# Patient Record
Sex: MALE | Race: WHITE | NOT HISPANIC OR LATINO | Employment: OTHER | URBAN - METROPOLITAN AREA
[De-identification: names, ages, dates, MRNs, and addresses within clinical notes are randomized per-mention and may not be internally consistent; named-entity substitution may affect disease eponyms.]

---

## 2017-03-17 ENCOUNTER — HOSPITAL ENCOUNTER (OUTPATIENT)
Dept: INFUSION THERAPY | Facility: HOSPITAL | Age: 47
Discharge: HOME | End: 2017-03-17
Attending: FAMILY MEDICINE | Admitting: FAMILY MEDICINE

## 2017-04-21 ENCOUNTER — ON CAMPUS - OUTPATIENT (AMBULATORY)
Dept: URBAN - METROPOLITAN AREA HOSPITAL 2 | Facility: HOSPITAL | Age: 47
End: 2017-04-21

## 2017-04-21 VITALS
HEART RATE: 69 BPM | OXYGEN SATURATION: 99 % | HEART RATE: 61 BPM | HEIGHT: 65 IN | OXYGEN SATURATION: 97 % | HEART RATE: 72 BPM | HEART RATE: 64 BPM | HEART RATE: 63 BPM | SYSTOLIC BLOOD PRESSURE: 117 MMHG | OXYGEN SATURATION: 93 % | DIASTOLIC BLOOD PRESSURE: 80 MMHG | WEIGHT: 167.8 LBS | SYSTOLIC BLOOD PRESSURE: 131 MMHG | SYSTOLIC BLOOD PRESSURE: 104 MMHG | OXYGEN SATURATION: 92 % | SYSTOLIC BLOOD PRESSURE: 121 MMHG | OXYGEN SATURATION: 95 % | DIASTOLIC BLOOD PRESSURE: 73 MMHG | SYSTOLIC BLOOD PRESSURE: 108 MMHG | RESPIRATION RATE: 18 BRPM | DIASTOLIC BLOOD PRESSURE: 62 MMHG | SYSTOLIC BLOOD PRESSURE: 96 MMHG | HEART RATE: 78 BPM | RESPIRATION RATE: 16 BRPM | DIASTOLIC BLOOD PRESSURE: 70 MMHG | DIASTOLIC BLOOD PRESSURE: 85 MMHG | TEMPERATURE: 97.3 F

## 2017-04-21 DIAGNOSIS — R13.10 DYSPHAGIA, UNSPECIFIED: ICD-10-CM

## 2017-04-21 PROCEDURE — 43450 DILATE ESOPHAGUS 1/MULT PASS: CPT | Performed by: INTERNAL MEDICINE

## 2017-04-21 PROCEDURE — 43235 EGD DIAGNOSTIC BRUSH WASH: CPT | Performed by: INTERNAL MEDICINE

## 2017-04-21 RX ORDER — RANITIDINE 300 MG/1
TABLET ORAL
Qty: 180 | Refills: 4 | Status: COMPLETED
End: 2019-10-17

## 2017-04-21 RX ADMIN — PROPOFOL: 10 INJECTION, EMULSION INTRAVENOUS at 08:53

## 2017-05-18 ENCOUNTER — OFFICE (AMBULATORY)
Dept: URBAN - METROPOLITAN AREA CLINIC 64 | Facility: CLINIC | Age: 47
End: 2017-05-18

## 2017-05-18 VITALS
HEART RATE: 85 BPM | DIASTOLIC BLOOD PRESSURE: 79 MMHG | WEIGHT: 170 LBS | HEIGHT: 65 IN | SYSTOLIC BLOOD PRESSURE: 132 MMHG

## 2017-05-18 DIAGNOSIS — R10.84 GENERALIZED ABDOMINAL PAIN: ICD-10-CM

## 2017-05-18 DIAGNOSIS — Q43.1 HIRSCHSPRUNG'S DISEASE: ICD-10-CM

## 2017-05-18 DIAGNOSIS — K59.1 FUNCTIONAL DIARRHEA: ICD-10-CM

## 2017-05-18 DIAGNOSIS — R14.0 ABDOMINAL DISTENSION (GASEOUS): ICD-10-CM

## 2017-05-18 PROCEDURE — 99214 OFFICE O/P EST MOD 30 MIN: CPT | Performed by: NURSE PRACTITIONER

## 2017-05-19 LAB
C DIFFICILE TOXINS A+B, EIA: NEGATIVE
OVA + PARASITE EXAM: (no result)
RESULT: RESULT 1: (no result)
STOOL CULTURE: CAMPYLOBACTER CULTURE: (no result)
STOOL CULTURE: E COLI SHIGA TOXIN EIA: NEGATIVE
STOOL CULTURE: SALMONELLA/SHIGELLA SCREEN: (no result)
WHITE BLOOD CELLS (WBC), STOOL: (no result)

## 2017-06-15 ENCOUNTER — HOSPITAL ENCOUNTER (OUTPATIENT)
Dept: FAMILY MEDICINE CLINIC | Facility: CLINIC | Age: 47
Setting detail: SPECIMEN
Discharge: HOME | End: 2017-06-15
Attending: FAMILY MEDICINE | Admitting: FAMILY MEDICINE

## 2017-06-15 LAB
CHOLEST SERPL-MCNC: 166 MG/DL
CHOLEST/HDLC SERPL: 4.1 {RATIO}
CONV LDL CHOLESTEROL DIRECT: 112 MG/DL (ref 0–100)
HDLC SERPL-MCNC: 40 MG/DL
LDLC/HDLC SERPL: 2.8 {RATIO}
LIPID INTERPRETATION: ABNORMAL
PSA SERPL-MCNC: 1.1 NG/ML (ref 0–4)
TRIGL SERPL-MCNC: 87 MG/DL
TSH SERPL-ACNC: 6.45 UIU/ML (ref 0.34–5.6)
VALPROATE SERPL-MCNC: 74.5 UG/ML (ref 50–100)
VLDLC SERPL CALC-MCNC: 13.7 MG/DL

## 2017-06-21 ENCOUNTER — ON CAMPUS - OUTPATIENT (AMBULATORY)
Dept: URBAN - METROPOLITAN AREA HOSPITAL 2 | Facility: HOSPITAL | Age: 47
End: 2017-06-21

## 2017-06-21 ENCOUNTER — HOSPITAL ENCOUNTER (OUTPATIENT)
Dept: OTHER | Facility: HOSPITAL | Age: 47
Setting detail: SPECIMEN
Discharge: HOME | End: 2017-06-21
Attending: INTERNAL MEDICINE | Admitting: INTERNAL MEDICINE

## 2017-06-21 ENCOUNTER — OFFICE (AMBULATORY)
Dept: URBAN - METROPOLITAN AREA CLINIC 64 | Facility: CLINIC | Age: 47
End: 2017-06-21

## 2017-06-21 VITALS
TEMPERATURE: 97.6 F | DIASTOLIC BLOOD PRESSURE: 77 MMHG | OXYGEN SATURATION: 97 % | DIASTOLIC BLOOD PRESSURE: 47 MMHG | SYSTOLIC BLOOD PRESSURE: 100 MMHG | SYSTOLIC BLOOD PRESSURE: 92 MMHG | DIASTOLIC BLOOD PRESSURE: 57 MMHG | DIASTOLIC BLOOD PRESSURE: 52 MMHG | DIASTOLIC BLOOD PRESSURE: 40 MMHG | OXYGEN SATURATION: 95 % | HEART RATE: 59 BPM | HEART RATE: 60 BPM | WEIGHT: 169 LBS | SYSTOLIC BLOOD PRESSURE: 112 MMHG | HEART RATE: 61 BPM | HEART RATE: 58 BPM | OXYGEN SATURATION: 98 % | DIASTOLIC BLOOD PRESSURE: 51 MMHG | DIASTOLIC BLOOD PRESSURE: 58 MMHG | HEART RATE: 56 BPM | SYSTOLIC BLOOD PRESSURE: 79 MMHG | DIASTOLIC BLOOD PRESSURE: 50 MMHG | RESPIRATION RATE: 17 BRPM | OXYGEN SATURATION: 96 % | HEIGHT: 65 IN | HEART RATE: 57 BPM | DIASTOLIC BLOOD PRESSURE: 53 MMHG | RESPIRATION RATE: 16 BRPM | SYSTOLIC BLOOD PRESSURE: 83 MMHG | HEART RATE: 55 BPM | RESPIRATION RATE: 18 BRPM | RESPIRATION RATE: 20 BRPM | SYSTOLIC BLOOD PRESSURE: 124 MMHG | HEART RATE: 53 BPM | SYSTOLIC BLOOD PRESSURE: 93 MMHG | HEART RATE: 52 BPM | DIASTOLIC BLOOD PRESSURE: 73 MMHG | SYSTOLIC BLOOD PRESSURE: 118 MMHG | SYSTOLIC BLOOD PRESSURE: 72 MMHG | DIASTOLIC BLOOD PRESSURE: 66 MMHG | SYSTOLIC BLOOD PRESSURE: 80 MMHG | HEART RATE: 72 BPM | DIASTOLIC BLOOD PRESSURE: 86 MMHG

## 2017-06-21 DIAGNOSIS — Q43.1 HIRSCHSPRUNG'S DISEASE: ICD-10-CM

## 2017-06-21 DIAGNOSIS — K62.1 RECTAL POLYP: ICD-10-CM

## 2017-06-21 DIAGNOSIS — R19.4 CHANGE IN BOWEL HABIT: ICD-10-CM

## 2017-06-21 DIAGNOSIS — K59.1 FUNCTIONAL DIARRHEA: ICD-10-CM

## 2017-06-21 DIAGNOSIS — R10.84 GENERALIZED ABDOMINAL PAIN: ICD-10-CM

## 2017-06-21 LAB
GI HISTOLOGY: A. UNSPECIFIED: (no result)
GI HISTOLOGY: PDF REPORT: (no result)

## 2017-06-21 PROCEDURE — 88305 TISSUE EXAM BY PATHOLOGIST: CPT | Mod: 26 | Performed by: INTERNAL MEDICINE

## 2017-06-21 PROCEDURE — 45380 COLONOSCOPY AND BIOPSY: CPT | Performed by: INTERNAL MEDICINE

## 2017-06-21 RX ORDER — LUBIPROSTONE 24 UG/1
CAPSULE, GELATIN COATED ORAL
Qty: 60 | Refills: 1 | Status: COMPLETED
End: 2019-11-04

## 2017-06-21 RX ADMIN — PROPOFOL: 10 INJECTION, EMULSION INTRAVENOUS at 08:35

## 2017-06-21 NOTE — SERVICEHPINOTES
Patient is a 46-year-old male with history of Hirschsprung's disease, magacolon s/p left colectomy, and behavior disorder who presents today with complaints of diarrhea and stomach swelling. The patient was last seen on EGD in 4/2017 by Dr. Tomas which was normal with empiric dilation to 56 U[place]. The patient's most recent colonoscopy was in 8/2013 performed by Dr. Callejas showing an anastomotic ulcer, fecal impaction with focal colitis, poor prep, internal and external hemorrhoids with megarectum. At the time of his colonoscopy, the patient was started on Linzess 290 mg daily and Miralax BID. He presents today with stomach swelling and diarrhea. BRThe patient is accompanied to this visit by his caretaker. According to caretaker, the patient has been having diarrhea for the past couple months. His primary care provider stopped Linzess approximately 2 months ago as the patient had been having diarrhea multiple times daily with occasional episodes of incontinence at night. The patient had been on a bowel regimen of MiraLAX twice daily, magnesium citrate once weekly, and Linzess once daily. Over the weekend, the patient's caretaker reports that his abdomen became markedly more distended and the patient began to complain of some abdominal pain. The patient's primary care provider was notified and he was instructed to drink magnesium citrate every other day. The patient's abdominal distention has now lessened and he is no longer been complaining of abdominal pain. The patient's caretaker reports that he typically has multiple loose stools daily with occasional small formed pieces. Caretakers have not seen any bright red blood per rectum or melena. Patient currently denies any abdominal pain. No nausea/vomiting, heartburn, or dysphagia. No NSAID use. Caretaker denies any recent fever or weight loss. 4/21/2017 EGD (Dr. Tomas) - normal, empiric dilation to 56 FrBR1/2015 EGD (Dr. Espinosa) - normal, empiric dilation to 56 FrBR8/2013 Colonoscopy (Dr. Callejas) - anastomotic ulcer, fecal impaction with focal colitis, poor prep, internal and external hemorrhoids with megarectum

## 2017-07-06 ENCOUNTER — OFFICE (AMBULATORY)
Dept: URBAN - METROPOLITAN AREA CLINIC 64 | Facility: CLINIC | Age: 47
End: 2017-07-06

## 2017-07-06 VITALS
WEIGHT: 165 LBS | HEIGHT: 65 IN | SYSTOLIC BLOOD PRESSURE: 105 MMHG | HEART RATE: 74 BPM | DIASTOLIC BLOOD PRESSURE: 76 MMHG

## 2017-07-06 DIAGNOSIS — K59.1 FUNCTIONAL DIARRHEA: ICD-10-CM

## 2017-07-06 DIAGNOSIS — R14.0 ABDOMINAL DISTENSION (GASEOUS): ICD-10-CM

## 2017-07-06 PROCEDURE — 99213 OFFICE O/P EST LOW 20 MIN: CPT | Performed by: NURSE PRACTITIONER

## 2017-09-18 ENCOUNTER — HOSPITAL ENCOUNTER (OUTPATIENT)
Dept: FAMILY MEDICINE CLINIC | Facility: CLINIC | Age: 47
Setting detail: SPECIMEN
Discharge: HOME | End: 2017-09-18
Attending: FAMILY MEDICINE | Admitting: FAMILY MEDICINE

## 2017-09-18 LAB — TSH SERPL-ACNC: 10.51 UIU/ML (ref 0.34–5.6)

## 2017-09-19 LAB
T3 SERPL-MCNC: 0.86 NG/ML (ref 0.87–1.78)
T4 FREE SERPL-MCNC: 0.62 NG/DL (ref 0.58–1.64)

## 2017-12-18 ENCOUNTER — HOSPITAL ENCOUNTER (OUTPATIENT)
Dept: FAMILY MEDICINE CLINIC | Facility: CLINIC | Age: 47
Setting detail: SPECIMEN
Discharge: HOME | End: 2017-12-18
Attending: FAMILY MEDICINE | Admitting: FAMILY MEDICINE

## 2018-03-26 ENCOUNTER — HOSPITAL ENCOUNTER (OUTPATIENT)
Dept: FAMILY MEDICINE CLINIC | Facility: CLINIC | Age: 48
Setting detail: SPECIMEN
Discharge: HOME | End: 2018-03-26
Attending: FAMILY MEDICINE | Admitting: FAMILY MEDICINE

## 2018-03-26 LAB
ALBUMIN SERPL-MCNC: 3.6 G/DL (ref 3.5–4.8)
ALBUMIN/GLOB SERPL: 1 {RATIO} (ref 1–1.7)
ALP SERPL-CCNC: 41 IU/L (ref 32–91)
ALT SERPL-CCNC: 27 IU/L (ref 17–63)
ANION GAP SERPL CALC-SCNC: 12.1 MMOL/L (ref 10–20)
AST SERPL-CCNC: 44 IU/L (ref 15–41)
BASOPHILS # BLD AUTO: 0 10*3/UL (ref 0–0.2)
BASOPHILS NFR BLD AUTO: 0 % (ref 0–2)
BILIRUB SERPL-MCNC: 0.6 MG/DL (ref 0.3–1.2)
BILIRUB UR QL STRIP: NEGATIVE MG/DL
BUN SERPL-MCNC: 14 MG/DL (ref 8–20)
BUN/CREAT SERPL: 15.6 (ref 6.2–20.3)
CALCIUM SERPL-MCNC: 9.1 MG/DL (ref 8.9–10.3)
CASTS URNS QL MICRO: ABNORMAL /[LPF]
CHLORIDE SERPL-SCNC: 103 MMOL/L (ref 101–111)
COLOR UR: ABNORMAL
CONV BACTERIA IN URINE MICRO: NEGATIVE
CONV CLARITY OF URINE: CLEAR
CONV CO2: 28 MMOL/L (ref 22–32)
CONV HYALINE CASTS IN URINE MICRO: 5 /[LPF] (ref 0–5)
CONV PROTEIN IN URINE BY AUTOMATED TEST STRIP: NEGATIVE MG/DL
CONV SMALL ROUND CELLS: ABNORMAL /[HPF]
CONV TOTAL PROTEIN: 7.1 G/DL (ref 6.1–7.9)
CONV UROBILINOGEN IN URINE BY AUTOMATED TEST STRIP: 0.2 MG/DL
CREAT UR-MCNC: 0.9 MG/DL (ref 0.7–1.2)
CULTURE INDICATED?: ABNORMAL
DIFFERENTIAL METHOD BLD: (no result)
EOSINOPHIL # BLD AUTO: 0.1 10*3/UL (ref 0–0.3)
EOSINOPHIL # BLD AUTO: 2 % (ref 0–3)
ERYTHROCYTE [DISTWIDTH] IN BLOOD BY AUTOMATED COUNT: 14.6 % (ref 11.5–14.5)
GLOBULIN UR ELPH-MCNC: 3.5 G/DL (ref 2.5–3.8)
GLUCOSE SERPL-MCNC: 80 MG/DL (ref 65–99)
GLUCOSE UR QL: NEGATIVE MG/DL
HCT VFR BLD AUTO: 43.4 % (ref 40–54)
HGB BLD-MCNC: 14.5 G/DL (ref 14–18)
HGB UR QL STRIP: NEGATIVE
KETONES UR QL STRIP: ABNORMAL MG/DL
LEUKOCYTE ESTERASE UR QL STRIP: ABNORMAL
LYMPHOCYTES # BLD AUTO: 2.5 10*3/UL (ref 0.8–4.8)
LYMPHOCYTES NFR BLD AUTO: 38 % (ref 18–42)
MCH RBC QN AUTO: 32.6 PG (ref 26–32)
MCHC RBC AUTO-ENTMCNC: 33.4 G/DL (ref 32–36)
MCV RBC AUTO: 97.7 FL (ref 80–94)
MONOCYTES # BLD AUTO: 0.7 10*3/UL (ref 0.1–1.3)
MONOCYTES NFR BLD AUTO: 11 % (ref 2–11)
NEUTROPHILS # BLD AUTO: 3.3 10*3/UL (ref 2.3–8.6)
NEUTROPHILS NFR BLD AUTO: 49 % (ref 50–75)
NITRITE UR QL STRIP: NEGATIVE
NRBC BLD AUTO-RTO: 0 /100{WBCS}
NRBC/RBC NFR BLD MANUAL: 0 10*3/UL
PH UR STRIP.AUTO: 6 [PH] (ref 4.5–8)
PLATELET # BLD AUTO: 151 10*3/UL (ref 150–450)
PMV BLD AUTO: 9.9 FL (ref 7.4–10.4)
POTASSIUM SERPL-SCNC: 4.1 MMOL/L (ref 3.6–5.1)
RBC # BLD AUTO: 4.45 10*6/UL (ref 4.6–6)
RBC #/AREA URNS HPF: 1 /[HPF] (ref 0–3)
SODIUM SERPL-SCNC: 139 MMOL/L (ref 136–144)
SP GR UR: 1.03 (ref 1–1.03)
SPERM URNS QL MICRO: ABNORMAL /[HPF]
SQUAMOUS SPT QL MICRO: 2 /[HPF] (ref 0–5)
UNIDENT CRYS URNS QL MICRO: ABNORMAL /[HPF]
WBC # BLD AUTO: 6.7 10*3/UL (ref 4.5–11.5)
WBC #/AREA URNS HPF: 2 /[HPF] (ref 0–5)
YEAST SPEC QL WET PREP: ABNORMAL /[HPF]

## 2018-10-23 ENCOUNTER — HOSPITAL ENCOUNTER (OUTPATIENT)
Dept: FAMILY MEDICINE CLINIC | Facility: CLINIC | Age: 48
Setting detail: SPECIMEN
Discharge: HOME | End: 2018-10-23
Attending: FAMILY MEDICINE | Admitting: FAMILY MEDICINE

## 2018-10-23 LAB
ALBUMIN SERPL-MCNC: 3.6 G/DL (ref 3.5–4.8)
ALBUMIN/GLOB SERPL: 1.1 {RATIO} (ref 1–1.7)
ALP SERPL-CCNC: 58 IU/L (ref 32–91)
ALT SERPL-CCNC: 19 IU/L (ref 17–63)
ANION GAP SERPL CALC-SCNC: 12.2 MMOL/L (ref 10–20)
AST SERPL-CCNC: 32 IU/L (ref 15–41)
BASOPHILS # BLD AUTO: 0 10*3/UL (ref 0–0.2)
BASOPHILS NFR BLD AUTO: 1 % (ref 0–2)
BILIRUB SERPL-MCNC: 0.4 MG/DL (ref 0.3–1.2)
BUN SERPL-MCNC: 12 MG/DL (ref 8–20)
BUN/CREAT SERPL: 13.3 (ref 6.2–20.3)
CALCIUM SERPL-MCNC: 9.1 MG/DL (ref 8.9–10.3)
CHLORIDE SERPL-SCNC: 101 MMOL/L (ref 101–111)
CONV CO2: 25 MMOL/L (ref 22–32)
CONV TOTAL PROTEIN: 7 G/DL (ref 6.1–7.9)
CREAT UR-MCNC: 0.9 MG/DL (ref 0.7–1.2)
DIFFERENTIAL METHOD BLD: (no result)
EOSINOPHIL # BLD AUTO: 0.1 10*3/UL (ref 0–0.3)
EOSINOPHIL # BLD AUTO: 2 % (ref 0–3)
ERYTHROCYTE [DISTWIDTH] IN BLOOD BY AUTOMATED COUNT: 14.9 % (ref 11.5–14.5)
GLOBULIN UR ELPH-MCNC: 3.4 G/DL (ref 2.5–3.8)
GLUCOSE SERPL-MCNC: 83 MG/DL (ref 65–99)
HCT VFR BLD AUTO: 44.6 % (ref 40–54)
HGB BLD-MCNC: 15 G/DL (ref 14–18)
LYMPHOCYTES # BLD AUTO: 2.4 10*3/UL (ref 0.8–4.8)
LYMPHOCYTES NFR BLD AUTO: 39 % (ref 18–42)
MCH RBC QN AUTO: 32.9 PG (ref 26–32)
MCHC RBC AUTO-ENTMCNC: 33.7 G/DL (ref 32–36)
MCV RBC AUTO: 97.7 FL (ref 80–94)
MONOCYTES # BLD AUTO: 0.7 10*3/UL (ref 0.1–1.3)
MONOCYTES NFR BLD AUTO: 11 % (ref 2–11)
NEUTROPHILS # BLD AUTO: 2.8 10*3/UL (ref 2.3–8.6)
NEUTROPHILS NFR BLD AUTO: 47 % (ref 50–75)
NRBC BLD AUTO-RTO: 0 /100{WBCS}
NRBC/RBC NFR BLD MANUAL: 0 10*3/UL
PLATELET # BLD AUTO: 169 10*3/UL (ref 150–450)
PMV BLD AUTO: 9.3 FL (ref 7.4–10.4)
POTASSIUM SERPL-SCNC: 4.2 MMOL/L (ref 3.6–5.1)
PSA SERPL-MCNC: 1.5 NG/ML (ref 0–4)
RBC # BLD AUTO: 4.56 10*6/UL (ref 4.6–6)
SODIUM SERPL-SCNC: 134 MMOL/L (ref 136–144)
T3 SERPL-MCNC: 0.68 NG/ML (ref 0.87–1.78)
T4 FREE SERPL-MCNC: 0.7 NG/DL (ref 0.58–1.64)
TSH SERPL-ACNC: 0.93 UIU/ML (ref 0.34–5.6)
VALPROATE SERPL-MCNC: 93.8 UG/ML (ref 50–100)
WBC # BLD AUTO: 6.1 10*3/UL (ref 4.5–11.5)

## 2018-11-14 ENCOUNTER — OFFICE (AMBULATORY)
Dept: URBAN - METROPOLITAN AREA CLINIC 64 | Facility: CLINIC | Age: 48
End: 2018-11-14

## 2018-11-14 VITALS
SYSTOLIC BLOOD PRESSURE: 119 MMHG | HEIGHT: 65 IN | HEART RATE: 87 BPM | DIASTOLIC BLOOD PRESSURE: 95 MMHG | WEIGHT: 188 LBS

## 2018-11-14 DIAGNOSIS — R14.0 ABDOMINAL DISTENSION (GASEOUS): ICD-10-CM

## 2018-11-14 DIAGNOSIS — R19.5 OTHER FECAL ABNORMALITIES: ICD-10-CM

## 2018-11-14 DIAGNOSIS — R93.3 ABNORMAL FINDINGS ON DIAGNOSTIC IMAGING OF OTHER PARTS OF DI: ICD-10-CM

## 2018-11-14 DIAGNOSIS — Q43.1 HIRSCHSPRUNG'S DISEASE: ICD-10-CM

## 2018-11-14 PROCEDURE — 99214 OFFICE O/P EST MOD 30 MIN: CPT | Performed by: NURSE PRACTITIONER

## 2018-11-14 RX ORDER — BISACODYL 10 MG
SUPPOSITORY, RECTAL RECTAL
Qty: 15 | Refills: 1 | Status: COMPLETED
Start: 2018-11-14 | End: 2019-09-04

## 2019-01-03 ENCOUNTER — OFFICE (AMBULATORY)
Dept: URBAN - METROPOLITAN AREA CLINIC 64 | Facility: CLINIC | Age: 49
End: 2019-01-03
Payer: MEDICAID

## 2019-01-03 VITALS
DIASTOLIC BLOOD PRESSURE: 85 MMHG | SYSTOLIC BLOOD PRESSURE: 118 MMHG | WEIGHT: 195 LBS | HEART RATE: 86 BPM | HEIGHT: 65 IN

## 2019-01-03 DIAGNOSIS — R14.0 ABDOMINAL DISTENSION (GASEOUS): ICD-10-CM

## 2019-01-03 DIAGNOSIS — K59.00 CONSTIPATION, UNSPECIFIED: ICD-10-CM

## 2019-01-03 DIAGNOSIS — R10.84 GENERALIZED ABDOMINAL PAIN: ICD-10-CM

## 2019-01-03 DIAGNOSIS — Q43.1 HIRSCHSPRUNG'S DISEASE: ICD-10-CM

## 2019-01-03 PROCEDURE — 99214 OFFICE O/P EST MOD 30 MIN: CPT | Performed by: INTERNAL MEDICINE

## 2019-01-03 RX ORDER — PLECANATIDE 3 MG/1
3 TABLET ORAL
Qty: 90 | Refills: 3 | Status: COMPLETED
Start: 2019-01-03 | End: 2019-10-17

## 2019-01-04 ENCOUNTER — HOSPITAL ENCOUNTER (OUTPATIENT)
Dept: GENERAL RADIOLOGY | Facility: HOSPITAL | Age: 49
Discharge: HOME | End: 2019-01-04
Attending: INTERNAL MEDICINE | Admitting: INTERNAL MEDICINE

## 2019-01-05 ENCOUNTER — HOSPITAL ENCOUNTER (OUTPATIENT)
Dept: GENERAL RADIOLOGY | Facility: HOSPITAL | Age: 49
Discharge: HOME | End: 2019-01-05
Attending: INTERNAL MEDICINE | Admitting: INTERNAL MEDICINE

## 2019-01-09 ENCOUNTER — HOSPITAL ENCOUNTER (OUTPATIENT)
Dept: GENERAL RADIOLOGY | Facility: HOSPITAL | Age: 49
Discharge: HOME | End: 2019-01-09
Attending: INTERNAL MEDICINE | Admitting: INTERNAL MEDICINE

## 2019-01-31 ENCOUNTER — OFFICE (AMBULATORY)
Dept: URBAN - METROPOLITAN AREA CLINIC 51 | Facility: CLINIC | Age: 49
End: 2019-01-31

## 2019-01-31 DIAGNOSIS — K62.89 OTHER SPECIFIED DISEASES OF ANUS AND RECTUM: ICD-10-CM

## 2019-01-31 DIAGNOSIS — K59.00 CONSTIPATION, UNSPECIFIED: ICD-10-CM

## 2019-01-31 DIAGNOSIS — R10.9 UNSPECIFIED ABDOMINAL PAIN: ICD-10-CM

## 2019-01-31 DIAGNOSIS — Q43.1 HIRSCHSPRUNG'S DISEASE: ICD-10-CM

## 2019-01-31 PROCEDURE — 91120: CPT | Mod: 59 | Performed by: INTERNAL MEDICINE

## 2019-01-31 PROCEDURE — 91122: CPT | Performed by: INTERNAL MEDICINE

## 2019-02-06 ENCOUNTER — HOSPITAL ENCOUNTER (OUTPATIENT)
Dept: LAB | Facility: HOSPITAL | Age: 49
Discharge: HOME | End: 2019-02-06
Attending: PSYCHIATRY & NEUROLOGY | Admitting: PSYCHIATRY & NEUROLOGY

## 2019-02-06 LAB — VALPROATE SERPL-MCNC: 61 UG/ML (ref 50–100)

## 2019-02-13 ENCOUNTER — OFFICE (AMBULATORY)
Dept: URBAN - METROPOLITAN AREA CLINIC 64 | Facility: CLINIC | Age: 49
End: 2019-02-13

## 2019-02-13 VITALS
HEART RATE: 87 BPM | DIASTOLIC BLOOD PRESSURE: 84 MMHG | SYSTOLIC BLOOD PRESSURE: 119 MMHG | WEIGHT: 189 LBS | HEIGHT: 65 IN

## 2019-02-13 DIAGNOSIS — K59.00 CONSTIPATION, UNSPECIFIED: ICD-10-CM

## 2019-02-13 DIAGNOSIS — R14.0 ABDOMINAL DISTENSION (GASEOUS): ICD-10-CM

## 2019-02-13 PROCEDURE — 99213 OFFICE O/P EST LOW 20 MIN: CPT | Performed by: INTERNAL MEDICINE

## 2019-04-15 ENCOUNTER — OFFICE (AMBULATORY)
Dept: URBAN - METROPOLITAN AREA CLINIC 64 | Facility: CLINIC | Age: 49
End: 2019-04-15

## 2019-04-15 VITALS
HEART RATE: 86 BPM | SYSTOLIC BLOOD PRESSURE: 114 MMHG | WEIGHT: 188 LBS | DIASTOLIC BLOOD PRESSURE: 80 MMHG | HEIGHT: 65 IN

## 2019-04-15 DIAGNOSIS — R14.0 ABDOMINAL DISTENSION (GASEOUS): ICD-10-CM

## 2019-04-15 DIAGNOSIS — K59.00 CONSTIPATION, UNSPECIFIED: ICD-10-CM

## 2019-04-15 PROCEDURE — 99213 OFFICE O/P EST LOW 20 MIN: CPT | Performed by: INTERNAL MEDICINE

## 2019-04-15 RX ORDER — POLYETHYLENE GLYCOL 3350 17 G/17G
34 POWDER, FOR SOLUTION ORAL
Qty: 510 | Refills: 11 | Status: COMPLETED
Start: 2019-04-15 | End: 2019-10-17

## 2019-04-18 ENCOUNTER — HOSPITAL ENCOUNTER (OUTPATIENT)
Dept: CARDIOLOGY | Facility: HOSPITAL | Age: 49
Discharge: HOME | End: 2019-04-18
Attending: NURSE PRACTITIONER | Admitting: NURSE PRACTITIONER

## 2019-04-24 ENCOUNTER — HOSPITAL ENCOUNTER (OUTPATIENT)
Dept: FAMILY MEDICINE CLINIC | Facility: CLINIC | Age: 49
Setting detail: SPECIMEN
Discharge: HOME | End: 2019-04-24
Attending: FAMILY MEDICINE | Admitting: FAMILY MEDICINE

## 2019-04-24 LAB
ALBUMIN SERPL-MCNC: 4.1 G/DL (ref 3.5–4.8)
ALBUMIN/GLOB SERPL: 1.1 {RATIO} (ref 1–1.7)
ALP SERPL-CCNC: 69 IU/L (ref 32–91)
ALT SERPL-CCNC: 36 IU/L (ref 17–63)
ANION GAP SERPL CALC-SCNC: 16 MMOL/L (ref 10–20)
AST SERPL-CCNC: 56 IU/L (ref 15–41)
BILIRUB SERPL-MCNC: 0.5 MG/DL (ref 0.3–1.2)
BUN SERPL-MCNC: 15 MG/DL (ref 8–20)
BUN/CREAT SERPL: 12.5 (ref 6.2–20.3)
CALCIUM SERPL-MCNC: 9.9 MG/DL (ref 8.9–10.3)
CHLORIDE SERPL-SCNC: 102 MMOL/L (ref 101–111)
CONV CO2: 22 MMOL/L (ref 22–32)
CONV TOTAL PROTEIN: 7.8 G/DL (ref 6.1–7.9)
CREAT UR-MCNC: 1.2 MG/DL (ref 0.7–1.2)
GLOBULIN UR ELPH-MCNC: 3.7 G/DL (ref 2.5–3.8)
GLUCOSE SERPL-MCNC: 87 MG/DL (ref 65–99)
POTASSIUM SERPL-SCNC: 4 MMOL/L (ref 3.6–5.1)
SODIUM SERPL-SCNC: 136 MMOL/L (ref 136–144)
VALPROATE SERPL-MCNC: ABNORMAL UG/ML
VALPROATE SERPL-MCNC: ABNORMAL UG/ML (ref 50–100)

## 2019-06-05 ENCOUNTER — HOSPITAL ENCOUNTER (OUTPATIENT)
Dept: LAB | Facility: HOSPITAL | Age: 49
Discharge: HOME | End: 2019-06-05
Attending: PSYCHIATRY & NEUROLOGY | Admitting: PSYCHIATRY & NEUROLOGY

## 2019-06-05 LAB — VALPROATE SERPL-MCNC: 67.2 UG/ML (ref 50–100)

## 2019-06-27 RX ORDER — LUBIPROSTONE 24 UG/1
CAPSULE, GELATIN COATED ORAL
Qty: 60 CAPSULE | Refills: 11 | Status: SHIPPED | OUTPATIENT
Start: 2019-06-27 | End: 2020-06-04

## 2019-07-29 ENCOUNTER — OFFICE VISIT (OUTPATIENT)
Dept: FAMILY MEDICINE CLINIC | Facility: CLINIC | Age: 49
End: 2019-07-29

## 2019-07-29 VITALS
WEIGHT: 188.4 LBS | HEART RATE: 91 BPM | RESPIRATION RATE: 12 BRPM | BODY MASS INDEX: 33.37 KG/M2 | SYSTOLIC BLOOD PRESSURE: 115 MMHG | DIASTOLIC BLOOD PRESSURE: 80 MMHG

## 2019-07-29 DIAGNOSIS — R91.8 LUNG MASS: ICD-10-CM

## 2019-07-29 DIAGNOSIS — Q43.1 HIRSCHSPRUNG'S DISEASE: ICD-10-CM

## 2019-07-29 DIAGNOSIS — K59.09 CONSTIPATION, CHRONIC: ICD-10-CM

## 2019-07-29 DIAGNOSIS — F71 MODERATE INTELLECTUAL DISABILITY: ICD-10-CM

## 2019-07-29 DIAGNOSIS — K22.2 ESOPHAGEAL STRICTURE: Primary | ICD-10-CM

## 2019-07-29 DIAGNOSIS — K21.9 GASTROESOPHAGEAL REFLUX DISEASE WITHOUT ESOPHAGITIS: ICD-10-CM

## 2019-07-29 DIAGNOSIS — R91.1 NODULE OF LOWER LOBE OF LEFT LUNG: Primary | ICD-10-CM

## 2019-07-29 PROBLEM — J30.1 HAY FEVER: Status: ACTIVE | Noted: 2018-06-25

## 2019-07-29 PROBLEM — IMO0002 DISORDER OF FOOT: Status: ACTIVE | Noted: 2018-08-24

## 2019-07-29 PROBLEM — R27.0 ATAXIA: Status: ACTIVE | Noted: 2018-08-24

## 2019-07-29 PROBLEM — Z99.89 DEPENDENCE ON CONTINUOUS POSITIVE AIRWAY PRESSURE VENTILATION: Status: ACTIVE | Noted: 2018-04-25

## 2019-07-29 PROBLEM — E78.5 HYPERLIPIDEMIA: Status: ACTIVE | Noted: 2017-06-15

## 2019-07-29 PROCEDURE — 99213 OFFICE O/P EST LOW 20 MIN: CPT | Performed by: FAMILY MEDICINE

## 2019-07-29 RX ORDER — LEVOTHYROXINE SODIUM 0.1 MG/1
TABLET ORAL
COMMUNITY
Start: 2018-10-18 | End: 2019-10-18 | Stop reason: SDUPTHER

## 2019-07-29 RX ORDER — DIVALPROEX SODIUM 250 MG/1
250 TABLET, DELAYED RELEASE ORAL 2 TIMES DAILY
Qty: 60 TABLET | Refills: 11 | COMMUNITY
Start: 2019-07-29 | End: 2019-10-29

## 2019-07-29 RX ORDER — PLECANATIDE 3 MG/1
TABLET ORAL
Refills: 3 | COMMUNITY
Start: 2019-07-09 | End: 2020-06-13

## 2019-07-29 RX ORDER — TAMSULOSIN HYDROCHLORIDE 0.4 MG/1
1 CAPSULE ORAL NIGHTLY
COMMUNITY

## 2019-07-29 RX ORDER — BUSPIRONE HYDROCHLORIDE 30 MG/1
30 TABLET ORAL 2 TIMES DAILY
Qty: 60 TABLET | Refills: 11 | COMMUNITY
Start: 2019-07-29

## 2019-07-29 RX ORDER — QUETIAPINE FUMARATE 300 MG/1
TABLET, FILM COATED ORAL EVERY 12 HOURS
COMMUNITY
Start: 2016-11-09 | End: 2019-10-29

## 2019-07-29 RX ORDER — MONTELUKAST SODIUM 10 MG/1
TABLET ORAL
COMMUNITY
Start: 2019-05-03 | End: 2020-05-01

## 2019-07-29 RX ORDER — QUETIAPINE FUMARATE 400 MG/1
400 TABLET, FILM COATED ORAL 2 TIMES DAILY
Refills: 3 | COMMUNITY
Start: 2019-06-10

## 2019-07-29 RX ORDER — DIVALPROEX SODIUM 250 MG/1
TABLET, DELAYED RELEASE ORAL
COMMUNITY
Start: 2016-05-03 | End: 2019-07-29

## 2019-07-29 RX ORDER — BUSPIRONE HYDROCHLORIDE 30 MG/1
TABLET ORAL
COMMUNITY
Start: 2012-03-19 | End: 2019-07-29

## 2019-07-29 RX ORDER — FLUVOXAMINE MALEATE 150 MG/1
CAPSULE, EXTENDED RELEASE ORAL
COMMUNITY
Start: 2018-03-06 | End: 2019-10-29

## 2019-07-29 RX ORDER — RANITIDINE 300 MG/1
CAPSULE ORAL
COMMUNITY
Start: 2015-03-11 | End: 2019-10-29

## 2019-07-29 NOTE — PROGRESS NOTES
Rooming Tab(CC,VS,Pt Hx,Fall Screen)  Chief Complaint   Patient presents with   • Hypothyroidism       Subjective 48-year-old here for follow-up 3-month.  Patient has had more coughing with his meals, he has a history of esophageal stricture and has had to have dilation in the past.  His last was approximately 2 years ago.  He has a history of Hirschsprung's disease and chronic constipation and follows this with GI as well.  He has been doing relatively well.  He is in a group home and doing relatively well behaviorally.  There are no other new issues.  We need to review his medications.    I have reviewed and updated his medications, medical history and problem list during today's office visit.     Patient Care Team:  Charlie Wing MD as PCP - General  Charlie Wing MD as PCP - Claims Attributed  Charlie Wing MD as PCP - Family Medicine    Problem List Tab  Medications Tab  Synopsis Tab  Chart Review Tab  Care Everywhere Tab  Immunizations Tab  Patient History Tab    Social History     Tobacco Use   • Smoking status: Never Smoker   • Smokeless tobacco: Never Used   Substance Use Topics   • Alcohol use: No     Frequency: Never       Review of Systems   Unable to perform ROS: Mental status change       Objective     Rooming Tab(CC,VS,Pt Hx,Fall Screen)  /80 (BP Location: Right arm, Patient Position: Sitting, Cuff Size: Large Adult)   Pulse 91   Resp 12   Wt 85.5 kg (188 lb 6.4 oz)   BMI 33.37 kg/m²     Body mass index is 33.37 kg/m².    Physical Exam   Constitutional: He appears well-developed. No distress.   Mentally handicapped, pleasant and cooperative   HENT:   Nose: Nose normal.   Mouth/Throat: Oropharynx is clear and moist.   Eyes: Conjunctivae, EOM and lids are normal. Pupils are equal, round, and reactive to light.   Neck: Trachea normal and normal range of motion. Neck supple. No JVD present. Carotid bruit is not present. No thyroid mass and no thyromegaly present.   Cardiovascular:  Normal rate, regular rhythm, normal heart sounds and intact distal pulses.   Pulmonary/Chest: Effort normal and breath sounds normal.   Abdominal:   Somewhat distended, bowel sounds present, nontender   Musculoskeletal:   Trace of edema, has a brace on his left ankle and lower leg.   Lymphadenopathy:     He has no cervical adenopathy.   Neurological:   Mentally handicapped, he has an ataxic gait with poor balance   Skin: Skin is warm and dry.   Psychiatric: His speech is normal. He is attentive.   Nursing note and vitals reviewed.       Statin Choice Calculator  Data Reviewed:                   Assessment/Plan   Order Review Tab  Health Maintenance Tab  Patient Plan/Order Tab  Diagnoses and all orders for this visit:    1. Esophageal stricture (Primary)  Assessment & Plan:  Recurrent  Will need EGD with dilation    Orders:  -     Ambulatory referral for Screening EGD    2. Gastroesophageal reflux disease without esophagitis    3. Constipation, chronic  Assessment & Plan:  stable      4. Moderate intellectual disability  Assessment & Plan:  Psychological condition is unchanged.  Continue current treatment regimen.  Psychological condition  will be reassessed at the next regular appointment.      5. Lung mass  Assessment & Plan:  He is overdue for a CT scan of the chest, we will order this      6. Hirschsprung's disease  Assessment & Plan:  Continue current medication      Other orders  -     divalproex (DEPAKOTE) 250 MG DR tablet; Take 1 tablet by mouth 2 (Two) Times a Day.  Dispense: 60 tablet; Refill: 11  -     busPIRone (BUSPAR) 30 MG tablet; Take 1 tablet by mouth 2 (Two) Times a Day.  Dispense: 60 tablet; Refill: 11      Wrapup Tab  Return in about 3 months (around 10/29/2019).

## 2019-08-09 ENCOUNTER — HOSPITAL ENCOUNTER (OUTPATIENT)
Dept: CT IMAGING | Facility: HOSPITAL | Age: 49
Discharge: HOME | End: 2019-08-09
Admitting: FAMILY MEDICINE

## 2019-08-09 DIAGNOSIS — R91.1 NODULE OF LOWER LOBE OF LEFT LUNG: ICD-10-CM

## 2019-08-09 PROCEDURE — 71250 CT THORAX DX C-: CPT

## 2019-10-10 ENCOUNTER — LAB (OUTPATIENT)
Dept: LAB | Facility: HOSPITAL | Age: 49
End: 2019-10-10

## 2019-10-10 ENCOUNTER — TRANSCRIBE ORDERS (OUTPATIENT)
Dept: ADMINISTRATIVE | Facility: HOSPITAL | Age: 49
End: 2019-10-10

## 2019-10-10 DIAGNOSIS — F39 MILD MOOD DISORDER (HCC): ICD-10-CM

## 2019-10-10 DIAGNOSIS — F39 MILD MOOD DISORDER (HCC): Primary | ICD-10-CM

## 2019-10-10 LAB — VALPROATE SERPL-MCNC: 69.8 MCG/ML (ref 50–100)

## 2019-10-10 PROCEDURE — 80164 ASSAY DIPROPYLACETIC ACD TOT: CPT

## 2019-10-10 PROCEDURE — 36415 COLL VENOUS BLD VENIPUNCTURE: CPT

## 2019-10-17 ENCOUNTER — OFFICE (AMBULATORY)
Dept: URBAN - METROPOLITAN AREA PATHOLOGY 4 | Facility: PATHOLOGY | Age: 49
End: 2019-10-17

## 2019-10-17 ENCOUNTER — ON CAMPUS - OUTPATIENT (AMBULATORY)
Dept: URBAN - METROPOLITAN AREA HOSPITAL 2 | Facility: HOSPITAL | Age: 49
End: 2019-10-17

## 2019-10-17 VITALS
DIASTOLIC BLOOD PRESSURE: 82 MMHG | DIASTOLIC BLOOD PRESSURE: 62 MMHG | TEMPERATURE: 96.8 F | OXYGEN SATURATION: 99 % | RESPIRATION RATE: 18 BRPM | HEART RATE: 78 BPM | SYSTOLIC BLOOD PRESSURE: 101 MMHG | SYSTOLIC BLOOD PRESSURE: 116 MMHG | HEIGHT: 65 IN | DIASTOLIC BLOOD PRESSURE: 49 MMHG | SYSTOLIC BLOOD PRESSURE: 100 MMHG | OXYGEN SATURATION: 98 % | DIASTOLIC BLOOD PRESSURE: 53 MMHG | OXYGEN SATURATION: 100 % | WEIGHT: 199 LBS | HEART RATE: 74 BPM | HEART RATE: 71 BPM | HEART RATE: 81 BPM | OXYGEN SATURATION: 97 % | HEART RATE: 85 BPM | SYSTOLIC BLOOD PRESSURE: 120 MMHG | DIASTOLIC BLOOD PRESSURE: 78 MMHG | HEART RATE: 75 BPM | SYSTOLIC BLOOD PRESSURE: 106 MMHG | RESPIRATION RATE: 16 BRPM | SYSTOLIC BLOOD PRESSURE: 132 MMHG | RESPIRATION RATE: 19 BRPM | DIASTOLIC BLOOD PRESSURE: 58 MMHG

## 2019-10-17 DIAGNOSIS — K31.89 OTHER DISEASES OF STOMACH AND DUODENUM: ICD-10-CM

## 2019-10-17 DIAGNOSIS — B96.81 HELICOBACTER PYLORI [H. PYLORI] AS THE CAUSE OF DISEASES CLA: ICD-10-CM

## 2019-10-17 DIAGNOSIS — K29.50 UNSPECIFIED CHRONIC GASTRITIS WITHOUT BLEEDING: ICD-10-CM

## 2019-10-17 DIAGNOSIS — R13.10 DYSPHAGIA, UNSPECIFIED: ICD-10-CM

## 2019-10-17 LAB
GI HISTOLOGY: A. SELECT: (no result)
GI HISTOLOGY: PDF REPORT: (no result)

## 2019-10-17 PROCEDURE — 88305 TISSUE EXAM BY PATHOLOGIST: CPT | Mod: 26 | Performed by: INTERNAL MEDICINE

## 2019-10-17 PROCEDURE — 43239 EGD BIOPSY SINGLE/MULTIPLE: CPT | Performed by: INTERNAL MEDICINE

## 2019-10-17 PROCEDURE — 43450 DILATE ESOPHAGUS 1/MULT PASS: CPT | Performed by: INTERNAL MEDICINE

## 2019-10-17 PROCEDURE — 88305 TISSUE EXAM BY PATHOLOGIST: CPT | Performed by: INTERNAL MEDICINE

## 2019-10-17 RX ORDER — PANTOPRAZOLE SODIUM 40 MG/1
TABLET, DELAYED RELEASE ORAL
Qty: 90 | Refills: 2 | Status: ACTIVE

## 2019-10-17 RX ORDER — RANITIDINE 300 MG/1
TABLET ORAL
Refills: 0 | Status: COMPLETED
End: 2019-10-17

## 2019-10-18 ENCOUNTER — LAB REQUISITION (OUTPATIENT)
Dept: LAB | Facility: HOSPITAL | Age: 49
End: 2019-10-18

## 2019-10-18 DIAGNOSIS — R13.10 DYSPHAGIA, UNSPECIFIED: ICD-10-CM

## 2019-10-21 LAB
LAB AP CASE REPORT: NORMAL
PATH REPORT.FINAL DX SPEC: NORMAL
PATH REPORT.GROSS SPEC: NORMAL

## 2019-10-21 RX ORDER — LEVOTHYROXINE SODIUM 0.1 MG/1
TABLET ORAL
Qty: 30 TABLET | Refills: 11 | Status: SHIPPED | OUTPATIENT
Start: 2019-10-21 | End: 2020-06-13

## 2019-10-29 ENCOUNTER — OFFICE VISIT (OUTPATIENT)
Dept: FAMILY MEDICINE CLINIC | Facility: CLINIC | Age: 49
End: 2019-10-29

## 2019-10-29 VITALS
BODY MASS INDEX: 34.79 KG/M2 | RESPIRATION RATE: 12 BRPM | SYSTOLIC BLOOD PRESSURE: 115 MMHG | HEART RATE: 82 BPM | WEIGHT: 196.4 LBS | DIASTOLIC BLOOD PRESSURE: 83 MMHG

## 2019-10-29 DIAGNOSIS — R97.20 ELEVATED PSA: ICD-10-CM

## 2019-10-29 DIAGNOSIS — R60.0 LEG EDEMA: ICD-10-CM

## 2019-10-29 DIAGNOSIS — E78.2 MIXED HYPERLIPIDEMIA: ICD-10-CM

## 2019-10-29 DIAGNOSIS — E03.9 ACQUIRED HYPOTHYROIDISM: ICD-10-CM

## 2019-10-29 DIAGNOSIS — G40.909 SEIZURE DISORDER (HCC): ICD-10-CM

## 2019-10-29 DIAGNOSIS — I89.0 LYMPHEDEMA: Primary | ICD-10-CM

## 2019-10-29 LAB
ALBUMIN SERPL-MCNC: 4 G/DL (ref 3.5–5.2)
ALBUMIN/GLOB SERPL: 1.1 G/DL
ALP SERPL-CCNC: 63 U/L (ref 39–117)
ALT SERPL W P-5'-P-CCNC: 26 U/L (ref 1–41)
ANION GAP SERPL CALCULATED.3IONS-SCNC: 5.5 MMOL/L (ref 5–15)
AST SERPL-CCNC: 39 U/L (ref 1–40)
BASOPHILS # BLD AUTO: 0.03 10*3/MM3 (ref 0–0.2)
BASOPHILS NFR BLD AUTO: 0.6 % (ref 0–1.5)
BILIRUB SERPL-MCNC: 0.3 MG/DL (ref 0.2–1.2)
BUN BLD-MCNC: 13 MG/DL (ref 6–20)
BUN/CREAT SERPL: 14 (ref 7–25)
CALCIUM SPEC-SCNC: 9.6 MG/DL (ref 8.6–10.5)
CHLORIDE SERPL-SCNC: 96 MMOL/L (ref 98–107)
CO2 SERPL-SCNC: 32.5 MMOL/L (ref 22–29)
CREAT BLD-MCNC: 0.93 MG/DL (ref 0.76–1.27)
DEPRECATED RDW RBC AUTO: 45.9 FL (ref 37–54)
EOSINOPHIL # BLD AUTO: 0.08 10*3/MM3 (ref 0–0.4)
EOSINOPHIL NFR BLD AUTO: 1.6 % (ref 0.3–6.2)
ERYTHROCYTE [DISTWIDTH] IN BLOOD BY AUTOMATED COUNT: 13.2 % (ref 12.3–15.4)
GFR SERPL CREATININE-BSD FRML MDRD: 86 ML/MIN/1.73
GLOBULIN UR ELPH-MCNC: 3.8 GM/DL
GLUCOSE BLD-MCNC: 76 MG/DL (ref 65–99)
HCT VFR BLD AUTO: 44.7 % (ref 37.5–51)
HGB BLD-MCNC: 15.4 G/DL (ref 13–17.7)
IMM GRANULOCYTES # BLD AUTO: 0.03 10*3/MM3 (ref 0–0.05)
IMM GRANULOCYTES NFR BLD AUTO: 0.6 % (ref 0–0.5)
LYMPHOCYTES # BLD AUTO: 1.91 10*3/MM3 (ref 0.7–3.1)
LYMPHOCYTES NFR BLD AUTO: 37.7 % (ref 19.6–45.3)
MCH RBC QN AUTO: 33 PG (ref 26.6–33)
MCHC RBC AUTO-ENTMCNC: 34.5 G/DL (ref 31.5–35.7)
MCV RBC AUTO: 95.7 FL (ref 79–97)
MONOCYTES # BLD AUTO: 0.67 10*3/MM3 (ref 0.1–0.9)
MONOCYTES NFR BLD AUTO: 13.2 % (ref 5–12)
NEUTROPHILS # BLD AUTO: 2.35 10*3/MM3 (ref 1.7–7)
NEUTROPHILS NFR BLD AUTO: 46.3 % (ref 42.7–76)
NRBC BLD AUTO-RTO: 0 /100 WBC (ref 0–0.2)
PLATELET # BLD AUTO: 165 10*3/MM3 (ref 140–450)
PMV BLD AUTO: 12 FL (ref 6–12)
POTASSIUM BLD-SCNC: 4.6 MMOL/L (ref 3.5–5.2)
PROT SERPL-MCNC: 7.8 G/DL (ref 6–8.5)
PSA SERPL-MCNC: 1.05 NG/ML (ref 0–4)
RBC # BLD AUTO: 4.67 10*6/MM3 (ref 4.14–5.8)
SODIUM BLD-SCNC: 134 MMOL/L (ref 136–145)
T3 SERPL-MCNC: 66.6 NG/DL (ref 80–200)
T4 FREE SERPL-MCNC: 0.96 NG/DL (ref 0.93–1.7)
TSH SERPL DL<=0.05 MIU/L-ACNC: 2.74 UIU/ML (ref 0.27–4.2)
WBC NRBC COR # BLD: 5.07 10*3/MM3 (ref 3.4–10.8)

## 2019-10-29 PROCEDURE — 84480 ASSAY TRIIODOTHYRONINE (T3): CPT | Performed by: FAMILY MEDICINE

## 2019-10-29 PROCEDURE — 84443 ASSAY THYROID STIM HORMONE: CPT | Performed by: FAMILY MEDICINE

## 2019-10-29 PROCEDURE — 84153 ASSAY OF PSA TOTAL: CPT | Performed by: FAMILY MEDICINE

## 2019-10-29 PROCEDURE — 80053 COMPREHEN METABOLIC PANEL: CPT | Performed by: FAMILY MEDICINE

## 2019-10-29 PROCEDURE — 84439 ASSAY OF FREE THYROXINE: CPT | Performed by: FAMILY MEDICINE

## 2019-10-29 PROCEDURE — 85025 COMPLETE CBC W/AUTO DIFF WBC: CPT | Performed by: FAMILY MEDICINE

## 2019-10-29 PROCEDURE — 36415 COLL VENOUS BLD VENIPUNCTURE: CPT | Performed by: FAMILY MEDICINE

## 2019-10-29 PROCEDURE — 99213 OFFICE O/P EST LOW 20 MIN: CPT | Performed by: FAMILY MEDICINE

## 2019-10-29 RX ORDER — PANTOPRAZOLE SODIUM 40 MG/1
40 TABLET, DELAYED RELEASE ORAL DAILY
Refills: 3 | COMMUNITY
Start: 2019-10-17

## 2019-10-29 RX ORDER — DIVALPROEX SODIUM 500 MG/1
500 TABLET, DELAYED RELEASE ORAL 2 TIMES DAILY
Qty: 60 TABLET | Refills: 11 | COMMUNITY
Start: 2019-10-29 | End: 2020-03-05 | Stop reason: SDUPTHER

## 2019-10-29 RX ORDER — POLYETHYLENE GLYCOL 3350 17 G/17G
POWDER, FOR SOLUTION ORAL
Refills: 11 | COMMUNITY
Start: 2019-10-19 | End: 2020-06-04

## 2019-10-29 RX ORDER — FLUVOXAMINE MALEATE 100 MG
100 TABLET ORAL 2 TIMES DAILY
Qty: 60 TABLET | Refills: 11 | COMMUNITY
Start: 2019-10-29 | End: 2020-06-04

## 2019-10-29 RX ORDER — FUROSEMIDE 40 MG/1
40 TABLET ORAL DAILY
Qty: 30 TABLET | Refills: 0 | Status: SHIPPED | OUTPATIENT
Start: 2019-10-29 | End: 2019-11-15 | Stop reason: SDUPTHER

## 2019-10-29 RX ORDER — DIVALPROEX SODIUM 500 MG/1
1000 TABLET, DELAYED RELEASE ORAL 2 TIMES DAILY
Refills: 2 | COMMUNITY
Start: 2019-10-03 | End: 2019-10-29

## 2019-10-29 RX ORDER — MAGNESIUM OXIDE 500 MG
TABLET ORAL
Refills: 11 | COMMUNITY
Start: 2019-10-19 | End: 2020-06-04

## 2019-10-29 NOTE — PROGRESS NOTES
Rooming Tab(CC,VS,Pt Hx,Fall Screen)  Chief Complaint   Patient presents with   • multiple medical       Subjective 48-year-old here for follow-up of his intellectual disability.  Patient lives in a group home.  He has 24-hour 7-day a week care but is able to do some things on his own including bathing and dressing.  He has had persistent swelling in his legs, he sleeps in a lounge chair and will not elevate his legs and they become quite severe least swollen.  He also has chronic constipation and is currently on amities and Lantus together.  He has no other complaints today.  He has no shortness of breath dizziness or syncope    I have reviewed and updated his medications, medical history and problem list during today's office visit.     Patient Care Team:  Charlie Wing MD as PCP - General  To Barber DPM as PCP - Claims Attributed  Charlie Wing MD as PCP - Family Medicine    Problem List Tab  Medications Tab  Synopsis Tab  Chart Review Tab  Care Everywhere Tab  Immunizations Tab  Patient History Tab    Social History     Tobacco Use   • Smoking status: Never Smoker   • Smokeless tobacco: Never Used   Substance Use Topics   • Alcohol use: No     Frequency: Never       Review of Systems   Constitutional: Negative for chills, fatigue and fever.   HENT: Negative for nosebleeds.    Eyes: Negative for double vision.   Respiratory: Negative for chest tightness and shortness of breath.    Cardiovascular: Positive for leg swelling. Negative for chest pain and palpitations.   Gastrointestinal: Positive for constipation. Negative for abdominal pain and blood in stool.   Genitourinary: Negative for dysuria and hematuria.   Neurological: Negative for dizziness and syncope.   Psychiatric/Behavioral: Negative for depressed mood.       Objective     Rooming Tab(CC,VS,Pt Hx,Fall Screen)  /83 (BP Location: Right arm, Patient Position: Sitting, Cuff Size: Large Adult)   Pulse 82   Resp 12   Wt 89.1 kg  (196 lb 6.4 oz)   BMI 34.79 kg/m²     Body mass index is 34.79 kg/m².    Physical Exam   Constitutional: No distress.   Pleasant, follows simple directions, is in no acute distress   HENT:   Head: Normocephalic and atraumatic.   Nose: Nose normal.   Mouth/Throat: Oropharynx is clear and moist.   Eyes: Conjunctivae, EOM and lids are normal. Pupils are equal, round, and reactive to light.   Neck: Trachea normal and normal range of motion. Neck supple. No JVD present. Carotid bruit is not present. No thyroid mass and no thyromegaly present.   No carotid bruits   Cardiovascular: Normal rate, regular rhythm, normal heart sounds and intact distal pulses.   Pulmonary/Chest: Effort normal and breath sounds normal.   Abdominal:   Old incisions noted, protuberant, bowel sounds present, nondistended nontender.   Musculoskeletal:   He has 3+ edema in his lower extremities.  There is no redness or warmth or tenderness.   Neurological: He is alert. No cranial nerve deficit.   He is ataxic in his gait   Skin: Skin is warm and dry.   Psychiatric: His speech is normal.   He has intellectual disability but he is pleasant cooperative and follows simple directions. He is attentive.   Nursing note and vitals reviewed.       Statin Choice Calculator  Data Reviewed:               Lab Results   Component Value Date    BUN 13 10/29/2019    CREATININE 0.93 10/29/2019    EGFRIFNONA 86 10/29/2019     (L) 10/29/2019    K 4.6 10/29/2019    CL 96 (L) 10/29/2019    CALCIUM 9.6 10/29/2019    ALBUMIN 4.00 10/29/2019    BILITOT 0.3 10/29/2019    ALKPHOS 63 10/29/2019    AST 39 10/29/2019    ALT 26 10/29/2019    WBC 5.07 10/29/2019    RBC 4.67 10/29/2019    HCT 44.7 10/29/2019    MCV 95.7 10/29/2019    MCH 33.0 10/29/2019    TSH 2.740 10/29/2019    FREET4 0.96 10/29/2019    PSA 1.050 10/29/2019      Assessment/Plan   Order Review Tab  Health Maintenance Tab  Patient Plan/Order Tab  Diagnoses and all orders for this visit:    1. Lymphedema  (Primary)  -     Ambulatory Referral to Lymphedema Clinic    2. Mixed hyperlipidemia  Assessment & Plan:  Lipid abnormalities are improving with treatment.  Pharmacotherapy as ordered.  Lipids will be reassessed in 3 months.      3. Acquired hypothyroidism  Assessment & Plan:  Check TFTs    Orders:  -     TSH  -     T4, Free  -     T3    4. Seizure disorder (CMS/HCC)  Assessment & Plan:  Stable, no seizures    Orders:  -     CBC & Differential  -     Comprehensive Metabolic Panel  -     CBC Auto Differential    5. Elevated PSA  Assessment & Plan:  Recheck    Orders:  -     PSA DIAGNOSTIC    6. Leg edema  Assessment & Plan:  Lasix 40 mg daily   Referral to lymphedema clinic  Try to elevate legs      Other orders  -     fluvoxaMINE (LUVOX) 100 MG tablet; Take 1 tablet by mouth 2 (Two) Times a Day.  Dispense: 60 tablet; Refill: 11  -     divalproex (DEPAKOTE) 500 MG DR tablet; Take 1 tablet by mouth 2 (Two) Times a Day.  Dispense: 60 tablet; Refill: 11  -     furosemide (LASIX) 40 MG tablet; Take 1 tablet by mouth Daily.  Dispense: 30 tablet; Refill: 0      Wrapup Tab  Return in about 1 week (around 11/5/2019) for Recheck.

## 2019-10-30 PROBLEM — R97.20 ELEVATED PSA: Status: ACTIVE | Noted: 2019-10-30

## 2019-10-30 PROBLEM — R60.0 LEG EDEMA: Status: ACTIVE | Noted: 2019-10-30

## 2019-10-30 RX ORDER — LIOTHYRONINE SODIUM 5 UG/1
5 TABLET ORAL DAILY
Qty: 30 TABLET | Refills: 5 | Status: SHIPPED | OUTPATIENT
Start: 2019-10-30 | End: 2020-04-02

## 2019-11-01 ENCOUNTER — TREATMENT (OUTPATIENT)
Dept: PHYSICAL THERAPY | Facility: CLINIC | Age: 49
End: 2019-11-01

## 2019-11-01 DIAGNOSIS — I89.0 LYMPHEDEMA: Primary | ICD-10-CM

## 2019-11-01 PROCEDURE — 97110 THERAPEUTIC EXERCISES: CPT | Performed by: OCCUPATIONAL THERAPIST

## 2019-11-01 PROCEDURE — 97165 OT EVAL LOW COMPLEX 30 MIN: CPT | Performed by: OCCUPATIONAL THERAPIST

## 2019-11-01 NOTE — PROGRESS NOTES
Occupational Therapy Initial Evaluation    Patient: Charlie Wells   : 1970  Diagnosis/ICD-10 Code:  Lymphedema [I89.0]  Referring practitioner: Charlie Wing MD  Date of Initial Visit: 2019  Today's Date: 2019  Patient seen for 1 sessions               Subjective Questionnaire: LEFS:       Subjective the patient is a 48 yr old male   He has lived in a group home for about 30 yrs.  He has development /cognitive issues     He is here today for BLE lymphedema    The pt also has sleep apnea but refuses to wear the Cpac machine.    He, therefore ,has a recliner, which has helped the snoring issue but he refuses to elevate his feet; resulting in  issues with lymphedema.   He does have compression socks which he was wearing   He does not report pain   The caregiver reports that he does walk 20 min x2 a day on a treadmill or he walks outside.            Objective      Right LE              Left LE   26 cm      Ankle        25cm  34cm   Above ankle   28cm   44cm  Calf                 45cm  43 cm    Above calf    44cm       Assessment/Plan     Lymphedema treatment requires;   BLE layered lymphedema wraps   The wraps remain in place for 2days  Return to clinic for re-application of the wraps and BLE exercises   Once the lymphedema is reduced  Compression garments are obtained and to be worn daily be the patient     There is incident where a pneumatic pump can be obtained and used daily for one hr     Goals:  To submit documentation to determine if the patient qualifies for a pump   If the pt qualifies a trial run and demonstration to be done to see if the patient will wear /tolerate the pump   Contact to be made with the manager of the group home      History # of Personal Factors and/or Comorbidities: LOW (0)  Examination of Body System(s): # of elements: LOW (1-2)  Clinical Presentation: STABLE   Clinical Decision Making: LOW       Timed Codes        Manual Therapy:           mins   78211;    Therapeutic Exercise:  15         mins  37701;     Neuromuscular April:     mins  27488;    Therapeutic Activity:          mins  56724;      Ultrasound:          mins  77351;    Community/ work         mins  98732  Self Care/ Vandana         mins  32355  Sensory Re-Int.                  mins   23010  Cognitve Re-train         mins  71443     Un-timed Codes  Electrical Stimulation:         mins 9 15932 ( );  OT low eval     30       mins  81552  OT mod eval.                 mins   72281  OT high eval         mins 57415        Timed Treatment:   15   mins   Total Treatment:     45  mins    OT SIGNATURE: Rachael Trujillo OT   DATE TREATMENT INITIATED: 11/1/2019    Initial Certification  Certification Period: 1/30/2020  I certify that the therapy services are furnished while this patient is under my care.  The services outlined above are required by this patient, and will be reviewed every 90 days.     PHYSICIAN: Charlie Wing MD      DATE:     Please sign and return via fax to [phone].. Thank you, [facility].

## 2019-11-04 ENCOUNTER — OFFICE (AMBULATORY)
Dept: URBAN - METROPOLITAN AREA CLINIC 64 | Facility: CLINIC | Age: 49
End: 2019-11-04

## 2019-11-04 VITALS
SYSTOLIC BLOOD PRESSURE: 115 MMHG | DIASTOLIC BLOOD PRESSURE: 79 MMHG | HEART RATE: 79 BPM | HEIGHT: 65 IN | WEIGHT: 199 LBS

## 2019-11-04 DIAGNOSIS — K59.39 OTHER MEGACOLON: ICD-10-CM

## 2019-11-04 DIAGNOSIS — B96.81 HELICOBACTER PYLORI [H. PYLORI] AS THE CAUSE OF DISEASES CLA: ICD-10-CM

## 2019-11-04 DIAGNOSIS — F41.9 ANXIETY DISORDER, UNSPECIFIED: ICD-10-CM

## 2019-11-04 DIAGNOSIS — A09 INFECTIOUS GASTROENTERITIS AND COLITIS, UNSPECIFIED: ICD-10-CM

## 2019-11-04 DIAGNOSIS — R14.0 ABDOMINAL DISTENSION (GASEOUS): ICD-10-CM

## 2019-11-04 DIAGNOSIS — K59.00 CONSTIPATION, UNSPECIFIED: ICD-10-CM

## 2019-11-04 PROCEDURE — 99213 OFFICE O/P EST LOW 20 MIN: CPT | Performed by: INTERNAL MEDICINE

## 2019-11-04 RX ORDER — PRUCALOPRIDE 2 MG/1
TABLET, FILM COATED ORAL
Qty: 30 | Refills: 1 | Status: ACTIVE

## 2019-11-15 RX ORDER — FUROSEMIDE 40 MG/1
TABLET ORAL
Qty: 30 TABLET | Refills: 0 | Status: SHIPPED | OUTPATIENT
Start: 2019-11-15 | End: 2019-12-13 | Stop reason: SDUPTHER

## 2019-12-09 ENCOUNTER — HOSPITAL ENCOUNTER (OUTPATIENT)
Dept: RESPIRATORY THERAPY | Facility: HOSPITAL | Age: 49
Discharge: HOME | End: 2019-12-09
Admitting: INTERNAL MEDICINE

## 2019-12-09 ENCOUNTER — TRANSCRIBE ORDERS (OUTPATIENT)
Dept: ADMINISTRATIVE | Facility: HOSPITAL | Age: 49
End: 2019-12-09

## 2019-12-09 DIAGNOSIS — R09.02 HYPOXEMIA: Primary | ICD-10-CM

## 2019-12-09 DIAGNOSIS — R09.02 HYPOXEMIA: ICD-10-CM

## 2019-12-09 DIAGNOSIS — J44.9 CHRONIC OBSTRUCTIVE PULMONARY DISEASE, UNSPECIFIED COPD TYPE (HCC): ICD-10-CM

## 2019-12-09 PROCEDURE — 94618 PULMONARY STRESS TESTING: CPT

## 2019-12-09 NOTE — NURSING NOTE
Exercise Oximetry    Patient Name:Charlie Wells   MRN: [id number]   Date: 12/09/19             ROOM AIR BASELINE   SpO2% 94   Heart Rate 88   Blood Pressure      EXERCISE ON ROOM AIR SpO2% EXERCISE ON O2 @  LPM SpO2%   1 MINUTE 93 1 MINUTE    2 MINUTES 92 2 MINUTES    3 MINUTES 92 3 MINUTES    4 MINUTES 93 4 MINUTES    5 MINUTES 93 5 MINUTES    6 MINUTES 92 6 MINUTES               Distance Walked   Distance Walked   Dyspnea (Kathrin Scale)   Dyspnea (Kathrin Scale)   Fatigue (Kathrin Scale)   Fatigue (Kathrin Scale)   SpO2% Post Exercise  94 SpO2% Post Exercise   HR Post Exercise  92 HR Post Exercise   Time to Recovery  1 MINUTE Time to Recovery     Comments: PATIENT WALKED ON ROOM AIR, O2 SAT REMAINED 92% OR BETTER THRU-OUT THE WALK. HR INCREASED  BPM DURING WALK

## 2019-12-16 RX ORDER — FUROSEMIDE 40 MG/1
TABLET ORAL
Qty: 30 TABLET | Refills: 0 | Status: SHIPPED | OUTPATIENT
Start: 2019-12-16 | End: 2020-01-10

## 2020-01-10 RX ORDER — FUROSEMIDE 40 MG/1
TABLET ORAL
Qty: 30 TABLET | Refills: 0 | Status: SHIPPED | OUTPATIENT
Start: 2020-01-10 | End: 2020-02-10

## 2020-01-23 RX ORDER — SKIN CLEANSER COMB NO.42
CLEANSER (ML) TOPICAL
Refills: 0 | COMMUNITY
Start: 2019-11-01 | End: 2020-01-23 | Stop reason: SDUPTHER

## 2020-01-23 RX ORDER — SKIN CLEANSER COMB NO.42
1 CLEANSER (ML) TOPICAL DAILY
Qty: 237 ML | Refills: 3 | Status: SHIPPED | OUTPATIENT
Start: 2020-01-23 | End: 2020-11-25

## 2020-02-04 ENCOUNTER — OFFICE VISIT (OUTPATIENT)
Dept: FAMILY MEDICINE CLINIC | Facility: CLINIC | Age: 50
End: 2020-02-04

## 2020-02-04 VITALS
BODY MASS INDEX: 35.89 KG/M2 | WEIGHT: 202.6 LBS | DIASTOLIC BLOOD PRESSURE: 70 MMHG | HEART RATE: 106 BPM | SYSTOLIC BLOOD PRESSURE: 105 MMHG | RESPIRATION RATE: 16 BRPM

## 2020-02-04 DIAGNOSIS — R60.0 LEG EDEMA: ICD-10-CM

## 2020-02-04 DIAGNOSIS — I27.20 PULMONARY HYPERTENSION (HCC): ICD-10-CM

## 2020-02-04 DIAGNOSIS — Q43.1 HIRSCHSPRUNG'S DISEASE: ICD-10-CM

## 2020-02-04 DIAGNOSIS — E03.9 ACQUIRED HYPOTHYROIDISM: ICD-10-CM

## 2020-02-04 DIAGNOSIS — G47.33 OBSTRUCTIVE SLEEP APNEA: Primary | ICD-10-CM

## 2020-02-04 LAB
ALBUMIN SERPL-MCNC: 4 G/DL (ref 3.5–5.2)
ALBUMIN/GLOB SERPL: 1.3 G/DL
ALP SERPL-CCNC: 60 U/L (ref 39–117)
ALT SERPL W P-5'-P-CCNC: 20 U/L (ref 1–41)
ANION GAP SERPL CALCULATED.3IONS-SCNC: 12.6 MMOL/L (ref 5–15)
AST SERPL-CCNC: 35 U/L (ref 1–40)
BILIRUB SERPL-MCNC: 0.3 MG/DL (ref 0.2–1.2)
BUN BLD-MCNC: 13 MG/DL (ref 6–20)
BUN/CREAT SERPL: 13.7 (ref 7–25)
CALCIUM SPEC-SCNC: 9.1 MG/DL (ref 8.6–10.5)
CHLORIDE SERPL-SCNC: 101 MMOL/L (ref 98–107)
CO2 SERPL-SCNC: 26.4 MMOL/L (ref 22–29)
CREAT BLD-MCNC: 0.95 MG/DL (ref 0.76–1.27)
GFR SERPL CREATININE-BSD FRML MDRD: 84 ML/MIN/1.73
GLOBULIN UR ELPH-MCNC: 3 GM/DL
GLUCOSE BLD-MCNC: 77 MG/DL (ref 65–99)
POTASSIUM BLD-SCNC: 4.2 MMOL/L (ref 3.5–5.2)
PROT SERPL-MCNC: 7 G/DL (ref 6–8.5)
SODIUM BLD-SCNC: 140 MMOL/L (ref 136–145)
T3 SERPL-MCNC: 80.1 NG/DL (ref 80–200)
T4 FREE SERPL-MCNC: 0.9 NG/DL (ref 0.93–1.7)
TSH SERPL DL<=0.05 MIU/L-ACNC: 0.69 UIU/ML (ref 0.27–4.2)

## 2020-02-04 PROCEDURE — 84439 ASSAY OF FREE THYROXINE: CPT | Performed by: FAMILY MEDICINE

## 2020-02-04 PROCEDURE — 84480 ASSAY TRIIODOTHYRONINE (T3): CPT | Performed by: FAMILY MEDICINE

## 2020-02-04 PROCEDURE — 74018 RADEX ABDOMEN 1 VIEW: CPT | Performed by: FAMILY MEDICINE

## 2020-02-04 PROCEDURE — 80053 COMPREHEN METABOLIC PANEL: CPT | Performed by: FAMILY MEDICINE

## 2020-02-04 PROCEDURE — 99214 OFFICE O/P EST MOD 30 MIN: CPT | Performed by: FAMILY MEDICINE

## 2020-02-04 PROCEDURE — 84443 ASSAY THYROID STIM HORMONE: CPT | Performed by: FAMILY MEDICINE

## 2020-02-04 RX ORDER — MAGNESIUM HYDROXIDE 1200 MG/15ML
SUSPENSION ORAL
Qty: 355 ML | Refills: 10 | Status: SHIPPED | OUTPATIENT
Start: 2020-02-04 | End: 2020-06-13

## 2020-02-04 NOTE — PROGRESS NOTES
Rooming Tab(CC,VS,Pt Hx,Fall Screen)  Chief Complaint   Patient presents with   • Hyperlipidemia       Subjective 49-year-old here for follow-up of his intellectual disability and other medical problems.  Patient is following [facility] for his Hirschsprung's disease.  He has had good bowel movements but his abdomen is still distended.  He gets short of breath with walking minimal distances.  He is getting more swelling in his ankles and calves.  He will not sleep in his bed but sleeps in a recliner and his feet hanging down.  They swell.  He wears compression socks and it does not seem to help anymore.  He is gained 6 pounds since his last visit.  The patient has obstructive sleep apnea and he does not treat it, he has pulmonary hypertension as well.  He has not had an echo in some time.   The patient's has hypothyroidism and we added Cytomel last visit and he needs to have his TFTs checked.    I have reviewed and updated his medications, medical history and problem list during today's office visit.     Patient Care Team:  Charlie Wing MD as PCP - General  Charlie Wing MD as PCP - Family Medicine  To Barber DPM as PCP - Claims Attributed    Problem List Tab  Medications Tab  Synopsis Tab  Chart Review Tab  Care Everywhere Tab  Immunizations Tab  Patient History Tab    Social History     Tobacco Use   • Smoking status: Never Smoker   • Smokeless tobacco: Never Used   Substance Use Topics   • Alcohol use: No     Frequency: Never       Review of Systems   Unable to perform ROS: Mental status change       Objective     Rooming Tab(CC,VS,Pt Hx,Fall Screen)  /70 (BP Location: Right arm, Patient Position: Sitting, Cuff Size: Large Adult)   Pulse 106   Resp 16   Wt 91.9 kg (202 lb 9.6 oz)   BMI 35.89 kg/m²     Body mass index is 35.89 kg/m².    Physical Exam   Constitutional: He is oriented to person, place, and time. No distress.   Obese pleasant, intellectually disabled but no distress   HENT:    Head: Normocephalic and atraumatic.   Nose: Nose normal.   Mouth/Throat: Oropharynx is clear and moist.   Eyes: Pupils are equal, round, and reactive to light. Conjunctivae, EOM and lids are normal.   Neck: Trachea normal and normal range of motion. Neck supple. No JVD present. Carotid bruit is not present. No thyroid mass and no thyromegaly present.   No carotid bruits   Cardiovascular: Normal rate, regular rhythm, normal heart sounds and intact distal pulses.   Pulmonary/Chest: Effort normal and breath sounds normal.   Abdominal:   Distended abdomen, bowel sounds present, nontender.   Musculoskeletal:   He has edema in his lower legs and feet   Neurological: He is alert and oriented to person, place, and time. No cranial nerve deficit.   Skin: Skin is warm and dry.   Psychiatric: His speech is normal.   He does not talk very much but does answer simple questions He is attentive.   Nursing note and vitals reviewed.       Statin Choice Calculator  Data Reviewed:               Lab Results   Component Value Date    BUN 13 02/04/2020    CREATININE 0.95 02/04/2020    EGFRIFNONA 84 02/04/2020     02/04/2020    K 4.2 02/04/2020     02/04/2020    CALCIUM 9.1 02/04/2020    ALBUMIN 4.00 02/04/2020    BILITOT 0.3 02/04/2020    ALKPHOS 60 02/04/2020    AST 35 02/04/2020    ALT 20 02/04/2020    TSH 0.692 02/04/2020    FREET4 0.90 (L) 02/04/2020      Assessment/Plan   Order Review Tab  Health Maintenance Tab  Patient Plan/Order Tab  Diagnoses and all orders for this visit:    1. Obstructive sleep apnea (Primary)  Assessment & Plan:  This is worsening because he does not treat it, he will not to treat because of his intellectual disability.    Orders:  -     Adult Transthoracic Echo Complete W/ Cont if Necessary Per Protocol; Future    2. Hirschsprung's disease  Assessment & Plan:  Recommend a follow-up with GI for this.  We  check a KUB.    Orders:  -     XR Abdomen KUB    3. Acquired hypothyroidism  Assessment &  Plan:  Improved  Check TFTs with the addition of Cytomel    Orders:  -     TSH  -     T4, Free  -     T3    4. Leg edema  Assessment & Plan:  Worsening.  Could be venous insufficiency from his legs hanging down or could be from his pulmonary hypertension from untreated sleep apnea.  Check an echocardiogram to assess his pulmonary artery pressures and right ventricle.    Orders:  -     Comprehensive Metabolic Panel    5. Pulmonary hypertension (CMS/HCC)  -     Adult Transthoracic Echo Complete W/ Cont if Necessary Per Protocol; Future      Wrapup Tab  Return in about 4 weeks (around 3/3/2020) for Recheck.

## 2020-02-07 NOTE — ASSESSMENT & PLAN NOTE
This is worsening because he does not treat it, he will not to treat because of his intellectual disability.

## 2020-02-07 NOTE — ASSESSMENT & PLAN NOTE
Worsening.  Could be venous insufficiency from his legs hanging down or could be from his pulmonary hypertension from untreated sleep apnea.  Check an echocardiogram to assess his pulmonary artery pressures and right ventricle.

## 2020-02-10 ENCOUNTER — APPOINTMENT (OUTPATIENT)
Dept: CARDIOLOGY | Facility: HOSPITAL | Age: 50
End: 2020-02-10

## 2020-02-10 ENCOUNTER — OFFICE (AMBULATORY)
Dept: URBAN - METROPOLITAN AREA CLINIC 64 | Facility: CLINIC | Age: 50
End: 2020-02-10

## 2020-02-10 VITALS
HEART RATE: 91 BPM | WEIGHT: 202 LBS | HEIGHT: 65 IN | SYSTOLIC BLOOD PRESSURE: 117 MMHG | DIASTOLIC BLOOD PRESSURE: 85 MMHG

## 2020-02-10 DIAGNOSIS — B96.81 HELICOBACTER PYLORI [H. PYLORI] AS THE CAUSE OF DISEASES CLA: ICD-10-CM

## 2020-02-10 DIAGNOSIS — R14.0 ABDOMINAL DISTENSION (GASEOUS): ICD-10-CM

## 2020-02-10 DIAGNOSIS — K59.00 CONSTIPATION, UNSPECIFIED: ICD-10-CM

## 2020-02-10 DIAGNOSIS — Q43.1 HIRSCHSPRUNG'S DISEASE: ICD-10-CM

## 2020-02-10 PROCEDURE — 99214 OFFICE O/P EST MOD 30 MIN: CPT | Performed by: NURSE PRACTITIONER

## 2020-02-10 RX ORDER — FUROSEMIDE 40 MG/1
TABLET ORAL
Qty: 30 TABLET | Refills: 0 | Status: SHIPPED | OUTPATIENT
Start: 2020-02-10 | End: 2020-03-09

## 2020-02-13 ENCOUNTER — HOSPITAL ENCOUNTER (OUTPATIENT)
Dept: CARDIOLOGY | Facility: HOSPITAL | Age: 50
Discharge: HOME | End: 2020-02-13
Admitting: FAMILY MEDICINE

## 2020-02-13 VITALS
HEIGHT: 63 IN | DIASTOLIC BLOOD PRESSURE: 77 MMHG | BODY MASS INDEX: 35.79 KG/M2 | RESPIRATION RATE: 20 BRPM | WEIGHT: 202 LBS | HEART RATE: 82 BPM | SYSTOLIC BLOOD PRESSURE: 120 MMHG

## 2020-02-13 DIAGNOSIS — I27.20 PULMONARY HYPERTENSION (HCC): ICD-10-CM

## 2020-02-13 DIAGNOSIS — G47.33 OBSTRUCTIVE SLEEP APNEA: ICD-10-CM

## 2020-02-13 PROCEDURE — 93306 TTE W/DOPPLER COMPLETE: CPT

## 2020-02-17 LAB
BH CV ECHO MEAS - ACS: 1.6 CM
BH CV ECHO MEAS - AO MAX PG (FULL): -1.7 MMHG
BH CV ECHO MEAS - AO MAX PG: 6.6 MMHG
BH CV ECHO MEAS - AO MEAN PG (FULL): 0.39 MMHG
BH CV ECHO MEAS - AO MEAN PG: 4.3 MMHG
BH CV ECHO MEAS - AO ROOT AREA (BSA CORRECTED): 1.5
BH CV ECHO MEAS - AO ROOT AREA: 6.9 CM^2
BH CV ECHO MEAS - AO ROOT DIAM: 3 CM
BH CV ECHO MEAS - AO V2 MAX: 128.4 CM/SEC
BH CV ECHO MEAS - AO V2 MEAN: 101.1 CM/SEC
BH CV ECHO MEAS - AO V2 VTI: 27 CM
BH CV ECHO MEAS - ASC AORTA: 3.2 CM
BH CV ECHO MEAS - AVA(I,A): 3.5 CM^2
BH CV ECHO MEAS - AVA(I,D): 3.5 CM^2
BH CV ECHO MEAS - AVA(V,A): 3.6 CM^2
BH CV ECHO MEAS - AVA(V,D): 3.6 CM^2
BH CV ECHO MEAS - BSA(HAYCOCK): 2.1 M^2
BH CV ECHO MEAS - BSA: 1.9 M^2
BH CV ECHO MEAS - BZI_BMI: 35.8 KILOGRAMS/M^2
BH CV ECHO MEAS - BZI_METRIC_HEIGHT: 160 CM
BH CV ECHO MEAS - BZI_METRIC_WEIGHT: 91.6 KG
BH CV ECHO MEAS - EDV(CUBED): 54.9 ML
BH CV ECHO MEAS - EDV(MOD-SP2): 58.2 ML
BH CV ECHO MEAS - EDV(MOD-SP4): 117.9 ML
BH CV ECHO MEAS - EDV(TEICH): 62 ML
BH CV ECHO MEAS - EF(CUBED): 61.5 %
BH CV ECHO MEAS - EF(MOD-BP): 69 %
BH CV ECHO MEAS - EF(MOD-SP2): 62.4 %
BH CV ECHO MEAS - EF(MOD-SP4): 64.6 %
BH CV ECHO MEAS - EF(TEICH): 53.8 %
BH CV ECHO MEAS - ESV(CUBED): 21.1 ML
BH CV ECHO MEAS - ESV(MOD-SP2): 21.9 ML
BH CV ECHO MEAS - ESV(MOD-SP4): 41.7 ML
BH CV ECHO MEAS - ESV(TEICH): 28.7 ML
BH CV ECHO MEAS - FS: 27.3 %
BH CV ECHO MEAS - IVS/LVPW: 1.4
BH CV ECHO MEAS - IVSD: 1.2 CM
BH CV ECHO MEAS - LA DIMENSION(2D): 3.7 CM
BH CV ECHO MEAS - LV DIASTOLIC VOL/BSA (35-75): 60.7 ML/M^2
BH CV ECHO MEAS - LV MASS(C)D: 128.2 GRAMS
BH CV ECHO MEAS - LV MASS(C)DI: 66 GRAMS/M^2
BH CV ECHO MEAS - LV MAX PG: 8.3 MMHG
BH CV ECHO MEAS - LV MEAN PG: 3.9 MMHG
BH CV ECHO MEAS - LV SYSTOLIC VOL/BSA (12-30): 21.5 ML/M^2
BH CV ECHO MEAS - LV V1 MAX: 144.2 CM/SEC
BH CV ECHO MEAS - LV V1 MEAN: 87.8 CM/SEC
BH CV ECHO MEAS - LV V1 VTI: 29.6 CM
BH CV ECHO MEAS - LVIDD: 3.8 CM
BH CV ECHO MEAS - LVIDS: 2.8 CM
BH CV ECHO MEAS - LVOT AREA: 3.2 CM^2
BH CV ECHO MEAS - LVOT DIAM: 2 CM
BH CV ECHO MEAS - LVPWD: 0.88 CM
BH CV ECHO MEAS - MV A MAX VEL: 67.1 CM/SEC
BH CV ECHO MEAS - MV DEC SLOPE: 329.2 CM/SEC^2
BH CV ECHO MEAS - MV DEC TIME: 0.25 SEC
BH CV ECHO MEAS - MV E MAX VEL: 82.6 CM/SEC
BH CV ECHO MEAS - MV E/A: 1.2
BH CV ECHO MEAS - MV MAX PG: 2.2 MMHG
BH CV ECHO MEAS - MV MEAN PG: 1.1 MMHG
BH CV ECHO MEAS - MV V2 MAX: 74.4 CM/SEC
BH CV ECHO MEAS - MV V2 MEAN: 49.1 CM/SEC
BH CV ECHO MEAS - MV V2 VTI: 18.4 CM
BH CV ECHO MEAS - MVA(VTI): 5.2 CM^2
BH CV ECHO MEAS - PA ACC TIME: 0.13 SEC
BH CV ECHO MEAS - PA MAX PG (FULL): 2.4 MMHG
BH CV ECHO MEAS - PA MAX PG: 4 MMHG
BH CV ECHO MEAS - PA MEAN PG (FULL): 1.4 MMHG
BH CV ECHO MEAS - PA MEAN PG: 2.2 MMHG
BH CV ECHO MEAS - PA PR(ACCEL): 18.8 MMHG
BH CV ECHO MEAS - PA V2 MAX: 99.9 CM/SEC
BH CV ECHO MEAS - PA V2 MEAN: 70.2 CM/SEC
BH CV ECHO MEAS - PA V2 VTI: 18.8 CM
BH CV ECHO MEAS - PULM DIAS VEL: 43.3 CM/SEC
BH CV ECHO MEAS - PULM S/D: 1.1
BH CV ECHO MEAS - PULM SYS VEL: 47.4 CM/SEC
BH CV ECHO MEAS - PVA(I,A): 3.1 CM^2
BH CV ECHO MEAS - PVA(I,D): 3.1 CM^2
BH CV ECHO MEAS - PVA(V,A): 3.4 CM^2
BH CV ECHO MEAS - PVA(V,D): 3.4 CM^2
BH CV ECHO MEAS - QP/QS: 0.6
BH CV ECHO MEAS - RAP SYSTOLE: 8 MMHG
BH CV ECHO MEAS - RV MAX PG: 1.6 MMHG
BH CV ECHO MEAS - RV MEAN PG: 0.87 MMHG
BH CV ECHO MEAS - RV V1 MAX: 62.4 CM/SEC
BH CV ECHO MEAS - RV V1 MEAN: 43.7 CM/SEC
BH CV ECHO MEAS - RV V1 VTI: 10.5 CM
BH CV ECHO MEAS - RVDD: 2.4 CM
BH CV ECHO MEAS - RVOT AREA: 5.5 CM^2
BH CV ECHO MEAS - RVOT DIAM: 2.6 CM
BH CV ECHO MEAS - RVSP: 24 MMHG
BH CV ECHO MEAS - SI(AO): 95.4 ML/M^2
BH CV ECHO MEAS - SI(CUBED): 17.4 ML/M^2
BH CV ECHO MEAS - SI(LVOT): 49.4 ML/M^2
BH CV ECHO MEAS - SI(MOD-SP2): 18.7 ML/M^2
BH CV ECHO MEAS - SI(MOD-SP4): 39.2 ML/M^2
BH CV ECHO MEAS - SI(TEICH): 17.2 ML/M^2
BH CV ECHO MEAS - SV(AO): 185.2 ML
BH CV ECHO MEAS - SV(CUBED): 33.8 ML
BH CV ECHO MEAS - SV(LVOT): 95.9 ML
BH CV ECHO MEAS - SV(MOD-SP2): 36.3 ML
BH CV ECHO MEAS - SV(MOD-SP4): 76.2 ML
BH CV ECHO MEAS - SV(RVOT): 57.4 ML
BH CV ECHO MEAS - SV(TEICH): 33.4 ML
BH CV ECHO MEAS - TR MAX VEL: 200 CM/SEC

## 2020-02-17 PROCEDURE — 93306 TTE W/DOPPLER COMPLETE: CPT | Performed by: INTERNAL MEDICINE

## 2020-03-05 ENCOUNTER — OFFICE VISIT (OUTPATIENT)
Dept: NEUROLOGY | Facility: CLINIC | Age: 50
End: 2020-03-05

## 2020-03-05 VITALS
BODY MASS INDEX: 35.44 KG/M2 | HEART RATE: 98 BPM | SYSTOLIC BLOOD PRESSURE: 115 MMHG | WEIGHT: 200 LBS | DIASTOLIC BLOOD PRESSURE: 77 MMHG | HEIGHT: 63 IN

## 2020-03-05 DIAGNOSIS — G47.33 OBSTRUCTIVE SLEEP APNEA: ICD-10-CM

## 2020-03-05 DIAGNOSIS — G40.909 SEIZURE DISORDER (HCC): Primary | ICD-10-CM

## 2020-03-05 PROCEDURE — 99213 OFFICE O/P EST LOW 20 MIN: CPT | Performed by: PSYCHIATRY & NEUROLOGY

## 2020-03-05 RX ORDER — PAROXETINE HYDROCHLORIDE 40 MG/1
50 TABLET, FILM COATED ORAL
COMMUNITY
Start: 2020-02-17 | End: 2021-09-13

## 2020-03-05 RX ORDER — ESTRADIOL 1 MG/1
1 TABLET ORAL DAILY
COMMUNITY
Start: 2020-02-10

## 2020-03-05 RX ORDER — PRUCALOPRIDE 2 MG/1
2 TABLET, FILM COATED ORAL DAILY
COMMUNITY
Start: 2020-02-12

## 2020-03-05 RX ORDER — DIVALPROEX SODIUM 500 MG/1
500 TABLET, DELAYED RELEASE ORAL 2 TIMES DAILY
Qty: 60 TABLET | Refills: 11 | COMMUNITY
Start: 2020-03-05 | End: 2021-05-13 | Stop reason: SDUPTHER

## 2020-03-05 RX ORDER — CHLORPHENIRAMINE MALEATE 4 MG/1
4 TABLET ORAL EVERY 6 HOURS PRN
COMMUNITY
Start: 2020-02-01 | End: 2020-11-25

## 2020-03-05 RX ORDER — ALUMINUM HYDROXIDE, MAGNESIUM HYDROXIDE, DIMETHICONE 200; 200; 20 MG/5ML; MG/5ML; MG/5ML
SUSPENSION ORAL
COMMUNITY
Start: 2020-02-01 | End: 2020-06-04

## 2020-03-05 NOTE — PROGRESS NOTES
Subjective: seizure disorder    Patient ID: Charlie Wells is a 49 y.o. male.    Chief complaint seizure disorder    Patient is here for follow up on seizures, no seizures patient is doing well with current medication. Depakote 500mg bid  plt ct and lft in chart reviewed both ok no sign of depakote toxicity  SHARON, dc pap as patient unable to be compliant.    SHARON,  ahi 58 on npsg july 2015, on bipap auto 12-20, PS 4-6; not using BIPAP as of  (11/23/15)  No new complaints or issues since last visit.  Pt sleeps in a recliner    Pt is obese bmi 35.4      The following portions of the patient's history were reviewed and updated as appropriate: allergies, current medications, past family history, past medical history, past social history, past surgical history and problem list.    History reviewed. No pertinent family history.    Past Medical History:   Diagnosis Date   • Allergic rhinitis    • Chronic constipation    • Depression    • Esophageal stricture    • GI bleed    • Hirschsprung's disease    • Hypothyroidism    • Iron deficiency anemia    • Leg length discrepancy    • Megacolon    • Mildly mentally retarded    • Positive PPD    • Pulmonary hypertension (CMS/HCC)    • Scoliosis    • Sleep apnea     Not very compliant with CPAP   • Urinary tract infection        Social History     Socioeconomic History   • Marital status: Single     Spouse name: Not on file   • Number of children: Not on file   • Years of education: Not on file   • Highest education level: Not on file   Tobacco Use   • Smoking status: Never Smoker   • Smokeless tobacco: Never Used   Substance and Sexual Activity   • Alcohol use: No     Frequency: Never   • Drug use: No   • Sexual activity: Never   Social History Narrative    Patient lives in a group home         Current Outpatient Medications:   •  busPIRone (BUSPAR) 30 MG tablet, Take 1 tablet by mouth 2 (Two) Times a Day., Disp: 60 tablet, Rfl: 11  •  divalproex (DEPAKOTE) 500 MG DR tablet,  Take 1 tablet by mouth 2 (Two) Times a Day., Disp: 60 tablet, Rfl: 11  •  estradiol (ESTRACE) 1 MG tablet, , Disp: , Rfl:   •  furosemide (LASIX) 40 MG tablet, 1T PO QD, Disp: 30 tablet, Rfl: 0  •  GAVILAX powder, DISSOLVE 2 SCOOPS IN 8OZ WATER EVERY MORNING, Disp: , Rfl: 11  •  GNP ALLERGY 4 MG tablet, , Disp: , Rfl:   •  hydrocortisone 2.5 % cream, , Disp: , Rfl:   •  levothyroxine (SYNTHROID, LEVOTHROID) 100 MCG tablet, TAKE ONE TABLET BY MOUTH EVERY DAY ( BUBBLE ), Disp: 30 tablet, Rfl: 11  •  liothyronine (CYTOMEL) 5 MCG tablet, Take 1 tablet by mouth Daily., Disp: 30 tablet, Rfl: 5  •  magnesium hydroxide (JESUS MILK OF MAGNESIA) 311 MG chewable tablet chewable tablet, JESUS MILK OF MAGNESIA 311 MG CHEW, Disp: , Rfl:   •  MI-ACID 200-200-20 MG/5ML suspension, , Disp: , Rfl:   •  MILK OF MAGNESIA 7.75 % suspension, TK 30 ML PO AT BEDTIME AS NEEDED FOR CONSTIPATION. NOTIFY NURSE IF NO BM FOR 6 CONSECUTIVE SHIFTS., Disp: 355 mL, Rfl: 10  •  montelukast (SINGULAIR) 10 MG tablet, SINGULAIR 10 MG TABS, Disp: , Rfl:   •  MOTEGRITY 2 MG tablet, TAKE 1 TABLET BY MOUTH EVERY DAY ( BUBBLE NOT ON CYCLE ), Disp: , Rfl:   •  pantoprazole (PROTONIX) 40 MG EC tablet, Take 40 mg by mouth Daily., Disp: , Rfl: 3  •  PARoxetine (PAXIL) 40 MG tablet, Take 40 mg by mouth every night at bedtime., Disp: , Rfl:   •  JESUS 500 MG tablet, TAKE 1 CAPSULE BY MOUTH TWICE DAILY ( REPLACES CHEW TABS)BOXES OF 24 IN BACK, Disp: , Rfl: 11  •  Polyethylene Glycol 3350 (MIRALAX PO), MIRALAX PACK, Disp: , Rfl:   •  QUEtiapine (SEROquel) 400 MG tablet, Take 400 mg by mouth 2 (Two) Times a Day., Disp: , Rfl: 3  •  Soap & Cleansers (CETAPHIL GENTLE CLEANSER) liquid, Apply 1 application topically to the appropriate area as directed Daily., Disp: 237 mL, Rfl: 3  •  tamsulosin (FLOMAX) 0.4 MG capsule 24 hr capsule, FLOMAX 0.4 MG CAPS, Disp: , Rfl:   •  TRULANCE 3 MG tablet, TAKE 1 TABLET BY MOUTH EVERY DAY ( SEND IN PACKAGE ), Disp: , Rfl: 3  •   AMITIZA 24 MCG capsule, TAKE 1 CAPSULE BY MOUTH TWICE DAILY, Disp: 60 capsule, Rfl: 11  •  fluvoxaMINE (LUVOX) 100 MG tablet, Take 1 tablet by mouth 2 (Two) Times a Day., Disp: 60 tablet, Rfl: 11    Review of Systems   Constitutional: Negative for fatigue and fever.   HENT: Negative for sinus pressure and sinus pain.    Eyes: Negative for discharge and itching.   Respiratory: Negative for cough and shortness of breath.    Cardiovascular: Negative for chest pain.   Gastrointestinal: Negative for abdominal pain and nausea.   Endocrine: Negative for cold intolerance and heat intolerance.   Genitourinary: Negative for urgency.   Musculoskeletal: Negative for neck pain and neck stiffness.   Neurological: Negative for dizziness and headaches.   Psychiatric/Behavioral: Positive for agitation and behavioral problems. Negative for confusion.          I have reviewed ROS completed by medical assistant.     Objective:    Neurologic Exam     Mental Status   Oriented to person, place, and time.   Level of consciousness: alert    Motor Exam     Strength   Strength 5/5 throughout.       Physical Exam   Constitutional: He is oriented to person, place, and time.   Neurological: He is oriented to person, place, and time. He has normal strength.       Assessment/Plan:    Charlie was seen today for seizures.    Diagnoses and all orders for this visit:    Seizure disorder (CMS/HCC)    Obstructive sleep apnea    Other orders  -     divalproex (DEPAKOTE) 500 MG DR tablet; Take 1 tablet by mouth 2 (Two) Times a Day.      Continue Depakote at 1000 mg per day  Pt unable to do CPAP, continue sleeping in chair      This document has been electronically signed by Joseph Seipel, MD on March 5, 2020 1:14 PM

## 2020-03-09 ENCOUNTER — OFFICE VISIT (OUTPATIENT)
Dept: FAMILY MEDICINE CLINIC | Facility: CLINIC | Age: 50
End: 2020-03-09

## 2020-03-09 ENCOUNTER — LAB (OUTPATIENT)
Dept: FAMILY MEDICINE CLINIC | Facility: CLINIC | Age: 50
End: 2020-03-09

## 2020-03-09 VITALS
HEART RATE: 92 BPM | DIASTOLIC BLOOD PRESSURE: 65 MMHG | BODY MASS INDEX: 35.92 KG/M2 | WEIGHT: 202.8 LBS | SYSTOLIC BLOOD PRESSURE: 91 MMHG | RESPIRATION RATE: 12 BRPM

## 2020-03-09 DIAGNOSIS — R60.0 LEG EDEMA: Primary | ICD-10-CM

## 2020-03-09 DIAGNOSIS — Z11.59 ENCOUNTER FOR SCREENING FOR OTHER VIRAL DISEASES: ICD-10-CM

## 2020-03-09 DIAGNOSIS — Z01.84 IMMUNITY STATUS TESTING: ICD-10-CM

## 2020-03-09 DIAGNOSIS — R60.0 LEG EDEMA: ICD-10-CM

## 2020-03-09 DIAGNOSIS — Q43.1 HIRSCHSPRUNG'S DISEASE: ICD-10-CM

## 2020-03-09 DIAGNOSIS — L71.9 ROSACEA: ICD-10-CM

## 2020-03-09 LAB — HBV SURFACE AB SER RIA-ACNC: NORMAL

## 2020-03-09 PROCEDURE — 99213 OFFICE O/P EST LOW 20 MIN: CPT | Performed by: FAMILY MEDICINE

## 2020-03-09 PROCEDURE — 86706 HEP B SURFACE ANTIBODY: CPT | Performed by: FAMILY MEDICINE

## 2020-03-09 RX ORDER — FUROSEMIDE 40 MG/1
TABLET ORAL
Qty: 45 TABLET | Refills: 11 | Status: SHIPPED | OUTPATIENT
Start: 2020-03-09 | End: 2020-06-13

## 2020-03-09 RX ORDER — METRONIDAZOLE 10 MG/G
GEL TOPICAL DAILY
Qty: 60 G | Refills: 5 | Status: SHIPPED | OUTPATIENT
Start: 2020-03-09 | End: 2020-06-04

## 2020-03-09 NOTE — PROGRESS NOTES
Rooming Tab(CC,VS,Pt Hx,Fall Screen)  Chief Complaint   Patient presents with   • Leg Swelling     The patient is here for his 4 wk f/u       Subjective Patient is here for follow-up of his edema.  He continues with some edema although it is somewhat better.  He continues with his abdominal protuberance which GI is working on.  He continues with dyspnea on exertion.  His KUB shows quite a bit of bowel distention.  He apparently had a sits markers done which passed within 5 days.  Patient's leg edema has not seemed to improve with Lasix.  He lays in a recliner to sleep and apparently his feet hanging down despite them trying to elevate them he will not do it.  He had an echocardiogram that was unremarkable and had a normal ejection fraction.  He has intellectual disability and is not always able to follow directions.  He also has quite a bit of a nasty rash on his face with dry skin despite Cetaphil lotion twice a day.    I have reviewed and updated his medications, medical history and problem list during today's office visit.     Patient Care Team:  Charlie Wing MD as PCP - General  Charlie Wing MD as PCP - Family Medicine  oT Barber DPM as PCP - Claims Attributed    Problem List Tab  Medications Tab  Synopsis Tab  Chart Review Tab  Care Everywhere Tab  Immunizations Tab  Patient History Tab    Social History     Tobacco Use   • Smoking status: Never Smoker   • Smokeless tobacco: Never Used   Substance Use Topics   • Alcohol use: No     Frequency: Never       Review of Systems   Constitutional: Negative for chills, fatigue and fever.   HENT: Negative for nosebleeds.    Eyes: Negative for double vision.   Respiratory: Positive for shortness of breath. Negative for chest tightness.    Cardiovascular: Positive for leg swelling. Negative for chest pain and palpitations.   Gastrointestinal: Negative for blood in stool.   Genitourinary: Negative for dysuria and hematuria.   Skin: Positive for rash.    Neurological: Negative for dizziness and syncope.   Psychiatric/Behavioral: Negative for depressed mood.       Objective     Rooming Tab(CC,VS,Pt Hx,Fall Screen)  BP 91/65 (BP Location: Left arm, Patient Position: Sitting, Cuff Size: Large Adult)   Pulse 92   Resp 12   Wt 92 kg (202 lb 12.8 oz)   BMI 35.92 kg/m²     Body mass index is 35.92 kg/m².    Physical Exam   Constitutional: He is oriented to person, place, and time. No distress.   Intellectually disabled, pleasant and answers questions.  He is in no distress   HENT:   Head: Normocephalic and atraumatic.   Nose: Nose normal.   Mouth/Throat: Oropharynx is clear and moist.   Eyes: Pupils are equal, round, and reactive to light. Conjunctivae, EOM and lids are normal.   Neck: Trachea normal and normal range of motion. Neck supple. No JVD present. Carotid bruit is not present. No thyroid mass and no thyromegaly present.   No carotid bruits   Cardiovascular: Normal rate, regular rhythm, normal heart sounds and intact distal pulses.   Pulmonary/Chest: Effort normal and breath sounds normal.   Abdominal:   Distended, bowel sounds present, nontender   Musculoskeletal:   1+ edema in his lower legs   Neurological: He is alert and oriented to person, place, and time. No cranial nerve deficit.   He has an ataxic gait   Skin: Skin is warm and dry.   Psychiatric: He has a normal mood and affect. His speech is normal and behavior is normal. He is attentive.   Nursing note and vitals reviewed.       Statin Choice Calculator  Data Reviewed:               Lab Results   Component Value Date    BUN 13 02/04/2020    CREATININE 0.95 02/04/2020    EGFRIFNONA 84 02/04/2020     02/04/2020    K 4.2 02/04/2020     02/04/2020    CALCIUM 9.1 02/04/2020    ALBUMIN 4.00 02/04/2020    BILITOT 0.3 02/04/2020    ALKPHOS 60 02/04/2020    AST 35 02/04/2020    ALT 20 02/04/2020    TSH 0.692 02/04/2020    FREET4 0.90 (L) 02/04/2020      Assessment/Plan   Order Review Tab  Health  Maintenance Tab  Patient Plan/Order Tab  Diagnoses and all orders for this visit:    1. Leg edema (Primary)  Assessment & Plan:  No better, increase Lasix to 60 mg daily and repeat a BMP in 2 weeks.  Try to elevate his legs best as possible.  Compression socks would be helpful.    Orders:  -     Basic Metabolic Panel; Future    2. Immunity status testing  -     Hepatitis B Surface Antibody    3. Encounter for screening for other viral diseases   -     Hepatitis B Surface Antibody    4. Hirschsprung's disease  Assessment & Plan:  GI is going to handle this      5. Rosacea  Assessment & Plan:  Discontinue Cetaphil  MetroGel twice daily  Dermatology if persistent        Other orders  -     furosemide (LASIX) 40 MG tablet; 1.5 po q d  Dispense: 45 tablet; Refill: 11  -     metroNIDAZOLE (METROGEL) 1 % gel; Apply  topically to the appropriate area as directed Daily.  Dispense: 60 g; Refill: 5      Wrapup Tab  Return in about 7 weeks (around 4/28/2020) for Medicare Wellness.

## 2020-03-10 ENCOUNTER — TELEPHONE (OUTPATIENT)
Dept: FAMILY MEDICINE CLINIC | Facility: CLINIC | Age: 50
End: 2020-03-10

## 2020-03-10 PROBLEM — L71.9 ROSACEA: Status: ACTIVE | Noted: 2020-03-10

## 2020-03-10 NOTE — TELEPHONE ENCOUNTER
----- Message from Charlie Wing MD sent at 3/10/2020  8:48 AM EDT -----  Call oconnell angy and give order for him to get Hepatitis B immunization

## 2020-03-10 NOTE — ASSESSMENT & PLAN NOTE
No better, increase Lasix to 60 mg daily and repeat a BMP in 2 weeks.  Try to elevate his legs best as possible.  Compression socks would be helpful.

## 2020-04-02 RX ORDER — LIOTHYRONINE SODIUM 5 UG/1
5 TABLET ORAL DAILY
Qty: 30 TABLET | Refills: 4 | Status: SHIPPED | OUTPATIENT
Start: 2020-04-02 | End: 2020-08-21

## 2020-05-01 RX ORDER — MONTELUKAST SODIUM 10 MG/1
TABLET ORAL
Qty: 30 TABLET | Refills: 10 | Status: SHIPPED | OUTPATIENT
Start: 2020-05-01 | End: 2021-03-08

## 2020-06-04 ENCOUNTER — TELEMEDICINE (OUTPATIENT)
Dept: FAMILY MEDICINE CLINIC | Facility: CLINIC | Age: 50
End: 2020-06-04

## 2020-06-04 ENCOUNTER — OFFICE (AMBULATORY)
Dept: URBAN - METROPOLITAN AREA CLINIC 64 | Facility: CLINIC | Age: 50
End: 2020-06-04

## 2020-06-04 VITALS
RESPIRATION RATE: 14 BRPM | DIASTOLIC BLOOD PRESSURE: 80 MMHG | SYSTOLIC BLOOD PRESSURE: 115 MMHG | HEART RATE: 83 BPM | BODY MASS INDEX: 35.96 KG/M2 | TEMPERATURE: 97.8 F | WEIGHT: 203 LBS

## 2020-06-04 VITALS — HEIGHT: 65 IN

## 2020-06-04 DIAGNOSIS — K59.39 OTHER MEGACOLON: ICD-10-CM

## 2020-06-04 DIAGNOSIS — Z00.00 MEDICARE ANNUAL WELLNESS VISIT, SUBSEQUENT: Primary | ICD-10-CM

## 2020-06-04 DIAGNOSIS — K59.00 CONSTIPATION, UNSPECIFIED: ICD-10-CM

## 2020-06-04 DIAGNOSIS — A04.9 BACTERIAL INTESTINAL INFECTION, UNSPECIFIED: ICD-10-CM

## 2020-06-04 DIAGNOSIS — Q43.1 HIRSCHSPRUNG'S DISEASE: ICD-10-CM

## 2020-06-04 PROCEDURE — G0439 PPPS, SUBSEQ VISIT: HCPCS | Performed by: FAMILY MEDICINE

## 2020-06-04 PROCEDURE — 99214 OFFICE O/P EST MOD 30 MIN: CPT | Mod: 95 | Performed by: INTERNAL MEDICINE

## 2020-06-04 RX ORDER — METRONIDAZOLE 500 MG/1
1500 TABLET, FILM COATED ORAL
Qty: 42 | Refills: 0 | Status: COMPLETED
Start: 2020-06-04 | End: 2020-07-10

## 2020-06-04 RX ORDER — DIAPER,BRIEF,INFANT-TODD,DISP
EACH MISCELLANEOUS
Qty: 30 G | Refills: 3 | Status: SHIPPED | OUTPATIENT
Start: 2020-06-04 | End: 2020-06-13

## 2020-06-04 RX ORDER — POLYETHYLENE GLYCOL 3350 17 G/17G
POWDER, FOR SOLUTION ORAL
Qty: 100 PACKET | Refills: 11 | COMMUNITY
Start: 2020-06-04 | End: 2020-06-13

## 2020-06-04 RX ORDER — CEFUROXIME AXETIL 500 MG/1
1000 TABLET, FILM COATED ORAL
Qty: 28 | Refills: 0 | Status: COMPLETED
Start: 2020-06-04 | End: 2022-03-07

## 2020-06-04 NOTE — ASSESSMENT & PLAN NOTE
Recommend increase his exercise activity.  Also recommend a nutritious diet high in fiber and vegetable, low in carbohydrates, eating leaner cuts of meat and higher amounts of fish.  Immunizations were reviewed with caretaker.

## 2020-06-04 NOTE — PROGRESS NOTES
The ABCs of the Annual Wellness Visit  Subsequent Medicare Wellness Visit    Chief Complaint   Patient presents with   • Medicare Wellness-subsequent       Subjective   History of Present Illness:  Charlie Wells is a 49 y.o. male who presents for a Subsequent Medicare Wellness Visit.  We are doing a video visit for his Medicare wellness exam due to COVID-19.  The patient has intellectual disability and most of the discussion is with his caretaker and myself.  Patient is doing well, he has good hearing.  He has no complaints of chest pain or syncope or dizziness.  He has quite a bit of difficulty with his bowels because of his Hirschsprung's disease and follows GI for this and is on multiple medications for this.  They are working with them for the treatment of this.  He has no urinary issues.  He is active although he does not exercise much he does walk some but could do better.  They try to feed him a nutritious diet.  He requires assistance with his ADLs and transportation.    HEALTH RISK ASSESSMENT    Recent Hospitalizations:  No hospitalization(s) within the last year.    Current Medical Providers:  Patient Care Team:  Charlie Wing MD as PCP - General  Charlie Wing MD as PCP - Family Medicine  To Barber DPM as PCP - Claims Attributed    Smoking Status:  Social History     Tobacco Use   Smoking Status Never Smoker   Smokeless Tobacco Never Used       Alcohol Consumption:  Social History     Substance and Sexual Activity   Alcohol Use No   • Frequency: Never       Depression Screen:   PHQ-2/PHQ-9 Depression Screening 2/4/2020   Little interest or pleasure in doing things 0   Feeling down, depressed, or hopeless 0   Total Score 0       Fall Risk Screen:  STEADI Fall Risk Assessment has not been completed.    Health Habits and Functional and Cognitive Screening:  No flowsheet data found.      Does the patient have evidence of cognitive impairment? No    Asprin use counseling:Does not need  ASA (and currently is not on it)    Age-appropriate Screening Schedule:  Refer to the list below for future screening recommendations based on patient's age, sex and/or medical conditions. Orders for these recommended tests are listed in the plan section. The patient has been provided with a written plan.    Health Maintenance   Topic Date Due   • PNEUMOCOCCAL VACCINE (19-64 MEDIUM RISK) (1 of 1 - PPSV23) 12/22/1988   • LIPID PANEL  07/29/2019   • ZOSTER VACCINE (2 of 2) 04/16/2020   • INFLUENZA VACCINE  08/01/2020   • COLONOSCOPY  06/21/2027   • TDAP/TD VACCINES (2 - Td) 04/30/2029          The following portions of the patient's history were reviewed and updated as appropriate:   He  has a past medical history of Allergic rhinitis, Chronic constipation, Depression, Esophageal stricture, GI bleed, Hirschsprung's disease, Hypothyroidism, Iron deficiency anemia, Leg length discrepancy, Megacolon, Mildly mentally retarded, Positive PPD, Pulmonary hypertension (CMS/HCC), Scoliosis, Sleep apnea, and Urinary tract infection.  He does not have any pertinent problems on file.  He  has a past surgical history that includes Esophageal dilation; Hemicolectomy (Left); Colonoscopy; and Myotomy.  His family history is not on file.  He  reports that he has never smoked. He has never used smokeless tobacco. He reports that he does not drink alcohol or use drugs.  Current Outpatient Medications   Medication Sig Dispense Refill   • busPIRone (BUSPAR) 30 MG tablet Take 1 tablet by mouth 2 (Two) Times a Day. 60 tablet 11   • divalproex (DEPAKOTE) 500 MG DR tablet Take 1 tablet by mouth 2 (Two) Times a Day. 60 tablet 11   • estradiol (ESTRACE) 1 MG tablet      • furosemide (LASIX) 40 MG tablet 1.5 po q d 45 tablet 11   • GNP ALLERGY 4 MG tablet      • hydrocortisone 1 % cream Apply face bid prn rash 30 g 3   • hydrocortisone 2.5 % cream      • levothyroxine (SYNTHROID, LEVOTHROID) 100 MCG tablet TAKE ONE TABLET BY MOUTH EVERY DAY (  BUBBLE ) 30 tablet 11   • liothyronine (CYTOMEL) 5 MCG tablet Take 1 tablet by mouth Daily. 30 tablet 4   • magnesium hydroxide (JESUS MILK OF MAGNESIA) 311 MG chewable tablet chewable tablet JESUS MILK OF MAGNESIA 311 MG CHEW     • MILK OF MAGNESIA 7.75 % suspension TK 30 ML PO AT BEDTIME AS NEEDED FOR CONSTIPATION. NOTIFY NURSE IF NO BM FOR 6 CONSECUTIVE SHIFTS. 355 mL 10   • montelukast (SINGULAIR) 10 MG tablet TAKE ONE TABLET BY MOUTH EVERY NIGHT AT BEDTIME 30 tablet 10   • MOTEGRITY 2 MG tablet TAKE 1 TABLET BY MOUTH EVERY DAY ( BUBBLE NOT ON CYCLE )     • pantoprazole (PROTONIX) 40 MG EC tablet Take 40 mg by mouth Daily.  3   • PARoxetine (PAXIL) 40 MG tablet Take 40 mg by mouth every night at bedtime.     • polyethylene glycol (MiraLax) 17 g packet 2 scoops q d 100 packet 11   • QUEtiapine (SEROquel) 400 MG tablet Take 400 mg by mouth 2 (Two) Times a Day.  3   • Soap & Cleansers (CETAPHIL GENTLE CLEANSER) liquid Apply 1 application topically to the appropriate area as directed Daily. 237 mL 3   • tamsulosin (FLOMAX) 0.4 MG capsule 24 hr capsule FLOMAX 0.4 MG CAPS     • TRULANCE 3 MG tablet TAKE 1 TABLET BY MOUTH EVERY DAY ( SEND IN PACKAGE )  3     No current facility-administered medications for this visit.      Current Outpatient Medications on File Prior to Visit   Medication Sig   • busPIRone (BUSPAR) 30 MG tablet Take 1 tablet by mouth 2 (Two) Times a Day.   • divalproex (DEPAKOTE) 500 MG DR tablet Take 1 tablet by mouth 2 (Two) Times a Day.   • estradiol (ESTRACE) 1 MG tablet    • furosemide (LASIX) 40 MG tablet 1.5 po q d   • GNP ALLERGY 4 MG tablet    • hydrocortisone 2.5 % cream    • levothyroxine (SYNTHROID, LEVOTHROID) 100 MCG tablet TAKE ONE TABLET BY MOUTH EVERY DAY ( BUBBLE )   • liothyronine (CYTOMEL) 5 MCG tablet Take 1 tablet by mouth Daily.   • magnesium hydroxide (JESUS MILK OF MAGNESIA) 311 MG chewable tablet chewable tablet JESUS MILK OF MAGNESIA 311 MG CHEW   • MILK OF  MAGNESIA 7.75 % suspension TK 30 ML PO AT BEDTIME AS NEEDED FOR CONSTIPATION. NOTIFY NURSE IF NO BM FOR 6 CONSECUTIVE SHIFTS.   • montelukast (SINGULAIR) 10 MG tablet TAKE ONE TABLET BY MOUTH EVERY NIGHT AT BEDTIME   • MOTEGRITY 2 MG tablet TAKE 1 TABLET BY MOUTH EVERY DAY ( BUBBLE NOT ON CYCLE )   • pantoprazole (PROTONIX) 40 MG EC tablet Take 40 mg by mouth Daily.   • PARoxetine (PAXIL) 40 MG tablet Take 40 mg by mouth every night at bedtime.   • QUEtiapine (SEROquel) 400 MG tablet Take 400 mg by mouth 2 (Two) Times a Day.   • Soap & Cleansers (CETAPHIL GENTLE CLEANSER) liquid Apply 1 application topically to the appropriate area as directed Daily.   • tamsulosin (FLOMAX) 0.4 MG capsule 24 hr capsule FLOMAX 0.4 MG CAPS   • TRULANCE 3 MG tablet TAKE 1 TABLET BY MOUTH EVERY DAY ( SEND IN PACKAGE )   • [DISCONTINUED] AMITIZA 24 MCG capsule TAKE 1 CAPSULE BY MOUTH TWICE DAILY   • [DISCONTINUED] fluvoxaMINE (LUVOX) 100 MG tablet Take 1 tablet by mouth 2 (Two) Times a Day.   • [DISCONTINUED] GAVILAX powder DISSOLVE 2 SCOOPS IN 8OZ WATER EVERY MORNING   • [DISCONTINUED] metroNIDAZOLE (METROGEL) 1 % gel Apply  topically to the appropriate area as directed Daily.   • [DISCONTINUED] MI-ACID 200-200-20 MG/5ML suspension    • [DISCONTINUED] JESUS 500 MG tablet TAKE 1 CAPSULE BY MOUTH TWICE DAILY ( REPLACES CHEW TABS)BOXES OF 24 IN BACK   • [DISCONTINUED] Polyethylene Glycol 3350 (MIRALAX PO) MIRALAX PACK     No current facility-administered medications on file prior to visit.      He is allergic to metoclopramide..    Outpatient Medications Prior to Visit   Medication Sig Dispense Refill   • busPIRone (BUSPAR) 30 MG tablet Take 1 tablet by mouth 2 (Two) Times a Day. 60 tablet 11   • divalproex (DEPAKOTE) 500 MG DR tablet Take 1 tablet by mouth 2 (Two) Times a Day. 60 tablet 11   • estradiol (ESTRACE) 1 MG tablet      • furosemide (LASIX) 40 MG tablet 1.5 po q d 45 tablet 11   • GNP ALLERGY 4 MG tablet      • hydrocortisone  2.5 % cream      • levothyroxine (SYNTHROID, LEVOTHROID) 100 MCG tablet TAKE ONE TABLET BY MOUTH EVERY DAY ( BUBBLE ) 30 tablet 11   • liothyronine (CYTOMEL) 5 MCG tablet Take 1 tablet by mouth Daily. 30 tablet 4   • magnesium hydroxide (JESUS MILK OF MAGNESIA) 311 MG chewable tablet chewable tablet JESUS MILK OF MAGNESIA 311 MG CHEW     • MILK OF MAGNESIA 7.75 % suspension TK 30 ML PO AT BEDTIME AS NEEDED FOR CONSTIPATION. NOTIFY NURSE IF NO BM FOR 6 CONSECUTIVE SHIFTS. 355 mL 10   • montelukast (SINGULAIR) 10 MG tablet TAKE ONE TABLET BY MOUTH EVERY NIGHT AT BEDTIME 30 tablet 10   • MOTEGRITY 2 MG tablet TAKE 1 TABLET BY MOUTH EVERY DAY ( BUBBLE NOT ON CYCLE )     • pantoprazole (PROTONIX) 40 MG EC tablet Take 40 mg by mouth Daily.  3   • PARoxetine (PAXIL) 40 MG tablet Take 40 mg by mouth every night at bedtime.     • QUEtiapine (SEROquel) 400 MG tablet Take 400 mg by mouth 2 (Two) Times a Day.  3   • Soap & Cleansers (CETAPHIL GENTLE CLEANSER) liquid Apply 1 application topically to the appropriate area as directed Daily. 237 mL 3   • tamsulosin (FLOMAX) 0.4 MG capsule 24 hr capsule FLOMAX 0.4 MG CAPS     • TRULANCE 3 MG tablet TAKE 1 TABLET BY MOUTH EVERY DAY ( SEND IN PACKAGE )  3   • AMITIZA 24 MCG capsule TAKE 1 CAPSULE BY MOUTH TWICE DAILY 60 capsule 11   • fluvoxaMINE (LUVOX) 100 MG tablet Take 1 tablet by mouth 2 (Two) Times a Day. 60 tablet 11   • GAVILAX powder DISSOLVE 2 SCOOPS IN 8OZ WATER EVERY MORNING  11   • metroNIDAZOLE (METROGEL) 1 % gel Apply  topically to the appropriate area as directed Daily. 60 g 5   • MI-ACID 200-200-20 MG/5ML suspension      • JESUS 500 MG tablet TAKE 1 CAPSULE BY MOUTH TWICE DAILY ( REPLACES CHEW TABS)BOXES OF 24 IN BACK  11   • Polyethylene Glycol 3350 (MIRALAX PO) MIRALAX PACK       No facility-administered medications prior to visit.        Patient Active Problem List   Diagnosis   • Anemia in other chronic diseases classified elsewhere   • Ataxia   •  Constipation, chronic   • Dependence on continuous positive airway pressure ventilation   • Disorder of foot   • Encounter for general adult medical examination without abnormal findings   • Gastroesophageal reflux disease   • Esophageal stricture   • Hay fever   • Hirschsprung's disease   • Hyperlipidemia   • Hypothyroidism   • Lung mass   • Mediastinal lymphadenopathy   • Moderate intellectual disability   • Obstructive sleep apnea   • Proteinuria   • Seizure disorder (CMS/HCC)   • Unequal leg length   • Full incontinence of feces   • Urinary incontinence   • Leg edema   • Elevated PSA   • Rosacea   • Medicare annual wellness visit, subsequent       Advanced Care Planning:  ACP discussion was declined by the patient. Patient does not have an advance directive, declines further assistance.    Review of Systems   Constitutional: Negative for chills and fever.   HENT: Negative for nosebleeds.    Respiratory: Negative for cough, chest tightness and shortness of breath.    Cardiovascular: Negative for chest pain and palpitations.   Gastrointestinal: Positive for abdominal distention and constipation. Negative for nausea and vomiting.   Genitourinary: Negative for dysuria and hematuria.   Neurological: Negative for syncope.   Psychiatric/Behavioral: Negative for behavioral problems, confusion and self-injury.       Compared to one year ago, the patient feels his physical health is the same.  Compared to one year ago, the patient feels his mental health is the same.    Reviewed chart for potential of high risk medication in the elderly: yes  Reviewed chart for potential of harmful drug interactions in the elderly:yes    Objective         Vitals:    06/04/20 1719   BP: 115/80   Pulse: 83   Resp: 14   Temp: 97.8 °F (36.6 °C)   Weight: 92.1 kg (203 lb)       Body mass index is 35.96 kg/m².  Discussed the patient's BMI with him. The BMI is above average; BMI management plan is completed.    Physical Exam   Constitutional: He  appears well-developed and well-nourished.   Poor communication due to his intellectual disability   HENT:   Head: Normocephalic and atraumatic.   Eyes: Pupils are equal, round, and reactive to light. EOM are normal.   Pulmonary/Chest: Effort normal.   Neurological: He is alert.   Psychiatric:   He is pleasant and answers questions that are simple   Nursing note and vitals reviewed.            Assessment/Plan   Medicare Risks and Personalized Health Plan  CMS Preventative Services Quick Reference  Advance Directive Discussion  Colon Cancer Screening  Dementia/Memory   Fall Risk  Glaucoma Risk  Hearing Problem  Immunizations Discussed/Encouraged (specific immunizations; adacel Tdap, Influenza, Pneumococcal 23, Prevnar and Shingrix )  Inadequate Social Support, Isolation, Loneliness, Lack of Transportation, Financial Difficulties, or Caregiver Stress   Inactivity/Sedentary  Obesity/Overweight   Prostate Cancer Screening     The above risks/problems have been discussed with the patient.  Pertinent information has been shared with the patient in the After Visit Summary.  Follow up plans and orders are seen below in the Assessment/Plan Section.    Diagnoses and all orders for this visit:    1. Medicare annual wellness visit, subsequent (Primary)  Assessment & Plan:  Recommend increase his exercise activity.  Also recommend a nutritious diet high in fiber and vegetable, low in carbohydrates, eating leaner cuts of meat and higher amounts of fish.  Immunizations were reviewed with caretaker.      Other orders  -     polyethylene glycol (MiraLax) 17 g packet; 2 scoops q d  Dispense: 100 packet; Refill: 11  -     hydrocortisone 1 % cream; Apply face bid prn rash  Dispense: 30 g; Refill: 3    Follow Up:  Return in about 3 months (around 9/4/2020) for Recheck.     An After Visit Summary and PPPS were given to the patient.     At next visit patient will need hep C antibody, TFTs, CBC, CMP and valproic acid lipid panel

## 2020-06-13 ENCOUNTER — HOSPITAL ENCOUNTER (INPATIENT)
Facility: HOSPITAL | Age: 50
LOS: 11 days | Discharge: NURSING, REHAB OR LONG-TERM CARE | End: 2020-06-24
Attending: INTERNAL MEDICINE | Admitting: INTERNAL MEDICINE

## 2020-06-13 ENCOUNTER — APPOINTMENT (OUTPATIENT)
Dept: GENERAL RADIOLOGY | Facility: HOSPITAL | Age: 50
End: 2020-06-13

## 2020-06-13 ENCOUNTER — APPOINTMENT (OUTPATIENT)
Dept: CT IMAGING | Facility: HOSPITAL | Age: 50
End: 2020-06-13

## 2020-06-13 ENCOUNTER — INPATIENT HOSPITAL (AMBULATORY)
Dept: URBAN - METROPOLITAN AREA HOSPITAL 84 | Facility: HOSPITAL | Age: 50
End: 2020-06-13

## 2020-06-13 ENCOUNTER — APPOINTMENT (OUTPATIENT)
Dept: ULTRASOUND IMAGING | Facility: HOSPITAL | Age: 50
End: 2020-06-13

## 2020-06-13 DIAGNOSIS — K85.90 ACUTE PANCREATITIS WITHOUT NECROSIS OR INFECTION, UNSPECIFIE: ICD-10-CM

## 2020-06-13 DIAGNOSIS — J18.9 PNEUMONIA OF RIGHT LUNG DUE TO INFECTIOUS ORGANISM, UNSPECIFIED PART OF LUNG: ICD-10-CM

## 2020-06-13 DIAGNOSIS — K21.9 GASTRO-ESOPHAGEAL REFLUX DISEASE WITHOUT ESOPHAGITIS: ICD-10-CM

## 2020-06-13 DIAGNOSIS — K85.90 ACUTE PANCREATITIS, UNSPECIFIED COMPLICATION STATUS, UNSPECIFIED PANCREATITIS TYPE: ICD-10-CM

## 2020-06-13 DIAGNOSIS — J18.9 PNEUMONIA DUE TO INFECTIOUS ORGANISM, UNSPECIFIED LATERALITY, UNSPECIFIED PART OF LUNG: Primary | ICD-10-CM

## 2020-06-13 DIAGNOSIS — K59.39 MEGACOLON: ICD-10-CM

## 2020-06-13 DIAGNOSIS — R14.0 ABDOMINAL DISTENSION (GASEOUS): ICD-10-CM

## 2020-06-13 PROBLEM — E87.6 HYPOKALEMIA: Status: ACTIVE | Noted: 2020-06-13

## 2020-06-13 PROBLEM — R10.9 ABDOMINAL PAIN: Status: ACTIVE | Noted: 2020-06-13

## 2020-06-13 LAB
ALBUMIN SERPL-MCNC: 3.5 G/DL (ref 3.5–5.2)
ALBUMIN/GLOB SERPL: 1 G/DL
ALP SERPL-CCNC: 51 U/L (ref 39–117)
ALT SERPL W P-5'-P-CCNC: 9 U/L (ref 1–41)
ANION GAP SERPL CALCULATED.3IONS-SCNC: 12 MMOL/L (ref 5–15)
ANISOCYTOSIS BLD QL: ABNORMAL
AST SERPL-CCNC: 24 U/L (ref 1–40)
B PERT DNA SPEC QL NAA+PROBE: NOT DETECTED
BILIRUB SERPL-MCNC: 0.4 MG/DL (ref 0.2–1.2)
BILIRUB UR QL STRIP: NEGATIVE
BUN BLD-MCNC: 12 MG/DL (ref 6–20)
BUN BLD-MCNC: ABNORMAL MG/DL
BUN/CREAT SERPL: ABNORMAL
C PNEUM DNA NPH QL NAA+NON-PROBE: NOT DETECTED
CALCIUM SPEC-SCNC: 9 MG/DL (ref 8.6–10.5)
CHLORIDE SERPL-SCNC: 100 MMOL/L (ref 98–107)
CLARITY UR: CLEAR
CO2 SERPL-SCNC: 25 MMOL/L (ref 22–29)
COLOR UR: YELLOW
CREAT BLD-MCNC: 0.85 MG/DL (ref 0.76–1.27)
D-LACTATE SERPL-SCNC: 1.5 MMOL/L (ref 0.5–2)
DEPRECATED RDW RBC AUTO: 50.3 FL (ref 37–54)
ERYTHROCYTE [DISTWIDTH] IN BLOOD BY AUTOMATED COUNT: 15.5 % (ref 12.3–15.4)
FLUAV H1 2009 PAND RNA NPH QL NAA+PROBE: NOT DETECTED
FLUAV H1 HA GENE NPH QL NAA+PROBE: NOT DETECTED
FLUAV H3 RNA NPH QL NAA+PROBE: NOT DETECTED
FLUAV SUBTYP SPEC NAA+PROBE: NOT DETECTED
FLUBV RNA ISLT QL NAA+PROBE: NOT DETECTED
GFR SERPL CREATININE-BSD FRML MDRD: 95 ML/MIN/1.73
GLOBULIN UR ELPH-MCNC: 3.4 GM/DL
GLUCOSE BLD-MCNC: 123 MG/DL (ref 65–99)
GLUCOSE UR STRIP-MCNC: NEGATIVE MG/DL
HADV DNA SPEC NAA+PROBE: NOT DETECTED
HCOV 229E RNA SPEC QL NAA+PROBE: NOT DETECTED
HCOV HKU1 RNA SPEC QL NAA+PROBE: NOT DETECTED
HCOV NL63 RNA SPEC QL NAA+PROBE: NOT DETECTED
HCOV OC43 RNA SPEC QL NAA+PROBE: NOT DETECTED
HCT VFR BLD AUTO: 40 % (ref 37.5–51)
HGB BLD-MCNC: 13.8 G/DL (ref 13–17.7)
HGB UR QL STRIP.AUTO: NEGATIVE
HMPV RNA NPH QL NAA+NON-PROBE: NOT DETECTED
HOLD SPECIMEN: NORMAL
HPIV1 RNA SPEC QL NAA+PROBE: NOT DETECTED
HPIV2 RNA SPEC QL NAA+PROBE: NOT DETECTED
HPIV3 RNA NPH QL NAA+PROBE: NOT DETECTED
HPIV4 P GENE NPH QL NAA+PROBE: NOT DETECTED
KETONES UR QL STRIP: NEGATIVE
L PNEUMO1 AG UR QL IA: NEGATIVE
LEUKOCYTE ESTERASE UR QL STRIP.AUTO: NEGATIVE
LIPASE SERPL-CCNC: 1395 U/L (ref 13–60)
LYMPHOCYTES # BLD MANUAL: 1.8 10*3/MM3 (ref 0.7–3.1)
LYMPHOCYTES NFR BLD MANUAL: 15 % (ref 5–12)
LYMPHOCYTES NFR BLD MANUAL: 25 % (ref 19.6–45.3)
M PNEUMO IGG SER IA-ACNC: NOT DETECTED
MCH RBC QN AUTO: 32.3 PG (ref 26.6–33)
MCHC RBC AUTO-ENTMCNC: 34.4 G/DL (ref 31.5–35.7)
MCV RBC AUTO: 94 FL (ref 79–97)
MONOCYTES # BLD AUTO: 1.08 10*3/MM3 (ref 0.1–0.9)
MYELOCYTES NFR BLD MANUAL: 9 % (ref 0–0)
NEUTROPHILS # BLD AUTO: 3.67 10*3/MM3 (ref 1.7–7)
NEUTROPHILS NFR BLD MANUAL: 32 % (ref 42.7–76)
NEUTS BAND NFR BLD MANUAL: 19 % (ref 0–5)
NEUTS VAC BLD QL SMEAR: ABNORMAL
NITRITE UR QL STRIP: NEGATIVE
NT-PROBNP SERPL-MCNC: 288 PG/ML (ref 5–900)
NT-PROBNP SERPL-MCNC: 288.8 PG/ML (ref 5–900)
PH UR STRIP.AUTO: 6.5 [PH] (ref 5–8)
PLAT MORPH BLD: NORMAL
PLATELET # BLD AUTO: 181 10*3/MM3 (ref 140–450)
PMV BLD AUTO: 8.2 FL (ref 6–12)
POTASSIUM BLD-SCNC: 3.3 MMOL/L (ref 3.5–5.2)
POTASSIUM BLD-SCNC: 3.5 MMOL/L (ref 3.5–5.2)
PROCALCITONIN SERPL-MCNC: 0.14 NG/ML (ref 0.1–0.25)
PROT SERPL-MCNC: 6.9 G/DL (ref 6–8.5)
PROT UR QL STRIP: NEGATIVE
RBC # BLD AUTO: 4.26 10*6/MM3 (ref 4.14–5.8)
RHINOVIRUS RNA SPEC NAA+PROBE: NOT DETECTED
RSV RNA NPH QL NAA+NON-PROBE: NOT DETECTED
S PNEUM AG SPEC QL LA: NEGATIVE
SARS-COV-2 RNA PNL SPEC NAA+PROBE: NOT DETECTED
SCAN SLIDE: NORMAL
SODIUM BLD-SCNC: 137 MMOL/L (ref 136–145)
SP GR UR STRIP: 1.01 (ref 1–1.03)
TOXIC GRANULATION: ABNORMAL
TROPONIN T SERPL-MCNC: <0.01 NG/ML (ref 0–0.03)
UROBILINOGEN UR QL STRIP: NORMAL
WBC NRBC COR # BLD: 7.2 10*3/MM3 (ref 3.4–10.8)
WHOLE BLOOD HOLD SPECIMEN: NORMAL

## 2020-06-13 PROCEDURE — 81003 URINALYSIS AUTO W/O SCOPE: CPT | Performed by: NURSE PRACTITIONER

## 2020-06-13 PROCEDURE — 84484 ASSAY OF TROPONIN QUANT: CPT | Performed by: NURSE PRACTITIONER

## 2020-06-13 PROCEDURE — 76705 ECHO EXAM OF ABDOMEN: CPT

## 2020-06-13 PROCEDURE — 99284 EMERGENCY DEPT VISIT MOD MDM: CPT

## 2020-06-13 PROCEDURE — 74177 CT ABD & PELVIS W/CONTRAST: CPT

## 2020-06-13 PROCEDURE — 83880 ASSAY OF NATRIURETIC PEPTIDE: CPT | Performed by: NURSE PRACTITIONER

## 2020-06-13 PROCEDURE — 87899 AGENT NOS ASSAY W/OPTIC: CPT | Performed by: PHYSICIAN ASSISTANT

## 2020-06-13 PROCEDURE — 87635 SARS-COV-2 COVID-19 AMP PRB: CPT | Performed by: NURSE PRACTITIONER

## 2020-06-13 PROCEDURE — 85025 COMPLETE CBC W/AUTO DIFF WBC: CPT | Performed by: NURSE PRACTITIONER

## 2020-06-13 PROCEDURE — 83880 ASSAY OF NATRIURETIC PEPTIDE: CPT | Performed by: PHYSICIAN ASSISTANT

## 2020-06-13 PROCEDURE — 25010000002 CEFTRIAXONE PER 250 MG: Performed by: PHYSICIAN ASSISTANT

## 2020-06-13 PROCEDURE — 93005 ELECTROCARDIOGRAM TRACING: CPT

## 2020-06-13 PROCEDURE — 0099U HC BIOFIRE FILMARRAY RESP PANEL 1: CPT | Performed by: PHYSICIAN ASSISTANT

## 2020-06-13 PROCEDURE — 25010000002 CEFEPIME PER 500 MG: Performed by: NURSE PRACTITIONER

## 2020-06-13 PROCEDURE — 83605 ASSAY OF LACTIC ACID: CPT

## 2020-06-13 PROCEDURE — 93005 ELECTROCARDIOGRAM TRACING: CPT | Performed by: INTERNAL MEDICINE

## 2020-06-13 PROCEDURE — 83690 ASSAY OF LIPASE: CPT | Performed by: NURSE PRACTITIONER

## 2020-06-13 PROCEDURE — 74018 RADEX ABDOMEN 1 VIEW: CPT

## 2020-06-13 PROCEDURE — 25010000002 AZITHROMYCIN PER 500 MG: Performed by: NURSE PRACTITIONER

## 2020-06-13 PROCEDURE — 0 IOPAMIDOL PER 1 ML: Performed by: NURSE PRACTITIONER

## 2020-06-13 PROCEDURE — 80053 COMPREHEN METABOLIC PANEL: CPT | Performed by: NURSE PRACTITIONER

## 2020-06-13 PROCEDURE — 84132 ASSAY OF SERUM POTASSIUM: CPT | Performed by: PHYSICIAN ASSISTANT

## 2020-06-13 PROCEDURE — 87040 BLOOD CULTURE FOR BACTERIA: CPT | Performed by: NURSE PRACTITIONER

## 2020-06-13 PROCEDURE — 85007 BL SMEAR W/DIFF WBC COUNT: CPT | Performed by: NURSE PRACTITIONER

## 2020-06-13 PROCEDURE — 84484 ASSAY OF TROPONIN QUANT: CPT | Performed by: PHYSICIAN ASSISTANT

## 2020-06-13 PROCEDURE — 99223 1ST HOSP IP/OBS HIGH 75: CPT | Performed by: NURSE PRACTITIONER

## 2020-06-13 PROCEDURE — 36415 COLL VENOUS BLD VENIPUNCTURE: CPT

## 2020-06-13 PROCEDURE — 99223 1ST HOSP IP/OBS HIGH 75: CPT | Performed by: INTERNAL MEDICINE

## 2020-06-13 PROCEDURE — 71045 X-RAY EXAM CHEST 1 VIEW: CPT

## 2020-06-13 PROCEDURE — 84145 PROCALCITONIN (PCT): CPT | Performed by: PHYSICIAN ASSISTANT

## 2020-06-13 PROCEDURE — 25010000002 KETOROLAC TROMETHAMINE PER 15 MG: Performed by: PHYSICIAN ASSISTANT

## 2020-06-13 RX ORDER — LEVOTHYROXINE SODIUM 0.1 MG/1
100 TABLET ORAL DAILY
COMMUNITY
End: 2020-08-21 | Stop reason: SDUPTHER

## 2020-06-13 RX ORDER — POTASSIUM CHLORIDE 20 MEQ/1
40 TABLET, EXTENDED RELEASE ORAL AS NEEDED
Status: DISCONTINUED | OUTPATIENT
Start: 2020-06-13 | End: 2020-06-24 | Stop reason: HOSPADM

## 2020-06-13 RX ORDER — LEVOTHYROXINE SODIUM 0.1 MG/1
100 TABLET ORAL DAILY
Status: DISCONTINUED | OUTPATIENT
Start: 2020-06-14 | End: 2020-06-15

## 2020-06-13 RX ORDER — CALCIUM GLUCONATE 20 MG/ML
2 INJECTION, SOLUTION INTRAVENOUS AS NEEDED
Status: DISCONTINUED | OUTPATIENT
Start: 2020-06-13 | End: 2020-06-24 | Stop reason: HOSPADM

## 2020-06-13 RX ORDER — ONDANSETRON 2 MG/ML
4 INJECTION INTRAMUSCULAR; INTRAVENOUS EVERY 6 HOURS PRN
Status: DISCONTINUED | OUTPATIENT
Start: 2020-06-13 | End: 2020-06-24 | Stop reason: HOSPADM

## 2020-06-13 RX ORDER — CHLORPHENIRAMINE MALEATE 4 MG/1
4 TABLET ORAL EVERY 6 HOURS PRN
Status: DISCONTINUED | OUTPATIENT
Start: 2020-06-13 | End: 2020-06-24 | Stop reason: HOSPADM

## 2020-06-13 RX ORDER — ONDANSETRON 4 MG/1
4 TABLET, FILM COATED ORAL EVERY 6 HOURS PRN
Status: DISCONTINUED | OUTPATIENT
Start: 2020-06-13 | End: 2020-06-24 | Stop reason: HOSPADM

## 2020-06-13 RX ORDER — MAGNESIUM SULFATE HEPTAHYDRATE 40 MG/ML
2 INJECTION, SOLUTION INTRAVENOUS AS NEEDED
Status: DISCONTINUED | OUTPATIENT
Start: 2020-06-13 | End: 2020-06-24 | Stop reason: HOSPADM

## 2020-06-13 RX ORDER — CEFUROXIME AXETIL 500 MG/1
500 TABLET ORAL 2 TIMES DAILY
COMMUNITY
Start: 2020-06-04 | End: 2020-06-24 | Stop reason: HOSPADM

## 2020-06-13 RX ORDER — POLYETHYLENE GLYCOL 3350 17 G/17G
34 POWDER, FOR SOLUTION ORAL DAILY
Status: DISCONTINUED | OUTPATIENT
Start: 2020-06-14 | End: 2020-06-24 | Stop reason: HOSPADM

## 2020-06-13 RX ORDER — BUSPIRONE HYDROCHLORIDE 15 MG/1
30 TABLET ORAL 2 TIMES DAILY
Status: DISCONTINUED | OUTPATIENT
Start: 2020-06-13 | End: 2020-06-15

## 2020-06-13 RX ORDER — LORAZEPAM 2 MG/ML
2 INJECTION INTRAMUSCULAR ONCE AS NEEDED
Status: COMPLETED | OUTPATIENT
Start: 2020-06-13 | End: 2020-06-16

## 2020-06-13 RX ORDER — DOCUSATE SODIUM 100 MG/1
100 CAPSULE, LIQUID FILLED ORAL 2 TIMES DAILY PRN
Status: DISCONTINUED | OUTPATIENT
Start: 2020-06-13 | End: 2020-06-24 | Stop reason: HOSPADM

## 2020-06-13 RX ORDER — ESTRADIOL 1 MG/1
1 TABLET ORAL DAILY
Status: DISCONTINUED | OUTPATIENT
Start: 2020-06-14 | End: 2020-06-15

## 2020-06-13 RX ORDER — POTASSIUM CHLORIDE 1.5 G/1.77G
40 POWDER, FOR SOLUTION ORAL AS NEEDED
Status: DISCONTINUED | OUTPATIENT
Start: 2020-06-13 | End: 2020-06-24 | Stop reason: HOSPADM

## 2020-06-13 RX ORDER — SODIUM CHLORIDE 0.9 % (FLUSH) 0.9 %
10 SYRINGE (ML) INJECTION AS NEEDED
Status: DISCONTINUED | OUTPATIENT
Start: 2020-06-13 | End: 2020-06-24 | Stop reason: HOSPADM

## 2020-06-13 RX ORDER — TAMSULOSIN HYDROCHLORIDE 0.4 MG/1
0.4 CAPSULE ORAL NIGHTLY
Status: DISCONTINUED | OUTPATIENT
Start: 2020-06-13 | End: 2020-06-15

## 2020-06-13 RX ORDER — LIOTHYRONINE SODIUM 5 UG/1
5 TABLET ORAL DAILY
Status: DISCONTINUED | OUTPATIENT
Start: 2020-06-14 | End: 2020-06-24 | Stop reason: HOSPADM

## 2020-06-13 RX ORDER — DIVALPROEX SODIUM 125 MG/1
500 TABLET, DELAYED RELEASE ORAL 2 TIMES DAILY
Status: DISCONTINUED | OUTPATIENT
Start: 2020-06-13 | End: 2020-06-15

## 2020-06-13 RX ORDER — CALCIUM GLUCONATE 20 MG/ML
1 INJECTION, SOLUTION INTRAVENOUS AS NEEDED
Status: DISCONTINUED | OUTPATIENT
Start: 2020-06-13 | End: 2020-06-24 | Stop reason: HOSPADM

## 2020-06-13 RX ORDER — POLYETHYLENE GLYCOL 3350 17 G/17G
34 POWDER, FOR SOLUTION ORAL DAILY
COMMUNITY
End: 2020-11-17

## 2020-06-13 RX ORDER — MAGNESIUM SULFATE HEPTAHYDRATE 40 MG/ML
4 INJECTION, SOLUTION INTRAVENOUS AS NEEDED
Status: DISCONTINUED | OUTPATIENT
Start: 2020-06-13 | End: 2020-06-24 | Stop reason: HOSPADM

## 2020-06-13 RX ORDER — POTASSIUM CHLORIDE 20 MEQ/1
20 TABLET, EXTENDED RELEASE ORAL DAILY
Status: DISCONTINUED | OUTPATIENT
Start: 2020-06-13 | End: 2020-06-15

## 2020-06-13 RX ORDER — PANTOPRAZOLE SODIUM 40 MG/10ML
40 INJECTION, POWDER, LYOPHILIZED, FOR SOLUTION INTRAVENOUS
Status: DISCONTINUED | OUTPATIENT
Start: 2020-06-14 | End: 2020-06-24 | Stop reason: HOSPADM

## 2020-06-13 RX ORDER — CHOLECALCIFEROL (VITAMIN D3) 125 MCG
5 CAPSULE ORAL NIGHTLY PRN
Status: DISCONTINUED | OUTPATIENT
Start: 2020-06-13 | End: 2020-06-24 | Stop reason: HOSPADM

## 2020-06-13 RX ORDER — FUROSEMIDE 40 MG/1
60 TABLET ORAL DAILY
COMMUNITY
End: 2021-02-05

## 2020-06-13 RX ORDER — ALBUTEROL SULFATE 2.5 MG/3ML
2.5 SOLUTION RESPIRATORY (INHALATION) EVERY 6 HOURS PRN
Status: DISCONTINUED | OUTPATIENT
Start: 2020-06-13 | End: 2020-06-24 | Stop reason: HOSPADM

## 2020-06-13 RX ORDER — PANTOPRAZOLE SODIUM 40 MG/1
40 TABLET, DELAYED RELEASE ORAL DAILY
Status: DISCONTINUED | OUTPATIENT
Start: 2020-06-14 | End: 2020-06-13

## 2020-06-13 RX ORDER — ACETAMINOPHEN 650 MG/1
650 SUPPOSITORY RECTAL EVERY 4 HOURS PRN
Status: DISCONTINUED | OUTPATIENT
Start: 2020-06-13 | End: 2020-06-24 | Stop reason: HOSPADM

## 2020-06-13 RX ORDER — METRONIDAZOLE 500 MG/1
500 TABLET ORAL 3 TIMES DAILY
COMMUNITY
Start: 2020-06-04 | End: 2020-06-24 | Stop reason: HOSPADM

## 2020-06-13 RX ORDER — KETOROLAC TROMETHAMINE 15 MG/ML
15 INJECTION, SOLUTION INTRAMUSCULAR; INTRAVENOUS EVERY 6 HOURS PRN
Status: DISCONTINUED | OUTPATIENT
Start: 2020-06-13 | End: 2020-06-14

## 2020-06-13 RX ORDER — ACETAMINOPHEN 325 MG/1
650 TABLET ORAL EVERY 4 HOURS PRN
Status: DISCONTINUED | OUTPATIENT
Start: 2020-06-13 | End: 2020-06-24 | Stop reason: HOSPADM

## 2020-06-13 RX ORDER — METRONIDAZOLE 500 MG/1
500 TABLET ORAL EVERY 8 HOURS SCHEDULED
Status: DISCONTINUED | OUTPATIENT
Start: 2020-06-13 | End: 2020-06-14

## 2020-06-13 RX ORDER — SODIUM CHLORIDE 0.9 % (FLUSH) 0.9 %
10 SYRINGE (ML) INJECTION EVERY 12 HOURS SCHEDULED
Status: DISCONTINUED | OUTPATIENT
Start: 2020-06-13 | End: 2020-06-24 | Stop reason: HOSPADM

## 2020-06-13 RX ORDER — QUETIAPINE FUMARATE 100 MG/1
400 TABLET, FILM COATED ORAL 2 TIMES DAILY
Status: DISCONTINUED | OUTPATIENT
Start: 2020-06-13 | End: 2020-06-15

## 2020-06-13 RX ORDER — MONTELUKAST SODIUM 10 MG/1
10 TABLET ORAL NIGHTLY
Status: DISCONTINUED | OUTPATIENT
Start: 2020-06-13 | End: 2020-06-15

## 2020-06-13 RX ORDER — NITROGLYCERIN 0.4 MG/1
0.4 TABLET SUBLINGUAL
Status: DISCONTINUED | OUTPATIENT
Start: 2020-06-13 | End: 2020-06-24 | Stop reason: HOSPADM

## 2020-06-13 RX ORDER — PAROXETINE HYDROCHLORIDE 20 MG/1
40 TABLET, FILM COATED ORAL NIGHTLY
Status: DISCONTINUED | OUTPATIENT
Start: 2020-06-13 | End: 2020-06-15

## 2020-06-13 RX ORDER — SODIUM CHLORIDE, SODIUM LACTATE, POTASSIUM CHLORIDE, CALCIUM CHLORIDE 600; 310; 30; 20 MG/100ML; MG/100ML; MG/100ML; MG/100ML
125 INJECTION, SOLUTION INTRAVENOUS CONTINUOUS
Status: DISCONTINUED | OUTPATIENT
Start: 2020-06-13 | End: 2020-06-14

## 2020-06-13 RX ORDER — ACETAMINOPHEN 160 MG/5ML
650 SOLUTION ORAL EVERY 4 HOURS PRN
Status: DISCONTINUED | OUTPATIENT
Start: 2020-06-13 | End: 2020-06-24 | Stop reason: HOSPADM

## 2020-06-13 RX ADMIN — DIVALPROEX SODIUM 500 MG: 125 TABLET, DELAYED RELEASE ORAL at 20:38

## 2020-06-13 RX ADMIN — METRONIDAZOLE 500 MG: 500 TABLET, FILM COATED ORAL at 17:28

## 2020-06-13 RX ADMIN — CEFTRIAXONE SODIUM 1 G: 1 INJECTION, POWDER, FOR SOLUTION INTRAMUSCULAR; INTRAVENOUS at 20:39

## 2020-06-13 RX ADMIN — SODIUM CHLORIDE, SODIUM LACTATE, POTASSIUM CHLORIDE, AND CALCIUM CHLORIDE 125 ML/HR: 600; 310; 30; 20 INJECTION, SOLUTION INTRAVENOUS at 17:22

## 2020-06-13 RX ADMIN — IOPAMIDOL 100 ML: 755 INJECTION, SOLUTION INTRAVENOUS at 11:58

## 2020-06-13 RX ADMIN — METRONIDAZOLE 500 MG: 500 TABLET, FILM COATED ORAL at 20:38

## 2020-06-13 RX ADMIN — AZITHROMYCIN 500 MG: 500 INJECTION, POWDER, LYOPHILIZED, FOR SOLUTION INTRAVENOUS at 12:50

## 2020-06-13 RX ADMIN — TAMSULOSIN HYDROCHLORIDE 0.4 MG: 0.4 CAPSULE ORAL at 20:39

## 2020-06-13 RX ADMIN — Medication 10 ML: at 20:45

## 2020-06-13 RX ADMIN — POTASSIUM CHLORIDE 20 MEQ: 1500 TABLET, EXTENDED RELEASE ORAL at 11:22

## 2020-06-13 RX ADMIN — MONTELUKAST SODIUM 10 MG: 10 TABLET, COATED ORAL at 20:38

## 2020-06-13 RX ADMIN — PAROXETINE HYDROCHLORIDE 40 MG: 20 TABLET, FILM COATED ORAL at 20:39

## 2020-06-13 RX ADMIN — BUSPIRONE HYDROCHLORIDE 30 MG: 15 TABLET ORAL at 20:39

## 2020-06-13 RX ADMIN — CEFEPIME HYDROCHLORIDE 2 G: 2 INJECTION, POWDER, FOR SOLUTION INTRAVENOUS at 12:50

## 2020-06-13 RX ADMIN — QUETIAPINE 400 MG: 100 TABLET, FILM COATED ORAL at 20:37

## 2020-06-13 RX ADMIN — KETOROLAC TROMETHAMINE 15 MG: 15 INJECTION, SOLUTION INTRAMUSCULAR; INTRAVENOUS at 20:40

## 2020-06-14 ENCOUNTER — APPOINTMENT (OUTPATIENT)
Dept: GENERAL RADIOLOGY | Facility: HOSPITAL | Age: 50
End: 2020-06-14

## 2020-06-14 LAB
ALBUMIN SERPL-MCNC: 2.5 G/DL (ref 3.5–5.2)
ALBUMIN/GLOB SERPL: 0.8 G/DL
ALP SERPL-CCNC: 38 U/L (ref 39–117)
ALT SERPL W P-5'-P-CCNC: 7 U/L (ref 1–41)
ANION GAP SERPL CALCULATED.3IONS-SCNC: 9 MMOL/L (ref 5–15)
ANISOCYTOSIS BLD QL: ABNORMAL
ARTERIAL PATENCY WRIST A: POSITIVE
AST SERPL-CCNC: 18 U/L (ref 1–40)
ATMOSPHERIC PRESS: ABNORMAL MM[HG]
BASE EXCESS BLDA CALC-SCNC: 3.9 MMOL/L (ref 0–3)
BDY SITE: ABNORMAL
BILIRUB SERPL-MCNC: 0.3 MG/DL (ref 0.2–1.2)
BUN BLD-MCNC: 12 MG/DL (ref 6–20)
BUN BLD-MCNC: ABNORMAL MG/DL
BUN/CREAT SERPL: ABNORMAL
CALCIUM SPEC-SCNC: 8.1 MG/DL (ref 8.6–10.5)
CHLORIDE SERPL-SCNC: 103 MMOL/L (ref 98–107)
CHOLEST SERPL-MCNC: 78 MG/DL (ref 0–200)
CO2 BLDA-SCNC: 29.4 MMOL/L (ref 22–29)
CO2 SERPL-SCNC: 28 MMOL/L (ref 22–29)
CREAT BLD-MCNC: 0.71 MG/DL (ref 0.76–1.27)
DEPRECATED RDW RBC AUTO: 49.9 FL (ref 37–54)
EOSINOPHIL # BLD MANUAL: 0.26 10*3/MM3 (ref 0–0.4)
EOSINOPHIL NFR BLD MANUAL: 4 % (ref 0.3–6.2)
ERYTHROCYTE [DISTWIDTH] IN BLOOD BY AUTOMATED COUNT: 15.2 % (ref 12.3–15.4)
GFR SERPL CREATININE-BSD FRML MDRD: 117 ML/MIN/1.73
GLOBULIN UR ELPH-MCNC: 3 GM/DL
GLUCOSE BLD-MCNC: 71 MG/DL (ref 65–99)
HCO3 BLDA-SCNC: 28.1 MMOL/L (ref 21–28)
HCT VFR BLD AUTO: 36.4 % (ref 37.5–51)
HDLC SERPL-MCNC: 23 MG/DL (ref 40–60)
HEMODILUTION: NO
HGB BLD-MCNC: 12.7 G/DL (ref 13–17.7)
HOROWITZ INDEX BLD+IHG-RTO: 62 %
LDLC SERPL CALC-MCNC: 42 MG/DL (ref 0–100)
LDLC/HDLC SERPL: 1.83 {RATIO}
LIPASE SERPL-CCNC: 645 U/L (ref 13–60)
LYMPHOCYTES # BLD MANUAL: 1.6 10*3/MM3 (ref 0.7–3.1)
LYMPHOCYTES NFR BLD MANUAL: 25 % (ref 19.6–45.3)
LYMPHOCYTES NFR BLD MANUAL: 32 % (ref 5–12)
MAGNESIUM SERPL-MCNC: 1.9 MG/DL (ref 1.6–2.6)
MCH RBC QN AUTO: 33.2 PG (ref 26.6–33)
MCHC RBC AUTO-ENTMCNC: 35 G/DL (ref 31.5–35.7)
MCV RBC AUTO: 95 FL (ref 79–97)
METAMYELOCYTES NFR BLD MANUAL: 1 % (ref 0–0)
MODALITY: ABNORMAL
MONOCYTES # BLD AUTO: 2.05 10*3/MM3 (ref 0.1–0.9)
MYELOCYTES NFR BLD MANUAL: 4 % (ref 0–0)
NEUTROPHILS # BLD AUTO: 2.18 10*3/MM3 (ref 1.7–7)
NEUTROPHILS NFR BLD MANUAL: 21 % (ref 42.7–76)
NEUTS BAND NFR BLD MANUAL: 13 % (ref 0–5)
NT-PROBNP SERPL-MCNC: 632.9 PG/ML (ref 5–900)
PCO2 BLDA: 39.9 MM HG (ref 35–48)
PH BLDA: 7.46 PH UNITS (ref 7.35–7.45)
PHOSPHATE SERPL-MCNC: 3.4 MG/DL (ref 2.5–4.5)
PLAT MORPH BLD: NORMAL
PLATELET # BLD AUTO: 165 10*3/MM3 (ref 140–450)
PMV BLD AUTO: 8.6 FL (ref 6–12)
PO2 BLDA: 73.7 MM HG (ref 83–108)
POTASSIUM BLD-SCNC: 3.1 MMOL/L (ref 3.5–5.2)
PROT SERPL-MCNC: 5.5 G/DL (ref 6–8.5)
RBC # BLD AUTO: 3.83 10*6/MM3 (ref 4.14–5.8)
SAO2 % BLDCOA: 95.4 % (ref 94–98)
SCAN SLIDE: NORMAL
SODIUM BLD-SCNC: 140 MMOL/L (ref 136–145)
TOXIC GRANULATION: ABNORMAL
TRIGL SERPL-MCNC: 65 MG/DL (ref 0–150)
TROPONIN T SERPL-MCNC: <0.01 NG/ML (ref 0–0.03)
TSH SERPL DL<=0.05 MIU/L-ACNC: 3.06 UIU/ML (ref 0.27–4.2)
VLDLC SERPL-MCNC: 13 MG/DL
WBC NRBC COR # BLD: 6.4 10*3/MM3 (ref 3.4–10.8)

## 2020-06-14 PROCEDURE — 94799 UNLISTED PULMONARY SVC/PX: CPT

## 2020-06-14 PROCEDURE — 74018 RADEX ABDOMEN 1 VIEW: CPT

## 2020-06-14 PROCEDURE — 99233 SBSQ HOSP IP/OBS HIGH 50: CPT | Performed by: INTERNAL MEDICINE

## 2020-06-14 PROCEDURE — 71045 X-RAY EXAM CHEST 1 VIEW: CPT

## 2020-06-14 PROCEDURE — 84484 ASSAY OF TROPONIN QUANT: CPT | Performed by: PHYSICIAN ASSISTANT

## 2020-06-14 PROCEDURE — 85007 BL SMEAR W/DIFF WBC COUNT: CPT | Performed by: PHYSICIAN ASSISTANT

## 2020-06-14 PROCEDURE — 25010000002 AZITHROMYCIN PER 500 MG: Performed by: PHYSICIAN ASSISTANT

## 2020-06-14 PROCEDURE — 25010000002 ENOXAPARIN PER 10 MG: Performed by: INTERNAL MEDICINE

## 2020-06-14 PROCEDURE — 80053 COMPREHEN METABOLIC PANEL: CPT | Performed by: PHYSICIAN ASSISTANT

## 2020-06-14 PROCEDURE — 36600 WITHDRAWAL OF ARTERIAL BLOOD: CPT

## 2020-06-14 PROCEDURE — 84443 ASSAY THYROID STIM HORMONE: CPT | Performed by: PHYSICIAN ASSISTANT

## 2020-06-14 PROCEDURE — 85025 COMPLETE CBC W/AUTO DIFF WBC: CPT | Performed by: PHYSICIAN ASSISTANT

## 2020-06-14 PROCEDURE — 82803 BLOOD GASES ANY COMBINATION: CPT

## 2020-06-14 PROCEDURE — 80061 LIPID PANEL: CPT | Performed by: PHYSICIAN ASSISTANT

## 2020-06-14 PROCEDURE — 99232 SBSQ HOSP IP/OBS MODERATE 35: CPT | Performed by: NURSE PRACTITIONER

## 2020-06-14 PROCEDURE — 83880 ASSAY OF NATRIURETIC PEPTIDE: CPT | Performed by: PHYSICIAN ASSISTANT

## 2020-06-14 PROCEDURE — 25010000002 KETOROLAC TROMETHAMINE PER 15 MG: Performed by: PHYSICIAN ASSISTANT

## 2020-06-14 PROCEDURE — 84100 ASSAY OF PHOSPHORUS: CPT | Performed by: PHYSICIAN ASSISTANT

## 2020-06-14 PROCEDURE — 83735 ASSAY OF MAGNESIUM: CPT | Performed by: PHYSICIAN ASSISTANT

## 2020-06-14 PROCEDURE — 25010000002 PIPERACILLIN SOD-TAZOBACTAM PER 1 G: Performed by: INTERNAL MEDICINE

## 2020-06-14 PROCEDURE — 83690 ASSAY OF LIPASE: CPT | Performed by: NURSE PRACTITIONER

## 2020-06-14 RX ORDER — SODIUM CHLORIDE 9 MG/ML
100 INJECTION, SOLUTION INTRAVENOUS CONTINUOUS
Status: DISCONTINUED | OUTPATIENT
Start: 2020-06-14 | End: 2020-06-20

## 2020-06-14 RX ORDER — MORPHINE SULFATE 4 MG/ML
2 INJECTION, SOLUTION INTRAMUSCULAR; INTRAVENOUS EVERY 4 HOURS PRN
Status: DISCONTINUED | OUTPATIENT
Start: 2020-06-14 | End: 2020-06-21

## 2020-06-14 RX ADMIN — PIPERACILLIN AND TAZOBACTAM 3.38 G: 3; .375 INJECTION, POWDER, FOR SOLUTION INTRAVENOUS at 21:08

## 2020-06-14 RX ADMIN — DIVALPROEX SODIUM 500 MG: 125 TABLET, DELAYED RELEASE ORAL at 08:37

## 2020-06-14 RX ADMIN — PANTOPRAZOLE SODIUM 40 MG: 40 INJECTION, POWDER, FOR SOLUTION INTRAVENOUS at 05:57

## 2020-06-14 RX ADMIN — Medication 10 ML: at 21:08

## 2020-06-14 RX ADMIN — KETOROLAC TROMETHAMINE 15 MG: 15 INJECTION, SOLUTION INTRAMUSCULAR; INTRAVENOUS at 03:26

## 2020-06-14 RX ADMIN — SODIUM CHLORIDE 250 MG: 9 INJECTION, SOLUTION INTRAVENOUS at 11:18

## 2020-06-14 RX ADMIN — METRONIDAZOLE 500 MG: 500 TABLET, FILM COATED ORAL at 05:57

## 2020-06-14 RX ADMIN — QUETIAPINE 400 MG: 100 TABLET, FILM COATED ORAL at 08:37

## 2020-06-14 RX ADMIN — SODIUM CHLORIDE 50 ML/HR: 900 INJECTION, SOLUTION INTRAVENOUS at 16:15

## 2020-06-14 RX ADMIN — QUETIAPINE 400 MG: 100 TABLET, FILM COATED ORAL at 21:07

## 2020-06-14 RX ADMIN — BUSPIRONE HYDROCHLORIDE 30 MG: 15 TABLET ORAL at 08:37

## 2020-06-14 RX ADMIN — POTASSIUM CHLORIDE 20 MEQ: 1500 TABLET, EXTENDED RELEASE ORAL at 08:37

## 2020-06-14 RX ADMIN — POTASSIUM CHLORIDE 40 MEQ: 1500 TABLET, EXTENDED RELEASE ORAL at 05:57

## 2020-06-14 RX ADMIN — FUROSEMIDE 60 MG: 40 TABLET ORAL at 08:37

## 2020-06-14 RX ADMIN — LIOTHYRONINE SODIUM 5 MCG: 5 TABLET ORAL at 08:37

## 2020-06-14 RX ADMIN — ESTRADIOL 1 MG: 1 TABLET ORAL at 08:37

## 2020-06-14 RX ADMIN — PIPERACILLIN AND TAZOBACTAM 3.38 G: 3; .375 INJECTION, POWDER, FOR SOLUTION INTRAVENOUS at 16:15

## 2020-06-14 RX ADMIN — DIVALPROEX SODIUM 500 MG: 125 TABLET, DELAYED RELEASE ORAL at 21:07

## 2020-06-14 RX ADMIN — MONTELUKAST SODIUM 10 MG: 10 TABLET, COATED ORAL at 21:07

## 2020-06-14 RX ADMIN — LEVOTHYROXINE SODIUM 100 MCG: 100 TABLET ORAL at 06:02

## 2020-06-14 RX ADMIN — PAROXETINE HYDROCHLORIDE 40 MG: 20 TABLET, FILM COATED ORAL at 21:07

## 2020-06-14 RX ADMIN — METRONIDAZOLE 500 MG: 500 TABLET, FILM COATED ORAL at 14:35

## 2020-06-14 RX ADMIN — ENOXAPARIN SODIUM 40 MG: 40 INJECTION SUBCUTANEOUS at 16:15

## 2020-06-14 RX ADMIN — BUSPIRONE HYDROCHLORIDE 30 MG: 15 TABLET ORAL at 21:08

## 2020-06-15 ENCOUNTER — APPOINTMENT (OUTPATIENT)
Dept: GENERAL RADIOLOGY | Facility: HOSPITAL | Age: 50
End: 2020-06-15

## 2020-06-15 ENCOUNTER — INPATIENT HOSPITAL (AMBULATORY)
Dept: URBAN - METROPOLITAN AREA HOSPITAL 84 | Facility: HOSPITAL | Age: 50
End: 2020-06-15

## 2020-06-15 DIAGNOSIS — Q43.1 HIRSCHSPRUNG'S DISEASE: ICD-10-CM

## 2020-06-15 DIAGNOSIS — K59.00 CONSTIPATION, UNSPECIFIED: ICD-10-CM

## 2020-06-15 DIAGNOSIS — K59.8 OTHER SPECIFIED FUNCTIONAL INTESTINAL DISORDERS: ICD-10-CM

## 2020-06-15 DIAGNOSIS — R14.0 ABDOMINAL DISTENSION (GASEOUS): ICD-10-CM

## 2020-06-15 LAB
ALBUMIN SERPL-MCNC: 2.7 G/DL (ref 3.5–5.2)
ALBUMIN/GLOB SERPL: 0.8 G/DL
ALP SERPL-CCNC: 45 U/L (ref 39–117)
ALT SERPL W P-5'-P-CCNC: 9 U/L (ref 1–41)
ANION GAP SERPL CALCULATED.3IONS-SCNC: 12 MMOL/L (ref 5–15)
ANISOCYTOSIS BLD QL: ABNORMAL
AST SERPL-CCNC: 27 U/L (ref 1–40)
BILIRUB SERPL-MCNC: 0.3 MG/DL (ref 0.2–1.2)
BUN BLD-MCNC: 13 MG/DL (ref 6–20)
BUN BLD-MCNC: ABNORMAL MG/DL
BUN/CREAT SERPL: ABNORMAL
CALCIUM SPEC-SCNC: 8.2 MG/DL (ref 8.6–10.5)
CHLORIDE SERPL-SCNC: 103 MMOL/L (ref 98–107)
CO2 SERPL-SCNC: 25 MMOL/L (ref 22–29)
CREAT BLD-MCNC: 0.71 MG/DL (ref 0.76–1.27)
CRP SERPL-MCNC: 17.85 MG/DL (ref 0–0.5)
DEPRECATED RDW RBC AUTO: 49.4 FL (ref 37–54)
ERYTHROCYTE [DISTWIDTH] IN BLOOD BY AUTOMATED COUNT: 15.2 % (ref 12.3–15.4)
GFR SERPL CREATININE-BSD FRML MDRD: 117 ML/MIN/1.73
GLOBULIN UR ELPH-MCNC: 3.3 GM/DL
GLUCOSE BLD-MCNC: 96 MG/DL (ref 65–99)
HCT VFR BLD AUTO: 37.4 % (ref 37.5–51)
HGB BLD-MCNC: 13 G/DL (ref 13–17.7)
LIPASE SERPL-CCNC: 138 U/L (ref 13–60)
LYMPHOCYTES # BLD MANUAL: 2 10*3/MM3 (ref 0.7–3.1)
LYMPHOCYTES NFR BLD MANUAL: 18 % (ref 5–12)
LYMPHOCYTES NFR BLD MANUAL: 20 % (ref 19.6–45.3)
MAGNESIUM SERPL-MCNC: 2 MG/DL (ref 1.6–2.6)
MCH RBC QN AUTO: 32.5 PG (ref 26.6–33)
MCHC RBC AUTO-ENTMCNC: 34.6 G/DL (ref 31.5–35.7)
MCV RBC AUTO: 94 FL (ref 79–97)
METAMYELOCYTES NFR BLD MANUAL: 7 % (ref 0–0)
MONOCYTES # BLD AUTO: 1.8 10*3/MM3 (ref 0.1–0.9)
MYELOCYTES NFR BLD MANUAL: 2 % (ref 0–0)
NEUTROPHILS # BLD AUTO: 5.1 10*3/MM3 (ref 1.7–7)
NEUTROPHILS NFR BLD MANUAL: 6 % (ref 42.7–76)
NEUTS BAND NFR BLD MANUAL: 45 % (ref 0–5)
PHOSPHATE SERPL-MCNC: 2.6 MG/DL (ref 2.5–4.5)
PLATELET # BLD AUTO: 211 10*3/MM3 (ref 140–450)
PMV BLD AUTO: 8.4 FL (ref 6–12)
POTASSIUM BLD-SCNC: 3.5 MMOL/L (ref 3.5–5.2)
PROT SERPL-MCNC: 6 G/DL (ref 6–8.5)
RBC # BLD AUTO: 3.98 10*6/MM3 (ref 4.14–5.8)
SCAN SLIDE: NORMAL
SMALL PLATELETS BLD QL SMEAR: ADEQUATE
SODIUM BLD-SCNC: 140 MMOL/L (ref 136–145)
TOXIC GRANULATION: ABNORMAL
VARIANT LYMPHS NFR BLD MANUAL: 2 % (ref 0–5)
WBC NRBC COR # BLD: 10 10*3/MM3 (ref 3.4–10.8)

## 2020-06-15 PROCEDURE — 94799 UNLISTED PULMONARY SVC/PX: CPT

## 2020-06-15 PROCEDURE — 85025 COMPLETE CBC W/AUTO DIFF WBC: CPT | Performed by: PHYSICIAN ASSISTANT

## 2020-06-15 PROCEDURE — 85007 BL SMEAR W/DIFF WBC COUNT: CPT | Performed by: PHYSICIAN ASSISTANT

## 2020-06-15 PROCEDURE — 74018 RADEX ABDOMEN 1 VIEW: CPT

## 2020-06-15 PROCEDURE — 71046 X-RAY EXAM CHEST 2 VIEWS: CPT

## 2020-06-15 PROCEDURE — 25010000002 ENOXAPARIN PER 10 MG: Performed by: INTERNAL MEDICINE

## 2020-06-15 PROCEDURE — 25010000002 ONDANSETRON PER 1 MG: Performed by: PHYSICIAN ASSISTANT

## 2020-06-15 PROCEDURE — 94660 CPAP INITIATION&MGMT: CPT

## 2020-06-15 PROCEDURE — 25010000002 METHYLNALTREXONE 12 MG/0.6ML SOLUTION: Performed by: NURSE PRACTITIONER

## 2020-06-15 PROCEDURE — 99232 SBSQ HOSP IP/OBS MODERATE 35: CPT | Performed by: NURSE PRACTITIONER

## 2020-06-15 PROCEDURE — 25010000002 PIPERACILLIN SOD-TAZOBACTAM PER 1 G: Performed by: INTERNAL MEDICINE

## 2020-06-15 PROCEDURE — 86140 C-REACTIVE PROTEIN: CPT | Performed by: NURSE PRACTITIONER

## 2020-06-15 PROCEDURE — 25010000002 METHYLPREDNISOLONE PER 40 MG: Performed by: INTERNAL MEDICINE

## 2020-06-15 PROCEDURE — 84100 ASSAY OF PHOSPHORUS: CPT | Performed by: PHYSICIAN ASSISTANT

## 2020-06-15 PROCEDURE — 25010000002 FUROSEMIDE PER 20 MG: Performed by: INTERNAL MEDICINE

## 2020-06-15 PROCEDURE — 80053 COMPREHEN METABOLIC PANEL: CPT | Performed by: PHYSICIAN ASSISTANT

## 2020-06-15 PROCEDURE — 99233 SBSQ HOSP IP/OBS HIGH 50: CPT | Performed by: HOSPITALIST

## 2020-06-15 PROCEDURE — 83735 ASSAY OF MAGNESIUM: CPT | Performed by: PHYSICIAN ASSISTANT

## 2020-06-15 PROCEDURE — 94640 AIRWAY INHALATION TREATMENT: CPT

## 2020-06-15 PROCEDURE — 83690 ASSAY OF LIPASE: CPT | Performed by: NURSE PRACTITIONER

## 2020-06-15 RX ORDER — BUSPIRONE HYDROCHLORIDE 15 MG/1
30 TABLET ORAL 2 TIMES DAILY
Status: DISCONTINUED | OUTPATIENT
Start: 2020-06-15 | End: 2020-06-24 | Stop reason: HOSPADM

## 2020-06-15 RX ORDER — FUROSEMIDE 10 MG/ML
40 INJECTION INTRAMUSCULAR; INTRAVENOUS ONCE
Status: COMPLETED | OUTPATIENT
Start: 2020-06-15 | End: 2020-06-15

## 2020-06-15 RX ORDER — MONTELUKAST SODIUM 10 MG/1
10 TABLET ORAL NIGHTLY
Status: DISCONTINUED | OUTPATIENT
Start: 2020-06-15 | End: 2020-06-24 | Stop reason: HOSPADM

## 2020-06-15 RX ORDER — METHYLPREDNISOLONE SODIUM SUCCINATE 40 MG/ML
20 INJECTION, POWDER, LYOPHILIZED, FOR SOLUTION INTRAMUSCULAR; INTRAVENOUS ONCE
Status: COMPLETED | OUTPATIENT
Start: 2020-06-15 | End: 2020-06-15

## 2020-06-15 RX ORDER — ESTRADIOL 1 MG/1
1 TABLET ORAL DAILY
Status: DISCONTINUED | OUTPATIENT
Start: 2020-06-16 | End: 2020-06-24 | Stop reason: HOSPADM

## 2020-06-15 RX ORDER — QUETIAPINE FUMARATE 100 MG/1
400 TABLET, FILM COATED ORAL 2 TIMES DAILY
Status: DISCONTINUED | OUTPATIENT
Start: 2020-06-15 | End: 2020-06-15

## 2020-06-15 RX ORDER — PAROXETINE HYDROCHLORIDE 20 MG/1
40 TABLET, FILM COATED ORAL NIGHTLY
Status: DISCONTINUED | OUTPATIENT
Start: 2020-06-15 | End: 2020-06-24 | Stop reason: HOSPADM

## 2020-06-15 RX ORDER — LEVOTHYROXINE SODIUM 0.1 MG/1
100 TABLET ORAL DAILY
Status: DISCONTINUED | OUTPATIENT
Start: 2020-06-16 | End: 2020-06-24 | Stop reason: HOSPADM

## 2020-06-15 RX ORDER — FUROSEMIDE 10 MG/ML
20 INJECTION INTRAMUSCULAR; INTRAVENOUS ONCE
Status: COMPLETED | OUTPATIENT
Start: 2020-06-15 | End: 2020-06-15

## 2020-06-15 RX ORDER — POTASSIUM CHLORIDE 1.5 G/1.77G
20 POWDER, FOR SOLUTION ORAL DAILY
Status: DISCONTINUED | OUTPATIENT
Start: 2020-06-15 | End: 2020-06-24 | Stop reason: HOSPADM

## 2020-06-15 RX ADMIN — ONDANSETRON 4 MG: 2 INJECTION INTRAMUSCULAR; INTRAVENOUS at 22:35

## 2020-06-15 RX ADMIN — PIPERACILLIN AND TAZOBACTAM 3.38 G: 3; .375 INJECTION, POWDER, FOR SOLUTION INTRAVENOUS at 15:10

## 2020-06-15 RX ADMIN — Medication 10 ML: at 21:05

## 2020-06-15 RX ADMIN — ALBUTEROL SULFATE 2.5 MG: 2.5 SOLUTION RESPIRATORY (INHALATION) at 22:40

## 2020-06-15 RX ADMIN — DEXTROSE MONOHYDRATE 250 MG: 50 INJECTION, SOLUTION INTRAVENOUS at 17:16

## 2020-06-15 RX ADMIN — FUROSEMIDE 20 MG: 10 INJECTION INTRAMUSCULAR; INTRAVENOUS at 16:15

## 2020-06-15 RX ADMIN — LEVOTHYROXINE SODIUM 100 MCG: 100 TABLET ORAL at 05:41

## 2020-06-15 RX ADMIN — ALBUTEROL SULFATE 2.5 MG: 2.5 SOLUTION RESPIRATORY (INHALATION) at 15:24

## 2020-06-15 RX ADMIN — ENOXAPARIN SODIUM 40 MG: 40 INJECTION SUBCUTANEOUS at 15:10

## 2020-06-15 RX ADMIN — PIPERACILLIN AND TAZOBACTAM 3.38 G: 3; .375 INJECTION, POWDER, FOR SOLUTION INTRAVENOUS at 05:40

## 2020-06-15 RX ADMIN — Medication 10 ML: at 09:00

## 2020-06-15 RX ADMIN — PANTOPRAZOLE SODIUM 40 MG: 40 INJECTION, POWDER, FOR SOLUTION INTRAVENOUS at 05:41

## 2020-06-15 RX ADMIN — PIPERACILLIN AND TAZOBACTAM 3.38 G: 3; .375 INJECTION, POWDER, FOR SOLUTION INTRAVENOUS at 22:36

## 2020-06-15 RX ADMIN — POTASSIUM CHLORIDE 20 MEQ: 1.5 POWDER, FOR SOLUTION ORAL at 12:34

## 2020-06-15 RX ADMIN — METHYLPREDNISOLONE SODIUM SUCCINATE 20 MG: 40 INJECTION, POWDER, FOR SOLUTION INTRAMUSCULAR; INTRAVENOUS at 16:15

## 2020-06-15 RX ADMIN — FUROSEMIDE 40 MG: 10 INJECTION, SOLUTION INTRAMUSCULAR; INTRAVENOUS at 08:39

## 2020-06-15 RX ADMIN — METHYLNALTREXONE BROMIDE 12 MG: 12 INJECTION, SOLUTION SUBCUTANEOUS at 09:55

## 2020-06-15 RX ADMIN — ALBUTEROL SULFATE 2.5 MG: 2.5 SOLUTION RESPIRATORY (INHALATION) at 07:22

## 2020-06-16 ENCOUNTER — INPATIENT HOSPITAL (AMBULATORY)
Dept: URBAN - METROPOLITAN AREA HOSPITAL 84 | Facility: HOSPITAL | Age: 50
End: 2020-06-16

## 2020-06-16 ENCOUNTER — APPOINTMENT (OUTPATIENT)
Dept: GENERAL RADIOLOGY | Facility: HOSPITAL | Age: 50
End: 2020-06-16

## 2020-06-16 DIAGNOSIS — R14.0 ABDOMINAL DISTENSION (GASEOUS): ICD-10-CM

## 2020-06-16 DIAGNOSIS — K59.00 CONSTIPATION, UNSPECIFIED: ICD-10-CM

## 2020-06-16 DIAGNOSIS — Q43.1 HIRSCHSPRUNG'S DISEASE: ICD-10-CM

## 2020-06-16 DIAGNOSIS — K59.8 OTHER SPECIFIED FUNCTIONAL INTESTINAL DISORDERS: ICD-10-CM

## 2020-06-16 LAB
ALBUMIN SERPL-MCNC: 2.8 G/DL (ref 3.5–5.2)
ALBUMIN/GLOB SERPL: 0.8 G/DL
ALP SERPL-CCNC: 75 U/L (ref 39–117)
ALT SERPL W P-5'-P-CCNC: 8 U/L (ref 1–41)
AMYLASE SERPL-CCNC: 111 U/L (ref 28–100)
ANION GAP SERPL CALCULATED.3IONS-SCNC: 12 MMOL/L (ref 5–15)
ARTERIAL PATENCY WRIST A: NEGATIVE
ARTERIAL PATENCY WRIST A: POSITIVE
AST SERPL-CCNC: 27 U/L (ref 1–40)
ATMOSPHERIC PRESS: ABNORMAL MM[HG]
ATMOSPHERIC PRESS: ABNORMAL MM[HG]
BASE EXCESS BLDA CALC-SCNC: 6.6 MMOL/L (ref 0–3)
BASE EXCESS BLDA CALC-SCNC: 8.2 MMOL/L (ref 0–3)
BDY SITE: ABNORMAL
BDY SITE: ABNORMAL
BILIRUB SERPL-MCNC: 0.3 MG/DL (ref 0.2–1.2)
BUN BLD-MCNC: 17 MG/DL (ref 6–20)
BUN BLD-MCNC: ABNORMAL MG/DL
BUN/CREAT SERPL: ABNORMAL
CA-I BLDA-SCNC: 1.11 MMOL/L (ref 1.15–1.33)
CALCIUM SPEC-SCNC: 8.2 MG/DL (ref 8.6–10.5)
CHLORIDE SERPL-SCNC: 101 MMOL/L (ref 98–107)
CO2 BLDA-SCNC: 32.7 MMOL/L (ref 22–29)
CO2 BLDA-SCNC: 34.8 MMOL/L (ref 22–29)
CO2 SERPL-SCNC: 26 MMOL/L (ref 22–29)
CREAT BLD-MCNC: 0.6 MG/DL (ref 0.76–1.27)
D-LACTATE SERPL-SCNC: 0.7 MMOL/L (ref 0.5–2)
DEPRECATED RDW RBC AUTO: 52.1 FL (ref 37–54)
ERYTHROCYTE [DISTWIDTH] IN BLOOD BY AUTOMATED COUNT: 15.8 % (ref 12.3–15.4)
GFR SERPL CREATININE-BSD FRML MDRD: 143 ML/MIN/1.73
GLOBULIN UR ELPH-MCNC: 3.7 GM/DL
GLUCOSE BLD-MCNC: 126 MG/DL (ref 65–99)
GLUCOSE BLDC GLUCOMTR-MCNC: 121 MG/DL (ref 70–105)
GLUCOSE BLDC GLUCOMTR-MCNC: 121 MG/DL (ref 70–105)
GLUCOSE BLDC GLUCOMTR-MCNC: 133 MG/DL (ref 70–105)
GLUCOSE BLDC GLUCOMTR-MCNC: 136 MG/DL (ref 74–100)
HCO3 BLDA-SCNC: 31.3 MMOL/L (ref 21–28)
HCO3 BLDA-SCNC: 33.4 MMOL/L (ref 21–28)
HCT VFR BLD AUTO: 37.9 % (ref 37.5–51)
HCT VFR BLDA CALC: 45 % (ref 38–51)
HEMODILUTION: NO
HEMODILUTION: NO
HGB BLD-MCNC: 13.2 G/DL (ref 13–17.7)
HGB BLDA-MCNC: 15.5 G/DL (ref 12–17)
HOROWITZ INDEX BLD+IHG-RTO: 100 %
HOROWITZ INDEX BLD+IHG-RTO: 100 %
LIPASE SERPL-CCNC: 102 U/L (ref 13–60)
LYMPHOCYTES # BLD MANUAL: 0.82 10*3/MM3 (ref 0.7–3.1)
LYMPHOCYTES NFR BLD MANUAL: 1 % (ref 5–12)
LYMPHOCYTES NFR BLD MANUAL: 5 % (ref 19.6–45.3)
MAGNESIUM SERPL-MCNC: 2.4 MG/DL (ref 1.6–2.6)
MCH RBC QN AUTO: 33.3 PG (ref 26.6–33)
MCHC RBC AUTO-ENTMCNC: 34.8 G/DL (ref 31.5–35.7)
MCV RBC AUTO: 95.5 FL (ref 79–97)
METAMYELOCYTES NFR BLD MANUAL: 2 % (ref 0–0)
MODALITY: ABNORMAL
MODALITY: ABNORMAL
MONOCYTES # BLD AUTO: 0.16 10*3/MM3 (ref 0.1–0.9)
NEUTROPHILS # BLD AUTO: 15 10*3/MM3 (ref 1.7–7)
NEUTROPHILS NFR BLD MANUAL: 45 % (ref 42.7–76)
NEUTS BAND NFR BLD MANUAL: 47 % (ref 0–5)
PCO2 BLDA: 43.9 MM HG (ref 35–48)
PCO2 BLDA: 46.7 MM HG (ref 35–48)
PEEP RESPIRATORY: 12 CM[H2O]
PEEP RESPIRATORY: 5 CM[H2O]
PH BLDA: 7.46 PH UNITS (ref 7.35–7.45)
PH BLDA: 7.46 PH UNITS (ref 7.35–7.45)
PHOSPHATE SERPL-MCNC: 2.6 MG/DL (ref 2.5–4.5)
PLAT MORPH BLD: NORMAL
PLATELET # BLD AUTO: 242 10*3/MM3 (ref 140–450)
PMV BLD AUTO: 8.2 FL (ref 6–12)
PO2 BLDA: 58.1 MM HG (ref 83–108)
PO2 BLDA: 89.7 MM HG (ref 83–108)
POLYCHROMASIA BLD QL SMEAR: ABNORMAL
POTASSIUM BLD-SCNC: 3.7 MMOL/L (ref 3.5–5.2)
POTASSIUM BLDA-SCNC: 3.8 MMOL/L (ref 3.5–4.5)
PROT SERPL-MCNC: 6.5 G/DL (ref 6–8.5)
RBC # BLD AUTO: 3.97 10*6/MM3 (ref 4.14–5.8)
RESPIRATORY RATE: 20
RESPIRATORY RATE: 24
SAO2 % BLDCOA: 90.9 % (ref 94–98)
SAO2 % BLDCOA: 97.3 % (ref 94–98)
SCAN SLIDE: NORMAL
SODIUM BLD-SCNC: 139 MMOL/L (ref 136–145)
SODIUM BLDA-SCNC: 145 MMOL/L (ref 138–146)
TOXIC GRANULATION: ABNORMAL
VENTILATOR MODE: ABNORMAL
VENTILATOR MODE: ABNORMAL
VT ON VENT VENT: 450 ML
VT ON VENT VENT: 500 ML
WBC NRBC COR # BLD: 16.3 10*3/MM3 (ref 3.4–10.8)

## 2020-06-16 PROCEDURE — 31500 INSERT EMERGENCY AIRWAY: CPT

## 2020-06-16 PROCEDURE — 25010000002 METHYLPREDNISOLONE PER 40 MG: Performed by: NURSE PRACTITIONER

## 2020-06-16 PROCEDURE — 99233 SBSQ HOSP IP/OBS HIGH 50: CPT | Performed by: HOSPITALIST

## 2020-06-16 PROCEDURE — 83605 ASSAY OF LACTIC ACID: CPT

## 2020-06-16 PROCEDURE — 82150 ASSAY OF AMYLASE: CPT | Performed by: NURSE PRACTITIONER

## 2020-06-16 PROCEDURE — 94799 UNLISTED PULMONARY SVC/PX: CPT

## 2020-06-16 PROCEDURE — 85025 COMPLETE CBC W/AUTO DIFF WBC: CPT | Performed by: PHYSICIAN ASSISTANT

## 2020-06-16 PROCEDURE — 84100 ASSAY OF PHOSPHORUS: CPT | Performed by: PHYSICIAN ASSISTANT

## 2020-06-16 PROCEDURE — 5A1945Z RESPIRATORY VENTILATION, 24-96 CONSECUTIVE HOURS: ICD-10-PCS | Performed by: INTERNAL MEDICINE

## 2020-06-16 PROCEDURE — 82803 BLOOD GASES ANY COMBINATION: CPT

## 2020-06-16 PROCEDURE — 83735 ASSAY OF MAGNESIUM: CPT | Performed by: PHYSICIAN ASSISTANT

## 2020-06-16 PROCEDURE — 71045 X-RAY EXAM CHEST 1 VIEW: CPT

## 2020-06-16 PROCEDURE — 85018 HEMOGLOBIN: CPT

## 2020-06-16 PROCEDURE — 25010000002 PROPOFOL 10 MG/ML EMULSION

## 2020-06-16 PROCEDURE — 85007 BL SMEAR W/DIFF WBC COUNT: CPT | Performed by: PHYSICIAN ASSISTANT

## 2020-06-16 PROCEDURE — 87205 SMEAR GRAM STAIN: CPT | Performed by: INTERNAL MEDICINE

## 2020-06-16 PROCEDURE — 82330 ASSAY OF CALCIUM: CPT

## 2020-06-16 PROCEDURE — 87070 CULTURE OTHR SPECIMN AEROBIC: CPT | Performed by: INTERNAL MEDICINE

## 2020-06-16 PROCEDURE — 83690 ASSAY OF LIPASE: CPT | Performed by: NURSE PRACTITIONER

## 2020-06-16 PROCEDURE — 25010000002 PIPERACILLIN SOD-TAZOBACTAM PER 1 G: Performed by: INTERNAL MEDICINE

## 2020-06-16 PROCEDURE — 82962 GLUCOSE BLOOD TEST: CPT

## 2020-06-16 PROCEDURE — 25010000002 LORAZEPAM PER 2 MG: Performed by: PHYSICIAN ASSISTANT

## 2020-06-16 PROCEDURE — 25010000002 MIDAZOLAM PER 1 MG

## 2020-06-16 PROCEDURE — 80053 COMPREHEN METABOLIC PANEL: CPT | Performed by: PHYSICIAN ASSISTANT

## 2020-06-16 PROCEDURE — 25010000002 METHYLNALTREXONE 12 MG/0.6ML SOLUTION: Performed by: NURSE PRACTITIONER

## 2020-06-16 PROCEDURE — 36600 WITHDRAWAL OF ARTERIAL BLOOD: CPT

## 2020-06-16 PROCEDURE — 99233 SBSQ HOSP IP/OBS HIGH 50: CPT | Performed by: NURSE PRACTITIONER

## 2020-06-16 PROCEDURE — 74018 RADEX ABDOMEN 1 VIEW: CPT

## 2020-06-16 PROCEDURE — 94660 CPAP INITIATION&MGMT: CPT

## 2020-06-16 PROCEDURE — 25010000002 ENOXAPARIN PER 10 MG: Performed by: INTERNAL MEDICINE

## 2020-06-16 PROCEDURE — 94002 VENT MGMT INPAT INIT DAY: CPT

## 2020-06-16 PROCEDURE — 0BH17EZ INSERTION OF ENDOTRACHEAL AIRWAY INTO TRACHEA, VIA NATURAL OR ARTIFICIAL OPENING: ICD-10-PCS | Performed by: NURSE PRACTITIONER

## 2020-06-16 PROCEDURE — 25010000002 FENTANYL CITRATE (PF) 100 MCG/2ML SOLUTION

## 2020-06-16 PROCEDURE — 0B9F8ZX DRAINAGE OF RIGHT LOWER LUNG LOBE, VIA NATURAL OR ARTIFICIAL OPENING ENDOSCOPIC, DIAGNOSTIC: ICD-10-PCS | Performed by: INTERNAL MEDICINE

## 2020-06-16 PROCEDURE — 31500 INSERT EMERGENCY AIRWAY: CPT | Performed by: NURSE PRACTITIONER

## 2020-06-16 PROCEDURE — 80051 ELECTROLYTE PANEL: CPT

## 2020-06-16 PROCEDURE — 25010000003 LIDOCAINE 1 % SOLUTION

## 2020-06-16 RX ORDER — ETOMIDATE 2 MG/ML
20 INJECTION INTRAVENOUS ONCE
Status: COMPLETED | OUTPATIENT
Start: 2020-06-16 | End: 2020-06-16

## 2020-06-16 RX ORDER — MIDAZOLAM HYDROCHLORIDE 1 MG/ML
INJECTION INTRAMUSCULAR; INTRAVENOUS
Status: DISCONTINUED
Start: 2020-06-16 | End: 2020-06-16 | Stop reason: WASHOUT

## 2020-06-16 RX ORDER — LIDOCAINE HYDROCHLORIDE 10 MG/ML
INJECTION, SOLUTION INFILTRATION; PERINEURAL
Status: COMPLETED
Start: 2020-06-16 | End: 2020-06-16

## 2020-06-16 RX ORDER — ETOMIDATE 2 MG/ML
INJECTION INTRAVENOUS
Status: COMPLETED
Start: 2020-06-16 | End: 2020-06-16

## 2020-06-16 RX ORDER — MIDAZOLAM HYDROCHLORIDE 1 MG/ML
INJECTION INTRAMUSCULAR; INTRAVENOUS
Status: COMPLETED
Start: 2020-06-16 | End: 2020-06-16

## 2020-06-16 RX ORDER — CHLORHEXIDINE GLUCONATE 0.12 MG/ML
15 RINSE ORAL EVERY 12 HOURS SCHEDULED
Status: DISCONTINUED | OUTPATIENT
Start: 2020-06-16 | End: 2020-06-20

## 2020-06-16 RX ORDER — VECURONIUM BROMIDE 1 MG/ML
10 INJECTION, POWDER, LYOPHILIZED, FOR SOLUTION INTRAVENOUS ONCE
Status: COMPLETED | OUTPATIENT
Start: 2020-06-16 | End: 2020-06-16

## 2020-06-16 RX ORDER — FENTANYL CITRATE 50 UG/ML
INJECTION, SOLUTION INTRAMUSCULAR; INTRAVENOUS
Status: DISPENSED
Start: 2020-06-16 | End: 2020-06-16

## 2020-06-16 RX ORDER — MIDAZOLAM HYDROCHLORIDE 1 MG/ML
4 INJECTION INTRAMUSCULAR; INTRAVENOUS ONCE
Status: COMPLETED | OUTPATIENT
Start: 2020-06-16 | End: 2020-06-16

## 2020-06-16 RX ORDER — FENTANYL CITRATE 50 UG/ML
100 INJECTION, SOLUTION INTRAMUSCULAR; INTRAVENOUS ONCE
Status: COMPLETED | OUTPATIENT
Start: 2020-06-16 | End: 2020-06-16

## 2020-06-16 RX ORDER — PROPOFOL 10 MG/ML
VIAL (ML) INTRAVENOUS
Status: COMPLETED
Start: 2020-06-16 | End: 2020-06-16

## 2020-06-16 RX ORDER — METHYLPREDNISOLONE SODIUM SUCCINATE 40 MG/ML
20 INJECTION, POWDER, LYOPHILIZED, FOR SOLUTION INTRAMUSCULAR; INTRAVENOUS EVERY 12 HOURS
Status: DISCONTINUED | OUTPATIENT
Start: 2020-06-16 | End: 2020-06-21

## 2020-06-16 RX ORDER — FENTANYL CITRATE 50 UG/ML
INJECTION, SOLUTION INTRAMUSCULAR; INTRAVENOUS
Status: DISCONTINUED
Start: 2020-06-16 | End: 2020-06-16 | Stop reason: WASHOUT

## 2020-06-16 RX ORDER — VECURONIUM BROMIDE 1 MG/ML
INJECTION, POWDER, LYOPHILIZED, FOR SOLUTION INTRAVENOUS
Status: COMPLETED
Start: 2020-06-16 | End: 2020-06-16

## 2020-06-16 RX ORDER — ALBUTEROL SULFATE 2.5 MG/3ML
2.5 SOLUTION RESPIRATORY (INHALATION) ONCE
Status: DISCONTINUED | OUTPATIENT
Start: 2020-06-16 | End: 2020-06-18

## 2020-06-16 RX ORDER — VECURONIUM BROMIDE 1 MG/ML
INJECTION, POWDER, LYOPHILIZED, FOR SOLUTION INTRAVENOUS
Status: DISCONTINUED
Start: 2020-06-16 | End: 2020-06-16 | Stop reason: WASHOUT

## 2020-06-16 RX ORDER — MIDAZOLAM HYDROCHLORIDE 1 MG/ML
INJECTION INTRAMUSCULAR; INTRAVENOUS
Status: DISPENSED
Start: 2020-06-16 | End: 2020-06-16

## 2020-06-16 RX ORDER — PROPOFOL 10 MG/ML
VIAL (ML) INTRAVENOUS
Status: DISCONTINUED
Start: 2020-06-16 | End: 2020-06-16 | Stop reason: WASHOUT

## 2020-06-16 RX ORDER — FENTANYL CITRATE 50 UG/ML
INJECTION, SOLUTION INTRAMUSCULAR; INTRAVENOUS
Status: COMPLETED
Start: 2020-06-16 | End: 2020-06-16

## 2020-06-16 RX ADMIN — MIDAZOLAM HYDROCHLORIDE 4 MG: 1 INJECTION INTRAMUSCULAR; INTRAVENOUS at 04:28

## 2020-06-16 RX ADMIN — LIDOCAINE HYDROCHLORIDE: 10 INJECTION, SOLUTION INFILTRATION; PERINEURAL at 09:57

## 2020-06-16 RX ADMIN — BUSPIRONE HYDROCHLORIDE 30 MG: 15 TABLET ORAL at 08:14

## 2020-06-16 RX ADMIN — ETOMIDATE 20 MG: 2 INJECTION INTRAVENOUS at 06:36

## 2020-06-16 RX ADMIN — CHLORHEXIDINE GLUCONATE 0.12% ORAL RINSE 15 ML: 1.2 LIQUID ORAL at 21:45

## 2020-06-16 RX ADMIN — MIDAZOLAM 4 MG: 1 INJECTION INTRAMUSCULAR; INTRAVENOUS at 04:28

## 2020-06-16 RX ADMIN — FENTANYL CITRATE 100 MCG: 50 INJECTION INTRAMUSCULAR; INTRAVENOUS at 04:28

## 2020-06-16 RX ADMIN — FENTANYL CITRATE 100 MCG: 50 INJECTION, SOLUTION INTRAMUSCULAR; INTRAVENOUS at 04:28

## 2020-06-16 RX ADMIN — ETOMIDATE 20 MG: 40 INJECTION, SOLUTION INTRAVENOUS at 04:27

## 2020-06-16 RX ADMIN — DEXTROSE MONOHYDRATE 250 MG: 50 INJECTION, SOLUTION INTRAVENOUS at 15:32

## 2020-06-16 RX ADMIN — VECURONIUM BROMIDE 10 MG: 1 INJECTION, POWDER, LYOPHILIZED, FOR SOLUTION INTRAVENOUS at 06:35

## 2020-06-16 RX ADMIN — POTASSIUM CHLORIDE 20 MEQ: 1.5 POWDER, FOR SOLUTION ORAL at 08:14

## 2020-06-16 RX ADMIN — PIPERACILLIN AND TAZOBACTAM 3.38 G: 3; .375 INJECTION, POWDER, FOR SOLUTION INTRAVENOUS at 21:45

## 2020-06-16 RX ADMIN — BUSPIRONE HYDROCHLORIDE 30 MG: 15 TABLET ORAL at 21:46

## 2020-06-16 RX ADMIN — LORAZEPAM 1 MG: 2 INJECTION INTRAMUSCULAR; INTRAVENOUS at 10:22

## 2020-06-16 RX ADMIN — ETOMIDATE 20 MG: 2 INJECTION INTRAVENOUS at 04:27

## 2020-06-16 RX ADMIN — PIPERACILLIN AND TAZOBACTAM 3.38 G: 3; .375 INJECTION, POWDER, FOR SOLUTION INTRAVENOUS at 06:37

## 2020-06-16 RX ADMIN — CHLORHEXIDINE GLUCONATE 0.12% ORAL RINSE 15 ML: 1.2 LIQUID ORAL at 08:15

## 2020-06-16 RX ADMIN — LEVOTHYROXINE SODIUM 100 MCG: 100 TABLET ORAL at 07:57

## 2020-06-16 RX ADMIN — ALBUTEROL SULFATE 2.5 MG: 2.5 SOLUTION RESPIRATORY (INHALATION) at 00:50

## 2020-06-16 RX ADMIN — MONTELUKAST SODIUM 10 MG: 10 TABLET, COATED ORAL at 21:45

## 2020-06-16 RX ADMIN — DEXTROSE MONOHYDRATE 250 MG: 50 INJECTION, SOLUTION INTRAVENOUS at 00:54

## 2020-06-16 RX ADMIN — POLYETHYLENE GLYCOL 3350 34 G: 17 POWDER, FOR SOLUTION ORAL at 08:14

## 2020-06-16 RX ADMIN — ETOMIDATE 20 MG: 40 INJECTION, SOLUTION INTRAVENOUS at 06:36

## 2020-06-16 RX ADMIN — DEXTROSE MONOHYDRATE 250 MG: 50 INJECTION, SOLUTION INTRAVENOUS at 04:29

## 2020-06-16 RX ADMIN — SODIUM CHLORIDE 100 ML/HR: 900 INJECTION, SOLUTION INTRAVENOUS at 10:12

## 2020-06-16 RX ADMIN — DEXTROSE MONOHYDRATE 250 MG: 50 INJECTION, SOLUTION INTRAVENOUS at 10:13

## 2020-06-16 RX ADMIN — Medication 10 ML: at 21:44

## 2020-06-16 RX ADMIN — Medication 10 ML: at 08:14

## 2020-06-16 RX ADMIN — PIPERACILLIN AND TAZOBACTAM 3.38 G: 3; .375 INJECTION, POWDER, FOR SOLUTION INTRAVENOUS at 15:02

## 2020-06-16 RX ADMIN — METHYLNALTREXONE BROMIDE 12 MG: 12 INJECTION, SOLUTION SUBCUTANEOUS at 08:14

## 2020-06-16 RX ADMIN — LIOTHYRONINE SODIUM 5 MCG: 5 TABLET ORAL at 08:14

## 2020-06-16 RX ADMIN — PROPOFOL 1000 MG: 10 INJECTION, EMULSION INTRAVENOUS at 04:29

## 2020-06-16 RX ADMIN — PANTOPRAZOLE SODIUM 40 MG: 40 INJECTION, POWDER, FOR SOLUTION INTRAVENOUS at 06:37

## 2020-06-16 RX ADMIN — METHYLPREDNISOLONE SODIUM SUCCINATE 20 MG: 40 INJECTION, POWDER, FOR SOLUTION INTRAMUSCULAR; INTRAVENOUS at 21:46

## 2020-06-16 RX ADMIN — METHYLPREDNISOLONE SODIUM SUCCINATE 20 MG: 40 INJECTION, POWDER, FOR SOLUTION INTRAMUSCULAR; INTRAVENOUS at 10:12

## 2020-06-16 RX ADMIN — PAROXETINE HYDROCHLORIDE 40 MG: 20 TABLET, FILM COATED ORAL at 21:45

## 2020-06-16 RX ADMIN — PROPOFOL 1000 MG: 10 INJECTION, EMULSION INTRAVENOUS at 06:37

## 2020-06-16 RX ADMIN — ENOXAPARIN SODIUM 40 MG: 40 INJECTION SUBCUTANEOUS at 15:03

## 2020-06-16 RX ADMIN — DEXTROSE MONOHYDRATE 250 MG: 50 INJECTION, SOLUTION INTRAVENOUS at 21:45

## 2020-06-17 ENCOUNTER — APPOINTMENT (OUTPATIENT)
Dept: GENERAL RADIOLOGY | Facility: HOSPITAL | Age: 50
End: 2020-06-17

## 2020-06-17 LAB
ALBUMIN SERPL-MCNC: 2.7 G/DL (ref 3.5–5.2)
ALBUMIN/GLOB SERPL: 0.7 G/DL
ALP SERPL-CCNC: 125 U/L (ref 39–117)
ALT SERPL W P-5'-P-CCNC: 8 U/L (ref 1–41)
ANION GAP SERPL CALCULATED.3IONS-SCNC: 9 MMOL/L (ref 5–15)
ANISOCYTOSIS BLD QL: ABNORMAL
ARTERIAL PATENCY WRIST A: POSITIVE
AST SERPL-CCNC: 24 U/L (ref 1–40)
ATMOSPHERIC PRESS: ABNORMAL MM[HG]
BASE EXCESS BLDA CALC-SCNC: 2.9 MMOL/L (ref 0–3)
BDY SITE: ABNORMAL
BILIRUB SERPL-MCNC: 0.2 MG/DL (ref 0.2–1.2)
BUN BLD-MCNC: 17 MG/DL (ref 6–20)
BUN BLD-MCNC: ABNORMAL MG/DL
BUN/CREAT SERPL: ABNORMAL
CALCIUM SPEC-SCNC: 8.4 MG/DL (ref 8.6–10.5)
CHLORIDE SERPL-SCNC: 107 MMOL/L (ref 98–107)
CO2 BLDA-SCNC: 28.4 MMOL/L (ref 22–29)
CO2 SERPL-SCNC: 24 MMOL/L (ref 22–29)
CREAT BLD-MCNC: 0.56 MG/DL (ref 0.76–1.27)
DEPRECATED RDW RBC AUTO: 53.4 FL (ref 37–54)
ERYTHROCYTE [DISTWIDTH] IN BLOOD BY AUTOMATED COUNT: 15.9 % (ref 12.3–15.4)
GFR SERPL CREATININE-BSD FRML MDRD: >150 ML/MIN/1.73
GLOBULIN UR ELPH-MCNC: 3.8 GM/DL
GLUCOSE BLD-MCNC: 111 MG/DL (ref 65–99)
GLUCOSE BLDC GLUCOMTR-MCNC: 102 MG/DL (ref 70–105)
GLUCOSE BLDC GLUCOMTR-MCNC: 122 MG/DL (ref 70–105)
GLUCOSE BLDC GLUCOMTR-MCNC: 97 MG/DL (ref 70–105)
HCO3 BLDA-SCNC: 27.2 MMOL/L (ref 21–28)
HCT VFR BLD AUTO: 39.1 % (ref 37.5–51)
HEMODILUTION: NO
HGB BLD-MCNC: 13.4 G/DL (ref 13–17.7)
HOROWITZ INDEX BLD+IHG-RTO: 70 %
LYMPHOCYTES # BLD MANUAL: 1.39 10*3/MM3 (ref 0.7–3.1)
LYMPHOCYTES NFR BLD MANUAL: 4 % (ref 5–12)
LYMPHOCYTES NFR BLD MANUAL: 8 % (ref 19.6–45.3)
MAGNESIUM SERPL-MCNC: 2.3 MG/DL (ref 1.6–2.6)
MCH RBC QN AUTO: 32.5 PG (ref 26.6–33)
MCHC RBC AUTO-ENTMCNC: 34.3 G/DL (ref 31.5–35.7)
MCV RBC AUTO: 95 FL (ref 79–97)
MODALITY: ABNORMAL
MONOCYTES # BLD AUTO: 0.7 10*3/MM3 (ref 0.1–0.9)
NEUTROPHILS # BLD AUTO: 15.31 10*3/MM3 (ref 1.7–7)
NEUTROPHILS NFR BLD MANUAL: 83 % (ref 42.7–76)
NEUTS BAND NFR BLD MANUAL: 5 % (ref 0–5)
PCO2 BLDA: 39.7 MM HG (ref 35–48)
PEEP RESPIRATORY: 5 CM[H2O]
PH BLDA: 7.44 PH UNITS (ref 7.35–7.45)
PHOSPHATE SERPL-MCNC: 3.1 MG/DL (ref 2.5–4.5)
PLAT MORPH BLD: NORMAL
PLATELET # BLD AUTO: 240 10*3/MM3 (ref 140–450)
PMV BLD AUTO: 8.1 FL (ref 6–12)
PO2 BLDA: 114.5 MM HG (ref 83–108)
POTASSIUM BLD-SCNC: 4.6 MMOL/L (ref 3.5–5.2)
PROT SERPL-MCNC: 6.5 G/DL (ref 6–8.5)
RBC # BLD AUTO: 4.12 10*6/MM3 (ref 4.14–5.8)
RESPIRATORY RATE: 20
SAO2 % BLDCOA: 98.7 % (ref 94–98)
SCAN SLIDE: NORMAL
SODIUM BLD-SCNC: 140 MMOL/L (ref 136–145)
TOXIC GRANULATION: ABNORMAL
VENTILATOR MODE: ABNORMAL
VT ON VENT VENT: 500 ML
WBC NRBC COR # BLD: 17.4 10*3/MM3 (ref 3.4–10.8)

## 2020-06-17 PROCEDURE — 82803 BLOOD GASES ANY COMBINATION: CPT

## 2020-06-17 PROCEDURE — 94799 UNLISTED PULMONARY SVC/PX: CPT

## 2020-06-17 PROCEDURE — 82962 GLUCOSE BLOOD TEST: CPT

## 2020-06-17 PROCEDURE — 25010000002 LORAZEPAM PER 2 MG

## 2020-06-17 PROCEDURE — 94003 VENT MGMT INPAT SUBQ DAY: CPT

## 2020-06-17 PROCEDURE — 25010000002 LORAZEPAM PER 2 MG: Performed by: INTERNAL MEDICINE

## 2020-06-17 PROCEDURE — 25010000002 ENOXAPARIN PER 10 MG: Performed by: INTERNAL MEDICINE

## 2020-06-17 PROCEDURE — 25010000002 METHYLPREDNISOLONE PER 40 MG: Performed by: NURSE PRACTITIONER

## 2020-06-17 PROCEDURE — 71045 X-RAY EXAM CHEST 1 VIEW: CPT

## 2020-06-17 PROCEDURE — 99232 SBSQ HOSP IP/OBS MODERATE 35: CPT | Performed by: NURSE PRACTITIONER

## 2020-06-17 PROCEDURE — 83735 ASSAY OF MAGNESIUM: CPT | Performed by: PHYSICIAN ASSISTANT

## 2020-06-17 PROCEDURE — 36600 WITHDRAWAL OF ARTERIAL BLOOD: CPT

## 2020-06-17 PROCEDURE — 84100 ASSAY OF PHOSPHORUS: CPT | Performed by: PHYSICIAN ASSISTANT

## 2020-06-17 PROCEDURE — 25010000002 METHYLNALTREXONE 12 MG/0.6ML SOLUTION: Performed by: NURSE PRACTITIONER

## 2020-06-17 PROCEDURE — 25010000002 PIPERACILLIN SOD-TAZOBACTAM PER 1 G: Performed by: INTERNAL MEDICINE

## 2020-06-17 PROCEDURE — 85007 BL SMEAR W/DIFF WBC COUNT: CPT | Performed by: PHYSICIAN ASSISTANT

## 2020-06-17 PROCEDURE — 85025 COMPLETE CBC W/AUTO DIFF WBC: CPT | Performed by: PHYSICIAN ASSISTANT

## 2020-06-17 PROCEDURE — 80053 COMPREHEN METABOLIC PANEL: CPT | Performed by: PHYSICIAN ASSISTANT

## 2020-06-17 RX ORDER — LORAZEPAM 2 MG/ML
INJECTION INTRAMUSCULAR
Status: COMPLETED
Start: 2020-06-17 | End: 2020-06-17

## 2020-06-17 RX ORDER — FUROSEMIDE 10 MG/ML
20 INJECTION INTRAMUSCULAR; INTRAVENOUS DAILY
Status: DISCONTINUED | OUTPATIENT
Start: 2020-06-17 | End: 2020-06-17

## 2020-06-17 RX ORDER — LORAZEPAM 2 MG/ML
1 INJECTION INTRAMUSCULAR EVERY 6 HOURS PRN
Status: DISCONTINUED | OUTPATIENT
Start: 2020-06-17 | End: 2020-06-21

## 2020-06-17 RX ADMIN — POLYETHYLENE GLYCOL 3350 34 G: 17 POWDER, FOR SOLUTION ORAL at 09:06

## 2020-06-17 RX ADMIN — CHLORHEXIDINE GLUCONATE 0.12% ORAL RINSE 15 ML: 1.2 LIQUID ORAL at 09:06

## 2020-06-17 RX ADMIN — PIPERACILLIN AND TAZOBACTAM 3.38 G: 3; .375 INJECTION, POWDER, FOR SOLUTION INTRAVENOUS at 21:03

## 2020-06-17 RX ADMIN — PIPERACILLIN AND TAZOBACTAM 3.38 G: 3; .375 INJECTION, POWDER, FOR SOLUTION INTRAVENOUS at 14:29

## 2020-06-17 RX ADMIN — DEXTROSE MONOHYDRATE 250 MG: 50 INJECTION, SOLUTION INTRAVENOUS at 21:03

## 2020-06-17 RX ADMIN — METHYLNALTREXONE BROMIDE 12 MG: 12 INJECTION, SOLUTION SUBCUTANEOUS at 09:05

## 2020-06-17 RX ADMIN — DEXTROSE MONOHYDRATE 250 MG: 50 INJECTION, SOLUTION INTRAVENOUS at 16:08

## 2020-06-17 RX ADMIN — LORAZEPAM 1 MG: 2 INJECTION INTRAMUSCULAR; INTRAVENOUS at 23:53

## 2020-06-17 RX ADMIN — LORAZEPAM 1 MG: 2 INJECTION INTRAMUSCULAR; INTRAVENOUS at 14:06

## 2020-06-17 RX ADMIN — Medication 10 ML: at 09:06

## 2020-06-17 RX ADMIN — CHLORHEXIDINE GLUCONATE 0.12% ORAL RINSE 15 ML: 1.2 LIQUID ORAL at 21:03

## 2020-06-17 RX ADMIN — METHYLPREDNISOLONE SODIUM SUCCINATE 20 MG: 40 INJECTION, POWDER, FOR SOLUTION INTRAMUSCULAR; INTRAVENOUS at 21:03

## 2020-06-17 RX ADMIN — LIOTHYRONINE SODIUM 5 MCG: 5 TABLET ORAL at 09:06

## 2020-06-17 RX ADMIN — DEXTROSE MONOHYDRATE 250 MG: 50 INJECTION, SOLUTION INTRAVENOUS at 09:06

## 2020-06-17 RX ADMIN — BUSPIRONE HYDROCHLORIDE 30 MG: 15 TABLET ORAL at 09:05

## 2020-06-17 RX ADMIN — MONTELUKAST SODIUM 10 MG: 10 TABLET, COATED ORAL at 21:04

## 2020-06-17 RX ADMIN — ENOXAPARIN SODIUM 40 MG: 40 INJECTION SUBCUTANEOUS at 16:08

## 2020-06-17 RX ADMIN — ESTRADIOL 1 MG: 1 TABLET ORAL at 09:06

## 2020-06-17 RX ADMIN — BUSPIRONE HYDROCHLORIDE 30 MG: 15 TABLET ORAL at 21:04

## 2020-06-17 RX ADMIN — DEXTROSE MONOHYDRATE 250 MG: 50 INJECTION, SOLUTION INTRAVENOUS at 04:22

## 2020-06-17 RX ADMIN — PANTOPRAZOLE SODIUM 40 MG: 40 INJECTION, POWDER, FOR SOLUTION INTRAVENOUS at 05:25

## 2020-06-17 RX ADMIN — LEVOTHYROXINE SODIUM 100 MCG: 100 TABLET ORAL at 09:05

## 2020-06-17 RX ADMIN — Medication 10 ML: at 21:04

## 2020-06-17 RX ADMIN — PAROXETINE HYDROCHLORIDE 40 MG: 20 TABLET, FILM COATED ORAL at 21:04

## 2020-06-17 RX ADMIN — PIPERACILLIN AND TAZOBACTAM 3.38 G: 3; .375 INJECTION, POWDER, FOR SOLUTION INTRAVENOUS at 05:25

## 2020-06-17 RX ADMIN — METHYLPREDNISOLONE SODIUM SUCCINATE 20 MG: 40 INJECTION, POWDER, FOR SOLUTION INTRAMUSCULAR; INTRAVENOUS at 09:05

## 2020-06-17 RX ADMIN — POTASSIUM CHLORIDE 20 MEQ: 1.5 POWDER, FOR SOLUTION ORAL at 09:06

## 2020-06-18 ENCOUNTER — APPOINTMENT (OUTPATIENT)
Dept: CT IMAGING | Facility: HOSPITAL | Age: 50
End: 2020-06-18

## 2020-06-18 ENCOUNTER — APPOINTMENT (OUTPATIENT)
Dept: GENERAL RADIOLOGY | Facility: HOSPITAL | Age: 50
End: 2020-06-18

## 2020-06-18 ENCOUNTER — INPATIENT HOSPITAL (AMBULATORY)
Dept: URBAN - METROPOLITAN AREA HOSPITAL 84 | Facility: HOSPITAL | Age: 50
End: 2020-06-18

## 2020-06-18 ENCOUNTER — TELEPHONE (OUTPATIENT)
Dept: FAMILY MEDICINE CLINIC | Facility: CLINIC | Age: 50
End: 2020-06-18

## 2020-06-18 DIAGNOSIS — K59.00 CONSTIPATION, UNSPECIFIED: ICD-10-CM

## 2020-06-18 DIAGNOSIS — R14.0 ABDOMINAL DISTENSION (GASEOUS): ICD-10-CM

## 2020-06-18 DIAGNOSIS — K59.8 OTHER SPECIFIED FUNCTIONAL INTESTINAL DISORDERS: ICD-10-CM

## 2020-06-18 DIAGNOSIS — Q43.1 HIRSCHSPRUNG'S DISEASE: ICD-10-CM

## 2020-06-18 LAB
ALBUMIN SERPL-MCNC: 3 G/DL (ref 3.5–5.2)
ALBUMIN/GLOB SERPL: 0.8 G/DL
ALP SERPL-CCNC: 75 U/L (ref 39–117)
ALT SERPL W P-5'-P-CCNC: 10 U/L (ref 1–41)
AMYLASE SERPL-CCNC: 245 U/L (ref 28–100)
ANION GAP SERPL CALCULATED.3IONS-SCNC: 11 MMOL/L (ref 5–15)
ANION GAP SERPL CALCULATED.3IONS-SCNC: 11 MMOL/L (ref 5–15)
ARTERIAL PATENCY WRIST A: POSITIVE
AST SERPL-CCNC: 31 U/L (ref 1–40)
ATMOSPHERIC PRESS: ABNORMAL MM[HG]
BACTERIA SPEC AEROBE CULT: NORMAL
BACTERIA SPEC AEROBE CULT: NORMAL
BACTERIA SPEC RESP CULT: ABNORMAL
BACTERIA SPEC RESP CULT: ABNORMAL
BASE EXCESS BLDA CALC-SCNC: 1.2 MMOL/L (ref 0–3)
BDY SITE: ABNORMAL
BILIRUB SERPL-MCNC: 0.3 MG/DL (ref 0.2–1.2)
BUN BLD-MCNC: 13 MG/DL (ref 6–20)
BUN BLD-MCNC: 13 MG/DL (ref 6–20)
BUN BLD-MCNC: ABNORMAL MG/DL
BUN BLD-MCNC: ABNORMAL MG/DL
BUN/CREAT SERPL: ABNORMAL
BUN/CREAT SERPL: ABNORMAL
CALCIUM SPEC-SCNC: 8.6 MG/DL (ref 8.6–10.5)
CALCIUM SPEC-SCNC: 8.9 MG/DL (ref 8.6–10.5)
CHLORIDE SERPL-SCNC: 101 MMOL/L (ref 98–107)
CHLORIDE SERPL-SCNC: 97 MMOL/L (ref 98–107)
CO2 BLDA-SCNC: 25.9 MMOL/L (ref 22–29)
CO2 SERPL-SCNC: 20 MMOL/L (ref 22–29)
CO2 SERPL-SCNC: 24 MMOL/L (ref 22–29)
CREAT BLD-MCNC: 0.46 MG/DL (ref 0.76–1.27)
CREAT BLD-MCNC: 0.49 MG/DL (ref 0.76–1.27)
CRP SERPL-MCNC: 5.73 MG/DL (ref 0–0.5)
DEPRECATED RDW RBC AUTO: 50.8 FL (ref 37–54)
ERYTHROCYTE [DISTWIDTH] IN BLOOD BY AUTOMATED COUNT: 15.5 % (ref 12.3–15.4)
GFR SERPL CREATININE-BSD FRML MDRD: >150 ML/MIN/1.73
GFR SERPL CREATININE-BSD FRML MDRD: >150 ML/MIN/1.73
GIANT PLATELETS: ABNORMAL
GLOBULIN UR ELPH-MCNC: 3.9 GM/DL
GLUCOSE BLD-MCNC: 100 MG/DL (ref 65–99)
GLUCOSE BLD-MCNC: 98 MG/DL (ref 65–99)
GLUCOSE BLDC GLUCOMTR-MCNC: 83 MG/DL (ref 70–105)
GLUCOSE BLDC GLUCOMTR-MCNC: 92 MG/DL (ref 70–105)
GRAM STN SPEC: ABNORMAL
GRAM STN SPEC: ABNORMAL
HCO3 BLDA-SCNC: 24.8 MMOL/L (ref 21–28)
HCT VFR BLD AUTO: 43.4 % (ref 37.5–51)
HEMODILUTION: NO
HGB BLD-MCNC: 14.7 G/DL (ref 13–17.7)
HOROWITZ INDEX BLD+IHG-RTO: 40 %
LIPASE SERPL-CCNC: 419 U/L (ref 13–60)
LYMPHOCYTES # BLD MANUAL: 3.14 10*3/MM3 (ref 0.7–3.1)
LYMPHOCYTES NFR BLD MANUAL: 19 % (ref 19.6–45.3)
LYMPHOCYTES NFR BLD MANUAL: 6 % (ref 5–12)
MAGNESIUM SERPL-MCNC: 2.3 MG/DL (ref 1.6–2.6)
MCH RBC QN AUTO: 32 PG (ref 26.6–33)
MCHC RBC AUTO-ENTMCNC: 34 G/DL (ref 31.5–35.7)
MCV RBC AUTO: 94.2 FL (ref 79–97)
METAMYELOCYTES NFR BLD MANUAL: 2 % (ref 0–0)
MODALITY: ABNORMAL
MONOCYTES # BLD AUTO: 0.99 10*3/MM3 (ref 0.1–0.9)
MYELOCYTES NFR BLD MANUAL: 2 % (ref 0–0)
NEUTROPHILS # BLD AUTO: 11.55 10*3/MM3 (ref 1.7–7)
NEUTROPHILS NFR BLD MANUAL: 56 % (ref 42.7–76)
NEUTS BAND NFR BLD MANUAL: 14 % (ref 0–5)
PCO2 BLDA: 35.7 MM HG (ref 35–48)
PEEP RESPIRATORY: 5 CM[H2O]
PH BLDA: 7.45 PH UNITS (ref 7.35–7.45)
PHOSPHATE SERPL-MCNC: 3 MG/DL (ref 2.5–4.5)
PLATELET # BLD AUTO: 260 10*3/MM3 (ref 140–450)
PMV BLD AUTO: 8.5 FL (ref 6–12)
PO2 BLDA: 110.9 MM HG (ref 83–108)
POTASSIUM BLD-SCNC: 4.6 MMOL/L (ref 3.5–5.2)
POTASSIUM BLD-SCNC: 4.9 MMOL/L (ref 3.5–5.2)
PROT SERPL-MCNC: 6.9 G/DL (ref 6–8.5)
RBC # BLD AUTO: 4.6 10*6/MM3 (ref 4.14–5.8)
RBC MORPH BLD: NORMAL
RESPIRATORY RATE: 20
SAO2 % BLDCOA: 98.6 % (ref 94–98)
SCAN SLIDE: NORMAL
SODIUM BLD-SCNC: 132 MMOL/L (ref 136–145)
SODIUM BLD-SCNC: 132 MMOL/L (ref 136–145)
TOXIC GRANULATION: ABNORMAL
VARIANT LYMPHS NFR BLD MANUAL: 1 % (ref 0–5)
VENTILATOR MODE: ABNORMAL
VT ON VENT VENT: 500 ML
WBC NRBC COR # BLD: 16.5 10*3/MM3 (ref 3.4–10.8)

## 2020-06-18 PROCEDURE — 94799 UNLISTED PULMONARY SVC/PX: CPT

## 2020-06-18 PROCEDURE — 85025 COMPLETE CBC W/AUTO DIFF WBC: CPT | Performed by: PHYSICIAN ASSISTANT

## 2020-06-18 PROCEDURE — 85007 BL SMEAR W/DIFF WBC COUNT: CPT | Performed by: PHYSICIAN ASSISTANT

## 2020-06-18 PROCEDURE — 84520 ASSAY OF UREA NITROGEN: CPT | Performed by: INTERNAL MEDICINE

## 2020-06-18 PROCEDURE — 86140 C-REACTIVE PROTEIN: CPT | Performed by: NURSE PRACTITIONER

## 2020-06-18 PROCEDURE — 25010000002 ENOXAPARIN PER 10 MG: Performed by: INTERNAL MEDICINE

## 2020-06-18 PROCEDURE — 82150 ASSAY OF AMYLASE: CPT | Performed by: NURSE PRACTITIONER

## 2020-06-18 PROCEDURE — 82962 GLUCOSE BLOOD TEST: CPT

## 2020-06-18 PROCEDURE — 25010000002 METHYLNALTREXONE 12 MG/0.6ML SOLUTION: Performed by: NURSE PRACTITIONER

## 2020-06-18 PROCEDURE — 74177 CT ABD & PELVIS W/CONTRAST: CPT

## 2020-06-18 PROCEDURE — 25010000002 PIPERACILLIN SOD-TAZOBACTAM PER 1 G: Performed by: INTERNAL MEDICINE

## 2020-06-18 PROCEDURE — 83735 ASSAY OF MAGNESIUM: CPT | Performed by: PHYSICIAN ASSISTANT

## 2020-06-18 PROCEDURE — 94003 VENT MGMT INPAT SUBQ DAY: CPT

## 2020-06-18 PROCEDURE — 0 IOPAMIDOL PER 1 ML: Performed by: INTERNAL MEDICINE

## 2020-06-18 PROCEDURE — 25010000002 LORAZEPAM PER 2 MG: Performed by: INTERNAL MEDICINE

## 2020-06-18 PROCEDURE — 71045 X-RAY EXAM CHEST 1 VIEW: CPT

## 2020-06-18 PROCEDURE — 84100 ASSAY OF PHOSPHORUS: CPT | Performed by: PHYSICIAN ASSISTANT

## 2020-06-18 PROCEDURE — 82803 BLOOD GASES ANY COMBINATION: CPT

## 2020-06-18 PROCEDURE — 36600 WITHDRAWAL OF ARTERIAL BLOOD: CPT

## 2020-06-18 PROCEDURE — 25010000002 METHYLPREDNISOLONE PER 40 MG: Performed by: NURSE PRACTITIONER

## 2020-06-18 PROCEDURE — 25010000002 MORPHINE PER 10 MG: Performed by: INTERNAL MEDICINE

## 2020-06-18 PROCEDURE — 99232 SBSQ HOSP IP/OBS MODERATE 35: CPT | Performed by: NURSE PRACTITIONER

## 2020-06-18 PROCEDURE — 80053 COMPREHEN METABOLIC PANEL: CPT | Performed by: PHYSICIAN ASSISTANT

## 2020-06-18 PROCEDURE — 83690 ASSAY OF LIPASE: CPT | Performed by: NURSE PRACTITIONER

## 2020-06-18 RX ADMIN — CHLORHEXIDINE GLUCONATE 0.12% ORAL RINSE 15 ML: 1.2 LIQUID ORAL at 09:09

## 2020-06-18 RX ADMIN — DEXTROSE MONOHYDRATE 250 MG: 50 INJECTION, SOLUTION INTRAVENOUS at 21:19

## 2020-06-18 RX ADMIN — ESTRADIOL 1 MG: 1 TABLET ORAL at 09:09

## 2020-06-18 RX ADMIN — LEVOTHYROXINE SODIUM 100 MCG: 100 TABLET ORAL at 06:16

## 2020-06-18 RX ADMIN — POTASSIUM CHLORIDE 20 MEQ: 1.5 POWDER, FOR SOLUTION ORAL at 09:08

## 2020-06-18 RX ADMIN — METHYLNALTREXONE BROMIDE 12 MG: 12 INJECTION, SOLUTION SUBCUTANEOUS at 09:08

## 2020-06-18 RX ADMIN — PIPERACILLIN AND TAZOBACTAM 3.38 G: 3; .375 INJECTION, POWDER, FOR SOLUTION INTRAVENOUS at 14:48

## 2020-06-18 RX ADMIN — METHYLPREDNISOLONE SODIUM SUCCINATE 20 MG: 40 INJECTION, POWDER, FOR SOLUTION INTRAMUSCULAR; INTRAVENOUS at 21:19

## 2020-06-18 RX ADMIN — LIOTHYRONINE SODIUM 5 MCG: 5 TABLET ORAL at 09:08

## 2020-06-18 RX ADMIN — PANTOPRAZOLE SODIUM 40 MG: 40 INJECTION, POWDER, FOR SOLUTION INTRAVENOUS at 05:14

## 2020-06-18 RX ADMIN — PIPERACILLIN AND TAZOBACTAM 3.38 G: 3; .375 INJECTION, POWDER, FOR SOLUTION INTRAVENOUS at 05:14

## 2020-06-18 RX ADMIN — Medication 10 ML: at 21:19

## 2020-06-18 RX ADMIN — IOPAMIDOL 100 ML: 755 INJECTION, SOLUTION INTRAVENOUS at 13:00

## 2020-06-18 RX ADMIN — LORAZEPAM 1 MG: 2 INJECTION INTRAMUSCULAR; INTRAVENOUS at 21:24

## 2020-06-18 RX ADMIN — PAROXETINE HYDROCHLORIDE 40 MG: 20 TABLET, FILM COATED ORAL at 21:19

## 2020-06-18 RX ADMIN — DEXTROSE MONOHYDRATE 250 MG: 50 INJECTION, SOLUTION INTRAVENOUS at 15:56

## 2020-06-18 RX ADMIN — LORAZEPAM 1 MG: 2 INJECTION INTRAMUSCULAR; INTRAVENOUS at 14:58

## 2020-06-18 RX ADMIN — BUSPIRONE HYDROCHLORIDE 30 MG: 15 TABLET ORAL at 09:08

## 2020-06-18 RX ADMIN — Medication 10 ML: at 09:09

## 2020-06-18 RX ADMIN — POLYETHYLENE GLYCOL 3350 34 G: 17 POWDER, FOR SOLUTION ORAL at 09:08

## 2020-06-18 RX ADMIN — CHLORHEXIDINE GLUCONATE 0.12% ORAL RINSE 15 ML: 1.2 LIQUID ORAL at 21:19

## 2020-06-18 RX ADMIN — METHYLPREDNISOLONE SODIUM SUCCINATE 20 MG: 40 INJECTION, POWDER, FOR SOLUTION INTRAMUSCULAR; INTRAVENOUS at 09:08

## 2020-06-18 RX ADMIN — ENOXAPARIN SODIUM 40 MG: 40 INJECTION SUBCUTANEOUS at 15:03

## 2020-06-18 RX ADMIN — BUSPIRONE HYDROCHLORIDE 30 MG: 15 TABLET ORAL at 21:19

## 2020-06-18 RX ADMIN — MONTELUKAST SODIUM 10 MG: 10 TABLET, COATED ORAL at 21:19

## 2020-06-18 RX ADMIN — DEXTROSE MONOHYDRATE 250 MG: 50 INJECTION, SOLUTION INTRAVENOUS at 04:06

## 2020-06-18 RX ADMIN — PIPERACILLIN AND TAZOBACTAM 3.38 G: 3; .375 INJECTION, POWDER, FOR SOLUTION INTRAVENOUS at 21:19

## 2020-06-18 RX ADMIN — MORPHINE SULFATE 2 MG: 4 INJECTION INTRAVENOUS at 22:43

## 2020-06-18 RX ADMIN — DEXTROSE MONOHYDRATE 250 MG: 50 INJECTION, SOLUTION INTRAVENOUS at 09:08

## 2020-06-18 NOTE — TELEPHONE ENCOUNTER
[employer]/ [facility] dropped off a Confirmation of Diagnosis Form that is needing to be completed and signed by our office for patient.     Patient is needing this completed so he can continue his care at [facility].   Please contact Zaida once completed at [phone].  Form placed in [facility]/ [facility] box in front office.     Thank you.

## 2020-06-19 ENCOUNTER — APPOINTMENT (OUTPATIENT)
Dept: GENERAL RADIOLOGY | Facility: HOSPITAL | Age: 50
End: 2020-06-19

## 2020-06-19 ENCOUNTER — INPATIENT HOSPITAL (AMBULATORY)
Dept: URBAN - METROPOLITAN AREA HOSPITAL 84 | Facility: HOSPITAL | Age: 50
End: 2020-06-19

## 2020-06-19 DIAGNOSIS — K59.00 CONSTIPATION, UNSPECIFIED: ICD-10-CM

## 2020-06-19 DIAGNOSIS — Q43.1 HIRSCHSPRUNG'S DISEASE: ICD-10-CM

## 2020-06-19 DIAGNOSIS — K59.8 OTHER SPECIFIED FUNCTIONAL INTESTINAL DISORDERS: ICD-10-CM

## 2020-06-19 DIAGNOSIS — R14.0 ABDOMINAL DISTENSION (GASEOUS): ICD-10-CM

## 2020-06-19 LAB
ALBUMIN SERPL-MCNC: 3.1 G/DL (ref 3.5–5.2)
ALBUMIN/GLOB SERPL: 0.8 G/DL
ALP SERPL-CCNC: 69 U/L (ref 39–117)
ALT SERPL W P-5'-P-CCNC: 13 U/L (ref 1–41)
ANION GAP SERPL CALCULATED.3IONS-SCNC: 13 MMOL/L (ref 5–15)
ARTERIAL PATENCY WRIST A: ABNORMAL
AST SERPL-CCNC: 36 U/L (ref 1–40)
ATMOSPHERIC PRESS: ABNORMAL MM[HG]
BASE EXCESS BLDA CALC-SCNC: 2.6 MMOL/L (ref 0–3)
BDY SITE: ABNORMAL
BILIRUB SERPL-MCNC: 0.3 MG/DL (ref 0.2–1.2)
BUN BLD-MCNC: 16 MG/DL (ref 6–20)
BUN BLD-MCNC: ABNORMAL MG/DL
BUN/CREAT SERPL: ABNORMAL
CALCIUM SPEC-SCNC: 8.7 MG/DL (ref 8.6–10.5)
CHLORIDE SERPL-SCNC: 98 MMOL/L (ref 98–107)
CO2 BLDA-SCNC: 27 MMOL/L (ref 22–29)
CO2 SERPL-SCNC: 22 MMOL/L (ref 22–29)
CREAT BLD-MCNC: 0.54 MG/DL (ref 0.76–1.27)
CRP SERPL-MCNC: 2.86 MG/DL (ref 0–0.5)
DEPRECATED RDW RBC AUTO: 52.1 FL (ref 37–54)
ERYTHROCYTE [DISTWIDTH] IN BLOOD BY AUTOMATED COUNT: 15.6 % (ref 12.3–15.4)
GFR SERPL CREATININE-BSD FRML MDRD: >150 ML/MIN/1.73
GLOBULIN UR ELPH-MCNC: 3.9 GM/DL
GLUCOSE BLD-MCNC: 94 MG/DL (ref 65–99)
GLUCOSE BLDC GLUCOMTR-MCNC: 86 MG/DL (ref 70–105)
GLUCOSE BLDC GLUCOMTR-MCNC: 87 MG/DL (ref 70–105)
GLUCOSE BLDC GLUCOMTR-MCNC: 87 MG/DL (ref 70–105)
GLUCOSE BLDC GLUCOMTR-MCNC: 97 MG/DL (ref 70–105)
GLUCOSE BLDC GLUCOMTR-MCNC: 99 MG/DL (ref 70–105)
HCO3 BLDA-SCNC: 25.9 MMOL/L (ref 21–28)
HCT VFR BLD AUTO: 44.9 % (ref 37.5–51)
HEMODILUTION: NO
HGB BLD-MCNC: 15.1 G/DL (ref 13–17.7)
HOROWITZ INDEX BLD+IHG-RTO: 40 %
LIPASE SERPL-CCNC: 515 U/L (ref 13–60)
LYMPHOCYTES # BLD MANUAL: 2.16 10*3/MM3 (ref 0.7–3.1)
LYMPHOCYTES NFR BLD MANUAL: 10 % (ref 5–12)
LYMPHOCYTES NFR BLD MANUAL: 15 % (ref 19.6–45.3)
MAGNESIUM SERPL-MCNC: 2.5 MG/DL (ref 1.6–2.6)
MCH RBC QN AUTO: 32.1 PG (ref 26.6–33)
MCHC RBC AUTO-ENTMCNC: 33.6 G/DL (ref 31.5–35.7)
MCV RBC AUTO: 95.5 FL (ref 79–97)
METAMYELOCYTES NFR BLD MANUAL: 4 % (ref 0–0)
MODALITY: ABNORMAL
MONOCYTES # BLD AUTO: 1.44 10*3/MM3 (ref 0.1–0.9)
NEUTROPHILS # BLD AUTO: 10.22 10*3/MM3 (ref 1.7–7)
NEUTROPHILS NFR BLD MANUAL: 58 % (ref 42.7–76)
NEUTS BAND NFR BLD MANUAL: 13 % (ref 0–5)
PCO2 BLDA: 35.2 MM HG (ref 35–48)
PEEP RESPIRATORY: 0 CM[H2O]
PH BLDA: 7.47 PH UNITS (ref 7.35–7.45)
PHOSPHATE SERPL-MCNC: 3.4 MG/DL (ref 2.5–4.5)
PLAT MORPH BLD: NORMAL
PLATELET # BLD AUTO: 298 10*3/MM3 (ref 140–450)
PMV BLD AUTO: 8.1 FL (ref 6–12)
PO2 BLDA: 99.4 MM HG (ref 83–108)
POTASSIUM BLD-SCNC: 4.8 MMOL/L (ref 3.5–5.2)
PROT SERPL-MCNC: 7 G/DL (ref 6–8.5)
RBC # BLD AUTO: 4.69 10*6/MM3 (ref 4.14–5.8)
RBC MORPH BLD: NORMAL
RESPIRATORY RATE: 20
SAO2 % BLDCOA: 98.2 % (ref 94–98)
SCAN SLIDE: NORMAL
SODIUM BLD-SCNC: 133 MMOL/L (ref 136–145)
VENTILATOR MODE: ABNORMAL
VT ON VENT VENT: 500 ML
WBC MORPH BLD: NORMAL
WBC NRBC COR # BLD: 14.4 10*3/MM3 (ref 3.4–10.8)

## 2020-06-19 PROCEDURE — 97162 PT EVAL MOD COMPLEX 30 MIN: CPT

## 2020-06-19 PROCEDURE — 25010000002 PIPERACILLIN SOD-TAZOBACTAM PER 1 G: Performed by: INTERNAL MEDICINE

## 2020-06-19 PROCEDURE — 94799 UNLISTED PULMONARY SVC/PX: CPT

## 2020-06-19 PROCEDURE — 97530 THERAPEUTIC ACTIVITIES: CPT

## 2020-06-19 PROCEDURE — 80053 COMPREHEN METABOLIC PANEL: CPT | Performed by: PHYSICIAN ASSISTANT

## 2020-06-19 PROCEDURE — 99232 SBSQ HOSP IP/OBS MODERATE 35: CPT | Performed by: NURSE PRACTITIONER

## 2020-06-19 PROCEDURE — 36600 WITHDRAWAL OF ARTERIAL BLOOD: CPT

## 2020-06-19 PROCEDURE — 83690 ASSAY OF LIPASE: CPT | Performed by: NURSE PRACTITIONER

## 2020-06-19 PROCEDURE — 94003 VENT MGMT INPAT SUBQ DAY: CPT

## 2020-06-19 PROCEDURE — 71045 X-RAY EXAM CHEST 1 VIEW: CPT

## 2020-06-19 PROCEDURE — 84100 ASSAY OF PHOSPHORUS: CPT | Performed by: PHYSICIAN ASSISTANT

## 2020-06-19 PROCEDURE — 82803 BLOOD GASES ANY COMBINATION: CPT

## 2020-06-19 PROCEDURE — 86140 C-REACTIVE PROTEIN: CPT | Performed by: NURSE PRACTITIONER

## 2020-06-19 PROCEDURE — 25010000002 ENOXAPARIN PER 10 MG: Performed by: INTERNAL MEDICINE

## 2020-06-19 PROCEDURE — 82962 GLUCOSE BLOOD TEST: CPT

## 2020-06-19 PROCEDURE — 25010000002 METHYLNALTREXONE 12 MG/0.6ML SOLUTION: Performed by: NURSE PRACTITIONER

## 2020-06-19 PROCEDURE — 83735 ASSAY OF MAGNESIUM: CPT | Performed by: PHYSICIAN ASSISTANT

## 2020-06-19 PROCEDURE — 85007 BL SMEAR W/DIFF WBC COUNT: CPT | Performed by: PHYSICIAN ASSISTANT

## 2020-06-19 PROCEDURE — 97165 OT EVAL LOW COMPLEX 30 MIN: CPT

## 2020-06-19 PROCEDURE — 85025 COMPLETE CBC W/AUTO DIFF WBC: CPT | Performed by: PHYSICIAN ASSISTANT

## 2020-06-19 PROCEDURE — 25010000002 METHYLPREDNISOLONE PER 40 MG: Performed by: NURSE PRACTITIONER

## 2020-06-19 PROCEDURE — 25010000002 MORPHINE PER 10 MG: Performed by: INTERNAL MEDICINE

## 2020-06-19 RX ADMIN — MORPHINE SULFATE 2 MG: 4 INJECTION INTRAVENOUS at 17:30

## 2020-06-19 RX ADMIN — DEXTROSE MONOHYDRATE 250 MG: 50 INJECTION, SOLUTION INTRAVENOUS at 09:55

## 2020-06-19 RX ADMIN — MONTELUKAST SODIUM 10 MG: 10 TABLET, COATED ORAL at 21:05

## 2020-06-19 RX ADMIN — PANTOPRAZOLE SODIUM 40 MG: 40 INJECTION, POWDER, FOR SOLUTION INTRAVENOUS at 05:07

## 2020-06-19 RX ADMIN — CHLORHEXIDINE GLUCONATE 0.12% ORAL RINSE 15 ML: 1.2 LIQUID ORAL at 21:04

## 2020-06-19 RX ADMIN — BUSPIRONE HYDROCHLORIDE 30 MG: 15 TABLET ORAL at 08:24

## 2020-06-19 RX ADMIN — PIPERACILLIN AND TAZOBACTAM 3.38 G: 3; .375 INJECTION, POWDER, FOR SOLUTION INTRAVENOUS at 05:06

## 2020-06-19 RX ADMIN — LEVOTHYROXINE SODIUM 100 MCG: 100 TABLET ORAL at 06:08

## 2020-06-19 RX ADMIN — POTASSIUM CHLORIDE 20 MEQ: 1.5 POWDER, FOR SOLUTION ORAL at 08:24

## 2020-06-19 RX ADMIN — PIPERACILLIN AND TAZOBACTAM 3.38 G: 3; .375 INJECTION, POWDER, FOR SOLUTION INTRAVENOUS at 21:05

## 2020-06-19 RX ADMIN — Medication 10 ML: at 08:25

## 2020-06-19 RX ADMIN — Medication 10 ML: at 21:05

## 2020-06-19 RX ADMIN — METHYLNALTREXONE BROMIDE 12 MG: 12 INJECTION, SOLUTION SUBCUTANEOUS at 08:24

## 2020-06-19 RX ADMIN — DEXTROSE MONOHYDRATE 250 MG: 50 INJECTION, SOLUTION INTRAVENOUS at 21:05

## 2020-06-19 RX ADMIN — ESTRADIOL 1 MG: 1 TABLET ORAL at 08:24

## 2020-06-19 RX ADMIN — PAROXETINE HYDROCHLORIDE 40 MG: 20 TABLET, FILM COATED ORAL at 21:05

## 2020-06-19 RX ADMIN — BUSPIRONE HYDROCHLORIDE 30 MG: 15 TABLET ORAL at 21:05

## 2020-06-19 RX ADMIN — PIPERACILLIN AND TAZOBACTAM 3.38 G: 3; .375 INJECTION, POWDER, FOR SOLUTION INTRAVENOUS at 14:42

## 2020-06-19 RX ADMIN — POLYETHYLENE GLYCOL 3350 34 G: 17 POWDER, FOR SOLUTION ORAL at 08:24

## 2020-06-19 RX ADMIN — DEXTROSE MONOHYDRATE 250 MG: 50 INJECTION, SOLUTION INTRAVENOUS at 17:03

## 2020-06-19 RX ADMIN — METHYLPREDNISOLONE SODIUM SUCCINATE 20 MG: 40 INJECTION, POWDER, FOR SOLUTION INTRAMUSCULAR; INTRAVENOUS at 21:04

## 2020-06-19 RX ADMIN — DEXTROSE MONOHYDRATE 250 MG: 50 INJECTION, SOLUTION INTRAVENOUS at 03:26

## 2020-06-19 RX ADMIN — METHYLPREDNISOLONE SODIUM SUCCINATE 20 MG: 40 INJECTION, POWDER, FOR SOLUTION INTRAMUSCULAR; INTRAVENOUS at 09:55

## 2020-06-19 RX ADMIN — CHLORHEXIDINE GLUCONATE 0.12% ORAL RINSE 15 ML: 1.2 LIQUID ORAL at 08:25

## 2020-06-19 RX ADMIN — LIOTHYRONINE SODIUM 5 MCG: 5 TABLET ORAL at 08:24

## 2020-06-20 ENCOUNTER — INPATIENT HOSPITAL (AMBULATORY)
Dept: URBAN - METROPOLITAN AREA HOSPITAL 84 | Facility: HOSPITAL | Age: 50
End: 2020-06-20

## 2020-06-20 ENCOUNTER — APPOINTMENT (OUTPATIENT)
Dept: GENERAL RADIOLOGY | Facility: HOSPITAL | Age: 50
End: 2020-06-20

## 2020-06-20 DIAGNOSIS — K59.8 OTHER SPECIFIED FUNCTIONAL INTESTINAL DISORDERS: ICD-10-CM

## 2020-06-20 LAB
ALBUMIN SERPL-MCNC: 3.3 G/DL (ref 3.5–5.2)
ALBUMIN/GLOB SERPL: 1.1 G/DL
ALP SERPL-CCNC: 52 U/L (ref 39–117)
ALT SERPL W P-5'-P-CCNC: 13 U/L (ref 1–41)
ANION GAP SERPL CALCULATED.3IONS-SCNC: 8 MMOL/L (ref 5–15)
ANISOCYTOSIS BLD QL: ABNORMAL
AST SERPL-CCNC: 26 U/L (ref 1–40)
BILIRUB SERPL-MCNC: 0.3 MG/DL (ref 0.2–1.2)
BUN BLD-MCNC: 17 MG/DL (ref 6–20)
BUN BLD-MCNC: ABNORMAL MG/DL
BUN/CREAT SERPL: ABNORMAL
CALCIUM SPEC-SCNC: 8.8 MG/DL (ref 8.6–10.5)
CHLORIDE SERPL-SCNC: 101 MMOL/L (ref 98–107)
CO2 SERPL-SCNC: 27 MMOL/L (ref 22–29)
CREAT BLD-MCNC: 0.65 MG/DL (ref 0.76–1.27)
DEPRECATED RDW RBC AUTO: 52.1 FL (ref 37–54)
ERYTHROCYTE [DISTWIDTH] IN BLOOD BY AUTOMATED COUNT: 15.4 % (ref 12.3–15.4)
GFR SERPL CREATININE-BSD FRML MDRD: 130 ML/MIN/1.73
GLOBULIN UR ELPH-MCNC: 2.9 GM/DL
GLUCOSE BLD-MCNC: 98 MG/DL (ref 65–99)
GLUCOSE BLDC GLUCOMTR-MCNC: 136 MG/DL (ref 70–105)
GLUCOSE BLDC GLUCOMTR-MCNC: 93 MG/DL (ref 70–105)
HCT VFR BLD AUTO: 37.9 % (ref 37.5–51)
HCT VFR BLD AUTO: 40.5 % (ref 37.5–51)
HGB BLD-MCNC: 12.8 G/DL (ref 13–17.7)
HGB BLD-MCNC: 13.3 G/DL (ref 13–17.7)
LYMPHOCYTES # BLD MANUAL: 2.3 10*3/MM3 (ref 0.7–3.1)
LYMPHOCYTES NFR BLD MANUAL: 16 % (ref 19.6–45.3)
LYMPHOCYTES NFR BLD MANUAL: 4 % (ref 5–12)
MAGNESIUM SERPL-MCNC: 2.3 MG/DL (ref 1.6–2.6)
MCH RBC QN AUTO: 31.7 PG (ref 26.6–33)
MCHC RBC AUTO-ENTMCNC: 32.7 G/DL (ref 31.5–35.7)
MCV RBC AUTO: 97.1 FL (ref 79–97)
MONOCYTES # BLD AUTO: 0.58 10*3/MM3 (ref 0.1–0.9)
MYELOCYTES NFR BLD MANUAL: 4 % (ref 0–0)
NEUTROPHILS # BLD AUTO: 10.94 10*3/MM3 (ref 1.7–7)
NEUTROPHILS NFR BLD MANUAL: 72 % (ref 42.7–76)
NEUTS BAND NFR BLD MANUAL: 4 % (ref 0–5)
PHOSPHATE SERPL-MCNC: 4 MG/DL (ref 2.5–4.5)
PLAT MORPH BLD: NORMAL
PLATELET # BLD AUTO: 308 10*3/MM3 (ref 140–450)
PMV BLD AUTO: 8.3 FL (ref 6–12)
POTASSIUM BLD-SCNC: 5 MMOL/L (ref 3.5–5.2)
PROT SERPL-MCNC: 6.2 G/DL (ref 6–8.5)
RBC # BLD AUTO: 4.18 10*6/MM3 (ref 4.14–5.8)
SCAN SLIDE: NORMAL
SODIUM BLD-SCNC: 136 MMOL/L (ref 136–145)
TOXIC GRANULATION: ABNORMAL
WBC NRBC COR # BLD: 14.4 10*3/MM3 (ref 3.4–10.8)

## 2020-06-20 PROCEDURE — 85007 BL SMEAR W/DIFF WBC COUNT: CPT | Performed by: PHYSICIAN ASSISTANT

## 2020-06-20 PROCEDURE — 99233 SBSQ HOSP IP/OBS HIGH 50: CPT | Performed by: INTERNAL MEDICINE

## 2020-06-20 PROCEDURE — 83735 ASSAY OF MAGNESIUM: CPT | Performed by: PHYSICIAN ASSISTANT

## 2020-06-20 PROCEDURE — 25010000002 METHYLPREDNISOLONE PER 40 MG: Performed by: NURSE PRACTITIONER

## 2020-06-20 PROCEDURE — 94799 UNLISTED PULMONARY SVC/PX: CPT

## 2020-06-20 PROCEDURE — 84100 ASSAY OF PHOSPHORUS: CPT | Performed by: PHYSICIAN ASSISTANT

## 2020-06-20 PROCEDURE — 25010000002 PIPERACILLIN SOD-TAZOBACTAM PER 1 G: Performed by: INTERNAL MEDICINE

## 2020-06-20 PROCEDURE — 80053 COMPREHEN METABOLIC PANEL: CPT | Performed by: PHYSICIAN ASSISTANT

## 2020-06-20 PROCEDURE — 85018 HEMOGLOBIN: CPT | Performed by: INTERNAL MEDICINE

## 2020-06-20 PROCEDURE — 74018 RADEX ABDOMEN 1 VIEW: CPT

## 2020-06-20 PROCEDURE — 92610 EVALUATE SWALLOWING FUNCTION: CPT

## 2020-06-20 PROCEDURE — 82962 GLUCOSE BLOOD TEST: CPT

## 2020-06-20 PROCEDURE — 25010000002 METHYLNALTREXONE 12 MG/0.6ML SOLUTION: Performed by: NURSE PRACTITIONER

## 2020-06-20 PROCEDURE — 85025 COMPLETE CBC W/AUTO DIFF WBC: CPT | Performed by: PHYSICIAN ASSISTANT

## 2020-06-20 PROCEDURE — 94660 CPAP INITIATION&MGMT: CPT

## 2020-06-20 PROCEDURE — 85014 HEMATOCRIT: CPT | Performed by: INTERNAL MEDICINE

## 2020-06-20 RX ADMIN — DEXTROSE MONOHYDRATE 250 MG: 50 INJECTION, SOLUTION INTRAVENOUS at 16:09

## 2020-06-20 RX ADMIN — PAROXETINE HYDROCHLORIDE 40 MG: 20 TABLET, FILM COATED ORAL at 20:48

## 2020-06-20 RX ADMIN — POTASSIUM CHLORIDE 20 MEQ: 1.5 POWDER, FOR SOLUTION ORAL at 08:30

## 2020-06-20 RX ADMIN — Medication 10 ML: at 20:48

## 2020-06-20 RX ADMIN — PANTOPRAZOLE SODIUM 40 MG: 40 INJECTION, POWDER, FOR SOLUTION INTRAVENOUS at 05:29

## 2020-06-20 RX ADMIN — MONTELUKAST SODIUM 10 MG: 10 TABLET, COATED ORAL at 20:47

## 2020-06-20 RX ADMIN — CHLORHEXIDINE GLUCONATE 0.12% ORAL RINSE 15 ML: 1.2 LIQUID ORAL at 08:31

## 2020-06-20 RX ADMIN — PIPERACILLIN AND TAZOBACTAM 3.38 G: 3; .375 INJECTION, POWDER, FOR SOLUTION INTRAVENOUS at 05:29

## 2020-06-20 RX ADMIN — METHYLPREDNISOLONE SODIUM SUCCINATE 20 MG: 40 INJECTION, POWDER, FOR SOLUTION INTRAMUSCULAR; INTRAVENOUS at 09:34

## 2020-06-20 RX ADMIN — DEXTROSE MONOHYDRATE 250 MG: 50 INJECTION, SOLUTION INTRAVENOUS at 04:32

## 2020-06-20 RX ADMIN — BUSPIRONE HYDROCHLORIDE 30 MG: 15 TABLET ORAL at 20:47

## 2020-06-20 RX ADMIN — LIOTHYRONINE SODIUM 5 MCG: 5 TABLET ORAL at 08:30

## 2020-06-20 RX ADMIN — ESTRADIOL 1 MG: 1 TABLET ORAL at 08:30

## 2020-06-20 RX ADMIN — DEXTROSE MONOHYDRATE 250 MG: 50 INJECTION, SOLUTION INTRAVENOUS at 21:58

## 2020-06-20 RX ADMIN — BUSPIRONE HYDROCHLORIDE 30 MG: 15 TABLET ORAL at 08:30

## 2020-06-20 RX ADMIN — PIPERACILLIN AND TAZOBACTAM 3.38 G: 3; .375 INJECTION, POWDER, FOR SOLUTION INTRAVENOUS at 13:48

## 2020-06-20 RX ADMIN — DEXTROSE MONOHYDRATE 250 MG: 50 INJECTION, SOLUTION INTRAVENOUS at 09:34

## 2020-06-20 RX ADMIN — PIPERACILLIN AND TAZOBACTAM 3.38 G: 3; .375 INJECTION, POWDER, FOR SOLUTION INTRAVENOUS at 23:19

## 2020-06-20 RX ADMIN — METHYLPREDNISOLONE SODIUM SUCCINATE 20 MG: 40 INJECTION, POWDER, FOR SOLUTION INTRAMUSCULAR; INTRAVENOUS at 21:58

## 2020-06-20 RX ADMIN — POLYETHYLENE GLYCOL 3350 34 G: 17 POWDER, FOR SOLUTION ORAL at 08:30

## 2020-06-20 RX ADMIN — METHYLNALTREXONE BROMIDE 12 MG: 12 INJECTION, SOLUTION SUBCUTANEOUS at 08:30

## 2020-06-20 RX ADMIN — Medication 10 ML: at 08:31

## 2020-06-20 RX ADMIN — LEVOTHYROXINE SODIUM 100 MCG: 100 TABLET ORAL at 08:29

## 2020-06-21 ENCOUNTER — INPATIENT HOSPITAL (AMBULATORY)
Dept: URBAN - METROPOLITAN AREA HOSPITAL 84 | Facility: HOSPITAL | Age: 50
End: 2020-06-21

## 2020-06-21 DIAGNOSIS — K59.8 OTHER SPECIFIED FUNCTIONAL INTESTINAL DISORDERS: ICD-10-CM

## 2020-06-21 LAB
ALBUMIN SERPL-MCNC: 3.2 G/DL (ref 3.5–5.2)
ALBUMIN/GLOB SERPL: 0.9 G/DL
ALP SERPL-CCNC: 50 U/L (ref 39–117)
ALT SERPL W P-5'-P-CCNC: 15 U/L (ref 1–41)
ANION GAP SERPL CALCULATED.3IONS-SCNC: 8 MMOL/L (ref 5–15)
ANISOCYTOSIS BLD QL: ABNORMAL
AST SERPL-CCNC: 29 U/L (ref 1–40)
BILIRUB SERPL-MCNC: 0.4 MG/DL (ref 0.2–1.2)
BUN BLD-MCNC: 18 MG/DL (ref 6–20)
BUN BLD-MCNC: ABNORMAL MG/DL
BUN/CREAT SERPL: ABNORMAL
CALCIUM SPEC-SCNC: 8.7 MG/DL (ref 8.6–10.5)
CHLORIDE SERPL-SCNC: 100 MMOL/L (ref 98–107)
CO2 SERPL-SCNC: 24 MMOL/L (ref 22–29)
CREAT BLD-MCNC: 0.61 MG/DL (ref 0.76–1.27)
DEPRECATED RDW RBC AUTO: 49.9 FL (ref 37–54)
ERYTHROCYTE [DISTWIDTH] IN BLOOD BY AUTOMATED COUNT: 15.2 % (ref 12.3–15.4)
GFR SERPL CREATININE-BSD FRML MDRD: 140 ML/MIN/1.73
GLOBULIN UR ELPH-MCNC: 3.4 GM/DL
GLUCOSE BLD-MCNC: 97 MG/DL (ref 65–99)
HCT VFR BLD AUTO: 36.4 % (ref 37.5–51)
HGB BLD-MCNC: 12.4 G/DL (ref 13–17.7)
LYMPHOCYTES # BLD MANUAL: 1.33 10*3/MM3 (ref 0.7–3.1)
LYMPHOCYTES NFR BLD MANUAL: 3 % (ref 5–12)
LYMPHOCYTES NFR BLD MANUAL: 9 % (ref 19.6–45.3)
MAGNESIUM SERPL-MCNC: 2.4 MG/DL (ref 1.6–2.6)
MCH RBC QN AUTO: 32.3 PG (ref 26.6–33)
MCHC RBC AUTO-ENTMCNC: 34 G/DL (ref 31.5–35.7)
MCV RBC AUTO: 95 FL (ref 79–97)
METAMYELOCYTES NFR BLD MANUAL: 1 % (ref 0–0)
MONOCYTES # BLD AUTO: 0.44 10*3/MM3 (ref 0.1–0.9)
MYELOCYTES NFR BLD MANUAL: 5 % (ref 0–0)
NEUTROPHILS # BLD AUTO: 12.14 10*3/MM3 (ref 1.7–7)
NEUTROPHILS NFR BLD MANUAL: 78 % (ref 42.7–76)
NEUTS BAND NFR BLD MANUAL: 4 % (ref 0–5)
NRBC SPEC MANUAL: 1 /100 WBC (ref 0–0.2)
PHOSPHATE SERPL-MCNC: 4 MG/DL (ref 2.5–4.5)
PLAT MORPH BLD: NORMAL
PLATELET # BLD AUTO: 309 10*3/MM3 (ref 140–450)
PMV BLD AUTO: 8.2 FL (ref 6–12)
POTASSIUM BLD-SCNC: 4.7 MMOL/L (ref 3.5–5.2)
PROT SERPL-MCNC: 6.6 G/DL (ref 6–8.5)
RBC # BLD AUTO: 3.83 10*6/MM3 (ref 4.14–5.8)
SCAN SLIDE: NORMAL
SODIUM BLD-SCNC: 132 MMOL/L (ref 136–145)
TOXIC GRANULATION: ABNORMAL
WBC NRBC COR # BLD: 14.8 10*3/MM3 (ref 3.4–10.8)

## 2020-06-21 PROCEDURE — 80053 COMPREHEN METABOLIC PANEL: CPT | Performed by: PHYSICIAN ASSISTANT

## 2020-06-21 PROCEDURE — 99232 SBSQ HOSP IP/OBS MODERATE 35: CPT | Performed by: HOSPITALIST

## 2020-06-21 PROCEDURE — 94660 CPAP INITIATION&MGMT: CPT

## 2020-06-21 PROCEDURE — 63710000001 PREDNISONE PER 1 MG: Performed by: NURSE PRACTITIONER

## 2020-06-21 PROCEDURE — 25010000002 METHYLNALTREXONE 12 MG/0.6ML SOLUTION: Performed by: NURSE PRACTITIONER

## 2020-06-21 PROCEDURE — 25010000002 ENOXAPARIN PER 10 MG: Performed by: INTERNAL MEDICINE

## 2020-06-21 PROCEDURE — 25010000002 PIPERACILLIN SOD-TAZOBACTAM PER 1 G: Performed by: INTERNAL MEDICINE

## 2020-06-21 PROCEDURE — 85025 COMPLETE CBC W/AUTO DIFF WBC: CPT | Performed by: PHYSICIAN ASSISTANT

## 2020-06-21 PROCEDURE — 94799 UNLISTED PULMONARY SVC/PX: CPT

## 2020-06-21 PROCEDURE — 85007 BL SMEAR W/DIFF WBC COUNT: CPT | Performed by: PHYSICIAN ASSISTANT

## 2020-06-21 PROCEDURE — 99233 SBSQ HOSP IP/OBS HIGH 50: CPT | Performed by: INTERNAL MEDICINE

## 2020-06-21 PROCEDURE — 84100 ASSAY OF PHOSPHORUS: CPT | Performed by: PHYSICIAN ASSISTANT

## 2020-06-21 PROCEDURE — 25010000002 FLUCONAZOLE PER 200 MG: Performed by: HOSPITALIST

## 2020-06-21 PROCEDURE — 83735 ASSAY OF MAGNESIUM: CPT | Performed by: PHYSICIAN ASSISTANT

## 2020-06-21 RX ORDER — FLUCONAZOLE 2 MG/ML
400 INJECTION, SOLUTION INTRAVENOUS DAILY
Status: DISCONTINUED | OUTPATIENT
Start: 2020-06-21 | End: 2020-06-24

## 2020-06-21 RX ORDER — PREDNISONE 20 MG/1
20 TABLET ORAL 2 TIMES DAILY WITH MEALS
Status: DISCONTINUED | OUTPATIENT
Start: 2020-06-21 | End: 2020-06-24

## 2020-06-21 RX ORDER — MAGNESIUM CARB/ALUMINUM HYDROX 105-160MG
296 TABLET,CHEWABLE ORAL ONCE
Status: DISCONTINUED | OUTPATIENT
Start: 2020-06-21 | End: 2020-06-24 | Stop reason: HOSPADM

## 2020-06-21 RX ADMIN — DEXTROSE MONOHYDRATE 250 MG: 50 INJECTION, SOLUTION INTRAVENOUS at 21:23

## 2020-06-21 RX ADMIN — BUSPIRONE HYDROCHLORIDE 30 MG: 15 TABLET ORAL at 21:20

## 2020-06-21 RX ADMIN — LEVOTHYROXINE SODIUM 100 MCG: 100 TABLET ORAL at 06:13

## 2020-06-21 RX ADMIN — POLYETHYLENE GLYCOL 3350 34 G: 17 POWDER, FOR SOLUTION ORAL at 08:03

## 2020-06-21 RX ADMIN — DEXTROSE MONOHYDRATE 250 MG: 50 INJECTION, SOLUTION INTRAVENOUS at 14:17

## 2020-06-21 RX ADMIN — FLUCONAZOLE 400 MG: 2 INJECTION, SOLUTION INTRAVENOUS at 23:18

## 2020-06-21 RX ADMIN — PREDNISONE 20 MG: 20 TABLET ORAL at 07:46

## 2020-06-21 RX ADMIN — PIPERACILLIN AND TAZOBACTAM 3.38 G: 3; .375 INJECTION, POWDER, FOR SOLUTION INTRAVENOUS at 18:01

## 2020-06-21 RX ADMIN — DEXTROSE MONOHYDRATE 250 MG: 50 INJECTION, SOLUTION INTRAVENOUS at 03:48

## 2020-06-21 RX ADMIN — PIPERACILLIN AND TAZOBACTAM 3.38 G: 3; .375 INJECTION, POWDER, FOR SOLUTION INTRAVENOUS at 06:13

## 2020-06-21 RX ADMIN — ESTRADIOL 1 MG: 1 TABLET ORAL at 08:05

## 2020-06-21 RX ADMIN — ENOXAPARIN SODIUM 40 MG: 40 INJECTION SUBCUTANEOUS at 18:01

## 2020-06-21 RX ADMIN — PANTOPRAZOLE SODIUM 40 MG: 40 INJECTION, POWDER, FOR SOLUTION INTRAVENOUS at 06:13

## 2020-06-21 RX ADMIN — MONTELUKAST SODIUM 10 MG: 10 TABLET, COATED ORAL at 21:20

## 2020-06-21 RX ADMIN — BUSPIRONE HYDROCHLORIDE 30 MG: 15 TABLET ORAL at 08:04

## 2020-06-21 RX ADMIN — LIOTHYRONINE SODIUM 5 MCG: 5 TABLET ORAL at 08:05

## 2020-06-21 RX ADMIN — PREDNISONE 20 MG: 20 TABLET ORAL at 18:01

## 2020-06-21 RX ADMIN — POTASSIUM CHLORIDE 20 MEQ: 1.5 POWDER, FOR SOLUTION ORAL at 08:04

## 2020-06-21 RX ADMIN — PAROXETINE HYDROCHLORIDE 40 MG: 20 TABLET, FILM COATED ORAL at 21:20

## 2020-06-21 RX ADMIN — Medication 10 ML: at 21:21

## 2020-06-21 RX ADMIN — LINACLOTIDE 145 MCG: 145 CAPSULE, GELATIN COATED ORAL at 21:19

## 2020-06-21 RX ADMIN — METHYLNALTREXONE BROMIDE 12 MG: 12 INJECTION, SOLUTION SUBCUTANEOUS at 08:04

## 2020-06-22 ENCOUNTER — APPOINTMENT (OUTPATIENT)
Dept: GENERAL RADIOLOGY | Facility: HOSPITAL | Age: 50
End: 2020-06-22

## 2020-06-22 ENCOUNTER — INPATIENT HOSPITAL (AMBULATORY)
Dept: URBAN - METROPOLITAN AREA HOSPITAL 84 | Facility: HOSPITAL | Age: 50
End: 2020-06-22

## 2020-06-22 DIAGNOSIS — Q43.1 HIRSCHSPRUNG'S DISEASE: ICD-10-CM

## 2020-06-22 DIAGNOSIS — K59.00 CONSTIPATION, UNSPECIFIED: ICD-10-CM

## 2020-06-22 DIAGNOSIS — R14.0 ABDOMINAL DISTENSION (GASEOUS): ICD-10-CM

## 2020-06-22 DIAGNOSIS — K59.8 OTHER SPECIFIED FUNCTIONAL INTESTINAL DISORDERS: ICD-10-CM

## 2020-06-22 LAB
ALBUMIN SERPL-MCNC: 3.5 G/DL (ref 3.5–5.2)
ALBUMIN/GLOB SERPL: 1.2 G/DL
ALP SERPL-CCNC: 50 U/L (ref 39–117)
ALT SERPL W P-5'-P-CCNC: 21 U/L (ref 1–41)
ANION GAP SERPL CALCULATED.3IONS-SCNC: 8 MMOL/L (ref 5–15)
ANISOCYTOSIS BLD QL: ABNORMAL
AST SERPL-CCNC: 28 U/L (ref 1–40)
BILIRUB SERPL-MCNC: 0.3 MG/DL (ref 0.2–1.2)
BUN BLD-MCNC: 17 MG/DL (ref 6–20)
BUN BLD-MCNC: ABNORMAL MG/DL
BUN/CREAT SERPL: ABNORMAL
CALCIUM SPEC-SCNC: 8.9 MG/DL (ref 8.6–10.5)
CHLORIDE SERPL-SCNC: 101 MMOL/L (ref 98–107)
CO2 SERPL-SCNC: 28 MMOL/L (ref 22–29)
CREAT BLD-MCNC: 0.75 MG/DL (ref 0.76–1.27)
DEPRECATED RDW RBC AUTO: 49.4 FL (ref 37–54)
ERYTHROCYTE [DISTWIDTH] IN BLOOD BY AUTOMATED COUNT: 15.2 % (ref 12.3–15.4)
GFR SERPL CREATININE-BSD FRML MDRD: 110 ML/MIN/1.73
GLOBULIN UR ELPH-MCNC: 3 GM/DL
GLUCOSE BLD-MCNC: 90 MG/DL (ref 65–99)
HCT VFR BLD AUTO: 35.6 % (ref 37.5–51)
HGB BLD-MCNC: 12.1 G/DL (ref 13–17.7)
LYMPHOCYTES # BLD MANUAL: 2.15 10*3/MM3 (ref 0.7–3.1)
LYMPHOCYTES NFR BLD MANUAL: 19 % (ref 19.6–45.3)
LYMPHOCYTES NFR BLD MANUAL: 5 % (ref 5–12)
MAGNESIUM SERPL-MCNC: 2.2 MG/DL (ref 1.6–2.6)
MCH RBC QN AUTO: 32.5 PG (ref 26.6–33)
MCHC RBC AUTO-ENTMCNC: 33.9 G/DL (ref 31.5–35.7)
MCV RBC AUTO: 95.8 FL (ref 79–97)
METAMYELOCYTES NFR BLD MANUAL: 1 % (ref 0–0)
MONOCYTES # BLD AUTO: 0.57 10*3/MM3 (ref 0.1–0.9)
MYELOCYTES NFR BLD MANUAL: 3 % (ref 0–0)
NEUTROPHILS # BLD AUTO: 8.14 10*3/MM3 (ref 1.7–7)
NEUTROPHILS NFR BLD MANUAL: 71 % (ref 42.7–76)
NEUTS BAND NFR BLD MANUAL: 1 % (ref 0–5)
PHOSPHATE SERPL-MCNC: 3.8 MG/DL (ref 2.5–4.5)
PLAT MORPH BLD: NORMAL
PLATELET # BLD AUTO: 310 10*3/MM3 (ref 140–450)
PMV BLD AUTO: 8.5 FL (ref 6–12)
POTASSIUM BLD-SCNC: 4.2 MMOL/L (ref 3.5–5.2)
PROT SERPL-MCNC: 6.5 G/DL (ref 6–8.5)
RBC # BLD AUTO: 3.72 10*6/MM3 (ref 4.14–5.8)
SCAN SLIDE: NORMAL
SODIUM BLD-SCNC: 137 MMOL/L (ref 136–145)
TOXIC GRANULATION: ABNORMAL
WBC NRBC COR # BLD: 11.3 10*3/MM3 (ref 3.4–10.8)

## 2020-06-22 PROCEDURE — 83735 ASSAY OF MAGNESIUM: CPT | Performed by: PHYSICIAN ASSISTANT

## 2020-06-22 PROCEDURE — 97530 THERAPEUTIC ACTIVITIES: CPT

## 2020-06-22 PROCEDURE — 99232 SBSQ HOSP IP/OBS MODERATE 35: CPT | Performed by: NURSE PRACTITIONER

## 2020-06-22 PROCEDURE — 94799 UNLISTED PULMONARY SVC/PX: CPT

## 2020-06-22 PROCEDURE — 63710000001 PREDNISONE PER 1 MG: Performed by: NURSE PRACTITIONER

## 2020-06-22 PROCEDURE — 80053 COMPREHEN METABOLIC PANEL: CPT | Performed by: PHYSICIAN ASSISTANT

## 2020-06-22 PROCEDURE — 92611 MOTION FLUOROSCOPY/SWALLOW: CPT

## 2020-06-22 PROCEDURE — 94660 CPAP INITIATION&MGMT: CPT

## 2020-06-22 PROCEDURE — 85025 COMPLETE CBC W/AUTO DIFF WBC: CPT | Performed by: PHYSICIAN ASSISTANT

## 2020-06-22 PROCEDURE — 85007 BL SMEAR W/DIFF WBC COUNT: CPT | Performed by: PHYSICIAN ASSISTANT

## 2020-06-22 PROCEDURE — 25010000002 ENOXAPARIN PER 10 MG: Performed by: INTERNAL MEDICINE

## 2020-06-22 PROCEDURE — 25010000002 METHYLNALTREXONE 12 MG/0.6ML SOLUTION: Performed by: NURSE PRACTITIONER

## 2020-06-22 PROCEDURE — 25010000002 FLUCONAZOLE PER 200 MG: Performed by: HOSPITALIST

## 2020-06-22 PROCEDURE — 74230 X-RAY XM SWLNG FUNCJ C+: CPT

## 2020-06-22 PROCEDURE — 97116 GAIT TRAINING THERAPY: CPT

## 2020-06-22 PROCEDURE — 99233 SBSQ HOSP IP/OBS HIGH 50: CPT | Performed by: FAMILY MEDICINE

## 2020-06-22 PROCEDURE — 84100 ASSAY OF PHOSPHORUS: CPT | Performed by: PHYSICIAN ASSISTANT

## 2020-06-22 RX ADMIN — BUSPIRONE HYDROCHLORIDE 30 MG: 15 TABLET ORAL at 20:43

## 2020-06-22 RX ADMIN — ENOXAPARIN SODIUM 40 MG: 40 INJECTION SUBCUTANEOUS at 17:21

## 2020-06-22 RX ADMIN — PREDNISONE 20 MG: 20 TABLET ORAL at 17:21

## 2020-06-22 RX ADMIN — LINACLOTIDE 145 MCG: 145 CAPSULE, GELATIN COATED ORAL at 10:08

## 2020-06-22 RX ADMIN — LIOTHYRONINE SODIUM 5 MCG: 5 TABLET ORAL at 10:08

## 2020-06-22 RX ADMIN — DEXTROSE MONOHYDRATE 250 MG: 50 INJECTION, SOLUTION INTRAVENOUS at 04:12

## 2020-06-22 RX ADMIN — LEVOTHYROXINE SODIUM 100 MCG: 100 TABLET ORAL at 05:26

## 2020-06-22 RX ADMIN — Medication 10 ML: at 10:09

## 2020-06-22 RX ADMIN — BARIUM SULFATE 50 ML: 400 SUSPENSION ORAL at 13:15

## 2020-06-22 RX ADMIN — BUSPIRONE HYDROCHLORIDE 30 MG: 15 TABLET ORAL at 10:13

## 2020-06-22 RX ADMIN — PAROXETINE HYDROCHLORIDE 40 MG: 20 TABLET, FILM COATED ORAL at 20:44

## 2020-06-22 RX ADMIN — Medication 10 ML: at 20:45

## 2020-06-22 RX ADMIN — PANTOPRAZOLE SODIUM 40 MG: 40 INJECTION, POWDER, FOR SOLUTION INTRAVENOUS at 05:25

## 2020-06-22 RX ADMIN — ESTRADIOL 1 MG: 1 TABLET ORAL at 10:08

## 2020-06-22 RX ADMIN — POLYETHYLENE GLYCOL 3350 34 G: 17 POWDER, FOR SOLUTION ORAL at 10:08

## 2020-06-22 RX ADMIN — METHYLNALTREXONE BROMIDE 12 MG: 12 INJECTION, SOLUTION SUBCUTANEOUS at 10:09

## 2020-06-22 RX ADMIN — PREDNISONE 20 MG: 20 TABLET ORAL at 10:08

## 2020-06-22 RX ADMIN — MONTELUKAST SODIUM 10 MG: 10 TABLET, COATED ORAL at 20:43

## 2020-06-22 RX ADMIN — FLUCONAZOLE 400 MG: 2 INJECTION, SOLUTION INTRAVENOUS at 10:08

## 2020-06-22 RX ADMIN — DEXTROSE MONOHYDRATE 250 MG: 50 INJECTION, SOLUTION INTRAVENOUS at 17:21

## 2020-06-22 RX ADMIN — DEXTROSE MONOHYDRATE 250 MG: 50 INJECTION, SOLUTION INTRAVENOUS at 22:19

## 2020-06-22 RX ADMIN — DEXTROSE MONOHYDRATE 250 MG: 50 INJECTION, SOLUTION INTRAVENOUS at 10:08

## 2020-06-22 RX ADMIN — POTASSIUM CHLORIDE 20 MEQ: 1.5 POWDER, FOR SOLUTION ORAL at 10:08

## 2020-06-23 LAB
ALBUMIN SERPL-MCNC: 3.5 G/DL (ref 3.5–5.2)
ALBUMIN/GLOB SERPL: 1 G/DL
ALP SERPL-CCNC: 61 U/L (ref 39–117)
ALT SERPL W P-5'-P-CCNC: 24 U/L (ref 1–41)
ANION GAP SERPL CALCULATED.3IONS-SCNC: 13 MMOL/L (ref 5–15)
AST SERPL-CCNC: 30 U/L (ref 1–40)
BILIRUB SERPL-MCNC: 0.2 MG/DL (ref 0.2–1.2)
BUN BLD-MCNC: 15 MG/DL (ref 6–20)
BUN BLD-MCNC: ABNORMAL MG/DL
BUN/CREAT SERPL: ABNORMAL
CALCIUM SPEC-SCNC: 9 MG/DL (ref 8.6–10.5)
CHLORIDE SERPL-SCNC: 99 MMOL/L (ref 98–107)
CO2 SERPL-SCNC: 24 MMOL/L (ref 22–29)
CREAT BLD-MCNC: 0.51 MG/DL (ref 0.76–1.27)
DEPRECATED RDW RBC AUTO: 52.1 FL (ref 37–54)
ERYTHROCYTE [DISTWIDTH] IN BLOOD BY AUTOMATED COUNT: 15.6 % (ref 12.3–15.4)
GFR SERPL CREATININE-BSD FRML MDRD: >150 ML/MIN/1.73
GLOBULIN UR ELPH-MCNC: 3.5 GM/DL
GLUCOSE BLD-MCNC: 119 MG/DL (ref 65–99)
HCT VFR BLD AUTO: 37.2 % (ref 37.5–51)
HGB BLD-MCNC: 12.8 G/DL (ref 13–17.7)
LARGE PLATELETS: ABNORMAL
LYMPHOCYTES # BLD MANUAL: 3.03 10*3/MM3 (ref 0.7–3.1)
LYMPHOCYTES NFR BLD MANUAL: 25 % (ref 19.6–45.3)
LYMPHOCYTES NFR BLD MANUAL: 6 % (ref 5–12)
MAGNESIUM SERPL-MCNC: 2.4 MG/DL (ref 1.6–2.6)
MCH RBC QN AUTO: 33.4 PG (ref 26.6–33)
MCHC RBC AUTO-ENTMCNC: 34.4 G/DL (ref 31.5–35.7)
MCV RBC AUTO: 97 FL (ref 79–97)
METAMYELOCYTES NFR BLD MANUAL: 1 % (ref 0–0)
MONOCYTES # BLD AUTO: 0.73 10*3/MM3 (ref 0.1–0.9)
MYELOCYTES NFR BLD MANUAL: 2 % (ref 0–0)
NEUTROPHILS # BLD AUTO: 7.99 10*3/MM3 (ref 1.7–7)
NEUTROPHILS NFR BLD MANUAL: 65 % (ref 42.7–76)
NEUTS BAND NFR BLD MANUAL: 1 % (ref 0–5)
PHOSPHATE SERPL-MCNC: 3.2 MG/DL (ref 2.5–4.5)
PLATELET # BLD AUTO: 332 10*3/MM3 (ref 140–450)
PMV BLD AUTO: 8.7 FL (ref 6–12)
POTASSIUM BLD-SCNC: 4.5 MMOL/L (ref 3.5–5.2)
PROT SERPL-MCNC: 7 G/DL (ref 6–8.5)
RBC # BLD AUTO: 3.83 10*6/MM3 (ref 4.14–5.8)
RBC MORPH BLD: NORMAL
SCAN SLIDE: NORMAL
SODIUM BLD-SCNC: 136 MMOL/L (ref 136–145)
WBC MORPH BLD: NORMAL
WBC NRBC COR # BLD: 12.1 10*3/MM3 (ref 3.4–10.8)

## 2020-06-23 PROCEDURE — 97530 THERAPEUTIC ACTIVITIES: CPT

## 2020-06-23 PROCEDURE — 80053 COMPREHEN METABOLIC PANEL: CPT | Performed by: PHYSICIAN ASSISTANT

## 2020-06-23 PROCEDURE — 83735 ASSAY OF MAGNESIUM: CPT | Performed by: PHYSICIAN ASSISTANT

## 2020-06-23 PROCEDURE — 97116 GAIT TRAINING THERAPY: CPT

## 2020-06-23 PROCEDURE — 92526 ORAL FUNCTION THERAPY: CPT

## 2020-06-23 PROCEDURE — 85025 COMPLETE CBC W/AUTO DIFF WBC: CPT | Performed by: PHYSICIAN ASSISTANT

## 2020-06-23 PROCEDURE — 25010000002 FLUCONAZOLE PER 200 MG: Performed by: HOSPITALIST

## 2020-06-23 PROCEDURE — 99232 SBSQ HOSP IP/OBS MODERATE 35: CPT | Performed by: FAMILY MEDICINE

## 2020-06-23 PROCEDURE — 25010000002 ENOXAPARIN PER 10 MG: Performed by: INTERNAL MEDICINE

## 2020-06-23 PROCEDURE — 84100 ASSAY OF PHOSPHORUS: CPT | Performed by: PHYSICIAN ASSISTANT

## 2020-06-23 PROCEDURE — 85007 BL SMEAR W/DIFF WBC COUNT: CPT | Performed by: PHYSICIAN ASSISTANT

## 2020-06-23 PROCEDURE — 25010000002 METHYLNALTREXONE 12 MG/0.6ML SOLUTION: Performed by: NURSE PRACTITIONER

## 2020-06-23 PROCEDURE — 63710000001 PREDNISONE PER 1 MG: Performed by: NURSE PRACTITIONER

## 2020-06-23 PROCEDURE — 94799 UNLISTED PULMONARY SVC/PX: CPT

## 2020-06-23 RX ORDER — GUAIFENESIN 600 MG/1
1200 TABLET, EXTENDED RELEASE ORAL EVERY 12 HOURS SCHEDULED
Status: DISCONTINUED | OUTPATIENT
Start: 2020-06-23 | End: 2020-06-24 | Stop reason: HOSPADM

## 2020-06-23 RX ADMIN — MONTELUKAST SODIUM 10 MG: 10 TABLET, COATED ORAL at 20:18

## 2020-06-23 RX ADMIN — DEXTROSE MONOHYDRATE 250 MG: 50 INJECTION, SOLUTION INTRAVENOUS at 17:00

## 2020-06-23 RX ADMIN — POLYETHYLENE GLYCOL 3350 34 G: 17 POWDER, FOR SOLUTION ORAL at 08:52

## 2020-06-23 RX ADMIN — PREDNISONE 20 MG: 20 TABLET ORAL at 17:00

## 2020-06-23 RX ADMIN — GUAIFENESIN 1200 MG: 600 TABLET, EXTENDED RELEASE ORAL at 08:58

## 2020-06-23 RX ADMIN — ENOXAPARIN SODIUM 40 MG: 40 INJECTION SUBCUTANEOUS at 17:00

## 2020-06-23 RX ADMIN — ESTRADIOL 1 MG: 1 TABLET ORAL at 08:52

## 2020-06-23 RX ADMIN — LEVOTHYROXINE SODIUM 100 MCG: 100 TABLET ORAL at 06:06

## 2020-06-23 RX ADMIN — BUSPIRONE HYDROCHLORIDE 30 MG: 15 TABLET ORAL at 20:18

## 2020-06-23 RX ADMIN — DEXTROSE MONOHYDRATE 250 MG: 50 INJECTION, SOLUTION INTRAVENOUS at 21:16

## 2020-06-23 RX ADMIN — PAROXETINE HYDROCHLORIDE 40 MG: 20 TABLET, FILM COATED ORAL at 20:18

## 2020-06-23 RX ADMIN — METHYLNALTREXONE BROMIDE 12 MG: 12 INJECTION, SOLUTION SUBCUTANEOUS at 08:52

## 2020-06-23 RX ADMIN — Medication 10 ML: at 20:19

## 2020-06-23 RX ADMIN — PANTOPRAZOLE SODIUM 40 MG: 40 INJECTION, POWDER, FOR SOLUTION INTRAVENOUS at 05:18

## 2020-06-23 RX ADMIN — FLUCONAZOLE 400 MG: 2 INJECTION, SOLUTION INTRAVENOUS at 08:48

## 2020-06-23 RX ADMIN — BUSPIRONE HYDROCHLORIDE 30 MG: 15 TABLET ORAL at 08:52

## 2020-06-23 RX ADMIN — GUAIFENESIN 1200 MG: 600 TABLET, EXTENDED RELEASE ORAL at 20:18

## 2020-06-23 RX ADMIN — DEXTROSE MONOHYDRATE 250 MG: 50 INJECTION, SOLUTION INTRAVENOUS at 08:52

## 2020-06-23 RX ADMIN — PREDNISONE 20 MG: 20 TABLET ORAL at 08:52

## 2020-06-23 RX ADMIN — Medication 10 ML: at 08:52

## 2020-06-23 RX ADMIN — DEXTROSE MONOHYDRATE 250 MG: 50 INJECTION, SOLUTION INTRAVENOUS at 03:47

## 2020-06-23 RX ADMIN — LIOTHYRONINE SODIUM 5 MCG: 5 TABLET ORAL at 08:52

## 2020-06-23 RX ADMIN — LINACLOTIDE 145 MCG: 145 CAPSULE, GELATIN COATED ORAL at 08:52

## 2020-06-24 VITALS
DIASTOLIC BLOOD PRESSURE: 73 MMHG | HEART RATE: 83 BPM | SYSTOLIC BLOOD PRESSURE: 106 MMHG | RESPIRATION RATE: 18 BRPM | BODY MASS INDEX: 32.5 KG/M2 | HEIGHT: 63 IN | OXYGEN SATURATION: 92 % | WEIGHT: 183.42 LBS | TEMPERATURE: 97.9 F

## 2020-06-24 LAB
ALBUMIN SERPL-MCNC: 3.5 G/DL (ref 3.5–5.2)
ALBUMIN/GLOB SERPL: 1.1 G/DL
ALP SERPL-CCNC: 57 U/L (ref 39–117)
ALT SERPL W P-5'-P-CCNC: 27 U/L (ref 1–41)
ANION GAP SERPL CALCULATED.3IONS-SCNC: 12 MMOL/L (ref 5–15)
AST SERPL-CCNC: 32 U/L (ref 1–40)
BILIRUB SERPL-MCNC: 0.3 MG/DL (ref 0.2–1.2)
BUN BLD-MCNC: 15 MG/DL (ref 6–20)
BUN BLD-MCNC: ABNORMAL MG/DL
BUN/CREAT SERPL: ABNORMAL
CALCIUM SPEC-SCNC: 9.1 MG/DL (ref 8.6–10.5)
CHLORIDE SERPL-SCNC: 100 MMOL/L (ref 98–107)
CO2 SERPL-SCNC: 25 MMOL/L (ref 22–29)
CREAT BLD-MCNC: 0.61 MG/DL (ref 0.76–1.27)
DEPRECATED RDW RBC AUTO: 52.9 FL (ref 37–54)
ERYTHROCYTE [DISTWIDTH] IN BLOOD BY AUTOMATED COUNT: 15.8 % (ref 12.3–15.4)
GFR SERPL CREATININE-BSD FRML MDRD: 140 ML/MIN/1.73
GLOBULIN UR ELPH-MCNC: 3.3 GM/DL
GLUCOSE BLD-MCNC: 94 MG/DL (ref 65–99)
HCT VFR BLD AUTO: 35.7 % (ref 37.5–51)
HGB BLD-MCNC: 12.3 G/DL (ref 13–17.7)
LYMPHOCYTES # BLD MANUAL: 2.19 10*3/MM3 (ref 0.7–3.1)
LYMPHOCYTES NFR BLD MANUAL: 19 % (ref 19.6–45.3)
LYMPHOCYTES NFR BLD MANUAL: 5 % (ref 5–12)
MAGNESIUM SERPL-MCNC: 2.4 MG/DL (ref 1.6–2.6)
MCH RBC QN AUTO: 33.1 PG (ref 26.6–33)
MCHC RBC AUTO-ENTMCNC: 34.4 G/DL (ref 31.5–35.7)
MCV RBC AUTO: 96.2 FL (ref 79–97)
METAMYELOCYTES NFR BLD MANUAL: 2 % (ref 0–0)
MONOCYTES # BLD AUTO: 0.58 10*3/MM3 (ref 0.1–0.9)
NEUTROPHILS # BLD AUTO: 8.51 10*3/MM3 (ref 1.7–7)
NEUTROPHILS NFR BLD MANUAL: 70 % (ref 42.7–76)
NEUTS BAND NFR BLD MANUAL: 4 % (ref 0–5)
NRBC SPEC MANUAL: 1 /100 WBC (ref 0–0.2)
PHOSPHATE SERPL-MCNC: 3.2 MG/DL (ref 2.5–4.5)
PLAT MORPH BLD: NORMAL
PLATELET # BLD AUTO: 333 10*3/MM3 (ref 140–450)
PMV BLD AUTO: 8.7 FL (ref 6–12)
POTASSIUM BLD-SCNC: 4.8 MMOL/L (ref 3.5–5.2)
PROT SERPL-MCNC: 6.8 G/DL (ref 6–8.5)
RBC # BLD AUTO: 3.71 10*6/MM3 (ref 4.14–5.8)
RBC MORPH BLD: NORMAL
SCAN SLIDE: NORMAL
SODIUM BLD-SCNC: 137 MMOL/L (ref 136–145)
TOXIC GRANULATION: ABNORMAL
WBC NRBC COR # BLD: 11.5 10*3/MM3 (ref 3.4–10.8)

## 2020-06-24 PROCEDURE — 84100 ASSAY OF PHOSPHORUS: CPT | Performed by: PHYSICIAN ASSISTANT

## 2020-06-24 PROCEDURE — 97530 THERAPEUTIC ACTIVITIES: CPT

## 2020-06-24 PROCEDURE — 85025 COMPLETE CBC W/AUTO DIFF WBC: CPT | Performed by: PHYSICIAN ASSISTANT

## 2020-06-24 PROCEDURE — 25010000002 METHYLNALTREXONE 12 MG/0.6ML SOLUTION: Performed by: NURSE PRACTITIONER

## 2020-06-24 PROCEDURE — 83735 ASSAY OF MAGNESIUM: CPT | Performed by: PHYSICIAN ASSISTANT

## 2020-06-24 PROCEDURE — 97110 THERAPEUTIC EXERCISES: CPT

## 2020-06-24 PROCEDURE — 80053 COMPREHEN METABOLIC PANEL: CPT | Performed by: PHYSICIAN ASSISTANT

## 2020-06-24 PROCEDURE — 63710000001 PREDNISONE PER 1 MG: Performed by: NURSE PRACTITIONER

## 2020-06-24 PROCEDURE — 99239 HOSP IP/OBS DSCHRG MGMT >30: CPT | Performed by: FAMILY MEDICINE

## 2020-06-24 PROCEDURE — 97116 GAIT TRAINING THERAPY: CPT

## 2020-06-24 PROCEDURE — 25010000002 FLUCONAZOLE PER 200 MG: Performed by: HOSPITALIST

## 2020-06-24 PROCEDURE — 85007 BL SMEAR W/DIFF WBC COUNT: CPT | Performed by: PHYSICIAN ASSISTANT

## 2020-06-24 RX ORDER — BISACODYL 10 MG
10 SUPPOSITORY, RECTAL RECTAL DAILY
Status: DISCONTINUED | OUTPATIENT
Start: 2020-06-24 | End: 2020-06-24 | Stop reason: HOSPADM

## 2020-06-24 RX ORDER — PREDNISONE 10 MG/1
10 TABLET ORAL
Qty: 7 TABLET | Refills: 0 | Status: SHIPPED | OUTPATIENT
Start: 2020-06-25 | End: 2020-07-02

## 2020-06-24 RX ORDER — GUAIFENESIN 600 MG/1
1200 TABLET, EXTENDED RELEASE ORAL EVERY 12 HOURS SCHEDULED
Qty: 30 TABLET | Refills: 0 | Status: SHIPPED | OUTPATIENT
Start: 2020-06-24 | End: 2020-11-25

## 2020-06-24 RX ORDER — FLUCONAZOLE 200 MG/1
400 TABLET ORAL EVERY 24 HOURS
Qty: 8 TABLET | Refills: 0 | Status: CANCELLED | OUTPATIENT
Start: 2020-06-24 | End: 2020-06-28

## 2020-06-24 RX ORDER — PREDNISONE 10 MG/1
10 TABLET ORAL
Status: DISCONTINUED | OUTPATIENT
Start: 2020-06-25 | End: 2020-06-24 | Stop reason: HOSPADM

## 2020-06-24 RX ORDER — FLUCONAZOLE 100 MG/1
400 TABLET ORAL EVERY 24 HOURS
Status: DISCONTINUED | OUTPATIENT
Start: 2020-06-25 | End: 2020-06-24 | Stop reason: HOSPADM

## 2020-06-24 RX ADMIN — METHYLNALTREXONE BROMIDE 12 MG: 12 INJECTION, SOLUTION SUBCUTANEOUS at 08:15

## 2020-06-24 RX ADMIN — GUAIFENESIN 1200 MG: 600 TABLET, EXTENDED RELEASE ORAL at 08:15

## 2020-06-24 RX ADMIN — Medication 10 ML: at 08:15

## 2020-06-24 RX ADMIN — PREDNISONE 20 MG: 20 TABLET ORAL at 08:15

## 2020-06-24 RX ADMIN — LINACLOTIDE 145 MCG: 145 CAPSULE, GELATIN COATED ORAL at 08:15

## 2020-06-24 RX ADMIN — BISACODYL 10 MG: 10 SUPPOSITORY RECTAL at 10:54

## 2020-06-24 RX ADMIN — ESTRADIOL 1 MG: 1 TABLET ORAL at 08:15

## 2020-06-24 RX ADMIN — DEXTROSE MONOHYDRATE 250 MG: 50 INJECTION, SOLUTION INTRAVENOUS at 08:14

## 2020-06-24 RX ADMIN — PANTOPRAZOLE SODIUM 40 MG: 40 INJECTION, POWDER, FOR SOLUTION INTRAVENOUS at 05:36

## 2020-06-24 RX ADMIN — LIOTHYRONINE SODIUM 5 MCG: 5 TABLET ORAL at 08:15

## 2020-06-24 RX ADMIN — DEXTROSE MONOHYDRATE 250 MG: 50 INJECTION, SOLUTION INTRAVENOUS at 03:11

## 2020-06-24 RX ADMIN — FLUCONAZOLE 400 MG: 2 INJECTION, SOLUTION INTRAVENOUS at 08:14

## 2020-06-24 RX ADMIN — POLYETHYLENE GLYCOL 3350 34 G: 17 POWDER, FOR SOLUTION ORAL at 08:15

## 2020-06-24 RX ADMIN — BUSPIRONE HYDROCHLORIDE 30 MG: 15 TABLET ORAL at 08:15

## 2020-06-24 RX ADMIN — LEVOTHYROXINE SODIUM 100 MCG: 100 TABLET ORAL at 06:01

## 2020-06-25 ENCOUNTER — LAB REQUISITION (OUTPATIENT)
Dept: LAB | Facility: HOSPITAL | Age: 50
End: 2020-06-25

## 2020-06-25 DIAGNOSIS — Z00.00 ENCOUNTER FOR GENERAL ADULT MEDICAL EXAMINATION WITHOUT ABNORMAL FINDINGS: ICD-10-CM

## 2020-06-25 LAB
ALBUMIN SERPL-MCNC: 3.3 G/DL (ref 3.5–5.2)
ALBUMIN/GLOB SERPL: 1.3 G/DL
ALP SERPL-CCNC: 52 U/L (ref 39–117)
ALT SERPL W P-5'-P-CCNC: 26 U/L (ref 1–41)
ANION GAP SERPL CALCULATED.3IONS-SCNC: 10 MMOL/L (ref 5–15)
AST SERPL-CCNC: 23 U/L (ref 1–40)
BILIRUB SERPL-MCNC: 0.2 MG/DL (ref 0.2–1.2)
BUN BLD-MCNC: 11 MG/DL (ref 6–20)
BUN BLD-MCNC: ABNORMAL MG/DL
BUN/CREAT SERPL: ABNORMAL
CALCIUM SPEC-SCNC: 9.1 MG/DL (ref 8.6–10.5)
CHLORIDE SERPL-SCNC: 105 MMOL/L (ref 98–107)
CO2 SERPL-SCNC: 28 MMOL/L (ref 22–29)
CREAT BLD-MCNC: 0.63 MG/DL (ref 0.76–1.27)
DEPRECATED RDW RBC AUTO: 52.5 FL (ref 37–54)
ERYTHROCYTE [DISTWIDTH] IN BLOOD BY AUTOMATED COUNT: 15.8 % (ref 12.3–15.4)
GFR SERPL CREATININE-BSD FRML MDRD: 135 ML/MIN/1.73
GLOBULIN UR ELPH-MCNC: 2.6 GM/DL
GLUCOSE BLD-MCNC: 72 MG/DL (ref 65–99)
HCT VFR BLD AUTO: 34.3 % (ref 37.5–51)
HGB BLD-MCNC: 11.8 G/DL (ref 13–17.7)
MAGNESIUM SERPL-MCNC: 2.2 MG/DL (ref 1.6–2.6)
MCH RBC QN AUTO: 33.2 PG (ref 26.6–33)
MCHC RBC AUTO-ENTMCNC: 34.4 G/DL (ref 31.5–35.7)
MCV RBC AUTO: 96.7 FL (ref 79–97)
PHOSPHATE SERPL-MCNC: 4.1 MG/DL (ref 2.5–4.5)
PLATELET # BLD AUTO: 331 10*3/MM3 (ref 140–450)
PMV BLD AUTO: 8.7 FL (ref 6–12)
POTASSIUM BLD-SCNC: 3.7 MMOL/L (ref 3.5–5.2)
PROT SERPL-MCNC: 5.9 G/DL (ref 6–8.5)
RBC # BLD AUTO: 3.55 10*6/MM3 (ref 4.14–5.8)
SODIUM BLD-SCNC: 143 MMOL/L (ref 136–145)
WBC NRBC COR # BLD: 9.2 10*3/MM3 (ref 3.4–10.8)

## 2020-06-25 PROCEDURE — 80053 COMPREHEN METABOLIC PANEL: CPT | Performed by: PHYSICAL MEDICINE & REHABILITATION

## 2020-06-25 PROCEDURE — 84100 ASSAY OF PHOSPHORUS: CPT | Performed by: PHYSICAL MEDICINE & REHABILITATION

## 2020-06-25 PROCEDURE — 83735 ASSAY OF MAGNESIUM: CPT | Performed by: PHYSICAL MEDICINE & REHABILITATION

## 2020-06-25 PROCEDURE — 85027 COMPLETE CBC AUTOMATED: CPT | Performed by: PHYSICAL MEDICINE & REHABILITATION

## 2020-06-26 ENCOUNTER — LAB REQUISITION (OUTPATIENT)
Dept: LAB | Facility: HOSPITAL | Age: 50
End: 2020-06-26

## 2020-06-26 DIAGNOSIS — Z00.00 ENCOUNTER FOR GENERAL ADULT MEDICAL EXAMINATION WITHOUT ABNORMAL FINDINGS: ICD-10-CM

## 2020-06-26 LAB
DEPRECATED RDW RBC AUTO: 56.9 FL (ref 37–54)
ERYTHROCYTE [DISTWIDTH] IN BLOOD BY AUTOMATED COUNT: 16.6 % (ref 12.3–15.4)
HCT VFR BLD AUTO: 34.3 % (ref 37.5–51)
HEMOCCULT STL QL IA: POSITIVE
HGB BLD-MCNC: 11.5 G/DL (ref 13–17.7)
MCH RBC QN AUTO: 32.7 PG (ref 26.6–33)
MCHC RBC AUTO-ENTMCNC: 33.5 G/DL (ref 31.5–35.7)
MCV RBC AUTO: 97.7 FL (ref 79–97)
PLATELET # BLD AUTO: 308 10*3/MM3 (ref 140–450)
PMV BLD AUTO: 8.9 FL (ref 6–12)
RBC # BLD AUTO: 3.52 10*6/MM3 (ref 4.14–5.8)
WBC NRBC COR # BLD: 11.2 10*3/MM3 (ref 3.4–10.8)

## 2020-06-26 PROCEDURE — 85027 COMPLETE CBC AUTOMATED: CPT

## 2020-06-26 PROCEDURE — 82274 ASSAY TEST FOR BLOOD FECAL: CPT

## 2020-06-29 ENCOUNTER — LAB REQUISITION (OUTPATIENT)
Dept: LAB | Facility: HOSPITAL | Age: 50
End: 2020-06-29

## 2020-06-29 DIAGNOSIS — Z00.00 ENCOUNTER FOR GENERAL ADULT MEDICAL EXAMINATION WITHOUT ABNORMAL FINDINGS: ICD-10-CM

## 2020-06-29 LAB
ANION GAP SERPL CALCULATED.3IONS-SCNC: 14 MMOL/L (ref 5–15)
BUN BLD-MCNC: ABNORMAL MG/DL
BUN SERPL-MCNC: 17 MG/DL (ref 6–20)
BUN/CREAT SERPL: ABNORMAL
CALCIUM SPEC-SCNC: 9.3 MG/DL (ref 8.6–10.5)
CHLORIDE SERPL-SCNC: 99 MMOL/L (ref 98–107)
CO2 SERPL-SCNC: 26 MMOL/L (ref 22–29)
CREAT BLD-MCNC: 0.51 MG/DL (ref 0.76–1.27)
DEPRECATED RDW RBC AUTO: 57.8 FL (ref 37–54)
ERYTHROCYTE [DISTWIDTH] IN BLOOD BY AUTOMATED COUNT: 16.8 % (ref 12.3–15.4)
GFR SERPL CREATININE-BSD FRML MDRD: >150 ML/MIN/1.73
GLUCOSE BLD-MCNC: 112 MG/DL (ref 65–99)
HCT VFR BLD AUTO: 31.2 % (ref 37.5–51)
HGB BLD-MCNC: 10.7 G/DL (ref 13–17.7)
MCH RBC QN AUTO: 33.4 PG (ref 26.6–33)
MCHC RBC AUTO-ENTMCNC: 34.3 G/DL (ref 31.5–35.7)
MCV RBC AUTO: 97.3 FL (ref 79–97)
PLATELET # BLD AUTO: 301 10*3/MM3 (ref 140–450)
PMV BLD AUTO: 9.2 FL (ref 6–12)
POTASSIUM BLD-SCNC: 4.2 MMOL/L (ref 3.5–5.2)
RBC # BLD AUTO: 3.21 10*6/MM3 (ref 4.14–5.8)
SODIUM BLD-SCNC: 139 MMOL/L (ref 136–145)
WBC NRBC COR # BLD: 9.4 10*3/MM3 (ref 3.4–10.8)

## 2020-06-29 PROCEDURE — 80048 BASIC METABOLIC PNL TOTAL CA: CPT | Performed by: PHYSICAL MEDICINE & REHABILITATION

## 2020-06-29 PROCEDURE — 85027 COMPLETE CBC AUTOMATED: CPT | Performed by: PHYSICAL MEDICINE & REHABILITATION

## 2020-06-30 ENCOUNTER — HOSPITAL ENCOUNTER (OUTPATIENT)
Dept: GENERAL RADIOLOGY | Facility: HOSPITAL | Age: 50
Discharge: HOME | End: 2020-06-30
Admitting: PHYSICAL MEDICINE & REHABILITATION

## 2020-06-30 DIAGNOSIS — R05.9 COUGH: ICD-10-CM

## 2020-06-30 PROCEDURE — 71046 X-RAY EXAM CHEST 2 VIEWS: CPT

## 2020-07-01 ENCOUNTER — HOSPITAL ENCOUNTER (OUTPATIENT)
Dept: GENERAL RADIOLOGY | Facility: HOSPITAL | Age: 50
Discharge: HOME | End: 2020-07-01
Admitting: PHYSICAL MEDICINE & REHABILITATION

## 2020-07-01 ENCOUNTER — LAB REQUISITION (OUTPATIENT)
Dept: LAB | Facility: HOSPITAL | Age: 50
End: 2020-07-01

## 2020-07-01 DIAGNOSIS — Z00.00 ENCOUNTER FOR GENERAL ADULT MEDICAL EXAMINATION WITHOUT ABNORMAL FINDINGS: ICD-10-CM

## 2020-07-01 DIAGNOSIS — R14.0 ABDOMINAL DISTENTION: ICD-10-CM

## 2020-07-01 LAB
DEPRECATED RDW RBC AUTO: 58.6 FL (ref 37–54)
ERYTHROCYTE [DISTWIDTH] IN BLOOD BY AUTOMATED COUNT: 17 % (ref 12.3–15.4)
HCT VFR BLD AUTO: 35 % (ref 37.5–51)
HGB BLD-MCNC: 11.7 G/DL (ref 13–17.7)
MCH RBC QN AUTO: 33 PG (ref 26.6–33)
MCHC RBC AUTO-ENTMCNC: 33.3 G/DL (ref 31.5–35.7)
MCV RBC AUTO: 98.9 FL (ref 79–97)
PLATELET # BLD AUTO: 311 10*3/MM3 (ref 140–450)
PMV BLD AUTO: 9.4 FL (ref 6–12)
RBC # BLD AUTO: 3.54 10*6/MM3 (ref 4.14–5.8)
WBC # BLD AUTO: 8.5 10*3/MM3 (ref 3.4–10.8)

## 2020-07-01 PROCEDURE — 85027 COMPLETE CBC AUTOMATED: CPT | Performed by: PHYSICAL MEDICINE & REHABILITATION

## 2020-07-01 PROCEDURE — 74018 RADEX ABDOMEN 1 VIEW: CPT

## 2020-07-02 ENCOUNTER — HOSPITAL ENCOUNTER (OUTPATIENT)
Dept: GENERAL RADIOLOGY | Facility: HOSPITAL | Age: 50
Discharge: HOME | End: 2020-07-02

## 2020-07-02 ENCOUNTER — LAB REQUISITION (OUTPATIENT)
Dept: LAB | Facility: HOSPITAL | Age: 50
End: 2020-07-02

## 2020-07-02 DIAGNOSIS — Z00.00 ENCOUNTER FOR GENERAL ADULT MEDICAL EXAMINATION WITHOUT ABNORMAL FINDINGS: ICD-10-CM

## 2020-07-02 DIAGNOSIS — R52 PAIN: ICD-10-CM

## 2020-07-02 LAB
ANION GAP SERPL CALCULATED.3IONS-SCNC: 13 MMOL/L (ref 5–15)
BASOPHILS # BLD AUTO: 0 10*3/MM3 (ref 0–0.2)
BASOPHILS NFR BLD AUTO: 0.3 % (ref 0–1.5)
BUN SERPL-MCNC: ABNORMAL MG/DL
BUN/CREAT SERPL: ABNORMAL
CALCIUM SPEC-SCNC: 9.2 MG/DL (ref 8.6–10.5)
CHLORIDE SERPL-SCNC: 100 MMOL/L (ref 98–107)
CO2 SERPL-SCNC: 27 MMOL/L (ref 22–29)
CREAT SERPL-MCNC: 0.73 MG/DL (ref 0.76–1.27)
DEPRECATED RDW RBC AUTO: 59.1 FL (ref 37–54)
EOSINOPHIL # BLD AUTO: 0.1 10*3/MM3 (ref 0–0.4)
EOSINOPHIL NFR BLD AUTO: 1.1 % (ref 0.3–6.2)
ERYTHROCYTE [DISTWIDTH] IN BLOOD BY AUTOMATED COUNT: 17.1 % (ref 12.3–15.4)
GFR SERPL CREATININE-BSD FRML MDRD: 114 ML/MIN/1.73
GLUCOSE SERPL-MCNC: 88 MG/DL (ref 65–99)
HCT VFR BLD AUTO: 38.1 % (ref 37.5–51)
HGB BLD-MCNC: 12.4 G/DL (ref 13–17.7)
LYMPHOCYTES # BLD AUTO: 3 10*3/MM3 (ref 0.7–3.1)
LYMPHOCYTES NFR BLD AUTO: 40.1 % (ref 19.6–45.3)
MAGNESIUM SERPL-MCNC: 2.1 MG/DL (ref 1.6–2.6)
MAGNESIUM SERPL-MCNC: 2.1 MG/DL (ref 1.6–2.6)
MCH RBC QN AUTO: 32.6 PG (ref 26.6–33)
MCHC RBC AUTO-ENTMCNC: 32.5 G/DL (ref 31.5–35.7)
MCV RBC AUTO: 100.3 FL (ref 79–97)
MONOCYTES # BLD AUTO: 0.8 10*3/MM3 (ref 0.1–0.9)
MONOCYTES NFR BLD AUTO: 10.4 % (ref 5–12)
NEUTROPHILS NFR BLD AUTO: 3.7 10*3/MM3 (ref 1.7–7)
NEUTROPHILS NFR BLD AUTO: 48.1 % (ref 42.7–76)
NRBC BLD AUTO-RTO: 0.1 /100 WBC (ref 0–0.2)
PHOSPHATE SERPL-MCNC: 3.4 MG/DL (ref 2.5–4.5)
PHOSPHATE SERPL-MCNC: 3.5 MG/DL (ref 2.5–4.5)
PLATELET # BLD AUTO: 314 10*3/MM3 (ref 140–450)
PMV BLD AUTO: 9.3 FL (ref 6–12)
POTASSIUM SERPL-SCNC: 4 MMOL/L (ref 3.5–5.2)
RBC # BLD AUTO: 3.8 10*6/MM3 (ref 4.14–5.8)
SODIUM SERPL-SCNC: 140 MMOL/L (ref 136–145)
WBC # BLD AUTO: 7.6 10*3/MM3 (ref 3.4–10.8)

## 2020-07-02 PROCEDURE — 80048 BASIC METABOLIC PNL TOTAL CA: CPT | Performed by: PHYSICAL MEDICINE & REHABILITATION

## 2020-07-02 PROCEDURE — 85025 COMPLETE CBC W/AUTO DIFF WBC: CPT | Performed by: PHYSICAL MEDICINE & REHABILITATION

## 2020-07-02 PROCEDURE — 74018 RADEX ABDOMEN 1 VIEW: CPT

## 2020-07-02 PROCEDURE — 84100 ASSAY OF PHOSPHORUS: CPT | Performed by: PHYSICAL MEDICINE & REHABILITATION

## 2020-07-02 PROCEDURE — 83735 ASSAY OF MAGNESIUM: CPT | Performed by: PHYSICAL MEDICINE & REHABILITATION

## 2020-07-03 LAB — BUN SERPL-MCNC: 10 MG/DL (ref 6–20)

## 2020-07-06 ENCOUNTER — HOSPITAL ENCOUNTER (OUTPATIENT)
Dept: GENERAL RADIOLOGY | Facility: HOSPITAL | Age: 50
Discharge: HOME | End: 2020-07-06

## 2020-07-06 DIAGNOSIS — Z87.01 HISTORY OF RECENT PNEUMONIA: ICD-10-CM

## 2020-07-06 DIAGNOSIS — K63.89 COLON DISTENTION: ICD-10-CM

## 2020-07-06 PROCEDURE — 71046 X-RAY EXAM CHEST 2 VIEWS: CPT

## 2020-07-06 PROCEDURE — 74018 RADEX ABDOMEN 1 VIEW: CPT

## 2020-07-08 ENCOUNTER — LAB REQUISITION (OUTPATIENT)
Dept: LAB | Facility: HOSPITAL | Age: 50
End: 2020-07-08

## 2020-07-08 DIAGNOSIS — J96.21 ACUTE AND CHRONIC RESPIRATORY FAILURE WITH HYPOXIA (HCC): ICD-10-CM

## 2020-07-08 DIAGNOSIS — Q43.1 HIRSCHSPRUNG'S DISEASE: ICD-10-CM

## 2020-07-08 DIAGNOSIS — R53.81 OTHER MALAISE: ICD-10-CM

## 2020-07-08 DIAGNOSIS — G40.909 EPILEPSY, UNSPECIFIED, NOT INTRACTABLE, WITHOUT STATUS EPILEPTICUS (HCC): ICD-10-CM

## 2020-07-08 LAB
ANION GAP SERPL CALCULATED.3IONS-SCNC: 12 MMOL/L (ref 5–15)
BUN SERPL-MCNC: 14 MG/DL (ref 6–20)
BUN SERPL-MCNC: ABNORMAL MG/DL
BUN/CREAT SERPL: ABNORMAL
CALCIUM SPEC-SCNC: 9.3 MG/DL (ref 8.6–10.5)
CHLORIDE SERPL-SCNC: 100 MMOL/L (ref 98–107)
CO2 SERPL-SCNC: 29 MMOL/L (ref 22–29)
CREAT SERPL-MCNC: 0.65 MG/DL (ref 0.76–1.27)
DEPRECATED RDW RBC AUTO: 58.2 FL (ref 37–54)
ERYTHROCYTE [DISTWIDTH] IN BLOOD BY AUTOMATED COUNT: 16.9 % (ref 12.3–15.4)
GFR SERPL CREATININE-BSD FRML MDRD: 130 ML/MIN/1.73
GLUCOSE SERPL-MCNC: 67 MG/DL (ref 65–99)
HCT VFR BLD AUTO: 35.6 % (ref 37.5–51)
HGB BLD-MCNC: 11.9 G/DL (ref 13–17.7)
MCH RBC QN AUTO: 32.9 PG (ref 26.6–33)
MCHC RBC AUTO-ENTMCNC: 33.4 G/DL (ref 31.5–35.7)
MCV RBC AUTO: 98.6 FL (ref 79–97)
PLATELET # BLD AUTO: 198 10*3/MM3 (ref 140–450)
PMV BLD AUTO: 8.8 FL (ref 6–12)
POTASSIUM SERPL-SCNC: 4 MMOL/L (ref 3.5–5.2)
RBC # BLD AUTO: 3.61 10*6/MM3 (ref 4.14–5.8)
SODIUM SERPL-SCNC: 141 MMOL/L (ref 136–145)
TSH SERPL DL<=0.05 MIU/L-ACNC: 12.2 UIU/ML (ref 0.27–4.2)
WBC # BLD AUTO: 5.2 10*3/MM3 (ref 3.4–10.8)

## 2020-07-08 PROCEDURE — 84443 ASSAY THYROID STIM HORMONE: CPT | Performed by: PHYSICAL MEDICINE & REHABILITATION

## 2020-07-08 PROCEDURE — 85027 COMPLETE CBC AUTOMATED: CPT | Performed by: PHYSICAL MEDICINE & REHABILITATION

## 2020-07-08 PROCEDURE — 80048 BASIC METABOLIC PNL TOTAL CA: CPT | Performed by: PHYSICAL MEDICINE & REHABILITATION

## 2020-07-10 ENCOUNTER — OFFICE (AMBULATORY)
Dept: URBAN - METROPOLITAN AREA CLINIC 64 | Facility: CLINIC | Age: 50
End: 2020-07-10

## 2020-07-10 VITALS
HEART RATE: 109 BPM | HEIGHT: 65 IN | DIASTOLIC BLOOD PRESSURE: 57 MMHG | SYSTOLIC BLOOD PRESSURE: 90 MMHG | WEIGHT: 199 LBS

## 2020-07-10 DIAGNOSIS — K59.00 CONSTIPATION, UNSPECIFIED: ICD-10-CM

## 2020-07-10 DIAGNOSIS — Q43.1 HIRSCHSPRUNG'S DISEASE: ICD-10-CM

## 2020-07-10 PROCEDURE — 99214 OFFICE O/P EST MOD 30 MIN: CPT | Performed by: INTERNAL MEDICINE

## 2020-07-13 ENCOUNTER — TELEPHONE (OUTPATIENT)
Dept: FAMILY MEDICINE CLINIC | Facility: CLINIC | Age: 50
End: 2020-07-13

## 2020-07-15 ENCOUNTER — TELEPHONE (OUTPATIENT)
Dept: FAMILY MEDICINE CLINIC | Facility: CLINIC | Age: 50
End: 2020-07-15

## 2020-07-15 NOTE — TELEPHONE ENCOUNTER
Patient had a video visit with [facility] on 6/4/2020.  Please fax office note from visit to [facility] at fax [phone].

## 2020-07-21 ENCOUNTER — LAB (OUTPATIENT)
Dept: FAMILY MEDICINE CLINIC | Facility: CLINIC | Age: 50
End: 2020-07-21

## 2020-07-21 ENCOUNTER — OFFICE VISIT (OUTPATIENT)
Dept: FAMILY MEDICINE CLINIC | Facility: CLINIC | Age: 50
End: 2020-07-21

## 2020-07-21 VITALS
BODY MASS INDEX: 34.83 KG/M2 | WEIGHT: 196.6 LBS | DIASTOLIC BLOOD PRESSURE: 74 MMHG | HEART RATE: 102 BPM | RESPIRATION RATE: 18 BRPM | TEMPERATURE: 97.5 F | SYSTOLIC BLOOD PRESSURE: 107 MMHG

## 2020-07-21 DIAGNOSIS — D64.9 ANEMIA, UNSPECIFIED TYPE: ICD-10-CM

## 2020-07-21 DIAGNOSIS — T17.908D ASPIRATION INTO RESPIRATORY TRACT, SUBSEQUENT ENCOUNTER: ICD-10-CM

## 2020-07-21 DIAGNOSIS — E87.6 HYPOKALEMIA: ICD-10-CM

## 2020-07-21 DIAGNOSIS — K85.80 OTHER ACUTE PANCREATITIS, UNSPECIFIED COMPLICATION STATUS: ICD-10-CM

## 2020-07-21 DIAGNOSIS — E03.9 ACQUIRED HYPOTHYROIDISM: Primary | ICD-10-CM

## 2020-07-21 DIAGNOSIS — Q43.1 HIRSCHSPRUNG'S DISEASE: ICD-10-CM

## 2020-07-21 DIAGNOSIS — G47.33 OBSTRUCTIVE SLEEP APNEA: ICD-10-CM

## 2020-07-21 DIAGNOSIS — R60.0 LEG EDEMA: ICD-10-CM

## 2020-07-21 PROBLEM — T17.908A ASPIRATION INTO RESPIRATORY TRACT: Status: ACTIVE | Noted: 2020-07-21

## 2020-07-21 LAB
ALBUMIN SERPL-MCNC: 4.2 G/DL (ref 3.5–5.2)
ALBUMIN/GLOB SERPL: 1.2 G/DL
ALP SERPL-CCNC: 55 U/L (ref 39–117)
ALT SERPL W P-5'-P-CCNC: 8 U/L (ref 1–41)
ANION GAP SERPL CALCULATED.3IONS-SCNC: 13 MMOL/L (ref 5–15)
AST SERPL-CCNC: 21 U/L (ref 1–40)
BASOPHILS # BLD AUTO: 0.03 10*3/MM3 (ref 0–0.2)
BASOPHILS NFR BLD AUTO: 0.6 % (ref 0–1.5)
BILIRUB SERPL-MCNC: 0.2 MG/DL (ref 0–1.2)
BUN SERPL-MCNC: 10 MG/DL (ref 6–20)
BUN/CREAT SERPL: 10.9 (ref 7–25)
CALCIUM SPEC-SCNC: 9.9 MG/DL (ref 8.6–10.5)
CHLORIDE SERPL-SCNC: 100 MMOL/L (ref 98–107)
CO2 SERPL-SCNC: 27 MMOL/L (ref 22–29)
CREAT SERPL-MCNC: 0.92 MG/DL (ref 0.76–1.27)
DEPRECATED RDW RBC AUTO: 51.7 FL (ref 37–54)
EOSINOPHIL # BLD AUTO: 0.14 10*3/MM3 (ref 0–0.4)
EOSINOPHIL NFR BLD AUTO: 2.6 % (ref 0.3–6.2)
ERYTHROCYTE [DISTWIDTH] IN BLOOD BY AUTOMATED COUNT: 14.2 % (ref 12.3–15.4)
GFR SERPL CREATININE-BSD FRML MDRD: 87 ML/MIN/1.73
GLOBULIN UR ELPH-MCNC: 3.4 GM/DL
GLUCOSE SERPL-MCNC: 75 MG/DL (ref 65–99)
HCT VFR BLD AUTO: 39.9 % (ref 37.5–51)
HGB BLD-MCNC: 13 G/DL (ref 13–17.7)
IMM GRANULOCYTES # BLD AUTO: 0.08 10*3/MM3 (ref 0–0.05)
IMM GRANULOCYTES NFR BLD AUTO: 1.5 % (ref 0–0.5)
LYMPHOCYTES # BLD AUTO: 1.93 10*3/MM3 (ref 0.7–3.1)
LYMPHOCYTES NFR BLD AUTO: 35.4 % (ref 19.6–45.3)
MCH RBC QN AUTO: 31.7 PG (ref 26.6–33)
MCHC RBC AUTO-ENTMCNC: 32.6 G/DL (ref 31.5–35.7)
MCV RBC AUTO: 97.3 FL (ref 79–97)
MONOCYTES # BLD AUTO: 0.8 10*3/MM3 (ref 0.1–0.9)
MONOCYTES NFR BLD AUTO: 14.7 % (ref 5–12)
NEUTROPHILS NFR BLD AUTO: 2.47 10*3/MM3 (ref 1.7–7)
NEUTROPHILS NFR BLD AUTO: 45.2 % (ref 42.7–76)
NRBC BLD AUTO-RTO: 0 /100 WBC (ref 0–0.2)
PLATELET # BLD AUTO: 275 10*3/MM3 (ref 140–450)
PMV BLD AUTO: 10.2 FL (ref 6–12)
POTASSIUM SERPL-SCNC: 4.4 MMOL/L (ref 3.5–5.2)
PROT SERPL-MCNC: 7.6 G/DL (ref 6–8.5)
RBC # BLD AUTO: 4.1 10*6/MM3 (ref 4.14–5.8)
SODIUM SERPL-SCNC: 140 MMOL/L (ref 136–145)
T3 SERPL-MCNC: 94.5 NG/DL (ref 80–200)
T4 FREE SERPL-MCNC: 1.02 NG/DL (ref 0.93–1.7)
TSH SERPL DL<=0.05 MIU/L-ACNC: 0.17 UIU/ML (ref 0.27–4.2)
WBC # BLD AUTO: 5.45 10*3/MM3 (ref 3.4–10.8)

## 2020-07-21 PROCEDURE — 80053 COMPREHEN METABOLIC PANEL: CPT | Performed by: FAMILY MEDICINE

## 2020-07-21 PROCEDURE — 99214 OFFICE O/P EST MOD 30 MIN: CPT | Performed by: FAMILY MEDICINE

## 2020-07-21 PROCEDURE — 85025 COMPLETE CBC W/AUTO DIFF WBC: CPT | Performed by: FAMILY MEDICINE

## 2020-07-21 PROCEDURE — 84443 ASSAY THYROID STIM HORMONE: CPT | Performed by: FAMILY MEDICINE

## 2020-07-21 PROCEDURE — 36415 COLL VENOUS BLD VENIPUNCTURE: CPT | Performed by: FAMILY MEDICINE

## 2020-07-21 PROCEDURE — 84439 ASSAY OF FREE THYROXINE: CPT | Performed by: FAMILY MEDICINE

## 2020-07-21 PROCEDURE — 84480 ASSAY TRIIODOTHYRONINE (T3): CPT | Performed by: FAMILY MEDICINE

## 2020-07-21 RX ORDER — LEVOTHYROXINE SODIUM 0.03 MG/1
TABLET ORAL
Qty: 30 TABLET | Refills: 11 | COMMUNITY
Start: 2020-07-21 | End: 2020-07-27 | Stop reason: SDUPTHER

## 2020-07-21 RX ORDER — AMMONIUM LACTATE 12 G/100G
LOTION TOPICAL DAILY
Qty: 225 G | Refills: 11 | Status: SHIPPED | OUTPATIENT
Start: 2020-07-21 | End: 2021-12-13

## 2020-07-21 NOTE — PROGRESS NOTES
Rooming Tab(CC,VS,Pt Hx,Fall Screen)  Chief Complaint   Patient presents with   • Pneumonia     The patient is here for a hospital f/u        Subjective Patient is here for his hospital follow-up where he was hospitalized 6 13-6 24 for abdominal pain which was felt to be secondary to his Hirschsprung's disease and chronic constipation.  He had abdominal swelling and distention, requiring hospitalization with IV fluids and enemas.  He also developed had pancreatitis which was treated with analgesics IV fluids and gut rest.  He also developed aspiration pneumonia with bronchoscopy showing Candida treated with Diflucan and Zosyn.  He was discharged to a rehab facility once he was discharged from the hospital and he was placed on a puréed diet with nectar thickened liquids.  Prior to his hospitalization he was on dime size food and no fluid restrictions.  He now has home health and they are trying to get a speech therapist to do a swallow study so he can go back to his dime sized food with no thickening in his liquids.  They did see gastroenterology and have decided no further treatment other than what he is doing for his Hirschsprung's disease.  He is on Linzess, milk of magnesia, Motegrity, true Levi, and MiraLAX.  These are all being managed by GI.  The patient has had no further coughing from his aspiration pneumonia and feels like he is breathing better.  Likely he had bowel obstruction which increased his risk of aspiration at that time.  Patient has hypothyroidism and the hospital recently increased his Synthroid from 100 mcg 212 mcg.  The patient has a history of severe sleep apnea and is not using his BiPAP and he has chronic edema in his legs and is taking Lasix 60 mg daily.  He continues with edema and the nurse practitioner was wondering if she should see a cardiologist despite having a normal echo.  I do not think cardiologist at this point is necessary.  He prior to going into the hospital was sleeping  and lounge chair with his feet hanging down most of the time and this along with his untreated sleep apnea is enough to cause significant edema      I have reviewed and updated his medications, medical history and problem list during today's office visit.     Patient Care Team:  Charlie Wing MD as PCP - General  Charlie Wing MD as PCP - Family Medicine  To Barber DPM as PCP - Claims Attributed    Problem List Tab  Medications Tab  Synopsis Tab  Chart Review Tab  Care Everywhere Tab  Immunizations Tab  Patient History Tab    Social History     Tobacco Use   • Smoking status: Never Smoker   • Smokeless tobacco: Never Used   Substance Use Topics   • Alcohol use: No     Frequency: Never       Review of Systems   Reason unable to perform ROS: Intellectual disability.       Objective     Rooming Tab(CC,VS,Pt Hx,Fall Screen)  /74   Pulse 102   Temp 97.5 °F (36.4 °C)   Resp 18   Wt 89.2 kg (196 lb 9.6 oz)   BMI 34.83 kg/m²     Body mass index is 34.83 kg/m².    Physical Exam   Constitutional: He is oriented to person, place, and time. No distress.   Intellectually disabled but pleasant male who follows very simple directions and is no acute distress.   HENT:   Head: Normocephalic and atraumatic.   Nose: Nose normal.   Mouth/Throat: Oropharynx is clear and moist.   Eyes: Pupils are equal, round, and reactive to light. Conjunctivae, EOM and lids are normal.   Neck: Trachea normal. No JVD present. Carotid bruit is not present. No thyroid mass and no thyromegaly present.   No carotid bruits  His neck is somewhat stiff and seems to be more leaning towards the right.   Cardiovascular: Normal rate, regular rhythm, normal heart sounds and intact distal pulses.   Pulmonary/Chest: Effort normal and breath sounds normal.   Abdominal:   Distended, nontender, no mass, bowel sounds are present and hyperactive   Musculoskeletal:   No c/c/e  He has significant scoliosis.  He has some chronic edema in his  legs but he certainly has no ulcer on his right leg.   Neurological: He is alert and oriented to person, place, and time. No cranial nerve deficit.   Skin: Skin is warm and dry.   Psychiatric: He has a normal mood and affect. His speech is normal and behavior is normal. He is attentive.   Nursing note and vitals reviewed.       Statin Choice Calculator  Data Reviewed:    Xr Chest 2 View    Result Date: 7/6/2020  Impression: 1. Improved aeration of the lung bases with resolved atelectasis or infection. 2. Persistent abnormal gaseous distention below the diaphragm likely related to an enlarged distended gas-filled colon.  Electronically Signed By-Rashawn Montgomery On:7/6/2020 2:35 PM This report was finalized on [id number] by  Rashawn Montgomery, .    Xr Chest 2 View    Result Date: 6/30/2020  Impression: 1.Poor inspiration. There are increased markings in the lower lobes that may relate to atelectasis or possibly could reflect underlying pneumonia. 2.Marked gaseous distention of the colon. Correlation with patient's GI history recommended.  Electronically Signed By-Nicholas Romero On:6/30/2020 10:48 AM This report was finalized on [id number] by  Nicholas Romero, .    Xr Abdomen 1 View    Result Date: 7/1/2020  Impression: Severe colonic dilation with gas in keeping with the provided history of [place] syndrome. The colonic dilation has increased compared to 06/20/2020.  Electronically Signed By-Dr. Tasha Hearn MD On:7/1/2020 4:11 PM This report was finalized on [id number] by Dr. Tasha Hearn MD.    Fl Video Swallow With Speech Single Contrast    Result Date: 6/22/2020  Impression: 1.No penetration or aspiration identified with any consistency. 2.Please refer to speech pathologist report for further details.  Electronically Signed By-DR. Clifford Bhakta MD On:6/22/2020 12:41 PM This report was finalized on [id number] by DR. Clifford Bhakta MD.    Xr Abdomen Kub    Result Date: 7/6/2020  Impression: 1. Fecal  impaction within the rectum with large stool burden. 2. Persistent markedly abnormal gas-distended colon measuring up to 15 cm within the descending colon.  Electronically Signed By-Rashawn Montgomery On:7/6/2020 2:36 PM This report was finalized on [id number] by  Rashawn Montgomery, .    Xr Abdomen Kub    Result Date: 7/2/2020  Impression: Severe diffuse colonic dilation does not appear significantly changed compared to 07/01/2020.  Electronically Signed By-Dr. Tasha Hearn MD On:7/2/2020 11:12 AM This report was finalized on [id number] by Dr. Tasha Hearn MD.            Lab Results   Component Value Date    BUN 10 07/21/2020    CREATININE 0.92 07/21/2020    EGFRIFNONA 87 07/21/2020     07/21/2020    K 4.4 07/21/2020     07/21/2020    CALCIUM 9.9 07/21/2020    ALBUMIN 4.20 07/21/2020    BILITOT 0.2 07/21/2020    ALKPHOS 55 07/21/2020    AST 21 07/21/2020    ALT 8 07/21/2020    TRIG 65 06/14/2020    HDL 23 (L) 06/14/2020    VLDL 13 06/14/2020    LDL 42 06/14/2020    LDLHDL 1.83 06/14/2020    WBC 5.45 07/21/2020    RBC 4.10 (L) 07/21/2020    HCT 39.9 07/21/2020    MCV 97.3 (H) 07/21/2020    MCH 31.7 07/21/2020    TSH 0.175 (L) 07/21/2020    FREET4 1.02 07/21/2020      Assessment/Plan   Order Review Tab  Health Maintenance Tab  Patient Plan/Order Tab  Diagnoses and all orders for this visit:    1. Acquired hypothyroidism (Primary)  Assessment & Plan:  We will recheck his TFTs.  For now we will continue on his current medication    Orders:  -     TSH  -     T4, Free  -     T3    2. Anemia, unspecified type  -     CBC & Differential  -     CBC Auto Differential    3. Hypokalemia  Assessment & Plan:  Repeat BMP today to evaluate his potassium    Orders:  -     Comprehensive Metabolic Panel    4. Leg edema  Assessment & Plan:  Continue Lasix 60 mg daily.  I agree with a hospital bed so that he is not let his legs hanging down all the time.  I do not think he needs cardiology consult    Orders:  -      Comprehensive Metabolic Panel    5. Other acute pancreatitis, unspecified complication status  Assessment & Plan:  Seems to have resolved.      6. Aspiration into respiratory tract, subsequent encounter  Assessment & Plan:  He will need another swallow study, would be beneficial for him and to the group home if he could go back to dime size food without any type of thickening      7. Obstructive sleep apnea  Assessment & Plan:  Obviously unable to treat and he will have issues over time because of this.      8. Hirschsprung's disease  Assessment & Plan:  On maximal treatment from GI.  I suspect this will progressive to the point where he will need a colectomy      Other orders  -     levothyroxine (SYNTHROID, LEVOTHROID) 25 MCG tablet; 1/2 po q d  Dispense: 30 tablet; Refill: 11  -     ammonium lactate (AmLactin) 12 % lotion; Apply  topically to the appropriate area as directed Daily.  Dispense: 225 g; Refill: 11      Wrapup Tab  Return in about 3 months (around 10/21/2020) for Recheck.         Current outpatient and discharge medications have been reconciled for the patient.  Reviewed by: Charlie Wing MD

## 2020-07-22 NOTE — ASSESSMENT & PLAN NOTE
Continue Lasix 60 mg daily.  I agree with a hospital bed so that he is not let his legs hanging down all the time.  I do not think he needs cardiology consult

## 2020-07-22 NOTE — ASSESSMENT & PLAN NOTE
On maximal treatment from GI.  I suspect this will progressive to the point where he will need a colectomy

## 2020-07-22 NOTE — ASSESSMENT & PLAN NOTE
He will need another swallow study, would be beneficial for him and to the group home if he could go back to dime size food without any type of thickening

## 2020-07-24 RX ORDER — LINACLOTIDE 145 UG/1
CAPSULE, GELATIN COATED ORAL
Qty: 30 CAPSULE | Refills: 0 | OUTPATIENT
Start: 2020-07-24

## 2020-07-27 RX ORDER — LEVOTHYROXINE SODIUM 0.03 MG/1
TABLET ORAL
Qty: 30 TABLET | Refills: 11 | Status: SHIPPED | OUTPATIENT
Start: 2020-07-27 | End: 2020-08-21

## 2020-08-14 ENCOUNTER — TELEPHONE (OUTPATIENT)
Dept: FAMILY MEDICINE CLINIC | Facility: CLINIC | Age: 50
End: 2020-08-14

## 2020-08-14 ENCOUNTER — DOCUMENTATION (OUTPATIENT)
Dept: PHYSICAL THERAPY | Facility: CLINIC | Age: 50
End: 2020-08-14

## 2020-08-14 DIAGNOSIS — T17.908D ASPIRATION INTO RESPIRATORY TRACT, SUBSEQUENT ENCOUNTER: Primary | ICD-10-CM

## 2020-08-14 NOTE — TELEPHONE ENCOUNTER
Siria, speech therapist with [facility], would like you to put an order in for [facility] for a swallow study for patient.  They are trying to upgrade his feedings.

## 2020-08-20 ENCOUNTER — TRANSCRIBE ORDERS (OUTPATIENT)
Dept: SURGERY | Facility: CLINIC | Age: 50
End: 2020-08-20

## 2020-08-20 DIAGNOSIS — Z01.818 OTHER SPECIFIED PRE-OPERATIVE EXAMINATION: Primary | ICD-10-CM

## 2020-08-21 RX ORDER — LIOTHYRONINE SODIUM 5 UG/1
5 TABLET ORAL DAILY
Qty: 30 TABLET | Refills: 3 | Status: SHIPPED | OUTPATIENT
Start: 2020-08-21 | End: 2020-12-11

## 2020-08-21 RX ORDER — LEVOTHYROXINE SODIUM 0.03 MG/1
TABLET ORAL
Qty: 30 TABLET | Refills: 10 | Status: SHIPPED | OUTPATIENT
Start: 2020-08-21 | End: 2021-01-12

## 2020-08-25 ENCOUNTER — LAB (OUTPATIENT)
Dept: LAB | Facility: HOSPITAL | Age: 50
End: 2020-08-25

## 2020-08-25 DIAGNOSIS — Z01.818 OTHER SPECIFIED PRE-OPERATIVE EXAMINATION: ICD-10-CM

## 2020-08-25 PROCEDURE — C9803 HOPD COVID-19 SPEC COLLECT: HCPCS

## 2020-08-25 PROCEDURE — U0004 COV-19 TEST NON-CDC HGH THRU: HCPCS

## 2020-08-25 PROCEDURE — U0002 COVID-19 LAB TEST NON-CDC: HCPCS

## 2020-08-26 LAB
REF LAB TEST METHOD: NORMAL
SARS-COV-2 RNA RESP QL NAA+PROBE: NOT DETECTED

## 2020-08-27 ENCOUNTER — HOSPITAL ENCOUNTER (OUTPATIENT)
Dept: GENERAL RADIOLOGY | Facility: HOSPITAL | Age: 50
Discharge: HOME | End: 2020-08-27
Admitting: FAMILY MEDICINE

## 2020-08-27 DIAGNOSIS — T17.908D ASPIRATION INTO RESPIRATORY TRACT, SUBSEQUENT ENCOUNTER: ICD-10-CM

## 2020-08-27 PROCEDURE — 92611 MOTION FLUOROSCOPY/SWALLOW: CPT

## 2020-08-27 PROCEDURE — 74230 X-RAY XM SWLNG FUNCJ C+: CPT

## 2020-08-27 PROCEDURE — 63710000001 BARIUM SULFATE 105 % SUSPENSION: Performed by: FAMILY MEDICINE

## 2020-08-27 PROCEDURE — A9270 NON-COVERED ITEM OR SERVICE: HCPCS | Performed by: FAMILY MEDICINE

## 2020-08-27 PROCEDURE — 63710000001 BARIUM SULFATE 40 % SUSPENSION: Performed by: FAMILY MEDICINE

## 2020-08-27 RX ADMIN — BARIUM SULFATE 50 ML: 400 SUSPENSION ORAL at 09:42

## 2020-08-27 RX ADMIN — BARIUM SULFATE 1500 ML: 1.05 SUSPENSION ORAL; RECTAL at 09:42

## 2020-08-27 NOTE — MBS/VFSS/FEES
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   • Disorder of foot   • Encounter for general adult medical examination without abnormal findings   • Gastroesophageal reflux disease   • Esophageal stricture   • Hay fever   • Hirschsprung's disease   • Hyperlipidemia   • Hypothyroidism   • Lung mass   • Mediastinal lymphadenopathy   • Moderate intellectual disability   • Obstructive sleep apnea   • Proteinuria   • Seizure disorder (CMS/[facility])   • Unequal leg length   • Full incontinence of feces   • Urinary incontinence   • Leg edema   • Elevated PSA   • Rosacea   • Medicare annual wellness visit, subsequent   • Pneumonia due to infectious organism   • Abdominal pain   • Hypokalemia   • Pancreatitis   • Aspiration into respiratory tract        Visit Dx:    ICD-10-CM ICD-9-CM   1. Aspiration into respiratory tract, subsequent encounter T17.908D V58.89     934.9       SLP Adult Swallow Evaluation     Row Name 20 1500       Rehab Evaluation    Document Type  evaluation  -MM    Subjective Information  no complaints  -MM    Patient Observations  alert;cooperative;agree to therapy  -MM    Patient Effort  good  -MM    Comment  PPE: gloves, mask, faceshield  -MM       General Information    Patient Profile Reviewed  yes  -MM    Pertinent History Of Current Problem  Patient previously seen by our department during an hospitlization wt VFSS completed on  revealing deep penetration of thin liquids, increased oral transit for all consistencies with premature spillage up to 10 seconds.  Patient was placed on a puree and NTL by spoon only diet.  Patient discharged to  rehab and then back to group home.  Patient has been working with a speech therapist to address swallow deficits.  VFSS requested to determine if patient can return to his previous diet of dime size solids and thin liquids.  Patients  accompanied him to the appointment.  -MM    Current Method of Nutrition  pureed;nectar/syrup-thick liquids  -MM    Prior Level of Function-Swallowing  mechanical soft textures;thin liquids  -MM    Plans/Goals Discussed with  patient;other (see comments)   -MM    Barriers to Rehab  none identified  -MM       Oral Motor and Function    Dentition Assessment  natural, present and adequate  -MM    Secretion Management  WNL/WFL  -MM    Mucosal Quality  moist, healthy  -MM    Volitional Swallow  WFL  -MM    Volitional Cough  WFL  -MM       General Eating/Swallowing Observations    Respiratory Support Currently in Use  room air  -MM    Eating/Swallowing Skills  self-fed  -MM    Positioning During Eating  upright 90 degree;upright in chair  -MM       MBS/VFSS    Utensils Used  spoon;cup  -MM    Consistencies Trialed  soft textures;chopped;pureed;thin liquids;nectar/syrup-thick liquids  -MM       MBS/VFSS Interpretation    Oral Prep Phase  impaired oral phase of swallowing  -MM    Oral Transit Phase  impaired  -MM    Oral Residue  WFL  -MM    VFSS Summary  THIN:  Patient given two trials of thin barium by cup to end the evaluation.  Patient takes large sips requiring two consecutive swallows to fully clear the material from oral cavity.  Patient exhibits spillage of part of bolus to pyriform sinus.  Transient penetration during the swallow occurs for both sips however it fully clears.  NTL:  Patient given NTL by cup 3x.  Premature spillage of liquid reaches the vallecuale with tongue rocking noted.  Swallow delay up to 5 seconds evident.  No penetration, aspiration or pharyngeal residue noted.  PUREE:  Patient given two trials of applesauce.  Patient exhibits  tongue pumping to propel the bolus.  Entire bolus reaches the vallecuale with a 5 second swallow delay.  PEACHES:  Patient given two trials of peaches.  Anterior munching noted with spillage of juice and part of bolus to pyriform sinus.  Once swallow is initiated there is no penetration, aspiration or pharyngeal residue.  FIG MARX:  Patient given a single bite of fig marx.  Slow but functional mastication noted.  Entire bolus reaches vallecuale prior to swallow initiation.  Minimal pharyngeal residue remains post swallow.  ESOPHAGUS:  Patients esophagus scanned at end of the evaluation to reveal adequate clearance.  -MM       Oral Preparatory Phase    Oral Preparatory Phase  prolonged manipulation  -MM    Prolonged Manipulation  regular textures  -MM    Oral Preparatory Phase, Comment  Prolonged mastication for fig marx trial with some difficulty forming a cohesive bolus  -MM       Oral Transit Phase    Impaired Oral Transit Phase  tongue pumping;premature spillage of liquids into pharynx  -MM    Tongue Pumping  thin liquids;nectar-thick liquids;pudding/puree;mechanical soft  -MM    Premature Spillage of Liquids into Pharynx  thin liquids;nectar-thick liquids;mixed consistency  -MM    Oral Transit Phase, Comment  Patient exhibits tongue pumping/rocking during oral phase for all consistencies.  This increases swallow initiation by 5 seconds.  Patient exhibtis premature spillage of thin liquid sto pyriform sinus, nectar thick liquids to vallecuale, juice from peaches to pyriform sinus.  -MM       Initiation of Pharyngeal Swallow    Initiation of Pharyngeal Swallow  bolus in valleculae;bolus in pyriform sinuses  -MM    Pharyngeal Phase  impaired pharyngeal phase of swallowing  -MM    Penetration During the Swallow  thin liquids  -MM    Response to Penetration  transient  -MM    Rosenbek's Scale  thin:;2--->level 2;nectar:;pudding/puree:;mixed:;regular textures:;1--->level 1  -MM       Esophageal Phase     Esophageal Phase  no impairments  -MM       Clinical Impression    SLP Swallowing Diagnosis  mild;oral dysfunction;pharyngeal dysfunction  -MM    Functional Impact  no impact on function  -MM    Swallow Criteria for Skilled Therapeutic Interventions Met  demonstrates skilled criteria  -MM       Recommendations    Therapy Frequency (Swallow)  evaluation only  -MM    Predicted Duration Therapy Intervention (Days)  until discharge  -MM    SLP Diet Recommendation  chopped;thin liquids  -MM    Recommended Precautions and Strategies  upright posture during/after eating;small bites of food and sips of liquid  -MM    SLP Rec. for Method of Medication Administration  as tolerated  -MM      User Key  (r) = Recorded By, (t) = Taken By, (c) = Cosigned By    Initials Name Provider Type    Lizzy Monte SLP Speech and Language Pathologist                                          Time Calculation:        Therapy Charges for Today     Code Description Service Date Service Provider Modifiers Qty    [id number] HC ST MOTION FLUORO EVAL SWALLOW 5 8/27/2020 Lizzy Avilez SLP GN 1                   EUNICE Angel  8/27/2020

## 2020-09-18 RX ORDER — LEVOTHYROXINE SODIUM 0.1 MG/1
TABLET ORAL
Qty: 30 TABLET | Refills: 10 | Status: SHIPPED | OUTPATIENT
Start: 2020-09-18 | End: 2021-01-12

## 2020-10-21 ENCOUNTER — OFFICE VISIT (OUTPATIENT)
Dept: FAMILY MEDICINE CLINIC | Facility: CLINIC | Age: 50
End: 2020-10-21

## 2020-10-21 VITALS
HEART RATE: 108 BPM | DIASTOLIC BLOOD PRESSURE: 86 MMHG | SYSTOLIC BLOOD PRESSURE: 122 MMHG | WEIGHT: 204 LBS | BODY MASS INDEX: 36.15 KG/M2 | TEMPERATURE: 97.1 F | RESPIRATION RATE: 16 BRPM

## 2020-10-21 DIAGNOSIS — G47.33 OBSTRUCTIVE SLEEP APNEA: ICD-10-CM

## 2020-10-21 DIAGNOSIS — L71.9 ROSACEA: Primary | ICD-10-CM

## 2020-10-21 DIAGNOSIS — M41.25 OTHER IDIOPATHIC SCOLIOSIS, THORACOLUMBAR REGION: ICD-10-CM

## 2020-10-21 DIAGNOSIS — R27.0 ATAXIA: ICD-10-CM

## 2020-10-21 DIAGNOSIS — R06.09 DOE (DYSPNEA ON EXERTION): ICD-10-CM

## 2020-10-21 DIAGNOSIS — R06.09 DYSPNEA ON EXERTION: ICD-10-CM

## 2020-10-21 PROCEDURE — 99214 OFFICE O/P EST MOD 30 MIN: CPT | Performed by: FAMILY MEDICINE

## 2020-10-21 RX ORDER — METRONIDAZOLE 10 MG/G
GEL TOPICAL DAILY
Qty: 60 G | Refills: 1 | Status: SHIPPED | OUTPATIENT
Start: 2020-10-21 | End: 2020-12-30

## 2020-10-21 NOTE — PROGRESS NOTES
Rooming Tab(CC,VS,Pt Hx,Fall Screen)  Chief Complaint   Patient presents with   • Hypothyroidism   • Hyperlipidemia       Subjective 49-year-old with intellectual and a bit disability here for follow-up of his multiple medical problems.  He continues with leg edema and he would not keep his legs up, he has a hospital bed which helps some but then he wakes up when he lets his legs hanging over.  His caretaker states that he is short of breath when he walks now any gets short of breath with minimal exertion.  The patient cannot really give much of a history.  She states there is no complaints of chest pain.  The patient does have sleep apnea and he does not treat this.  He has had it for years and likely this is affected his respiratory system.  The patient is constantly leaning to the right.  He sits to the right and his head is cocked to the right and he has scoliosis which looks like his spine is curved to the right.  He is off balance because of this and he has not tried physical therapy to see if it helps it.  Patient has a rash on his face that is getting worse.  It gets scaly and red and they are using Cetaphil lotion and is not helping.    I have reviewed and updated his medications, medical history and problem list during today's office visit.     Patient Care Team:  Charlie Wing MD as PCP - General  Charlie Wing MD as PCP - Family Medicine    Problem List Tab  Medications Tab  Synopsis Tab  Chart Review Tab  Care Everywhere Tab  Immunizations Tab  Patient History Tab    Social History     Tobacco Use   • Smoking status: Never Smoker   • Smokeless tobacco: Never Used   Substance Use Topics   • Alcohol use: No     Frequency: Never       Review of Systems   Unable to perform ROS: Patient nonverbal       Objective     Rooming Tab(CC,VS,Pt Hx,Fall Screen)  /86   Pulse 108   Temp 97.1 °F (36.2 °C)   Resp 16   Wt 92.5 kg (204 lb)   BMI 36.15 kg/m²     Body mass index is 36.15 kg/m².    Physical  Exam  Vitals signs and nursing note reviewed.   Constitutional:       General: He is not in acute distress.     Appearance: He is well-developed.      Comments: Patient is intellectually disabled.  He is falling asleep while were sitting there in the office.  He obviously has severe sleep apnea that is not treating.  He does follow very simple directions and is very pleasant.   HENT:      Head: Normocephalic and atraumatic.      Right Ear: Tympanic membrane normal.      Left Ear: Tympanic membrane and ear canal normal.      Nose: Nose normal.      Mouth/Throat:      Mouth: Mucous membranes are moist.      Pharynx: Oropharynx is clear.   Eyes:      General: Lids are normal.      Extraocular Movements: Extraocular movements intact.      Conjunctiva/sclera: Conjunctivae normal.      Pupils: Pupils are equal, round, and reactive to light.   Neck:      Thyroid: No thyroid mass or thyromegaly.      Vascular: No carotid bruit or JVD.      Trachea: Trachea normal.      Comments: No carotid bruits  He keeps his neck tilted to the right as well as his whole body.  I can straighten it up but then he just goes right back to the right side.  Cardiovascular:      Rate and Rhythm: Normal rate and regular rhythm.      Pulses: Normal pulses.      Heart sounds: Normal heart sounds.   Pulmonary:      Effort: Pulmonary effort is normal. No respiratory distress.      Breath sounds: Normal breath sounds. No wheezing or rales.   Abdominal:      Comments: Protuberant, distended, nontender.  Bowel sounds present   Musculoskeletal:      Comments: He has rather severe scoliosis any stands up he leans to the right.  He is off balance as well.  He does have 1+ edema in his lower extremities   Skin:     General: Skin is warm and dry.      Comments: He has erythematous papules on his perinasal region with some scaling as well   Neurological:      Mental Status: He is alert.      Cranial Nerves: No cranial nerve deficit.      Comments: Ataxic    Psychiatric:         Attention and Perception: He is attentive.         Speech: Speech normal.      Comments: The patient is sitting up on the chair and is falling asleep as we are doing the exam and talking.  He will wake up and follow simple directions.  He has intellectual disability that is rather severe          Statin Choice Calculator  Data Reviewed:                   Assessment/Plan   Order Review Tab  Health Maintenance Tab  Patient Plan/Order Tab  Diagnoses and all orders for this visit:    1. Rosacea (Primary)  Assessment & Plan:  Discontinue Cetaphil and start MetroGel twice daily      2. Ataxia  Assessment & Plan:  He needs physical therapy to try to help him with his gait    Orders:  -     Ambulatory Referral to Physical Therapy    3. Other idiopathic scoliosis, thoracolumbar region  Assessment & Plan:  We will go ahead and order x-rays and have physical therapy work with him.    Orders:  -     XR Scoliosis Complete Including Supine & Erect; Future  -     Ambulatory Referral to Physical Therapy    4. PUENTES (dyspnea on exertion)  -     XR Chest PA & Lateral; Future  -     Walking Oximetry; Future    5. Obstructive sleep apnea  Assessment & Plan:  Unfortunately because of his intellectual disability we are not able to treat this.      6. Dyspnea on exertion  Assessment & Plan:  We did a 6-minute walk and it was normal.  I went to check a chest x-ray.  He had an echocardiogram in February which did not see the right side of his heart very well.  He had a normal ejection fraction.  I am concerned that he has pulmonary hypertension.  We may consider pulmonary consult in the future      Other orders  -     metroNIDAZOLE (Metrogel) 1 % gel; Apply  topically to the appropriate area as directed Daily.  Dispense: 60 g; Refill: 1      Wrapup Tab  Return in about 4 weeks (around 11/18/2020) for Recheck.

## 2020-10-22 PROBLEM — M41.25 OTHER IDIOPATHIC SCOLIOSIS, THORACOLUMBAR REGION: Status: ACTIVE | Noted: 2020-10-22

## 2020-10-22 NOTE — ASSESSMENT & PLAN NOTE
We did a 6-minute walk and it was normal.  I went to check a chest x-ray.  He had an echocardiogram in February which did not see the right side of his heart very well.  He had a normal ejection fraction.  I am concerned that he has pulmonary hypertension.  We may consider pulmonary consult in the future

## 2020-10-28 DIAGNOSIS — R06.02 SOB (SHORTNESS OF BREATH): ICD-10-CM

## 2020-10-28 DIAGNOSIS — M41.25 OTHER IDIOPATHIC SCOLIOSIS, THORACOLUMBAR REGION: Primary | ICD-10-CM

## 2020-10-29 DIAGNOSIS — R06.09 DOE (DYSPNEA ON EXERTION): ICD-10-CM

## 2020-11-09 ENCOUNTER — TRANSCRIBE ORDERS (OUTPATIENT)
Dept: LAB | Facility: HOSPITAL | Age: 50
End: 2020-11-09

## 2020-11-09 ENCOUNTER — LAB (OUTPATIENT)
Dept: LAB | Facility: HOSPITAL | Age: 50
End: 2020-11-09

## 2020-11-09 DIAGNOSIS — F11.20 OPIOID TYPE DEPENDENCE, CONTINUOUS (HCC): Primary | ICD-10-CM

## 2020-11-09 DIAGNOSIS — F11.20 OPIOID TYPE DEPENDENCE, CONTINUOUS (HCC): ICD-10-CM

## 2020-11-09 LAB
NT-PROBNP SERPL-MCNC: 89.2 PG/ML (ref 0–900)
VALPROATE SERPL-MCNC: 87 MCG/ML (ref 50–125)

## 2020-11-09 PROCEDURE — 83880 ASSAY OF NATRIURETIC PEPTIDE: CPT | Performed by: FAMILY MEDICINE

## 2020-11-09 PROCEDURE — 36415 COLL VENOUS BLD VENIPUNCTURE: CPT | Performed by: FAMILY MEDICINE

## 2020-11-09 PROCEDURE — 80164 ASSAY DIPROPYLACETIC ACD TOT: CPT

## 2020-11-11 ENCOUNTER — OFFICE (AMBULATORY)
Dept: URBAN - METROPOLITAN AREA CLINIC 64 | Facility: CLINIC | Age: 50
End: 2020-11-11

## 2020-11-11 VITALS
DIASTOLIC BLOOD PRESSURE: 64 MMHG | HEART RATE: 88 BPM | SYSTOLIC BLOOD PRESSURE: 131 MMHG | HEIGHT: 65 IN | WEIGHT: 194 LBS

## 2020-11-11 DIAGNOSIS — K59.39 OTHER MEGACOLON: ICD-10-CM

## 2020-11-11 DIAGNOSIS — R14.0 ABDOMINAL DISTENSION (GASEOUS): ICD-10-CM

## 2020-11-11 DIAGNOSIS — K59.00 CONSTIPATION, UNSPECIFIED: ICD-10-CM

## 2020-11-11 PROCEDURE — 99214 OFFICE O/P EST MOD 30 MIN: CPT | Performed by: INTERNAL MEDICINE

## 2020-11-13 ENCOUNTER — TELEPHONE (OUTPATIENT)
Dept: NEUROLOGY | Facility: CLINIC | Age: 50
End: 2020-11-13

## 2020-11-13 NOTE — TELEPHONE ENCOUNTER
PATIENTS CAREGIVER CALLED IN AND STATED PT IS HAVING DEMENTIA LIKE BEHAVIOR AND DOESN'T KNOW WHATS GOING ON AROUND HIM AND OTHER TIMES HE HIS FINE. SHE STATES HE HAS BEEN CONFUSED AND AGITATED. THIS STARTED HAPPENING A COUPLE WEEKS AGO AND CAREGIVER SAYS ITS AT LEAST ONCE A DAY OR EVERY OTHER DAY.    PLEASE REACH OUT TO CAREGIVER TO DISCUSS IF WE CAN GET THEM SCHEDULED SOONER. I ALSO GOT THEM ON THE CALL LIST.     PH: [phone]

## 2020-11-17 ENCOUNTER — OFFICE VISIT (OUTPATIENT)
Dept: NEUROLOGY | Facility: CLINIC | Age: 50
End: 2020-11-17

## 2020-11-17 VITALS
DIASTOLIC BLOOD PRESSURE: 80 MMHG | BODY MASS INDEX: 33.66 KG/M2 | HEART RATE: 92 BPM | TEMPERATURE: 96.8 F | SYSTOLIC BLOOD PRESSURE: 131 MMHG | WEIGHT: 190 LBS | HEIGHT: 63 IN

## 2020-11-17 DIAGNOSIS — G47.33 OBSTRUCTIVE SLEEP APNEA: ICD-10-CM

## 2020-11-17 DIAGNOSIS — G40.909 SEIZURE DISORDER (HCC): ICD-10-CM

## 2020-11-17 DIAGNOSIS — G25.1 TREMOR DUE TO MULTIPLE DRUGS: ICD-10-CM

## 2020-11-17 DIAGNOSIS — R41.0 CONFUSION: Primary | ICD-10-CM

## 2020-11-17 PROCEDURE — 99214 OFFICE O/P EST MOD 30 MIN: CPT | Performed by: PSYCHIATRY & NEUROLOGY

## 2020-11-17 RX ORDER — CLOTRIMAZOLE 1 %
CREAM (GRAM) TOPICAL
COMMUNITY
Start: 2020-11-11

## 2020-11-17 RX ORDER — POLYETHYLENE GLYCOL 3350 17 G/17G
POWDER, FOR SOLUTION ORAL
COMMUNITY
Start: 2020-11-02 | End: 2020-12-30 | Stop reason: SDUPTHER

## 2020-11-17 RX ORDER — BENZTROPINE MESYLATE 0.5 MG/1
0.5 TABLET ORAL 2 TIMES DAILY
COMMUNITY
Start: 2020-10-08 | End: 2020-11-17

## 2020-11-17 RX ORDER — BENZTROPINE MESYLATE 1 MG/1
TABLET ORAL
COMMUNITY
Start: 2020-10-21 | End: 2020-11-17

## 2020-11-17 NOTE — PROGRESS NOTES
Subjective: Seizure disorder, SHARON    Patient ID: Charlie Wells is a 49 y.o. male.    History of Present Illness, new problem of confusion,  patient is here today with his caregiver.     New onset for a couple of weeks    Has days of confusion with agitation and not aware of what's going on around him.     Patient goes in and out of strange behaviors not sure if its related to medication was dc benztropine and Clonopin felt that just knocked him out.     Patient spells are starring off and confused more lethargic than normal he's lost awareness forgets what day it is and who his caregiver is that's been with him for 6 years.     He has mild tremor when eating. The benztropine had been prescribed for the tremor.    Patient currently lives in a group homes. Patient sliding off the bed onto the floor and having bowels and urinary accidents also having aggression and agitation.     Seizure disorder, treated with Depakote 500 mg 1 po bid. No seizures     SHARON, unable to tolerate PAP machine, currently sleeps in a chair.   SHARON,  ahi 58 on npsg july 2015, on bipap auto 12-20, PS 4-6; not using BIPAP as of  (11/23/15)    The following portions of the patient's history were reviewed and updated as appropriate: allergies, current medications, past family history, past medical history, past social history, past surgical history and problem list.    History reviewed. No pertinent family history.    Past Medical History:   Diagnosis Date   • Acute pancreatitis    • Allergic rhinitis    • Aspiration pneumonia (CMS/HCC)    • Ataxia    • Chronic constipation    • Chronic edema    • Depression    • Esophageal stricture    • Fecal incontinence    • GERD (gastroesophageal reflux disease)    • GI bleed    • Hirschsprung's disease    • Hyperlipidemia    • Hypokalemia    • Hypothyroidism    • Iron deficiency anemia    • Leg length discrepancy    • Mediastinal lymphadenopathy    • Megacolon    • Mildly mentally retarded    • Positive  PPD    • Pulmonary hypertension (CMS/HCC)    • Scoliosis    • Sleep apnea     Not very compliant with CPAP   • Urinary incontinence    • Urinary tract infection        Social History     Socioeconomic History   • Marital status: Single     Spouse name: Not on file   • Number of children: Not on file   • Years of education: Not on file   • Highest education level: Not on file   Tobacco Use   • Smoking status: Never Smoker   • Smokeless tobacco: Never Used   Substance and Sexual Activity   • Alcohol use: No     Frequency: Never   • Drug use: No   • Sexual activity: Never   Social History Narrative    Patient lives in a group home         Current Outpatient Medications:   •  ammonium lactate (AmLactin) 12 % lotion, Apply  topically to the appropriate area as directed Daily., Disp: 225 g, Rfl: 11  •  busPIRone (BUSPAR) 30 MG tablet, Take 1 tablet by mouth 2 (Two) Times a Day., Disp: 60 tablet, Rfl: 11  •  clotrimazole (LOTRIMIN) 1 % cream, , Disp: , Rfl:   •  divalproex (DEPAKOTE) 500 MG DR tablet, Take 1 tablet by mouth 2 (Two) Times a Day., Disp: 60 tablet, Rfl: 11  •  estradiol (ESTRACE) 1 MG tablet, Take 1 mg by mouth Daily., Disp: , Rfl:   •  furosemide (LASIX) 40 MG tablet, Take 60 mg by mouth Daily., Disp: , Rfl:   •  GaviLAX 17 GM/SCOOP powder, DISSOLVE 2 SCOOPS IN 8OZ WATER EVERY MORNING, Disp: , Rfl:   •  GNP ALLERGY 4 MG tablet, Take 4 mg by mouth Every 6 (Six) Hours As Needed., Disp: , Rfl:   •  guaiFENesin (MUCINEX) 600 MG 12 hr tablet, Take 2 tablets by mouth Every 12 (Twelve) Hours., Disp: 30 tablet, Rfl: 0  •  hydrocortisone 2.5 % cream, Apply 1 application topically to the appropriate area as directed 2 (Two) Times a Day As Needed., Disp: , Rfl:   •  levothyroxine (SYNTHROID, LEVOTHROID) 100 MCG tablet, TAKE ONE TABLET BY MOUTH EVERY DAY ( DISPILL), Disp: 30 tablet, Rfl: 10  •  levothyroxine (SYNTHROID, LEVOTHROID) 25 MCG tablet, TAKE 1/2 TABLET BY MOUTH EVERY DAY, Disp: 30 tablet, Rfl: 10  •   liothyronine (CYTOMEL) 5 MCG tablet, Take 1 tablet by mouth Daily., Disp: 30 tablet, Rfl: 3  •  metroNIDAZOLE (Metrogel) 1 % gel, Apply  topically to the appropriate area as directed Daily., Disp: 60 g, Rfl: 1  •  montelukast (SINGULAIR) 10 MG tablet, TAKE ONE TABLET BY MOUTH EVERY NIGHT AT BEDTIME, Disp: 30 tablet, Rfl: 10  •  MOTEGRITY 2 MG tablet, Take 2 mg by mouth Daily., Disp: , Rfl:   •  pantoprazole (PROTONIX) 40 MG EC tablet, Take 40 mg by mouth Daily., Disp: , Rfl: 3  •  PARoxetine (PAXIL) 40 MG tablet, Take 40 mg by mouth every night at bedtime., Disp: , Rfl:   •  Plecanatide (Trulance) 3 MG tablet, Take 1 tablet by mouth Daily., Disp: , Rfl:   •  QUEtiapine (SEROquel) 400 MG tablet, Take 400 mg by mouth 2 (Two) Times a Day., Disp: , Rfl: 3  •  Soap & Cleansers (CETAPHIL GENTLE CLEANSER) liquid, Apply 1 application topically to the appropriate area as directed Daily., Disp: 237 mL, Rfl: 3  •  tamsulosin (FLOMAX) 0.4 MG capsule 24 hr capsule, Take 1 capsule by mouth Every Night., Disp: , Rfl:     Review of Systems   Constitutional: Positive for fatigue. Negative for fever.   HENT: Negative for sinus pressure and sinus pain.    Eyes: Negative for discharge and itching.   Respiratory: Negative for cough and shortness of breath.    Cardiovascular: Negative for chest pain.   Gastrointestinal: Negative for abdominal pain and nausea.   Endocrine: Negative for cold intolerance and heat intolerance.   Genitourinary: Positive for frequency. Negative for urgency.   Musculoskeletal: Negative for neck pain and neck stiffness.   Allergic/Immunologic: Positive for environmental allergies.   Neurological: Negative for dizziness and headaches.   Psychiatric/Behavioral: Positive for agitation, behavioral problems and confusion.          I have reviewed ROS completed by medical assistant.     Objective:    Neurologic Exam     Cranial Nerves     CN III, IV, VI   Pupils are equal, round, and reactive to light.      Physical  Exam  Vitals signs reviewed.   Constitutional:       Comments: Pt slow to respond   Eyes:      Pupils: Pupils are equal, round, and reactive to light.   Neurological:      Comments: Mild action tremor   Psychiatric:      Comments: Pt is sluggish, awake.          Assessment/Plan:    Diagnoses and all orders for this visit:    1. Confusion (Primary)    2. Obstructive sleep apnea    3. Seizure disorder (CMS/HCC)      The confusion and mental status changes occurred after starting benztropine, held today, will see if he improves  Could be having sub clinical seizures but less likely  Will obtain an eeg    Tremor may be due to the Depakote, could try mysoline 50 mg at hs but defer for now    Continue current seizure medication    This document has been electronically signed by Joseph Seipel, MD on November 17, 2020 12:08 EST

## 2020-11-25 ENCOUNTER — OFFICE VISIT (OUTPATIENT)
Dept: SURGERY | Facility: CLINIC | Age: 50
End: 2020-11-25

## 2020-11-25 ENCOUNTER — LAB (OUTPATIENT)
Dept: FAMILY MEDICINE CLINIC | Facility: CLINIC | Age: 50
End: 2020-11-25

## 2020-11-25 VITALS
HEART RATE: 95 BPM | HEIGHT: 63 IN | SYSTOLIC BLOOD PRESSURE: 96 MMHG | DIASTOLIC BLOOD PRESSURE: 68 MMHG | TEMPERATURE: 97.5 F | RESPIRATION RATE: 18 BRPM | OXYGEN SATURATION: 94 % | BODY MASS INDEX: 33.77 KG/M2 | WEIGHT: 190.6 LBS

## 2020-11-25 DIAGNOSIS — R06.09 DOE (DYSPNEA ON EXERTION): Primary | ICD-10-CM

## 2020-11-25 DIAGNOSIS — K59.09 CONSTIPATION, CHRONIC: Primary | ICD-10-CM

## 2020-11-25 PROCEDURE — 81003 URINALYSIS AUTO W/O SCOPE: CPT | Performed by: FAMILY MEDICINE

## 2020-11-25 PROCEDURE — 99204 OFFICE O/P NEW MOD 45 MIN: CPT | Performed by: SURGERY

## 2020-11-25 RX ORDER — POLYETHYLENE GLYCOL 3350
1 POWDER (GRAM) MISCELLANEOUS DAILY
COMMUNITY
End: 2020-12-30

## 2020-11-25 RX ORDER — COAL TAR 0.5 %
SHAMPOO TOPICAL
COMMUNITY
End: 2020-12-30 | Stop reason: SDUPTHER

## 2020-11-25 NOTE — PROGRESS NOTES
GENERAL SURGERY CONSULTATION NOTE    Consult requested by: Dr. Mcconnell    Patient Care Team:  Charlie Wing MD as PCP - General  Charlie Wing MD as PCP - Family Medicine    Reason for consult: Megacolon    Subjective     Patient is a 49 y.o. male presents with a diagnosis of megacolon.  The patient was diagnosed with Hirschsprung's disease earlier in his life, and reportedly underwent a colon resection with colostomy which has subsequently been reversed.  He is referred to me today for the possibility of replacing a ostomy to help as the patient has significant difficulties with constipation, abdominal distention and megacolon.  To further complicate matters the patient has some degree of mental retardation and lives in a group home.  His giver is the one who provides the history for him however she has only been with him for the last 5 years or so.  The patient has no family alive to give further insight as to his history.  The following is gathered from the patient's gastroenterology health records:    August 2013 colonoscopy (Dr. Farley)-anastomotic ulcer, fecal impaction with focal colitis, poor prep, internal and external hemorrhoids with megarectum  January 2015 EGD (Dr. Mcconnell)-normal, empiric dilation to 56 [place].  April 2017 EGD (Dr. Pimentel)-normal, empiric dilation to 56 [place]  June 2017 colonoscopy (Dr. Mcconnell)-polyp, dilated left colon  October 2019 EGD Dr. Pimentel-nonerosive gastritis biopsy positive for H. pylori, normal esophagus status post dilation to 56 [place], normal duodenum.  May 2017 KUB-colonic distention similar to previous studies.  October 2018 unremarkable TSH, CMP, MCV 97 otherwise unremarkable CBC.  November 2018 CT abdomen and pelvis with contrast-abnormally distended colon, possible chronic adynamic ileus bibasilar densities possible atelectasis, scoliosis, right renal cyst.  KUB chronic colonic distention suggestive of ileus.  January 2019 sit studies-no markers in the  colon after 5 days (he was on medications for constipation)  January 2019 ARM abnormal sensation (did not feel balloon at 700 cc), RAIR present, weak anal sphincter  June 2020 CT dilated colon, mild pancreatitis  June 2020 ultrasound normal gallbladder and liver    Review of Systems   Unable to perform ROS: Psychiatric disorder   Gastrointestinal: Positive for abdominal distention and constipation.        History  Past Medical History:   Diagnosis Date   • Acute pancreatitis    • Allergic rhinitis    • Aspiration pneumonia (CMS/HCC)    • Ataxia    • Chronic constipation    • Chronic edema    • Depression    • Esophageal stricture    • Fecal incontinence    • GERD (gastroesophageal reflux disease)    • GI bleed    • Hirschsprung's disease    • Hyperlipidemia    • Hypokalemia    • Hypothyroidism    • Iron deficiency anemia    • Leg length discrepancy    • Mediastinal lymphadenopathy    • Megacolon    • Mildly mentally retarded    • Positive PPD    • Pulmonary hypertension (CMS/HCC)    • Scoliosis    • Sleep apnea     Not very compliant with CPAP   • Urinary incontinence    • Urinary tract infection      Past Surgical History:   Procedure Laterality Date   • COLONOSCOPY      June 2017   • ESOPHAGEAL DILATATION      Several   • HEMICOLECTOMY Left    • MYOTOMY      Rectal     No family history on file.  Social History     Tobacco Use   • Smoking status: Never Smoker   • Smokeless tobacco: Never Used   Substance Use Topics   • Alcohol use: No     Frequency: Never   • Drug use: No     (Not in a hospital admission)    Allergies:  Metoclopramide    Objective     Vital Signs  Temp:  [97.5 °F (36.4 °C)] 97.5 °F (36.4 °C)  Heart Rate:  [95] 95  Resp:  [18] 18  BP: (96)/(68) 96/68    Physical Exam  Vitals signs reviewed.   Constitutional:       General: He is not in acute distress.     Appearance: He is not ill-appearing.   HENT:      Head: Normocephalic and atraumatic.   Cardiovascular:      Rate and Rhythm: Normal rate and  regular rhythm.   Pulmonary:      Effort: Pulmonary effort is normal. No respiratory distress.   Abdominal:      General: There is distension.      Tenderness: There is no abdominal tenderness. There is no guarding or rebound.   Musculoskeletal: Normal range of motion.         General: No swelling.   Skin:     General: Skin is warm and dry.   Neurological:      General: No focal deficit present.      Mental Status: He is alert. Mental status is at baseline.   Psychiatric:         Mood and Affect: Mood normal.         Behavior: Behavior normal.         Thought Content: Thought content normal.         Judgment: Judgment normal.         Results Review:   Lab Results (last 24 hours)     ** No results found for the last 24 hours. **        No radiology results for the last day      I reviewed the patient's new imaging results and agree with the interpretation.  I reviewed the patient's other test results and agree with the interpretation    Assessment/Plan     Active Problems:  Megacolon    Discussed with the patient and his caregiver that certainly I could perform surgery to bring up an ostomy.  My plan would be to bring up a loop ostomy which would then be permanent.  The thought process for bringing up the loop is that it would allow for reflux of the mucus, stool, and gas from the distal colon to come through the ostomy as well and hopefully prevent further obstructive symptoms.  We discussed that this surgery would likely be fraught with difficulty as the patient has already had 2 bowel surgeries previously 1 to bring up the ostomy and then want to take it down and reconnect.  The risks of surgery include bleeding, infection, damage to surrounding structures, prolonged hospitalization, and exacerbation of underlying medical conditions.  When discussing nonoperative management strategies which could be done, the patient could attempt to lie on his stomach which is made a difference with regards to his distention in  the past.  This would allow for some pressure to be placed on the colon and hopefully allow some of the gas to escape.  I would also recommend considering enemas and/or placement of a red rubber catheter into the rectum to allow for expulsion of gas and fluid.  This would also be a potential way to manage his distended colon and abdominal distention in a nonoperative fashion.  The patient's caregiver was more attention to attempting nonoperative management prior to proceeding to the operating room, and I think this would be a good idea given his mental state and medical problems.  We will attempt these measures, and if they make a difference then hopefully we can avoid surgical intervention.  If they do not, then the plan would be to obtain a CT scan of the abdomen and pelvis to further gather information regarding his anatomy and consider surgical intervention in the future.    I discussed the patients findings and my recommendations with the patient and his caregiver.     Larry Randall MD  11/25/20  15:22 EST

## 2020-11-26 LAB
BILIRUB UR QL STRIP: NEGATIVE
CLARITY UR: CLEAR
COLOR UR: YELLOW
GLUCOSE UR STRIP-MCNC: NEGATIVE MG/DL
HGB UR QL STRIP.AUTO: NEGATIVE
KETONES UR QL STRIP: NEGATIVE
LEUKOCYTE ESTERASE UR QL STRIP.AUTO: NEGATIVE
NITRITE UR QL STRIP: NEGATIVE
PH UR STRIP.AUTO: 5.5 [PH] (ref 5–8)
PROT UR QL STRIP: NEGATIVE
SP GR UR STRIP: 1.01 (ref 1–1.03)
UROBILINOGEN UR QL STRIP: NORMAL

## 2020-12-01 ENCOUNTER — CLINICAL SUPPORT (OUTPATIENT)
Dept: FAMILY MEDICINE CLINIC | Facility: CLINIC | Age: 50
End: 2020-12-01

## 2020-12-01 DIAGNOSIS — Z23 IMMUNIZATION DUE: Primary | ICD-10-CM

## 2020-12-01 PROCEDURE — 86580 TB INTRADERMAL TEST: CPT | Performed by: FAMILY MEDICINE

## 2020-12-03 LAB
INDURATION: 0 MM (ref 0–10)
Lab: NORMAL
Lab: NORMAL
TB SKIN TEST: NEGATIVE

## 2020-12-07 ENCOUNTER — TELEPHONE (OUTPATIENT)
Dept: FAMILY MEDICINE CLINIC | Facility: CLINIC | Age: 50
End: 2020-12-07

## 2020-12-07 NOTE — TELEPHONE ENCOUNTER
PATIENTS CAREGIVER DAVID BECK STATED DUE TO RISING NUMBERS OF COVID SHE WAS ADVISED TO CHANGE APPOINTMENT TYPE TO MY CHART OR TELEPHONE VISIT.    ATTEMPTED WARM TRANSFER, UNSUCCESSFUL.    PLEASE ADVISE  [phone]

## 2020-12-09 ENCOUNTER — HOSPITAL ENCOUNTER (OUTPATIENT)
Dept: NEUROLOGY | Facility: HOSPITAL | Age: 50
Discharge: HOME | End: 2020-12-09
Admitting: PSYCHIATRY & NEUROLOGY

## 2020-12-09 DIAGNOSIS — R41.0 CONFUSION: ICD-10-CM

## 2020-12-09 DIAGNOSIS — G40.909 SEIZURE DISORDER (HCC): ICD-10-CM

## 2020-12-09 PROCEDURE — 95816 EEG AWAKE AND DROWSY: CPT

## 2020-12-10 PROCEDURE — 95816 EEG AWAKE AND DROWSY: CPT | Performed by: PSYCHIATRY & NEUROLOGY

## 2020-12-11 RX ORDER — LIOTHYRONINE SODIUM 5 UG/1
5 TABLET ORAL DAILY
Qty: 30 TABLET | Refills: 2 | Status: SHIPPED | OUTPATIENT
Start: 2020-12-11 | End: 2021-03-08

## 2020-12-30 ENCOUNTER — TELEMEDICINE (OUTPATIENT)
Dept: FAMILY MEDICINE CLINIC | Facility: CLINIC | Age: 50
End: 2020-12-30

## 2020-12-30 DIAGNOSIS — E03.9 ACQUIRED HYPOTHYROIDISM: Primary | ICD-10-CM

## 2020-12-30 DIAGNOSIS — G40.909 SEIZURE DISORDER (HCC): ICD-10-CM

## 2020-12-30 DIAGNOSIS — E87.6 HYPOKALEMIA: ICD-10-CM

## 2020-12-30 DIAGNOSIS — Q43.1 HIRSCHSPRUNG'S DISEASE: ICD-10-CM

## 2020-12-30 DIAGNOSIS — G47.33 OBSTRUCTIVE SLEEP APNEA: ICD-10-CM

## 2020-12-30 DIAGNOSIS — Z12.5 PROSTATE CANCER SCREENING: ICD-10-CM

## 2020-12-30 PROCEDURE — 99213 OFFICE O/P EST LOW 20 MIN: CPT | Performed by: FAMILY MEDICINE

## 2020-12-30 RX ORDER — CLONAZEPAM 0.5 MG/1
0.5 TABLET ORAL EVERY MORNING
Qty: 30 TABLET | Refills: 5 | COMMUNITY
Start: 2020-12-30 | End: 2021-05-13 | Stop reason: SDUPTHER

## 2020-12-30 RX ORDER — POLYETHYLENE GLYCOL 3350 17 G/17G
POWDER, FOR SOLUTION ORAL
Qty: 578 G | Refills: 11 | Status: ON HOLD | COMMUNITY
Start: 2020-12-30 | End: 2022-05-02

## 2020-12-30 RX ORDER — METRONIDAZOLE 10 MG/G
GEL TOPICAL DAILY
Qty: 60 TUBE | Refills: 11 | COMMUNITY
Start: 2020-12-30 | End: 2021-09-13

## 2020-12-30 RX ORDER — COAL TAR 0.5 %
SHAMPOO TOPICAL
Qty: 237 ML | Refills: 11 | Status: SHIPPED | OUTPATIENT
Start: 2020-12-30

## 2021-01-02 NOTE — ASSESSMENT & PLAN NOTE
Unfortunately this will never be treated and the patient is going to have respiratory issues secondary to this.

## 2021-01-05 ENCOUNTER — LAB (OUTPATIENT)
Dept: FAMILY MEDICINE CLINIC | Facility: CLINIC | Age: 51
End: 2021-01-05

## 2021-01-05 LAB
ALBUMIN SERPL-MCNC: 4.3 G/DL (ref 3.5–5.2)
ALBUMIN/GLOB SERPL: 1.4 G/DL
ALP SERPL-CCNC: 67 U/L (ref 39–117)
ALT SERPL W P-5'-P-CCNC: 21 U/L (ref 1–41)
ANION GAP SERPL CALCULATED.3IONS-SCNC: 10.7 MMOL/L (ref 5–15)
AST SERPL-CCNC: 40 U/L (ref 1–40)
BASOPHILS # BLD AUTO: 0.04 10*3/MM3 (ref 0–0.2)
BASOPHILS NFR BLD AUTO: 0.8 % (ref 0–1.5)
BILIRUB SERPL-MCNC: 0.3 MG/DL (ref 0–1.2)
BUN SERPL-MCNC: 21 MG/DL (ref 6–20)
BUN/CREAT SERPL: 18.9 (ref 7–25)
CALCIUM SPEC-SCNC: 9.7 MG/DL (ref 8.6–10.5)
CHLORIDE SERPL-SCNC: 99 MMOL/L (ref 98–107)
CO2 SERPL-SCNC: 29.3 MMOL/L (ref 22–29)
CREAT SERPL-MCNC: 1.11 MG/DL (ref 0.76–1.27)
DEPRECATED RDW RBC AUTO: 54.6 FL (ref 37–54)
EOSINOPHIL # BLD AUTO: 0.04 10*3/MM3 (ref 0–0.4)
EOSINOPHIL NFR BLD AUTO: 0.8 % (ref 0.3–6.2)
ERYTHROCYTE [DISTWIDTH] IN BLOOD BY AUTOMATED COUNT: 16.1 % (ref 12.3–15.4)
GFR SERPL CREATININE-BSD FRML MDRD: 70 ML/MIN/1.73
GLOBULIN UR ELPH-MCNC: 3 GM/DL
GLUCOSE SERPL-MCNC: 82 MG/DL (ref 65–99)
HCT VFR BLD AUTO: 42.6 % (ref 37.5–51)
HGB BLD-MCNC: 14.4 G/DL (ref 13–17.7)
IMM GRANULOCYTES # BLD AUTO: 0.05 10*3/MM3 (ref 0–0.05)
IMM GRANULOCYTES NFR BLD AUTO: 1.1 % (ref 0–0.5)
LYMPHOCYTES # BLD AUTO: 1.73 10*3/MM3 (ref 0.7–3.1)
LYMPHOCYTES NFR BLD AUTO: 36.3 % (ref 19.6–45.3)
MCH RBC QN AUTO: 31.6 PG (ref 26.6–33)
MCHC RBC AUTO-ENTMCNC: 33.8 G/DL (ref 31.5–35.7)
MCV RBC AUTO: 93.6 FL (ref 79–97)
MONOCYTES # BLD AUTO: 0.8 10*3/MM3 (ref 0.1–0.9)
MONOCYTES NFR BLD AUTO: 16.8 % (ref 5–12)
NEUTROPHILS NFR BLD AUTO: 2.1 10*3/MM3 (ref 1.7–7)
NEUTROPHILS NFR BLD AUTO: 44.2 % (ref 42.7–76)
NRBC BLD AUTO-RTO: 0 /100 WBC (ref 0–0.2)
PLATELET # BLD AUTO: 175 10*3/MM3 (ref 140–450)
PMV BLD AUTO: 11.5 FL (ref 6–12)
POTASSIUM SERPL-SCNC: 4.5 MMOL/L (ref 3.5–5.2)
PROT SERPL-MCNC: 7.3 G/DL (ref 6–8.5)
PSA SERPL-MCNC: 0.88 NG/ML (ref 0–4)
RBC # BLD AUTO: 4.55 10*6/MM3 (ref 4.14–5.8)
SODIUM SERPL-SCNC: 139 MMOL/L (ref 136–145)
T3 SERPL-MCNC: 82 NG/DL (ref 80–200)
T4 FREE SERPL-MCNC: 0.8 NG/DL (ref 0.93–1.7)
TSH SERPL DL<=0.05 MIU/L-ACNC: 0.54 UIU/ML (ref 0.27–4.2)
WBC # BLD AUTO: 4.76 10*3/MM3 (ref 3.4–10.8)

## 2021-01-05 PROCEDURE — 36415 COLL VENOUS BLD VENIPUNCTURE: CPT | Performed by: FAMILY MEDICINE

## 2021-01-05 PROCEDURE — 80053 COMPREHEN METABOLIC PANEL: CPT | Performed by: FAMILY MEDICINE

## 2021-01-05 PROCEDURE — 84480 ASSAY TRIIODOTHYRONINE (T3): CPT | Performed by: FAMILY MEDICINE

## 2021-01-05 PROCEDURE — 85025 COMPLETE CBC W/AUTO DIFF WBC: CPT | Performed by: FAMILY MEDICINE

## 2021-01-05 PROCEDURE — 84443 ASSAY THYROID STIM HORMONE: CPT | Performed by: FAMILY MEDICINE

## 2021-01-05 PROCEDURE — G0103 PSA SCREENING: HCPCS | Performed by: FAMILY MEDICINE

## 2021-01-05 PROCEDURE — 84439 ASSAY OF FREE THYROXINE: CPT | Performed by: FAMILY MEDICINE

## 2021-01-07 ENCOUNTER — OFFICE (AMBULATORY)
Dept: URBAN - METROPOLITAN AREA CLINIC 64 | Facility: CLINIC | Age: 51
End: 2021-01-07

## 2021-01-07 VITALS — HEIGHT: 65 IN

## 2021-01-07 DIAGNOSIS — K59.00 CONSTIPATION, UNSPECIFIED: ICD-10-CM

## 2021-01-07 DIAGNOSIS — R14.0 ABDOMINAL DISTENSION (GASEOUS): ICD-10-CM

## 2021-01-07 PROCEDURE — 99213 OFFICE O/P EST LOW 20 MIN: CPT | Performed by: INTERNAL MEDICINE

## 2021-01-12 ENCOUNTER — TELEPHONE (OUTPATIENT)
Dept: FAMILY MEDICINE CLINIC | Facility: CLINIC | Age: 51
End: 2021-01-12

## 2021-01-12 RX ORDER — LEVOTHYROXINE SODIUM 0.15 MG/1
150 TABLET ORAL DAILY
Qty: 90 TABLET | Refills: 3 | Status: SHIPPED | OUTPATIENT
Start: 2021-01-12 | End: 2021-06-10

## 2021-01-13 NOTE — TELEPHONE ENCOUNTER
Call Chrystal in [place] and discontinue the Synthroid 100 mcg and the 25 mcg  I sent in a new prescription for levothyroxine 150 mcg daily

## 2021-02-05 RX ORDER — FUROSEMIDE 40 MG/1
TABLET ORAL
Qty: 45 TABLET | Refills: 10 | Status: SHIPPED | OUTPATIENT
Start: 2021-02-05 | End: 2021-12-10

## 2021-03-08 RX ORDER — LIOTHYRONINE SODIUM 5 UG/1
TABLET ORAL
Qty: 30 TABLET | Refills: 1 | Status: SHIPPED | OUTPATIENT
Start: 2021-03-08 | End: 2021-04-30

## 2021-03-08 RX ORDER — MONTELUKAST SODIUM 10 MG/1
TABLET ORAL
Qty: 30 TABLET | Refills: 1 | Status: SHIPPED | OUTPATIENT
Start: 2021-03-08 | End: 2021-04-30

## 2021-04-16 ENCOUNTER — TELEPHONE (OUTPATIENT)
Dept: FAMILY MEDICINE CLINIC | Facility: CLINIC | Age: 51
End: 2021-04-16

## 2021-04-16 NOTE — TELEPHONE ENCOUNTER
Caller: MALKA WITH [employer]    Relationship to patient: Home Health    Best call back number: [phone]    Patient is needing: MALKA DAILY WITH [employer] SAID THAT THEY MISSED THEIR APPOINTMENT WITH THE PATIENT AND ASKED TO RESCHEDULE.    PLEASE ADVISE. MALKA'S SUPERVISOR CAN BE REACHED [phone]

## 2021-04-30 RX ORDER — LIOTHYRONINE SODIUM 5 UG/1
TABLET ORAL
Qty: 30 TABLET | Refills: 0 | Status: SHIPPED | OUTPATIENT
Start: 2021-04-30 | End: 2021-05-31

## 2021-04-30 RX ORDER — MONTELUKAST SODIUM 10 MG/1
TABLET ORAL
Qty: 30 TABLET | Refills: 0 | Status: SHIPPED | OUTPATIENT
Start: 2021-04-30 | End: 2021-05-31

## 2021-05-13 RX ORDER — CLONAZEPAM 0.5 MG/1
TABLET ORAL
COMMUNITY
Start: 2021-03-17 | End: 2021-09-13 | Stop reason: SDUPTHER

## 2021-05-13 RX ORDER — DIVALPROEX SODIUM 500 MG/1
10000 TABLET, DELAYED RELEASE ORAL 2 TIMES DAILY
COMMUNITY
Start: 2019-10-02

## 2021-05-17 ENCOUNTER — TELEMEDICINE (OUTPATIENT)
Dept: NEUROLOGY | Facility: CLINIC | Age: 51
End: 2021-05-17

## 2021-05-17 DIAGNOSIS — G40.909 SEIZURE DISORDER (HCC): Primary | ICD-10-CM

## 2021-05-17 DIAGNOSIS — G47.33 OBSTRUCTIVE SLEEP APNEA: ICD-10-CM

## 2021-05-17 PROCEDURE — 99214 OFFICE O/P EST MOD 30 MIN: CPT | Performed by: PSYCHIATRY & NEUROLOGY

## 2021-05-17 RX ORDER — DEUTETRABENAZINE 6 MG/1
1 TABLET, COATED ORAL DAILY
COMMUNITY
Start: 2021-05-10 | End: 2021-12-13

## 2021-05-31 RX ORDER — MONTELUKAST SODIUM 10 MG/1
TABLET ORAL
Qty: 30 TABLET | Refills: 0 | Status: SHIPPED | OUTPATIENT
Start: 2021-05-31 | End: 2021-06-25

## 2021-05-31 RX ORDER — LIOTHYRONINE SODIUM 5 UG/1
TABLET ORAL
Qty: 30 TABLET | Refills: 0 | Status: SHIPPED | OUTPATIENT
Start: 2021-05-31 | End: 2021-06-25

## 2021-06-09 ENCOUNTER — OFFICE VISIT (OUTPATIENT)
Dept: FAMILY MEDICINE CLINIC | Facility: CLINIC | Age: 51
End: 2021-06-09

## 2021-06-09 ENCOUNTER — LAB (OUTPATIENT)
Dept: FAMILY MEDICINE CLINIC | Facility: CLINIC | Age: 51
End: 2021-06-09

## 2021-06-09 VITALS
HEIGHT: 60 IN | TEMPERATURE: 97.3 F | HEART RATE: 82 BPM | OXYGEN SATURATION: 91 % | BODY MASS INDEX: 36.48 KG/M2 | WEIGHT: 185.8 LBS | SYSTOLIC BLOOD PRESSURE: 115 MMHG | DIASTOLIC BLOOD PRESSURE: 81 MMHG

## 2021-06-09 DIAGNOSIS — G40.909 SEIZURE DISORDER (HCC): ICD-10-CM

## 2021-06-09 DIAGNOSIS — M41.25 OTHER IDIOPATHIC SCOLIOSIS, THORACOLUMBAR REGION: ICD-10-CM

## 2021-06-09 DIAGNOSIS — Q43.1 HIRSCHSPRUNG'S DISEASE: ICD-10-CM

## 2021-06-09 DIAGNOSIS — D64.9 ANEMIA, UNSPECIFIED TYPE: ICD-10-CM

## 2021-06-09 DIAGNOSIS — R62.50 DEVELOPMENT DELAY: ICD-10-CM

## 2021-06-09 DIAGNOSIS — L71.9 ROSACEA: ICD-10-CM

## 2021-06-09 DIAGNOSIS — Z00.00 MEDICARE ANNUAL WELLNESS VISIT, SUBSEQUENT: Primary | ICD-10-CM

## 2021-06-09 DIAGNOSIS — E03.9 ACQUIRED HYPOTHYROIDISM: ICD-10-CM

## 2021-06-09 DIAGNOSIS — E78.2 MIXED HYPERLIPIDEMIA: ICD-10-CM

## 2021-06-09 PROCEDURE — 80053 COMPREHEN METABOLIC PANEL: CPT | Performed by: FAMILY MEDICINE

## 2021-06-09 PROCEDURE — G0439 PPPS, SUBSEQ VISIT: HCPCS | Performed by: FAMILY MEDICINE

## 2021-06-09 PROCEDURE — 84443 ASSAY THYROID STIM HORMONE: CPT | Performed by: FAMILY MEDICINE

## 2021-06-09 PROCEDURE — 80164 ASSAY DIPROPYLACETIC ACD TOT: CPT | Performed by: FAMILY MEDICINE

## 2021-06-09 PROCEDURE — 85025 COMPLETE CBC W/AUTO DIFF WBC: CPT | Performed by: FAMILY MEDICINE

## 2021-06-09 PROCEDURE — 36415 COLL VENOUS BLD VENIPUNCTURE: CPT | Performed by: FAMILY MEDICINE

## 2021-06-09 PROCEDURE — 80061 LIPID PANEL: CPT | Performed by: FAMILY MEDICINE

## 2021-06-09 NOTE — PROGRESS NOTES
Medicare Wellness Visit   The ABC's of the Annual Wellness Visit    Chief Complaint   Patient presents with   • Medicare Wellness-Initial Visit       HPI:  Charlie Wells, -1970, is a 50 y.o. male who presents for a Medicare Wellness Visit.    Recent Hospitalizations:  No hospitalization(s) within the last year..    Current Medical Providers:  Patient Care Team:  Rody Moreno MD as PCP - General (Family Medicine)  Seipel, Joseph F, MD as Consulting Physician (Sleep Medicine)  To Barber DPM as Consulting Physician (Podiatry)  Jeff Alexis MD as Consulting Physician (Urology)    Health Habits and Functional and Cognitive Screening and Depression Screening:  Functional & Cognitive Status 2021   Do you have difficulty preparing food and eating? Yes   Do you have difficulty bathing yourself, getting dressed or grooming yourself? Yes   Do you have difficulty using the toilet? Yes   Do you have difficulty moving around from place to place? No   Do you have trouble with steps or getting out of a bed or a chair? No   Current Diet Well Balanced Diet   Dental Exam Up to date   Eye Exam Up to date   Exercise (times per week) 3 times per week   Current Exercises Include Walking   Do you need help using the phone?  Yes   Are you deaf or do you have serious difficulty hearing?  No   Do you need help with transportation? Yes   Do you need help shopping? Yes   Do you need help preparing meals?  Yes   Do you need help with housework?  Yes   Do you need help with laundry? Yes   Do you need help taking your medications? Yes   Do you ever drive or ride in a car without wearing a seat belt? No   Have you felt unusual stress, anger or loneliness in the last month? No   Who do you live with? Community   If you need help, do you have trouble finding someone available to you? No   Do you have difficulty concentrating, remembering or making decisions? Yes       Compared to one year ago, the patient  feels his physical health is the same and his mental health is the same.    Depression Screen:  PHQ-2/PHQ-9 Depression Screening 6/9/2021   Little interest or pleasure in doing things 0   Feeling down, depressed, or hopeless 0   Total Score 0         Past Medical/Family/Social History:  The following portions of the patient's history were reviewed and updated as appropriate: allergies, current medications, past family history, past medical history, past social history, past surgical history and problem list.    Allergies   Allergen Reactions   • Metoclopramide Rash         Current Outpatient Medications:   •  ammonium lactate (AmLactin) 12 % lotion, Apply  topically to the appropriate area as directed Daily., Disp: 225 g, Rfl: 11  •  Austedo 6 MG tablet, Take 1 tablet by mouth Daily., Disp: , Rfl:   •  busPIRone (BUSPAR) 30 MG tablet, Take 1 tablet by mouth 2 (Two) Times a Day., Disp: 60 tablet, Rfl: 11  •  clonazePAM (KlonoPIN) 0.5 MG tablet, , Disp: , Rfl:   •  clotrimazole (LOTRIMIN) 1 % cream, , Disp: , Rfl:   •  coal tar (Neutrogena T/Gel) 0.5 % shampoo, Use 3 times per week, Disp: 237 mL, Rfl: 11  •  divalproex (DEPAKOTE) 500 MG DR tablet, , Disp: , Rfl:   •  estradiol (ESTRACE) 1 MG tablet, Take 1 mg by mouth Daily., Disp: , Rfl:   •  furosemide (LASIX) 40 MG tablet, TAKE 1 1/2 TABLETS BY MOUTH EVERY DAY ( INCREASE ), Disp: 45 tablet, Rfl: 10  •  GaviLAX 17 GM/SCOOP powder, 2 scoops q d, Disp: 578 g, Rfl: 11  •  levothyroxine (Synthroid) 150 MCG tablet, Take 1 tablet by mouth Daily., Disp: 90 tablet, Rfl: 3  •  liothyronine (CYTOMEL) 5 MCG tablet, TAKE ONE TABLET BY MOUTH EVERY DAY, Disp: 30 tablet, Rfl: 0  •  metroNIDAZOLE (Metrogel) 1 % gel, Apply  topically to the appropriate area as directed Daily., Disp: 60 tube, Rfl: 11  •  montelukast (SINGULAIR) 10 MG tablet, TAKE ONE TABLET BY MOUTH EVERY NIGHT AT BEDTIME, Disp: 30 tablet, Rfl: 0  •  MOTEGRITY 2 MG tablet, Take 2 mg by mouth Daily., Disp: , Rfl:   •   pantoprazole (PROTONIX) 40 MG EC tablet, Take 40 mg by mouth Daily., Disp: , Rfl: 3  •  PARoxetine (PAXIL) 40 MG tablet, Take 50 mg by mouth every night at bedtime., Disp: , Rfl:   •  Plecanatide (Trulance) 3 MG tablet, Take 1 tablet by mouth Daily., Disp: , Rfl:   •  QUEtiapine (SEROquel) 400 MG tablet, Take 400 mg by mouth 2 (Two) Times a Day., Disp: , Rfl: 3  •  tamsulosin (FLOMAX) 0.4 MG capsule 24 hr capsule, Take 1 capsule by mouth Every Night., Disp: , Rfl:     Aspirin use counseling: Does not need ASA (and currently is not on it)    Current medication list contains no high risk medications.  No harmful drug interactions have been identified.     History reviewed. No pertinent family history.    Social History     Tobacco Use   • Smoking status: Never Smoker   • Smokeless tobacco: Never Used   Substance Use Topics   • Alcohol use: No       Past Surgical History:   Procedure Laterality Date   • COLONOSCOPY      June 2017   • ESOPHAGEAL DILATATION      Several   • HEMICOLECTOMY Left    • MYOTOMY      Rectal       Patient Active Problem List   Diagnosis   • Anemia in other chronic diseases classified elsewhere   • Ataxia   • Constipation, chronic   • Dependence on continuous positive airway pressure ventilation   • Disorder of foot   • Encounter for general adult medical examination without abnormal findings   • Gastroesophageal reflux disease   • Esophageal stricture   • Hay fever   • Hirschsprung's disease   • Hyperlipidemia   • Hypothyroidism   • Lung mass   • Mediastinal lymphadenopathy   • Moderate intellectual disability   • Obstructive sleep apnea   • Proteinuria   • Seizure disorder (CMS/[facility])   • Unequal leg length   • Full incontinence of feces   • Urinary incontinence   • Leg edema   • Elevated PSA   • Rosacea   • Medicare annual wellness visit, subsequent   • Pneumonia due to infectious organism   • Abdominal pain   • Hypokalemia   • Pancreatitis   • Aspiration into respiratory tract   • Dyspnea on  "exertion   • Other idiopathic scoliosis, thoracolumbar region   • Development delay       Review of Systems    Objective     Vitals:    06/09/21 1335   BP: 115/81   Pulse: 82   Temp: 97.3 °F (36.3 °C)   TempSrc: Temporal   SpO2: 91%   Weight: 84.3 kg (185 lb 12.8 oz)   Height: 152.4 cm (60\")       Patient's Body mass index is 36.29 kg/m². indicating that he is obese (BMI >30). Obesity-related health conditions include the following: GERD. Obesity is unchanged. BMI is is above average; BMI management plan is completed. We discussed portion control and increasing exercise..      No exam data present    Known cognitive defect.    Physical Exam    Recent Lab Results:     Lab Results   Component Value Date    CHOL 78 06/14/2020    TRIG 65 06/14/2020    HDL 23 (L) 06/14/2020    VLDL 13 06/14/2020    LDLHDL 1.83 06/14/2020     No visits with results within 1 Week(s) from this visit.   Latest known visit with results is:   Telemedicine on 12/30/2020   Component Date Value Ref Range Status   • Glucose 01/05/2021 82  65 - 99 mg/dL Final   • BUN 01/05/2021 21* 6 - 20 mg/dL Final   • Creatinine 01/05/2021 1.11  0.76 - 1.27 mg/dL Final   • Sodium 01/05/2021 139  136 - 145 mmol/L Final   • Potassium 01/05/2021 4.5  3.5 - 5.2 mmol/L Final   • Chloride 01/05/2021 99  98 - 107 mmol/L Final   • CO2 01/05/2021 29.3* 22.0 - 29.0 mmol/L Final   • Calcium 01/05/2021 9.7  8.6 - 10.5 mg/dL Final   • Total Protein 01/05/2021 7.3  6.0 - 8.5 g/dL Final   • Albumin 01/05/2021 4.30  3.50 - 5.20 g/dL Final   • ALT (SGPT) 01/05/2021 21  1 - 41 U/L Final   • AST (SGOT) 01/05/2021 40  1 - 40 U/L Final   • Alkaline Phosphatase 01/05/2021 67  39 - 117 U/L Final   • Total Bilirubin 01/05/2021 0.3  0.0 - 1.2 mg/dL Final   • eGFR Non African Amer 01/05/2021 70  >60 mL/min/1.73 Final   • Globulin 01/05/2021 3.0  gm/dL Final   • A/G Ratio 01/05/2021 1.4  g/dL Final   • BUN/Creatinine Ratio 01/05/2021 18.9  7.0 - 25.0 Final   • Anion Gap 01/05/2021 10.7  " 5.0 - 15.0 mmol/L Final   • TSH 01/05/2021 0.536  0.270 - 4.200 uIU/mL Final   • T3, Total 01/05/2021 82.0  80.0 - 200.0 ng/dl Final   • Free T4 01/05/2021 0.80* 0.93 - 1.70 ng/dL Final   • PSA 01/05/2021 0.883  0.000 - 4.000 ng/mL Final   • WBC 01/05/2021 4.76  3.40 - 10.80 10*3/mm3 Final   • RBC 01/05/2021 4.55  4.14 - 5.80 10*6/mm3 Final   • Hemoglobin 01/05/2021 14.4  13.0 - 17.7 g/dL Final   • Hematocrit 01/05/2021 42.6  37.5 - 51.0 % Final   • MCV 01/05/2021 93.6  79.0 - 97.0 fL Final   • MCH 01/05/2021 31.6  26.6 - 33.0 pg Final   • MCHC 01/05/2021 33.8  31.5 - 35.7 g/dL Final   • RDW 01/05/2021 16.1* 12.3 - 15.4 % Final   • RDW-SD 01/05/2021 54.6* 37.0 - 54.0 fl Final   • MPV 01/05/2021 11.5  6.0 - 12.0 fL Final   • Platelets 01/05/2021 175  140 - 450 10*3/mm3 Final   • Neutrophil % 01/05/2021 44.2  42.7 - 76.0 % Final   • Lymphocyte % 01/05/2021 36.3  19.6 - 45.3 % Final   • Monocyte % 01/05/2021 16.8* 5.0 - 12.0 % Final   • Eosinophil % 01/05/2021 0.8  0.3 - 6.2 % Final   • Basophil % 01/05/2021 0.8  0.0 - 1.5 % Final   • Immature Grans % 01/05/2021 1.1* 0.0 - 0.5 % Final   • Neutrophils, Absolute 01/05/2021 2.10  1.70 - 7.00 10*3/mm3 Final   • Lymphocytes, Absolute 01/05/2021 1.73  0.70 - 3.10 10*3/mm3 Final   • Monocytes, Absolute 01/05/2021 0.80  0.10 - 0.90 10*3/mm3 Final   • Eosinophils, Absolute 01/05/2021 0.04  0.00 - 0.40 10*3/mm3 Final   • Basophils, Absolute 01/05/2021 0.04  0.00 - 0.20 10*3/mm3 Final   • Immature Grans, Absolute 01/05/2021 0.05  0.00 - 0.05 10*3/mm3 Final   • nRBC 01/05/2021 0.0  0.0 - 0.2 /100 WBC Final         Assessment/Plan   Age-appropriate Screening Schedule:  Refer to the list below for future screening recommendations based on patient's age, sex and/or medical conditions.      Health Maintenance   Topic Date Due   • LIPID PANEL  06/14/2021   • INFLUENZA VACCINE  08/01/2021   • TDAP/TD VACCINES (2 - Td or Tdap) 04/30/2029   • ZOSTER VACCINE  Completed       Medicare Risks  and Personalized Health Plan:  Advance Directive Discussion  Colon Cancer Screening  Diabetic Lab Screening       CMS-Preventive Services Quick Reference  Medicare Preventive Services Addressed:  Annual Wellness Visit (AWV)    Advance Care Planning:  ACP discussion was held with the patient during this visit. Patient does not have an advance directive, information provided.    Diagnoses and all orders for this visit:    1. Medicare annual wellness visit, subsequent (Primary)    2. Acquired hypothyroidism  -     TSH    3. Hirschsprung's disease    4. Anemia, unspecified type  -     CBC & Differential    5. Mixed hyperlipidemia  -     Comprehensive Metabolic Panel  -     Lipid Panel    6. Seizure disorder (CMS/[facility])  -     Valproic Acid Level, Total; Future    7. Other idiopathic scoliosis, thoracolumbar region  -     Ambulatory Referral to [facility]    8. Rosacea  -     Ambulatory Referral to Dermatology    9. Development delay        An After Visit Summary and PPPS with all of these plans were given to the patient.      Follow Up:  No follow-ups on file.

## 2021-06-10 LAB
ALBUMIN SERPL-MCNC: 4 G/DL (ref 3.5–5.2)
ALBUMIN/GLOB SERPL: 1.1 G/DL
ALP SERPL-CCNC: 72 U/L (ref 39–117)
ALT SERPL W P-5'-P-CCNC: 22 U/L (ref 1–41)
ANION GAP SERPL CALCULATED.3IONS-SCNC: 10.7 MMOL/L (ref 5–15)
AST SERPL-CCNC: 42 U/L (ref 1–40)
BASOPHILS # BLD AUTO: 0.03 10*3/MM3 (ref 0–0.2)
BASOPHILS NFR BLD AUTO: 0.6 % (ref 0–1.5)
BILIRUB SERPL-MCNC: 0.4 MG/DL (ref 0–1.2)
BUN SERPL-MCNC: 15 MG/DL (ref 6–20)
BUN/CREAT SERPL: 15.5 (ref 7–25)
CALCIUM SPEC-SCNC: 9.6 MG/DL (ref 8.6–10.5)
CHLORIDE SERPL-SCNC: 100 MMOL/L (ref 98–107)
CHOLEST SERPL-MCNC: 171 MG/DL (ref 0–200)
CO2 SERPL-SCNC: 30.3 MMOL/L (ref 22–29)
CREAT SERPL-MCNC: 0.97 MG/DL (ref 0.76–1.27)
DEPRECATED RDW RBC AUTO: 52 FL (ref 37–54)
EOSINOPHIL # BLD AUTO: 0.09 10*3/MM3 (ref 0–0.4)
EOSINOPHIL NFR BLD AUTO: 1.8 % (ref 0.3–6.2)
ERYTHROCYTE [DISTWIDTH] IN BLOOD BY AUTOMATED COUNT: 15.2 % (ref 12.3–15.4)
GFR SERPL CREATININE-BSD FRML MDRD: 82 ML/MIN/1.73
GLOBULIN UR ELPH-MCNC: 3.6 GM/DL
GLUCOSE SERPL-MCNC: 71 MG/DL (ref 65–99)
HCT VFR BLD AUTO: 45 % (ref 37.5–51)
HDLC SERPL-MCNC: 42 MG/DL (ref 40–60)
HGB BLD-MCNC: 15 G/DL (ref 13–17.7)
IMM GRANULOCYTES # BLD AUTO: 0.04 10*3/MM3 (ref 0–0.05)
IMM GRANULOCYTES NFR BLD AUTO: 0.8 % (ref 0–0.5)
LDLC SERPL CALC-MCNC: 109 MG/DL (ref 0–100)
LDLC/HDLC SERPL: 2.54 {RATIO}
LYMPHOCYTES # BLD AUTO: 2.44 10*3/MM3 (ref 0.7–3.1)
LYMPHOCYTES NFR BLD AUTO: 49.8 % (ref 19.6–45.3)
MCH RBC QN AUTO: 31.1 PG (ref 26.6–33)
MCHC RBC AUTO-ENTMCNC: 33.3 G/DL (ref 31.5–35.7)
MCV RBC AUTO: 93.4 FL (ref 79–97)
MONOCYTES # BLD AUTO: 0.8 10*3/MM3 (ref 0.1–0.9)
MONOCYTES NFR BLD AUTO: 16.3 % (ref 5–12)
NEUTROPHILS NFR BLD AUTO: 1.5 10*3/MM3 (ref 1.7–7)
NEUTROPHILS NFR BLD AUTO: 30.7 % (ref 42.7–76)
NRBC BLD AUTO-RTO: 0 /100 WBC (ref 0–0.2)
PLATELET # BLD AUTO: 142 10*3/MM3 (ref 140–450)
PMV BLD AUTO: 12 FL (ref 6–12)
POTASSIUM SERPL-SCNC: 4.2 MMOL/L (ref 3.5–5.2)
PROT SERPL-MCNC: 7.6 G/DL (ref 6–8.5)
RBC # BLD AUTO: 4.82 10*6/MM3 (ref 4.14–5.8)
SODIUM SERPL-SCNC: 141 MMOL/L (ref 136–145)
TRIGL SERPL-MCNC: 112 MG/DL (ref 0–150)
TSH SERPL DL<=0.05 MIU/L-ACNC: 0.05 UIU/ML (ref 0.27–4.2)
VALPROATE SERPL-MCNC: 128 MCG/ML (ref 50–125)
VLDLC SERPL-MCNC: 20 MG/DL (ref 5–40)
WBC # BLD AUTO: 4.9 10*3/MM3 (ref 3.4–10.8)

## 2021-06-10 RX ORDER — LEVOTHYROXINE SODIUM 137 UG/1
137 TABLET ORAL DAILY
Qty: 90 TABLET | Refills: 0 | Status: SHIPPED | OUTPATIENT
Start: 2021-06-10 | End: 2021-06-25

## 2021-06-11 ENCOUNTER — TELEPHONE (OUTPATIENT)
Dept: FAMILY MEDICINE CLINIC | Facility: CLINIC | Age: 51
End: 2021-06-11

## 2021-06-11 NOTE — TELEPHONE ENCOUNTER
GROUP HOME LEADER DAVID WASN'T SURE IF DR. TORRES SAID AT HIS LAST VISIT THAT THE PATIENT IS AVAILABLE FOR FOLLOW UP APPOINTMENTS VIA VIDEO?      PLEASE ADVISE  [phone]

## 2021-06-25 RX ORDER — LEVOTHYROXINE SODIUM 137 UG/1
TABLET ORAL
Qty: 30 TABLET | Refills: 0 | Status: SHIPPED | OUTPATIENT
Start: 2021-06-25 | End: 2021-07-26

## 2021-06-25 RX ORDER — LIOTHYRONINE SODIUM 5 UG/1
TABLET ORAL
Qty: 30 TABLET | Refills: 0 | Status: SHIPPED | OUTPATIENT
Start: 2021-06-25 | End: 2021-07-26

## 2021-06-25 RX ORDER — MONTELUKAST SODIUM 10 MG/1
TABLET ORAL
Qty: 30 TABLET | Refills: 0 | Status: SHIPPED | OUTPATIENT
Start: 2021-06-25 | End: 2021-07-26

## 2021-07-06 ENCOUNTER — PATIENT MESSAGE (OUTPATIENT)
Dept: FAMILY MEDICINE CLINIC | Facility: CLINIC | Age: 51
End: 2021-07-06

## 2021-07-06 DIAGNOSIS — M41.25 OTHER IDIOPATHIC SCOLIOSIS, THORACOLUMBAR REGION: Primary | ICD-10-CM

## 2021-07-12 ENCOUNTER — PATIENT MESSAGE (OUTPATIENT)
Dept: FAMILY MEDICINE CLINIC | Facility: CLINIC | Age: 51
End: 2021-07-12

## 2021-07-26 RX ORDER — LIOTHYRONINE SODIUM 5 UG/1
TABLET ORAL
Qty: 30 TABLET | Refills: 0 | Status: SHIPPED | OUTPATIENT
Start: 2021-07-26 | End: 2021-08-20

## 2021-07-26 RX ORDER — MONTELUKAST SODIUM 10 MG/1
TABLET ORAL
Qty: 30 TABLET | Refills: 0 | Status: SHIPPED | OUTPATIENT
Start: 2021-07-26 | End: 2021-08-20

## 2021-07-26 RX ORDER — LEVOTHYROXINE SODIUM 137 UG/1
TABLET ORAL
Qty: 30 TABLET | Refills: 0 | Status: SHIPPED | OUTPATIENT
Start: 2021-07-26 | End: 2021-07-31

## 2021-07-30 ENCOUNTER — LAB (OUTPATIENT)
Dept: FAMILY MEDICINE CLINIC | Facility: CLINIC | Age: 51
End: 2021-07-30

## 2021-07-30 ENCOUNTER — TELEPHONE (OUTPATIENT)
Dept: FAMILY MEDICINE CLINIC | Facility: CLINIC | Age: 51
End: 2021-07-30

## 2021-07-30 DIAGNOSIS — G40.909 SEIZURE DISORDER (HCC): Primary | ICD-10-CM

## 2021-07-30 DIAGNOSIS — E03.9 ACQUIRED HYPOTHYROIDISM: ICD-10-CM

## 2021-07-30 DIAGNOSIS — G40.909 SEIZURE DISORDER (HCC): ICD-10-CM

## 2021-07-30 LAB
TSH SERPL DL<=0.05 MIU/L-ACNC: 0.05 UIU/ML (ref 0.27–4.2)
VALPROATE SERPL-MCNC: 97 MCG/ML (ref 50–125)

## 2021-07-30 PROCEDURE — 80164 ASSAY DIPROPYLACETIC ACD TOT: CPT | Performed by: FAMILY MEDICINE

## 2021-07-30 PROCEDURE — 36415 COLL VENOUS BLD VENIPUNCTURE: CPT | Performed by: FAMILY MEDICINE

## 2021-07-30 PROCEDURE — 84443 ASSAY THYROID STIM HORMONE: CPT | Performed by: FAMILY MEDICINE

## 2021-07-31 RX ORDER — LEVOTHYROXINE SODIUM 0.12 MG/1
125 TABLET ORAL DAILY
Qty: 90 TABLET | Refills: 0 | Status: SHIPPED | OUTPATIENT
Start: 2021-07-31 | End: 2021-10-20

## 2021-08-02 ENCOUNTER — OFFICE VISIT (OUTPATIENT)
Dept: NEUROSURGERY | Facility: CLINIC | Age: 51
End: 2021-08-02

## 2021-08-02 VITALS
HEART RATE: 83 BPM | DIASTOLIC BLOOD PRESSURE: 75 MMHG | WEIGHT: 180 LBS | SYSTOLIC BLOOD PRESSURE: 113 MMHG | BODY MASS INDEX: 33.99 KG/M2 | HEIGHT: 61 IN

## 2021-08-02 DIAGNOSIS — M41.125 ADOLESCENT IDIOPATHIC SCOLIOSIS OF THORACOLUMBAR REGION: ICD-10-CM

## 2021-08-02 DIAGNOSIS — M54.2 MUSCULOSKELETAL NECK PAIN: ICD-10-CM

## 2021-08-02 DIAGNOSIS — M41.20 SCOLIOSIS (AND KYPHOSCOLIOSIS), IDIOPATHIC: Primary | ICD-10-CM

## 2021-08-02 PROCEDURE — 99214 OFFICE O/P EST MOD 30 MIN: CPT | Performed by: NURSE PRACTITIONER

## 2021-08-02 RX ORDER — FLUVOXAMINE MALEATE 50 MG/1
50 TABLET, COATED ORAL NIGHTLY
COMMUNITY

## 2021-08-02 NOTE — PROGRESS NOTES
Subjective   History of Present Illness: Charlie Wells is a 50 y.o. male is being seen for consultation today at the request of Rody Moreno MD for his thoracolumbar scoliosis.   Patient is accompanied by caregiver of group home.  Patient presents with type I scoliosis.  He has had no interventions.  Patient also has numerous other medical issues including megacolon, Hirschsprung's disease, and has significant development delay, seizure disorder and tardive dyskinesia.  patient reports no pain and caregiver agrees as she states that he does vocalize pain.  the reason for visit is due to patients worsening lean to the right and head tilt to the right.  Caregiver does report that patient has of late been refusing several aspects of his care including self-care, and physical activity.  Patient denies any arm pain or leg pain, thoracic pain or back pain at this time.  He does state after repeated questions if his neck hurt and he stated yes on the right side.    Neck Pain   This is a chronic problem. The current episode started more than 1 month ago. The problem occurs intermittently. The problem has been unchanged. The pain is associated with nothing. The pain is present in the right side. The quality of the pain is described as aching. The symptoms are aggravated by position. Pertinent negatives include no chest pain, fever, headaches, leg pain, numbness, photophobia, tingling or weakness.       The following portions of the patient's history were reviewed and updated as appropriate: allergies, current medications, past family history, past medical history, past social history, past surgical history and problem list.    Review of Systems   Constitutional: Negative for chills, fatigue and fever.   HENT: Negative for congestion, sinus pressure and sinus pain.    Eyes: Negative for photophobia, pain and visual disturbance.   Respiratory: Negative for cough, chest tightness, shortness of breath and wheezing.   "  Cardiovascular: Negative for chest pain and palpitations.   Gastrointestinal: Positive for constipation. Negative for abdominal pain, diarrhea, nausea and vomiting.   Genitourinary: Negative for difficulty urinating, flank pain and testicular pain.   Musculoskeletal: Positive for neck pain. Negative for neck stiffness.   Skin: Negative for rash and wound.   Neurological: Positive for seizures. Negative for tingling, syncope, speech difficulty, weakness, numbness and headaches.   Psychiatric/Behavioral: Positive for behavioral problems.   All other systems reviewed and are negative.      Objective     ./75 (BP Location: Left arm, Patient Position: Sitting, Cuff Size: Adult)   Pulse 83   Ht 154.9 cm (61\")   Wt 81.6 kg (180 lb)   BMI 34.01 kg/m²    Body mass index is 34.01 kg/m².      Physical Exam  Vitals reviewed.   Eyes:      Pupils: Pupils are equal, round, and reactive to light.   Pulmonary:      Effort: Pulmonary effort is normal.   Neurological:      Deep Tendon Reflexes: Strength normal.       Neurologic Exam     Mental Status   Disoriented to person.   Speech: (Delay)  Level of consciousness: alert    Cranial Nerves     CN III, IV, VI   Pupils are equal, round, and reactive to light.    Motor Exam     Strength   Strength 5/5 throughout.     Sensory Exam   Light touch normal.     Gait, Coordination, and Reflexes     Reflexes   Right Perkins: absent  Left Perkins: absent    Spine Musculoskeletal Exam    Gait      Short leg: right    Inspection      Leg length disparity: left greater than right    Thoracic scoliosis: right    Lumbar scoliosis: left    Shoulder elevation: left    Palpation      Cervical Spine      Masses: none    Spasms: none    Tenderness: present      Paraspinous: right      Thoracolumbar      Masses: none    Spasms: none    Lower extremity muscle tone: decreased    Tenderness: none    Range of Motion      Cervical Spine      Cervical flexion: chin to chest    Cervical flexion - " associated detail: no pain      Thoracolumbar      Thoracolumbar flexion: 50%    Thoracolumbar flexion - associated detail: no pain    Thoracolumbar extension: 25%    Thoracolumbar extension - associated detail: guarding    Strength      Cervical Spine      Cervical spine motor exam is normal.    Reflexes      Cervical spine reflexes are normal.      Right Spine        Perkins: absent      Left Spine        Perkins: absent        Assessment/Plan   Independent Review of Radiographic Studies:      I personally reviewed the images from the following studies.    Scoliosis x-rays 10/21/2020    Levocurvature with apex at L1-L2 measuring approximately 60 degrees.  Extra curvature with apex at T7-T8 measuring about 50 degrees.    Medical Decision Making:      Charlie Corbin a 50 y.o. male being seen as a new patient to clinic for review of his scoliosis.  Patient accompanied by his caregiver from his group home.  Patient's physical exam demonstrated no muscle weakness.   Patient's x-rays were reviewed demonstrating above findings.  Patient has no radiculopathy and after repeated questioning did complain of some muscle pain along the right side of his neck, this is felt due to positioning.  No café au lait spots noted, no hair/dimple patches over lumbar spine.  He did have very large abdomen indicative of his megacolon that is chronic.  Patient upon command would straighten his neck out fairly well but then would revert back to a right head and neck tilt.  Recommendations are for intense physical therapy to help strengthen his neck musculature fell due to deconditioning.  We will follow him back up in 1 year with repeat imaging.    .Procedures      Diagnoses and all orders for this visit:    1. Scoliosis (and kyphoscoliosis), idiopathic (Primary)    2. Adolescent idiopathic scoliosis of thoracolumbar region  -     Cancel: Ambulatory Referral to Physical Therapy Evaluate and treat; Strengthening, Stretching, ROM  -      Ambulatory Referral to Physical Therapy Evaluate and treat; Stretching, ROM, Strengthening  -     XR Scoliosis Complete Including Supine & Erect; Future    3. Musculoskeletal neck pain      Return in about 1 year (around 8/2/2022) for Next scheduled follow up.    This patient was examined wearing appropriate personal protective equipment.     Charlie Wells  reports that he has never smoked. He has never used smokeless tobacco..        Patient's blood pressure was reviewed.  Recommendations for  a low-salt diet and exercise to maintain/improve BP in addition to taking any presribed medications.           Andie Schuster, NICOLE  08/02/21  14:52 EDT

## 2021-08-18 ENCOUNTER — PATIENT MESSAGE (OUTPATIENT)
Dept: FAMILY MEDICINE CLINIC | Facility: CLINIC | Age: 51
End: 2021-08-18

## 2021-08-18 DIAGNOSIS — R41.89 COGNITIVE AND BEHAVIORAL CHANGES: ICD-10-CM

## 2021-08-18 DIAGNOSIS — R46.89 COGNITIVE AND BEHAVIORAL CHANGES: ICD-10-CM

## 2021-08-18 DIAGNOSIS — G40.909 SEIZURE DISORDER (HCC): Primary | ICD-10-CM

## 2021-08-20 RX ORDER — MONTELUKAST SODIUM 10 MG/1
TABLET ORAL
Qty: 30 TABLET | Refills: 0 | Status: SHIPPED | OUTPATIENT
Start: 2021-08-20 | End: 2021-09-17

## 2021-08-20 RX ORDER — LIOTHYRONINE SODIUM 5 UG/1
TABLET ORAL
Qty: 30 TABLET | Refills: 0 | Status: SHIPPED | OUTPATIENT
Start: 2021-08-20 | End: 2021-09-17

## 2021-08-24 ENCOUNTER — TREATMENT (OUTPATIENT)
Dept: PHYSICAL THERAPY | Facility: CLINIC | Age: 51
End: 2021-08-24

## 2021-08-24 DIAGNOSIS — M41.125 ADOLESCENT IDIOPATHIC SCOLIOSIS OF THORACOLUMBAR REGION: Primary | ICD-10-CM

## 2021-08-24 PROCEDURE — 97162 PT EVAL MOD COMPLEX 30 MIN: CPT | Performed by: PHYSICAL THERAPIST

## 2021-08-24 PROCEDURE — 97110 THERAPEUTIC EXERCISES: CPT | Performed by: PHYSICAL THERAPIST

## 2021-08-24 NOTE — PROGRESS NOTES
Physical Therapy Initial Evaluation and Plan of Care    Patient: Charlie Wells   : 1970  Diagnosis/ICD-10 Code:  Adolescent idiopathic scoliosis of thoracolumbar region [M41.125]  Referring practitioner: NICOLE Peraza  Date of Initial Visit: 2021  Today's Date: 2021  Patient seen for 1 sessions           Subjective Questionnaire: Oswestry: 17/50 ( 34%)      Subjective Evaluation    History of Present Illness  Mechanism of injury: Pt is referred to therapy due to back pain, with Dx of idiopathic scoliosis of thoracolumbar spine. Pt resides in a group home and doesn't communicate well, answers yes and no , his caretaker  providing the info and medical hx. He sleeps in a hospital bed due to sleep apnea. Deniesany pain at night.     Aggravating factors: standing/ walking / bending and inc activities    Relieving factors: sitting/ lying down    Functional limitation: physical activities, he has chores at home and he does ok most of the time.     PMH: see hx section         Quality of life: fair    Pain  Current pain ratin  At best pain ratin  At worst pain ratin    Social Support  Lives in: community-based residential facility    Patient Goals  Patient goals for therapy: decreased pain           Precautions:     Objective          Postural Observations  Seated posture: poor  Standing posture: poor    Additional Postural Observation Details  Presents with severe FHP/ head tilted to R and rotared to L   Flexed posture/ dec thoracic and lumbar lordosis/ inc thoracic kyphosis / scoliosis   Wearing ankle brace L ankle  Ambulates w/o a.d. slow gait w/ mod gait deviation  Pt follows simple instructions and commands    Active Range of Motion     Additional Active Range of Motion Details  Cervical ROM: mod limitation in all directions  Thoracic ROM: mod limitations in all directions    Lumbar ROM: mod / max limitations both flex and ext    LE ROM: wfl B     MMT B LE: grossly 4/5 for all ms  groups          Assessment & Plan     Assessment  Impairments: abnormal gait, abnormal or restricted ROM, activity intolerance, lacks appropriate home exercise program and pain with function  Assessment details: Pt is a 49 y/o m referred to therapy due to back pain. Pt presents with severe scoliosis and FHP. He has hx of intellectual disability and mental retardation. He resides in a group home. He has minimal ability to communicate but follows simple commands and instructions. . Upon initial evaluation pt exhibits the above impairments and functional limitations. Impairments affect ability to perform simple activities with inc pain with prolonged standing/ walking. Pt may benefit from skilled physical therapy to address impairments, reduce pain , improve overall functional mobility. Discussed increasing his activities with his caretaker. Pt to perf his HEP daily and walk at least 30 min 3xweek.   Prognosis: fair  Functional Limitations: walking, uncomfortable because of pain and standing  Goals  Plan Goals: STGs:  In 4 weeks    1- Pt will be compliant with initial HEP   2- Pt will tolerate progression of his HEP     LTGs: By DC     1- Pt will report  pain reduction, worse pain no greater than 3/10  2- Pt will be compliant with final HEP   3- Pt will improve Oswestry score compare to initial score at eval indicating functional improvement and pain reduction  4- Pt will demonstrate improved posture     Plan  Therapy options: will be seen for skilled physical therapy services  Planned modality interventions: high voltage pulsed current (pain management) and ultrasound  Planned therapy interventions: abdominal trunk stabilization, functional ROM exercises, home exercise program, manual therapy, neuromuscular re-education, postural training, strengthening and therapeutic activities  Frequency: 1x week  Treatment plan discussed with: caregiver  Plan details: 20 visits        See flow sheet for treatment detail    History  # of Personal Factors and/or Comorbidities: MODERATE (1-2)  Examination of Body System(s): # of elements: MODERATE (3)  Clinical Presentation: EVOLVING  Clinical Decision Making: MODERATE          Timed:         Manual Therapy:         mins  73857;     Therapeutic Exercise:    15     mins  01828;     Neuromuscular April:        mins  56074;    Therapeutic Activity:          mins  88292;     Gait Training:           mins  93676;     Ultrasound:          mins  14742;    Ionto                                   mins   87486  Self Care                            mins   18642  Canal repositioning           mins    57663      Un-Timed:  Electrical Stimulation:         mins  69605 ( );  Dry Needling          mins self-pay  Traction          mins 63682  Low Eval          Mins  94846  Mod Eval      30    Mins  71764  High Eval                            Mins  27203  Re-Eval                               mins  33138        Timed Treatment:  15    mins   Total Treatment:    45    mins    PT SIGNATURE: Ralph Atkins PT, CLT   DATE TREATMENT INITIATED: 8/24/2021    Initial Certification  Certification Period: 11/22/2021  I certify that the therapy services are furnished while this patient is under my care.  The services outlined above are required by this patient, and will be reviewed every 90 days.     PHYSICIAN: Andie Schuster APRN      DATE:     Please sign and return via fax to [phone].. Thank you, [facility].

## 2021-09-13 ENCOUNTER — TELEMEDICINE (OUTPATIENT)
Dept: FAMILY MEDICINE CLINIC | Facility: CLINIC | Age: 51
End: 2021-09-13

## 2021-09-13 DIAGNOSIS — M41.20 SCOLIOSIS (AND KYPHOSCOLIOSIS), IDIOPATHIC: ICD-10-CM

## 2021-09-13 DIAGNOSIS — F41.9 ANXIETY: Primary | ICD-10-CM

## 2021-09-13 PROCEDURE — 99213 OFFICE O/P EST LOW 20 MIN: CPT | Performed by: FAMILY MEDICINE

## 2021-09-13 RX ORDER — CLONAZEPAM 0.5 MG/1
TABLET ORAL
Status: ON HOLD
Start: 2021-09-13 | End: 2022-05-02

## 2021-09-13 NOTE — PROGRESS NOTES
Chief Complaint  Back Pain and Anxiety    Subjective          Charlie Wells presents to [facility]  He is starting physical therapy for his back and posture.  His clonazepam dose has been decreased.   Back Pain  This is a chronic problem. The current episode started more than 1 year ago. The problem has been waxing and waning since onset. The pain is present in the lumbar spine. The quality of the pain is described as aching.   Anxiety            Objective   Vital Signs:   There were no vitals taken for this visit.    Physical Exam  Constitutional:       General: He is not in acute distress.  Pulmonary:      Effort: Pulmonary effort is normal.   Neurological:      Mental Status: Mental status is at baseline.   Psychiatric:         Mood and Affect: Affect is blunt.         Cognition and Memory: Cognition is impaired.        Result Review :   The following data was reviewed by: Rody Moreno MD on 09/13/2021:  Common labs    Common Labsle 1/5/21 1/5/21 1/5/21 6/9/21 6/9/21 6/9/21    1011 1011 1011 1422 1422 1422   Glucose  82   71    BUN  21 (A)   15    Creatinine  1.11   0.97    eGFR Non African Am  70   82    Sodium  139   141    Potassium  4.5   4.2    Chloride  99   100    Calcium  9.7   9.6    Albumin  4.30   4.00    Total Bilirubin  0.3   0.4    Alkaline Phosphatase  67   72    AST (SGOT)  40   42 (A)    ALT (SGPT)  21   22    WBC 4.76   4.90     Hemoglobin 14.4   15.0     Hematocrit 42.6   45.0     Platelets 175   142     Total Cholesterol      171   Triglycerides      112   HDL Cholesterol      42   LDL Cholesterol       109 (A)   PSA   0.883      (A) Abnormal value                      Assessment and Plan    Diagnoses and all orders for this visit:    1. Anxiety (Primary)  -     clonazePAM (KlonoPIN) 0.5 MG tablet; 1/2 tablet each morning, 1 full tablet each evening    2. Scoliosis (and kyphoscoliosis), idiopathic        Follow Up   No follow-ups on file.  Patient was  given instructions and counseling regarding his condition or for health maintenance advice. Please see specific information pulled into the AVS if appropriate.       Answers for HPI/ROS submitted by the patient on 9/13/2021  What is the primary reason for your visit?: Other  Please describe your symptoms.: 90 day review  Have you had these symptoms before?: No  How long have you been having these symptoms?: 1-4 days  Please list any medications you are currently taking for this condition.: N/A  Please describe any probable cause for these symptoms. : N/A

## 2021-09-17 DIAGNOSIS — R46.89 COGNITIVE AND BEHAVIORAL CHANGES: Primary | ICD-10-CM

## 2021-09-17 DIAGNOSIS — G40.909 SEIZURE DISORDER (HCC): ICD-10-CM

## 2021-09-17 DIAGNOSIS — R41.89 COGNITIVE AND BEHAVIORAL CHANGES: Primary | ICD-10-CM

## 2021-09-17 RX ORDER — LIOTHYRONINE SODIUM 5 UG/1
TABLET ORAL
Qty: 30 TABLET | Refills: 5 | Status: SHIPPED | OUTPATIENT
Start: 2021-09-17 | End: 2022-03-06

## 2021-09-17 RX ORDER — MONTELUKAST SODIUM 10 MG/1
TABLET ORAL
Qty: 30 TABLET | Refills: 5 | Status: SHIPPED | OUTPATIENT
Start: 2021-09-17 | End: 2022-03-06

## 2021-09-20 ENCOUNTER — HOSPITAL ENCOUNTER (OUTPATIENT)
Dept: MRI IMAGING | Facility: HOSPITAL | Age: 51
Discharge: HOME | End: 2021-09-20
Admitting: FAMILY MEDICINE

## 2021-09-20 DIAGNOSIS — R41.89 COGNITIVE AND BEHAVIORAL CHANGES: ICD-10-CM

## 2021-09-20 DIAGNOSIS — G40.909 SEIZURE DISORDER (HCC): ICD-10-CM

## 2021-09-20 DIAGNOSIS — R46.89 COGNITIVE AND BEHAVIORAL CHANGES: ICD-10-CM

## 2021-09-20 PROCEDURE — 25010000002 GADOTERIDOL PER 1 ML: Performed by: FAMILY MEDICINE

## 2021-09-20 PROCEDURE — A9579 GAD-BASE MR CONTRAST NOS,1ML: HCPCS | Performed by: FAMILY MEDICINE

## 2021-09-20 PROCEDURE — 70553 MRI BRAIN STEM W/O & W/DYE: CPT

## 2021-09-20 RX ADMIN — GADOTERIDOL 18 ML: 279.3 INJECTION, SOLUTION INTRAVENOUS at 11:22

## 2021-09-21 ENCOUNTER — TREATMENT (OUTPATIENT)
Dept: PHYSICAL THERAPY | Facility: CLINIC | Age: 51
End: 2021-09-21

## 2021-09-21 DIAGNOSIS — M41.125 ADOLESCENT IDIOPATHIC SCOLIOSIS OF THORACOLUMBAR REGION: Primary | ICD-10-CM

## 2021-09-21 PROCEDURE — 97110 THERAPEUTIC EXERCISES: CPT | Performed by: PHYSICAL THERAPIST

## 2021-09-21 NOTE — PROGRESS NOTES
Physical Therapy Daily Progress Note      Patient: Charlie Wells   : 1970  Diagnosis/ICD-10 Code:  Adolescent idiopathic scoliosis of thoracolumbar region [M41.125]  Referring practitioner: NICOLE Peraza  Date of Initial Visit: Type: THERAPY  Noted: 2021  Today's Date: 2021  Patient seen for 2 sessions.  POC 20, 1x/wk, exp. 21    PMHX:  intellectual disability, mental retardation, poor communicator, sleep apnea           Subjective :  Pt. With difficulty communicating.  He denies pain when asked. Caregiver states that they are walking with him in the hallways and doing HEP.     Objective   See Exercise, Manual, and Modality Logs for complete treatment.       Assessment/Plan:  Pt. Has a lot of difficulty following commands and staying on task.  He does not c/o pain or appear to be in pain. He was able to progress without issue but needs consistent cueing both verbal  and visual to complete exercises.         Progress per Plan of Care        Timed:                                                                           Manual Therapy:                 mins  10768;                      Therapeutic Exercise:    30     mins  50435;     Neuromuscular April:        mins  87186;    Therapeutic Activity:           mins  20011;     Gait Training:                      mins  23962;     Ultrasound:                          mins  09201;    Ionto                                   mins   30786  Self Care                            mins   12462  Canal repositioning           mins    87011        Un-Timed:  Electrical Stimulation:         mins  25764 ( );  Dry Needling                       mins self-pay  Traction                               mins 68404  Low Eval                             Mins  56906  Mod Eval                             Mins  91944  High Eval                            Mins  14552  Re-Eval                               mins  71854           Timed Treatment:  30    mins    Total Treatment:    30    mins        Jamaica Villa, PTA  Physical Therapist Assistant

## 2021-09-27 NOTE — PROGRESS NOTES
Discharge Summary  Discharge Summary from Occupational Therapy Report        Number of Visits: 1  Subjective the patient is a 48 yr old male   He has lived in a group home for about 30 yrs.  He has development /cognitive issues     He is here today for BLE lymphedema    The pt also has sleep apnea but refuses to wear the Cpac machine.    He, therefore ,has a recliner, which has helped the snoring issue but he refuses to elevate his feet; resulting in  issues with lymphedema.   He does have compression socks which he was wearing   He does not report pain   The caregiver reports that he does walk 20 min x2 a day on a treadmill or he walks outside.             Discharge Status of Patient: pt was seen for evaluation    He did not establish follow up     Goals: Not Met    Discharge Plan: pt did not establish follow up,therefore goals unknown       Date of Discharge 8/14/20        Rachael Trujillo OT  Occupational Therapist   Last visit- 08/09/2021 type 2 diabetes    Last refill- not listed on med list    Last labs- 07/28/2021 hemoglobin a1c  Future Appointments   Date Time Provider Ernie Valencia   12/9/2021  2:30 PM Chris Landa MD PL ENT [facility]

## 2021-09-28 ENCOUNTER — TREATMENT (OUTPATIENT)
Dept: PHYSICAL THERAPY | Facility: CLINIC | Age: 51
End: 2021-09-28

## 2021-09-28 DIAGNOSIS — M41.125 ADOLESCENT IDIOPATHIC SCOLIOSIS OF THORACOLUMBAR REGION: Primary | ICD-10-CM

## 2021-09-28 PROCEDURE — 97112 NEUROMUSCULAR REEDUCATION: CPT | Performed by: PHYSICAL THERAPIST

## 2021-09-28 PROCEDURE — 97110 THERAPEUTIC EXERCISES: CPT | Performed by: PHYSICAL THERAPIST

## 2021-09-28 NOTE — PROGRESS NOTES
Physical Therapy Daily Progress Note      Patient: Charlie Wells   : 1970  Diagnosis/ICD-10 Code:  Adolescent idiopathic scoliosis of thoracolumbar region [M41.125]  Referring practitioner: NICOLE Peraza  Date of Initial Visit: Type: THERAPY  Noted: 2021  Today's Date: 2021  Patient seen for 3 sessions.  POC 20, 1x/wk, exp. 21    PMHX:  intellectual disability, mental retardation, poor communicator, sleep apnea           Subjective :  Pt. Denies pain.   Caregiver states that they are walking with him in the hallways and doing HEP.     Objective   See Exercise, Manual, and Modality Logs for complete treatment. Progression as noted.       Assessment/Plan: Pt. Requires constant cueing to perform there ex and stay on task. He was able to progress without complaint.       Progress per Plan of Care        Timed:                                                                           Manual Therapy:                 mins  34854;                      Therapeutic Exercise:    20     mins  12623;     Neuromuscular April:    10    mins  58298;    Therapeutic Activity:           mins  25724;     Gait Training:                      mins  23044;     Ultrasound:                          mins  32297;    Ionto                                   mins   33418  Self Care                            mins   19188  Canal repositioning           mins    35594        Un-Timed:  Electrical Stimulation:         mins  41107 ( );  Dry Needling                       mins self-pay  Traction                               mins 19261  Low Eval                             Mins  05628  Mod Eval                             Mins  63986  High Eval                            Mins  49328  Re-Eval                               mins  61567           Timed Treatment:  30    mins   Total Treatment:    30    mins        Jamaica Villa PTA  Physical Therapist Assistant

## 2021-10-05 ENCOUNTER — TREATMENT (OUTPATIENT)
Dept: PHYSICAL THERAPY | Facility: CLINIC | Age: 51
End: 2021-10-05

## 2021-10-05 DIAGNOSIS — M41.125 ADOLESCENT IDIOPATHIC SCOLIOSIS OF THORACOLUMBAR REGION: Primary | ICD-10-CM

## 2021-10-05 PROCEDURE — 97112 NEUROMUSCULAR REEDUCATION: CPT | Performed by: PHYSICAL THERAPIST

## 2021-10-05 PROCEDURE — 97110 THERAPEUTIC EXERCISES: CPT | Performed by: PHYSICAL THERAPIST

## 2021-10-05 NOTE — PROGRESS NOTES
Physical Therapy Daily Progress Note    Patient: Charlie Wells  : 1970  Referring practitioner: NICOLE Peraza  Today's Date: 10/5/2021    VISIT#: 4    Subjective   Pt reports: he is doing ok today, denies any pain presently.       Objective     See Exercise, Manual, and Modality Logs for complete treatment.     Patient Education:    Assessment & Plan     Assessment  Assessment details: Pt tolerated today's treatment session well. Requires constant vcs to stay on task, and perf correctly. He is cooperative and follows commands.           Progress per Plan of Care            Timed:         Manual Therapy:         mins  29850;     Therapeutic Exercise:   20      mins  57708;     Neuromuscular April: 10       mins  56064;    Therapeutic Activity:          mins  71206;     Gait Training:           mins  91198;     Ultrasound:          mins  43876;    Ionto                                   mins   72428  Self Care                            mins   63692  Canal repositioning           mins    18182    Un-Timed:  Electrical Stimulation:         mins  95735 ( );  Traction          mins 43650  Low Eval          Mins  81436  Mod Eval          Mins  52137  High Eval                            Mins  70913  Re-Eval                               mins  44273    Timed Treatment:  30    mins   Total Treatment:    30    mins    Ralph Atkins, PT, CLT  Physical Therapist

## 2021-10-12 ENCOUNTER — TREATMENT (OUTPATIENT)
Dept: PHYSICAL THERAPY | Facility: CLINIC | Age: 51
End: 2021-10-12

## 2021-10-12 DIAGNOSIS — M41.125 ADOLESCENT IDIOPATHIC SCOLIOSIS OF THORACOLUMBAR REGION: Primary | ICD-10-CM

## 2021-10-12 PROCEDURE — 97110 THERAPEUTIC EXERCISES: CPT | Performed by: PHYSICAL THERAPIST

## 2021-10-12 PROCEDURE — 97112 NEUROMUSCULAR REEDUCATION: CPT | Performed by: PHYSICAL THERAPIST

## 2021-10-12 NOTE — PROGRESS NOTES
Physical Therapy Daily Progress Note      Patient: Charlie Wells   : 1970  Diagnosis/ICD-10 Code:  Adolescent idiopathic scoliosis of thoracolumbar region [M41.125]  Referring practitioner: NICOLE Peraza  Date of Initial Visit: Type: THERAPY  Noted: 2021  Today's Date: 10/12/2021  Patient seen for 5 sessions.  POC 20, 1x/wk, exp. 21    PMHX:  intellectual disability, mental retardation, poor communicator, sleep apnea           Subjective :  No new complaints/issues.     Objective   See Exercise, Manual, and Modality Logs for complete treatment.       Assessment/Plan: Pt. More lethargic today and required frequent cueing to stay alert.       Progress per Plan of Care        Timed:                                                                           Manual Therapy:                 mins  38823;                      Therapeutic Exercise:    20     mins  35492;     Neuromuscular April:    10    mins  16599;    Therapeutic Activity:           mins  86611;     Gait Training:                      mins  03215;     Ultrasound:                          mins  62806;    Ionto                                   mins   26946  Self Care                            mins   84001  Canal repositioning           mins    39933        Un-Timed:  Electrical Stimulation:         mins  73282 ( );  Dry Needling                       mins self-pay  Traction                               mins 08205  Low Eval                             Mins  53345  Mod Eval                             Mins  40711  High Eval                            Mins  99022  Re-Eval                               mins  84726           Timed Treatment:  30    mins   Total Treatment:    30    mins        Jamaica Villa PTA  Physical Therapist Assistant

## 2021-10-15 DIAGNOSIS — R13.10 DYSPHAGIA, UNSPECIFIED TYPE: Primary | ICD-10-CM

## 2021-10-20 RX ORDER — LEVOTHYROXINE SODIUM 0.12 MG/1
TABLET ORAL
Qty: 90 TABLET | Refills: 3 | Status: ON HOLD | OUTPATIENT
Start: 2021-10-20 | End: 2022-05-02

## 2021-10-27 ENCOUNTER — TREATMENT (OUTPATIENT)
Dept: PHYSICAL THERAPY | Facility: CLINIC | Age: 51
End: 2021-10-27

## 2021-10-27 DIAGNOSIS — M41.125 ADOLESCENT IDIOPATHIC SCOLIOSIS OF THORACOLUMBAR REGION: Primary | ICD-10-CM

## 2021-10-27 PROCEDURE — 97110 THERAPEUTIC EXERCISES: CPT | Performed by: PHYSICAL THERAPIST

## 2021-10-27 PROCEDURE — 97530 THERAPEUTIC ACTIVITIES: CPT | Performed by: PHYSICAL THERAPIST

## 2021-10-27 NOTE — PROGRESS NOTES
Physical Therapy Progress Note/ DC summary    Patient: Charlie Wells  : 1970  Referring practitioner: NICOLE Peraza  Today's Date: 10/27/2021    VISIT#: [id number]    Subjective   Pt reports: no new co. Denies any pain this morning.       Objective     See Exercise, Manual, and Modality Logs for complete treatment.     Patient Education:    Assessment & Plan     Assessment  Assessment details: Pt has been seen for 6 visits. He is cooperative and follows instructions and commands but most of the time he is lethargic and requires constant vcs and prompts to stay on task. He is easily distracted. We have not made a lot of progress due to his mental disability and his chronic condition and scoliosis. His current condition is not reversible. Has issued a HEP for the staff to continue to work with him.     Goals  Plan Goals: Goals  Plan Goals: STGs:  In 4 weeks  ( all MET)    1- Pt will be compliant with initial HEP   2- Pt will tolerate progression of his HEP     LTGs: By DC     1- Pt will report  pain reduction, worse pain no greater than 3/10 ( MET)  2- Pt will be compliant with final HEP   3- Pt will improve Oswestry score compare to initial score at eval indicating functional improvement and pain reduction  4- Pt will demonstrate improved posture        Plan  Plan details: Pt will be DC                       Timed:         Manual Therapy:         mins  82949;     Therapeutic Exercise:   15      mins  17870;     Neuromuscular April:        mins  12678;    Therapeutic Activity:     15     mins  40059;     Gait Training:           mins  41766;     Ultrasound:          mins  05288;    Ionto                                   mins   07994  Self Care                            mins   56874  Canal repositioning           mins    86314    Un-Timed:  Electrical Stimulation:         mins  36098 ( );  Traction          mins 52814  Low Eval          Mins  69481  Mod Eval          Mins  87677  High Eval                             Mins  40710  Re-Eval                               mins  93059    Timed Treatment: 30     mins   Total Treatment:    30    mins    Ralph Atkins PT, CLT  Physical Therapist

## 2021-11-04 ENCOUNTER — TELEPHONE (OUTPATIENT)
Dept: FAMILY MEDICINE CLINIC | Facility: CLINIC | Age: 51
End: 2021-11-04

## 2021-11-04 NOTE — TELEPHONE ENCOUNTER
VERY CONSTIPATED ONLY LIQUID COMING OUT.  CAREGIVER THINKING HE NEEDS XRAY OF ABD, CONCERNED  POSSIBLE OBSTRUCTION, IF SO ORDER SENT TO [facility].  PLEASE INSTRUCT.

## 2021-11-04 NOTE — TELEPHONE ENCOUNTER
DAVID CALLED BACK TO LET YOU KNOW THAT PATIENT IS DOING BETTER, HIS STOMACH IS SOFT, HAD A LOT OF OUTPUT AND DECREASED LIQUID       PLEASE ADVISE   DAVID CAGE (EC) [phone] (H)

## 2021-11-17 ENCOUNTER — PATIENT MESSAGE (OUTPATIENT)
Dept: FAMILY MEDICINE CLINIC | Facility: CLINIC | Age: 51
End: 2021-11-17

## 2021-11-17 DIAGNOSIS — R41.89 COGNITIVE AND BEHAVIORAL CHANGES: ICD-10-CM

## 2021-11-17 DIAGNOSIS — R46.89 COGNITIVE AND BEHAVIORAL CHANGES: ICD-10-CM

## 2021-11-17 DIAGNOSIS — G24.01 TARDIVE DYSKINESIA: Primary | ICD-10-CM

## 2021-11-17 DIAGNOSIS — R62.50 DEVELOPMENT DELAY: ICD-10-CM

## 2021-11-24 ENCOUNTER — TELEPHONE (OUTPATIENT)
Dept: PHYSICAL THERAPY | Facility: CLINIC | Age: 51
End: 2021-11-24

## 2021-11-24 NOTE — TELEPHONE ENCOUNTER
Left VM message for Dr Moreno's medical assistant regarding new referral we received for Tardive dyskinesia on Charlie Wells.  TD is due to medication and is medication induced. Pt is not a candidate for rehab due to his mental status and his intellectual disabilities and no carry over with exercises and instructions.   Pt has not been scheduled for PT/OT at this time , if the physician still wants him to be evaluated we will schedule him for evaluation.

## 2021-12-01 ENCOUNTER — CLINICAL SUPPORT (OUTPATIENT)
Dept: FAMILY MEDICINE CLINIC | Facility: CLINIC | Age: 51
End: 2021-12-01

## 2021-12-01 VITALS — TEMPERATURE: 98.2 F

## 2021-12-01 DIAGNOSIS — Z23 NEED FOR TUBERCULOSIS VACCINATION: Primary | ICD-10-CM

## 2021-12-01 PROCEDURE — 86580 TB INTRADERMAL TEST: CPT | Performed by: FAMILY MEDICINE

## 2021-12-03 ENCOUNTER — CLINICAL SUPPORT (OUTPATIENT)
Dept: FAMILY MEDICINE CLINIC | Facility: CLINIC | Age: 51
End: 2021-12-03

## 2021-12-10 RX ORDER — FUROSEMIDE 40 MG/1
TABLET ORAL
Qty: 45 TABLET | Refills: 9 | Status: ON HOLD | OUTPATIENT
Start: 2021-12-10 | End: 2022-05-02

## 2021-12-13 ENCOUNTER — LAB (OUTPATIENT)
Dept: FAMILY MEDICINE CLINIC | Facility: CLINIC | Age: 51
End: 2021-12-13

## 2021-12-13 ENCOUNTER — OFFICE VISIT (OUTPATIENT)
Dept: FAMILY MEDICINE CLINIC | Facility: CLINIC | Age: 51
End: 2021-12-13

## 2021-12-13 VITALS
BODY MASS INDEX: 35.9 KG/M2 | WEIGHT: 190 LBS | HEART RATE: 84 BPM | OXYGEN SATURATION: 95 % | DIASTOLIC BLOOD PRESSURE: 87 MMHG | SYSTOLIC BLOOD PRESSURE: 125 MMHG | TEMPERATURE: 97.7 F

## 2021-12-13 DIAGNOSIS — E03.9 ACQUIRED HYPOTHYROIDISM: ICD-10-CM

## 2021-12-13 DIAGNOSIS — K59.09 CONSTIPATION, CHRONIC: Primary | ICD-10-CM

## 2021-12-13 LAB — TSH SERPL DL<=0.05 MIU/L-ACNC: 0.29 UIU/ML (ref 0.27–4.2)

## 2021-12-13 PROCEDURE — 36415 COLL VENOUS BLD VENIPUNCTURE: CPT | Performed by: FAMILY MEDICINE

## 2021-12-13 PROCEDURE — 84443 ASSAY THYROID STIM HORMONE: CPT | Performed by: FAMILY MEDICINE

## 2021-12-13 PROCEDURE — 99214 OFFICE O/P EST MOD 30 MIN: CPT | Performed by: FAMILY MEDICINE

## 2021-12-13 RX ORDER — DEUTETRABENAZINE 9 MG/1
9 TABLET, COATED ORAL DAILY
COMMUNITY
Start: 2021-12-10

## 2021-12-13 RX ORDER — TRIAMCINOLONE ACETONIDE 1 MG/G
1 CREAM TOPICAL 2 TIMES DAILY PRN
COMMUNITY
Start: 2021-10-14

## 2021-12-13 NOTE — PROGRESS NOTES
Chief Complaint  Med Refill (90 f/u )    Subjective          Charlie Wells presents to [facility]  Constipation  This is a chronic problem. The current episode started more than 1 year ago. The problem has been waxing and waning since onset. His stool frequency is 1 time per day. The stool is described as firm. The patient is on a high fiber diet. He exercises regularly. There has been adequate water intake. Associated symptoms include bloating. Pertinent negatives include no diarrhea, fecal incontinence, melena, nausea or weight loss. Risk factors include obesity. He has tried fiber, diet changes and laxatives for the symptoms. The treatment provided mild relief. (Hirschsprung's disease)   Hypothyroidism  This is a chronic problem. The current episode started more than 1 year ago. The problem occurs constantly. The problem has been unchanged. Pertinent negatives include no nausea. Nothing aggravates the symptoms.       Objective   Vital Signs:   /87 (BP Location: Right arm, Patient Position: Sitting, Cuff Size: Adult)   Pulse 84   Temp 97.7 °F (36.5 °C) (Infrared)   Wt 86.2 kg (190 lb)   SpO2 95%   BMI 35.90 kg/m²     Physical Exam  Vitals and nursing note reviewed. Exam conducted with a chaperone present.   Cardiovascular:      Rate and Rhythm: Regular rhythm.      Heart sounds: Normal heart sounds.   Pulmonary:      Breath sounds: Normal breath sounds.   Abdominal:      General: There is distension.      Palpations: There is no mass.      Tenderness: There is no abdominal tenderness.   Skin:     General: Skin is warm.   Neurological:      Mental Status: He is alert. Mental status is at baseline.   Psychiatric:         Behavior: Behavior normal.         Cognition and Memory: Cognition is impaired.        Result Review :   The following data was reviewed by: Rody Moreno MD on 12/13/2021:  Common labs    Common Labsle 1/5/21 1/5/21 1/5/21 6/9/21 6/9/21 6/9/21     1011 1011 1011 1422 1422 1422   Glucose  82   71    BUN  21 (A)   15    Creatinine  1.11   0.97    eGFR Non African Am  70   82    Sodium  139   141    Potassium  4.5   4.2    Chloride  99   100    Calcium  9.7   9.6    Albumin  4.30   4.00    Total Bilirubin  0.3   0.4    Alkaline Phosphatase  67   72    AST (SGOT)  40   42 (A)    ALT (SGPT)  21   22    WBC 4.76   4.90     Hemoglobin 14.4   15.0     Hematocrit 42.6   45.0     Platelets 175   142     Total Cholesterol      171   Triglycerides      112   HDL Cholesterol      42   LDL Cholesterol       109 (A)   PSA   0.883      (A) Abnormal value                      Assessment and Plan    Diagnoses and all orders for this visit:    1. Constipation, chronic (Primary)  Assessment & Plan:  Continue Trulance and Motegrity.  Follow up with GI as planned.        2. Acquired hypothyroidism  Assessment & Plan:  Continue current thyroid medicine for now.  I will adjust if needed when TSH is resulted.    Orders:  -     TSH      Follow Up   No follow-ups on file.  Patient was given instructions and counseling regarding his condition or for health maintenance advice. Please see specific information pulled into the AVS if appropriate.       Answers for HPI/ROS submitted by the patient on 12/7/2021  What is the primary reason for your visit?: Other  Please describe your symptoms.: Routine visit, 90 day medication and physical review.  Have you had these symptoms before?: No  How long have you been having these symptoms?: Greater than 2 weeks  Please list any medications you are currently taking for this condition.: N/A  I'm sorry, I don't know how to correctly answer these questions.

## 2022-03-03 ENCOUNTER — PATIENT MESSAGE (OUTPATIENT)
Dept: FAMILY MEDICINE CLINIC | Facility: CLINIC | Age: 52
End: 2022-03-03

## 2022-03-03 DIAGNOSIS — G40.909 SEIZURE DISORDER: Primary | ICD-10-CM

## 2022-03-06 RX ORDER — MONTELUKAST SODIUM 10 MG/1
TABLET ORAL
Qty: 30 TABLET | Refills: 4 | Status: SHIPPED | OUTPATIENT
Start: 2022-03-06

## 2022-03-06 RX ORDER — LIOTHYRONINE SODIUM 5 UG/1
TABLET ORAL
Qty: 30 TABLET | Refills: 4 | Status: ON HOLD | OUTPATIENT
Start: 2022-03-06 | End: 2022-05-02

## 2022-03-08 ENCOUNTER — ON CAMPUS - OUTPATIENT (AMBULATORY)
Dept: URBAN - METROPOLITAN AREA HOSPITAL 2 | Facility: HOSPITAL | Age: 52
End: 2022-03-08

## 2022-03-08 VITALS
HEART RATE: 76 BPM | TEMPERATURE: 97.3 F | DIASTOLIC BLOOD PRESSURE: 77 MMHG | DIASTOLIC BLOOD PRESSURE: 48 MMHG | HEART RATE: 83 BPM | HEART RATE: 77 BPM | RESPIRATION RATE: 16 BRPM | HEIGHT: 63 IN | HEART RATE: 75 BPM | DIASTOLIC BLOOD PRESSURE: 52 MMHG | DIASTOLIC BLOOD PRESSURE: 85 MMHG | OXYGEN SATURATION: 92 % | SYSTOLIC BLOOD PRESSURE: 129 MMHG | SYSTOLIC BLOOD PRESSURE: 109 MMHG | DIASTOLIC BLOOD PRESSURE: 71 MMHG | RESPIRATION RATE: 15 BRPM | OXYGEN SATURATION: 98 % | HEART RATE: 72 BPM | SYSTOLIC BLOOD PRESSURE: 93 MMHG | OXYGEN SATURATION: 100 % | SYSTOLIC BLOOD PRESSURE: 113 MMHG | WEIGHT: 188 LBS | SYSTOLIC BLOOD PRESSURE: 112 MMHG | SYSTOLIC BLOOD PRESSURE: 111 MMHG | HEART RATE: 78 BPM

## 2022-03-08 DIAGNOSIS — R13.10 DYSPHAGIA, UNSPECIFIED: ICD-10-CM

## 2022-03-08 PROCEDURE — 43450 DILATE ESOPHAGUS 1/MULT PASS: CPT | Performed by: INTERNAL MEDICINE

## 2022-03-08 PROCEDURE — 43235 EGD DIAGNOSTIC BRUSH WASH: CPT | Performed by: INTERNAL MEDICINE

## 2022-03-08 NOTE — TELEPHONE ENCOUNTER
Orders have been faxed.   Have a good day.    Intermediate Repair Preamble Text (Leave Blank If You Do Not Want): Undermining was performed sharply.

## 2022-03-11 ENCOUNTER — TELEPHONE (OUTPATIENT)
Dept: FAMILY MEDICINE CLINIC | Facility: CLINIC | Age: 52
End: 2022-03-11

## 2022-03-11 NOTE — TELEPHONE ENCOUNTER
PATIENT'S CARE GIVER WANTED TO GET CLARIFICATION ON PATIENT'S DEPAKOTE RX    HE HAS LAB APPT ON 16TH - AND HE NORMALLY TAKES HIS FIRST DOSE AT 7 AM 1,000MG AND THEN ONE AT NIGHT    SHOULD HE HOLD OFF ON THE MORNING DOSE BEFORE HIS LAB APPT OR TAKE AS NORMAL     PLEASE CALL AND ADVISE   DAVID CAGE (EC) [phone] (H)

## 2022-03-16 ENCOUNTER — LAB (OUTPATIENT)
Dept: FAMILY MEDICINE CLINIC | Facility: CLINIC | Age: 52
End: 2022-03-16

## 2022-03-16 DIAGNOSIS — G40.909 SEIZURE DISORDER: ICD-10-CM

## 2022-03-16 LAB — VALPROATE SERPL-MCNC: 89.1 MCG/ML (ref 50–125)

## 2022-03-16 PROCEDURE — 36415 COLL VENOUS BLD VENIPUNCTURE: CPT

## 2022-03-16 PROCEDURE — 80164 ASSAY DIPROPYLACETIC ACD TOT: CPT | Performed by: FAMILY MEDICINE

## 2022-03-21 ENCOUNTER — LAB (OUTPATIENT)
Dept: FAMILY MEDICINE CLINIC | Facility: CLINIC | Age: 52
End: 2022-03-21

## 2022-03-21 ENCOUNTER — OFFICE VISIT (OUTPATIENT)
Dept: FAMILY MEDICINE CLINIC | Facility: CLINIC | Age: 52
End: 2022-03-21

## 2022-03-21 VITALS
TEMPERATURE: 97.5 F | BODY MASS INDEX: 35.71 KG/M2 | OXYGEN SATURATION: 94 % | WEIGHT: 189 LBS | HEART RATE: 91 BPM | DIASTOLIC BLOOD PRESSURE: 76 MMHG | SYSTOLIC BLOOD PRESSURE: 104 MMHG

## 2022-03-21 DIAGNOSIS — Z12.5 PROSTATE CANCER SCREENING: ICD-10-CM

## 2022-03-21 DIAGNOSIS — R46.89 COGNITIVE AND BEHAVIORAL CHANGES: ICD-10-CM

## 2022-03-21 DIAGNOSIS — R41.89 COGNITIVE AND BEHAVIORAL CHANGES: ICD-10-CM

## 2022-03-21 DIAGNOSIS — G24.01 TARDIVE DYSKINESIA: Primary | ICD-10-CM

## 2022-03-21 DIAGNOSIS — R62.50 DEVELOPMENT DELAY: ICD-10-CM

## 2022-03-21 LAB — PSA SERPL-MCNC: 1.07 NG/ML (ref 0–4)

## 2022-03-21 PROCEDURE — 99213 OFFICE O/P EST LOW 20 MIN: CPT | Performed by: FAMILY MEDICINE

## 2022-03-21 PROCEDURE — G0103 PSA SCREENING: HCPCS | Performed by: FAMILY MEDICINE

## 2022-03-21 PROCEDURE — 36415 COLL VENOUS BLD VENIPUNCTURE: CPT | Performed by: FAMILY MEDICINE

## 2022-03-21 RX ORDER — DIVALPROEX SODIUM 500 MG/1
1 TABLET, EXTENDED RELEASE ORAL EVERY 12 HOURS
COMMUNITY

## 2022-03-21 RX ORDER — CLONAZEPAM 0.5 MG/1
1 TABLET ORAL DAILY
Status: ON HOLD | COMMUNITY
End: 2022-05-02

## 2022-03-21 RX ORDER — AMMONIUM LACTATE 12 G/100G
LOTION TOPICAL DAILY
COMMUNITY
Start: 2022-01-26

## 2022-03-21 NOTE — PROGRESS NOTES
Chief Complaint  Follow-up (3 month f/u )    Subjective          Charlie Wells presents to [facility]  Pleasant cognitively-delayed man presents with his caregiver for routine follow up.  They are requesting a referral to [facility] for PT (or OT) for help with finding the appropriate utensil for him to use for help with feeding.  He has a history of tardive dyskinesia.       Objective   Vital Signs:   /76 (BP Location: Left arm, Patient Position: Sitting, Cuff Size: Adult)   Pulse 91   Temp 97.5 °F (36.4 °C) (Infrared)   Wt 85.7 kg (189 lb)   SpO2 94%   BMI 35.71 kg/m²     Physical Exam  Vitals and nursing note reviewed. Exam conducted with a chaperone present.   Constitutional:       General: He is not in acute distress.  Cardiovascular:      Rate and Rhythm: Regular rhythm.      Heart sounds: Normal heart sounds.   Pulmonary:      Breath sounds: Normal breath sounds.   Abdominal:      General: There is distension.   Musculoskeletal:      Cervical back: Neck supple.   Neurological:      Mental Status: He is alert. Mental status is at baseline.   Psychiatric:         Mood and Affect: Mood normal.         Speech: Speech is delayed.         Behavior: Behavior is cooperative.         Cognition and Memory: Cognition is impaired.        Result Review :   The following data was reviewed by: Rody Moreno MD on 03/21/2022:  Common labs    Common Labsle 6/9/21 6/9/21 6/9/21    1422 1422 1422   Glucose  71    BUN  15    Creatinine  0.97    eGFR Non African Am  82    Sodium  141    Potassium  4.2    Chloride  100    Calcium  9.6    Albumin  4.00    Total Bilirubin  0.4    Alkaline Phosphatase  72    AST (SGOT)  42 (A)    ALT (SGPT)  22    WBC 4.90     Hemoglobin 15.0     Hematocrit 45.0     Platelets 142     Total Cholesterol   171   Triglycerides   112   HDL Cholesterol   42   LDL Cholesterol    109 (A)   (A) Abnormal value                      Assessment and Plan    Diagnoses  and all orders for this visit:    1. Tardive dyskinesia (Primary)  -     Ambulatory Referral to Physical Therapy Evaluate and treat    2. Development delay  -     Ambulatory Referral to Physical Therapy Evaluate and treat    3. Cognitive and behavioral changes  -     Ambulatory Referral to Physical Therapy Evaluate and treat    4. Prostate cancer screening  -     PSA Screen        Follow Up   No follow-ups on file.  Patient was given instructions and counseling regarding his condition or for health maintenance advice. Please see specific information pulled into the AVS if appropriate.       Answers for HPI/ROS submitted by the patient on 3/14/2022  What is the primary reason for your visit?: Other  Please describe your symptoms.: 90 day review of medications and health.  Have you had these symptoms before?: Yes  How long have you been having these symptoms?: Greater than 2 weeks  Please list any medications you are currently taking for this condition.: 90 day review of medications and health

## 2022-04-28 ENCOUNTER — OFFICE (AMBULATORY)
Dept: URBAN - METROPOLITAN AREA CLINIC 64 | Facility: CLINIC | Age: 52
End: 2022-04-28

## 2022-04-28 VITALS — HEIGHT: 63 IN | WEIGHT: 197 LBS

## 2022-04-28 DIAGNOSIS — R14.0 ABDOMINAL DISTENSION (GASEOUS): ICD-10-CM

## 2022-04-28 DIAGNOSIS — K59.00 CONSTIPATION, UNSPECIFIED: ICD-10-CM

## 2022-04-28 PROCEDURE — 99214 OFFICE O/P EST MOD 30 MIN: CPT | Performed by: INTERNAL MEDICINE

## 2022-04-28 RX ORDER — PLECANATIDE 3 MG/1
TABLET ORAL
Qty: 90 | Refills: 3 | Status: COMPLETED
End: 2022-04-28

## 2022-04-28 RX ORDER — LINACLOTIDE 290 UG/1
290 CAPSULE, GELATIN COATED ORAL
Qty: 90 | Refills: 3 | Status: ACTIVE
Start: 2022-04-28

## 2022-04-28 RX ORDER — POLYETHYLENE GLYCOL 3350 17 G/17G
68 POWDER, FOR SOLUTION ORAL
Qty: 1020 | Refills: 11 | Status: ACTIVE
Start: 2022-04-28

## 2022-04-30 ENCOUNTER — HOSPITAL ENCOUNTER (INPATIENT)
Facility: HOSPITAL | Age: 52
LOS: 8 days | Discharge: NURSING, REHAB OR LONG-TERM CARE | End: 2022-05-10
Attending: EMERGENCY MEDICINE | Admitting: INTERNAL MEDICINE

## 2022-04-30 ENCOUNTER — APPOINTMENT (OUTPATIENT)
Dept: GENERAL RADIOLOGY | Facility: HOSPITAL | Age: 52
End: 2022-04-30

## 2022-04-30 ENCOUNTER — APPOINTMENT (OUTPATIENT)
Dept: CT IMAGING | Facility: HOSPITAL | Age: 52
End: 2022-04-30

## 2022-04-30 DIAGNOSIS — R14.0 ABDOMINAL DISTENSION: ICD-10-CM

## 2022-04-30 DIAGNOSIS — R10.9 ABDOMINAL PAIN, UNSPECIFIED ABDOMINAL LOCATION: Primary | ICD-10-CM

## 2022-04-30 LAB
ALBUMIN SERPL-MCNC: 3.9 G/DL (ref 3.5–5.2)
ALBUMIN/GLOB SERPL: 1.3 G/DL
ALP SERPL-CCNC: 72 U/L (ref 39–117)
ALT SERPL W P-5'-P-CCNC: 19 U/L (ref 1–41)
ANION GAP SERPL CALCULATED.3IONS-SCNC: 12 MMOL/L (ref 5–15)
APTT PPP: 31 SECONDS (ref 61–76.5)
AST SERPL-CCNC: 35 U/L (ref 1–40)
B PARAPERT DNA SPEC QL NAA+PROBE: NOT DETECTED
B PERT DNA SPEC QL NAA+PROBE: NOT DETECTED
BACTERIA UR QL AUTO: ABNORMAL /HPF
BASOPHILS # BLD AUTO: 0.1 10*3/MM3 (ref 0–0.2)
BASOPHILS NFR BLD AUTO: 1.3 % (ref 0–1.5)
BILIRUB SERPL-MCNC: 0.2 MG/DL (ref 0–1.2)
BILIRUB UR QL STRIP: NEGATIVE
BUN SERPL-MCNC: 14 MG/DL (ref 6–20)
BUN/CREAT SERPL: 15.9 (ref 7–25)
C PNEUM DNA NPH QL NAA+NON-PROBE: NOT DETECTED
CALCIUM SPEC-SCNC: 8.9 MG/DL (ref 8.6–10.5)
CHLORIDE SERPL-SCNC: 105 MMOL/L (ref 98–107)
CLARITY UR: CLEAR
CO2 SERPL-SCNC: 24 MMOL/L (ref 22–29)
COLOR UR: YELLOW
CREAT SERPL-MCNC: 0.88 MG/DL (ref 0.76–1.27)
D-LACTATE SERPL-SCNC: 0.8 MMOL/L (ref 0.5–2)
DEPRECATED RDW RBC AUTO: 49.4 FL (ref 37–54)
EGFRCR SERPLBLD CKD-EPI 2021: 103.5 ML/MIN/1.73
EOSINOPHIL # BLD AUTO: 0.1 10*3/MM3 (ref 0–0.4)
EOSINOPHIL NFR BLD AUTO: 1.9 % (ref 0.3–6.2)
ERYTHROCYTE [DISTWIDTH] IN BLOOD BY AUTOMATED COUNT: 14.9 % (ref 12.3–15.4)
FLUAV SUBTYP SPEC NAA+PROBE: NOT DETECTED
FLUBV RNA ISLT QL NAA+PROBE: NOT DETECTED
GLOBULIN UR ELPH-MCNC: 3 GM/DL
GLUCOSE SERPL-MCNC: 83 MG/DL (ref 65–99)
GLUCOSE UR STRIP-MCNC: NEGATIVE MG/DL
HADV DNA SPEC NAA+PROBE: NOT DETECTED
HCOV 229E RNA SPEC QL NAA+PROBE: NOT DETECTED
HCOV HKU1 RNA SPEC QL NAA+PROBE: NOT DETECTED
HCOV NL63 RNA SPEC QL NAA+PROBE: NOT DETECTED
HCOV OC43 RNA SPEC QL NAA+PROBE: NOT DETECTED
HCT VFR BLD AUTO: 42.5 % (ref 37.5–51)
HGB BLD-MCNC: 14.2 G/DL (ref 13–17.7)
HGB UR QL STRIP.AUTO: ABNORMAL
HMPV RNA NPH QL NAA+NON-PROBE: NOT DETECTED
HPIV1 RNA ISLT QL NAA+PROBE: NOT DETECTED
HPIV2 RNA SPEC QL NAA+PROBE: NOT DETECTED
HPIV3 RNA NPH QL NAA+PROBE: NOT DETECTED
HPIV4 P GENE NPH QL NAA+PROBE: NOT DETECTED
HYALINE CASTS UR QL AUTO: ABNORMAL /LPF
INR PPP: 1.1 (ref 0.93–1.1)
KETONES UR QL STRIP: ABNORMAL
LEUKOCYTE ESTERASE UR QL STRIP.AUTO: ABNORMAL
LIPASE SERPL-CCNC: 59 U/L (ref 13–60)
LYMPHOCYTES # BLD AUTO: 1.5 10*3/MM3 (ref 0.7–3.1)
LYMPHOCYTES NFR BLD AUTO: 34.4 % (ref 19.6–45.3)
M PNEUMO IGG SER IA-ACNC: NOT DETECTED
MCH RBC QN AUTO: 31.7 PG (ref 26.6–33)
MCHC RBC AUTO-ENTMCNC: 33.5 G/DL (ref 31.5–35.7)
MCV RBC AUTO: 94.6 FL (ref 79–97)
MONOCYTES # BLD AUTO: 0.6 10*3/MM3 (ref 0.1–0.9)
MONOCYTES NFR BLD AUTO: 14.5 % (ref 5–12)
NEUTROPHILS NFR BLD AUTO: 2.1 10*3/MM3 (ref 1.7–7)
NEUTROPHILS NFR BLD AUTO: 47.9 % (ref 42.7–76)
NITRITE UR QL STRIP: NEGATIVE
NRBC BLD AUTO-RTO: 0.3 /100 WBC (ref 0–0.2)
NT-PROBNP SERPL-MCNC: 85.1 PG/ML (ref 0–900)
PH UR STRIP.AUTO: 6.5 [PH] (ref 5–8)
PLATELET # BLD AUTO: 179 10*3/MM3 (ref 140–450)
PMV BLD AUTO: 9.2 FL (ref 6–12)
POTASSIUM SERPL-SCNC: 3.9 MMOL/L (ref 3.5–5.2)
PROT SERPL-MCNC: 6.9 G/DL (ref 6–8.5)
PROT UR QL STRIP: NEGATIVE
PROTHROMBIN TIME: 11.3 SECONDS (ref 9.6–11.7)
RBC # BLD AUTO: 4.49 10*6/MM3 (ref 4.14–5.8)
RBC # UR STRIP: ABNORMAL /HPF
REF LAB TEST METHOD: ABNORMAL
RHINOVIRUS RNA SPEC NAA+PROBE: NOT DETECTED
RSV RNA NPH QL NAA+NON-PROBE: NOT DETECTED
SARS-COV-2 RNA NPH QL NAA+NON-PROBE: NOT DETECTED
SODIUM SERPL-SCNC: 141 MMOL/L (ref 136–145)
SP GR UR STRIP: 1.03 (ref 1–1.03)
SQUAMOUS #/AREA URNS HPF: ABNORMAL /HPF
TROPONIN T SERPL-MCNC: <0.01 NG/ML (ref 0–0.03)
UROBILINOGEN UR QL STRIP: ABNORMAL
WBC # UR STRIP: ABNORMAL /HPF
WBC NRBC COR # BLD: 4.4 10*3/MM3 (ref 3.4–10.8)

## 2022-04-30 PROCEDURE — 99284 EMERGENCY DEPT VISIT MOD MDM: CPT

## 2022-04-30 PROCEDURE — 0 IOPAMIDOL PER 1 ML: Performed by: EMERGENCY MEDICINE

## 2022-04-30 PROCEDURE — 87040 BLOOD CULTURE FOR BACTERIA: CPT | Performed by: EMERGENCY MEDICINE

## 2022-04-30 PROCEDURE — 99223 1ST HOSP IP/OBS HIGH 75: CPT | Performed by: INTERNAL MEDICINE

## 2022-04-30 PROCEDURE — G0378 HOSPITAL OBSERVATION PER HR: HCPCS

## 2022-04-30 PROCEDURE — 80053 COMPREHEN METABOLIC PANEL: CPT | Performed by: EMERGENCY MEDICINE

## 2022-04-30 PROCEDURE — 71045 X-RAY EXAM CHEST 1 VIEW: CPT

## 2022-04-30 PROCEDURE — 85610 PROTHROMBIN TIME: CPT | Performed by: EMERGENCY MEDICINE

## 2022-04-30 PROCEDURE — 84484 ASSAY OF TROPONIN QUANT: CPT | Performed by: EMERGENCY MEDICINE

## 2022-04-30 PROCEDURE — 85730 THROMBOPLASTIN TIME PARTIAL: CPT | Performed by: EMERGENCY MEDICINE

## 2022-04-30 PROCEDURE — 83605 ASSAY OF LACTIC ACID: CPT

## 2022-04-30 PROCEDURE — 70450 CT HEAD/BRAIN W/O DYE: CPT

## 2022-04-30 PROCEDURE — 93005 ELECTROCARDIOGRAM TRACING: CPT | Performed by: EMERGENCY MEDICINE

## 2022-04-30 PROCEDURE — 0202U NFCT DS 22 TRGT SARS-COV-2: CPT | Performed by: EMERGENCY MEDICINE

## 2022-04-30 PROCEDURE — 83690 ASSAY OF LIPASE: CPT | Performed by: EMERGENCY MEDICINE

## 2022-04-30 PROCEDURE — 83880 ASSAY OF NATRIURETIC PEPTIDE: CPT | Performed by: EMERGENCY MEDICINE

## 2022-04-30 PROCEDURE — 25010000002 ENOXAPARIN PER 10 MG: Performed by: INTERNAL MEDICINE

## 2022-04-30 PROCEDURE — 81001 URINALYSIS AUTO W/SCOPE: CPT | Performed by: EMERGENCY MEDICINE

## 2022-04-30 PROCEDURE — 85025 COMPLETE CBC W/AUTO DIFF WBC: CPT | Performed by: EMERGENCY MEDICINE

## 2022-04-30 PROCEDURE — 74177 CT ABD & PELVIS W/CONTRAST: CPT

## 2022-04-30 RX ORDER — ESTRADIOL 1 MG/1
1 TABLET ORAL DAILY
Status: DISCONTINUED | OUTPATIENT
Start: 2022-04-30 | End: 2022-05-10 | Stop reason: HOSPADM

## 2022-04-30 RX ORDER — TAMSULOSIN HYDROCHLORIDE 0.4 MG/1
0.4 CAPSULE ORAL NIGHTLY
Status: DISCONTINUED | OUTPATIENT
Start: 2022-04-30 | End: 2022-05-10 | Stop reason: HOSPADM

## 2022-04-30 RX ORDER — ACETAMINOPHEN 325 MG/1
650 TABLET ORAL EVERY 4 HOURS PRN
Status: DISCONTINUED | OUTPATIENT
Start: 2022-04-30 | End: 2022-05-10 | Stop reason: HOSPADM

## 2022-04-30 RX ORDER — LEVOTHYROXINE SODIUM 0.12 MG/1
125 TABLET ORAL
Status: DISCONTINUED | OUTPATIENT
Start: 2022-05-01 | End: 2022-05-10 | Stop reason: HOSPADM

## 2022-04-30 RX ORDER — FLUVOXAMINE MALEATE 50 MG/1
50 TABLET, COATED ORAL NIGHTLY
Status: DISCONTINUED | OUTPATIENT
Start: 2022-04-30 | End: 2022-05-10 | Stop reason: HOSPADM

## 2022-04-30 RX ORDER — LIOTHYRONINE SODIUM 5 UG/1
5 TABLET ORAL DAILY
Status: DISCONTINUED | OUTPATIENT
Start: 2022-05-01 | End: 2022-05-10 | Stop reason: HOSPADM

## 2022-04-30 RX ORDER — BUSPIRONE HYDROCHLORIDE 15 MG/1
30 TABLET ORAL 2 TIMES DAILY
Status: DISCONTINUED | OUTPATIENT
Start: 2022-04-30 | End: 2022-05-10 | Stop reason: HOSPADM

## 2022-04-30 RX ORDER — SODIUM CHLORIDE 0.9 % (FLUSH) 0.9 %
10 SYRINGE (ML) INJECTION AS NEEDED
Status: DISCONTINUED | OUTPATIENT
Start: 2022-04-30 | End: 2022-05-10 | Stop reason: HOSPADM

## 2022-04-30 RX ORDER — PANTOPRAZOLE SODIUM 40 MG/1
40 TABLET, DELAYED RELEASE ORAL DAILY
Status: DISCONTINUED | OUTPATIENT
Start: 2022-04-30 | End: 2022-05-10 | Stop reason: HOSPADM

## 2022-04-30 RX ORDER — MONTELUKAST SODIUM 10 MG/1
10 TABLET ORAL DAILY
Status: DISCONTINUED | OUTPATIENT
Start: 2022-04-30 | End: 2022-05-10 | Stop reason: HOSPADM

## 2022-04-30 RX ORDER — ONDANSETRON 2 MG/ML
4 INJECTION INTRAMUSCULAR; INTRAVENOUS EVERY 6 HOURS PRN
Status: DISCONTINUED | OUTPATIENT
Start: 2022-04-30 | End: 2022-05-10 | Stop reason: HOSPADM

## 2022-04-30 RX ORDER — ACETAMINOPHEN 650 MG/1
650 SUPPOSITORY RECTAL EVERY 4 HOURS PRN
Status: DISCONTINUED | OUTPATIENT
Start: 2022-04-30 | End: 2022-05-10 | Stop reason: HOSPADM

## 2022-04-30 RX ORDER — QUETIAPINE FUMARATE 100 MG/1
400 TABLET, FILM COATED ORAL 2 TIMES DAILY
Status: DISCONTINUED | OUTPATIENT
Start: 2022-04-30 | End: 2022-05-10 | Stop reason: HOSPADM

## 2022-04-30 RX ORDER — SODIUM CHLORIDE 0.9 % (FLUSH) 0.9 %
3 SYRINGE (ML) INJECTION EVERY 12 HOURS SCHEDULED
Status: DISCONTINUED | OUTPATIENT
Start: 2022-04-30 | End: 2022-05-10 | Stop reason: HOSPADM

## 2022-04-30 RX ORDER — ENOXAPARIN SODIUM 100 MG/ML
40 INJECTION SUBCUTANEOUS
Status: DISCONTINUED | OUTPATIENT
Start: 2022-04-30 | End: 2022-05-02

## 2022-04-30 RX ORDER — CHOLECALCIFEROL (VITAMIN D3) 125 MCG
5 CAPSULE ORAL NIGHTLY PRN
Status: DISCONTINUED | OUTPATIENT
Start: 2022-04-30 | End: 2022-05-10 | Stop reason: HOSPADM

## 2022-04-30 RX ORDER — CLONAZEPAM 0.5 MG/1
0.25 TABLET ORAL EVERY MORNING
Status: COMPLETED | OUTPATIENT
Start: 2022-05-01 | End: 2022-05-07

## 2022-04-30 RX ORDER — SODIUM CHLORIDE 0.9 % (FLUSH) 0.9 %
3-10 SYRINGE (ML) INJECTION AS NEEDED
Status: DISCONTINUED | OUTPATIENT
Start: 2022-04-30 | End: 2022-05-10 | Stop reason: HOSPADM

## 2022-04-30 RX ORDER — ONDANSETRON 4 MG/1
4 TABLET, FILM COATED ORAL EVERY 6 HOURS PRN
Status: DISCONTINUED | OUTPATIENT
Start: 2022-04-30 | End: 2022-05-10 | Stop reason: HOSPADM

## 2022-04-30 RX ORDER — CLONAZEPAM 0.5 MG/1
0.5 TABLET ORAL NIGHTLY
Status: COMPLETED | OUTPATIENT
Start: 2022-04-30 | End: 2022-05-06

## 2022-04-30 RX ORDER — ACETAMINOPHEN 160 MG/5ML
650 SOLUTION ORAL EVERY 4 HOURS PRN
Status: DISCONTINUED | OUTPATIENT
Start: 2022-04-30 | End: 2022-05-10 | Stop reason: HOSPADM

## 2022-04-30 RX ORDER — DIVALPROEX SODIUM 500 MG/1
500 TABLET, EXTENDED RELEASE ORAL EVERY 12 HOURS
Status: DISCONTINUED | OUTPATIENT
Start: 2022-04-30 | End: 2022-05-10 | Stop reason: HOSPADM

## 2022-04-30 RX ORDER — SODIUM CHLORIDE 9 MG/ML
125 INJECTION, SOLUTION INTRAVENOUS CONTINUOUS
Status: DISCONTINUED | OUTPATIENT
Start: 2022-04-30 | End: 2022-05-10 | Stop reason: HOSPADM

## 2022-04-30 RX ADMIN — MONTELUKAST 10 MG: 10 TABLET, FILM COATED ORAL at 22:06

## 2022-04-30 RX ADMIN — FLUVOXAMINE MALEATE 50 MG: 50 TABLET, FILM COATED ORAL at 22:00

## 2022-04-30 RX ADMIN — ENOXAPARIN SODIUM 40 MG: 40 INJECTION SUBCUTANEOUS at 21:59

## 2022-04-30 RX ADMIN — CLONAZEPAM 0.5 MG: 0.5 TABLET ORAL at 21:59

## 2022-04-30 RX ADMIN — SODIUM CHLORIDE 125 ML/HR: 9 INJECTION, SOLUTION INTRAVENOUS at 18:10

## 2022-04-30 RX ADMIN — TAMSULOSIN HYDROCHLORIDE 0.4 MG: 0.4 CAPSULE ORAL at 22:00

## 2022-04-30 RX ADMIN — Medication 3 ML: at 21:59

## 2022-04-30 RX ADMIN — DIVALPROEX SODIUM 500 MG: 500 TABLET, EXTENDED RELEASE ORAL at 22:00

## 2022-04-30 RX ADMIN — QUETIAPINE FUMARATE 400 MG: 100 TABLET ORAL at 22:00

## 2022-04-30 RX ADMIN — BUSPIRONE HYDROCHLORIDE 30 MG: 15 TABLET ORAL at 21:59

## 2022-04-30 RX ADMIN — ESTRADIOL 1 MG: 1 TABLET ORAL at 22:00

## 2022-04-30 RX ADMIN — Medication 10 ML: at 12:58

## 2022-04-30 RX ADMIN — PANTOPRAZOLE SODIUM 40 MG: 40 TABLET, DELAYED RELEASE ORAL at 21:59

## 2022-04-30 RX ADMIN — IOPAMIDOL 100 ML: 755 INJECTION, SOLUTION INTRAVENOUS at 15:48

## 2022-05-01 ENCOUNTER — ANESTHESIA EVENT (OUTPATIENT)
Dept: PERIOP | Facility: HOSPITAL | Age: 52
End: 2022-05-01

## 2022-05-01 ENCOUNTER — ANESTHESIA (OUTPATIENT)
Dept: PERIOP | Facility: HOSPITAL | Age: 52
End: 2022-05-01

## 2022-05-01 LAB
ALBUMIN SERPL-MCNC: 3.6 G/DL (ref 3.5–5.2)
ALBUMIN/GLOB SERPL: 1.3 G/DL
ALP SERPL-CCNC: 64 U/L (ref 39–117)
ALT SERPL W P-5'-P-CCNC: 18 U/L (ref 1–41)
ANION GAP SERPL CALCULATED.3IONS-SCNC: 10 MMOL/L (ref 5–15)
AST SERPL-CCNC: 39 U/L (ref 1–40)
BILIRUB SERPL-MCNC: 0.3 MG/DL (ref 0–1.2)
BUN SERPL-MCNC: 16 MG/DL (ref 6–20)
BUN/CREAT SERPL: 20.8 (ref 7–25)
CALCIUM SPEC-SCNC: 8.5 MG/DL (ref 8.6–10.5)
CHLORIDE SERPL-SCNC: 107 MMOL/L (ref 98–107)
CO2 SERPL-SCNC: 25 MMOL/L (ref 22–29)
CREAT SERPL-MCNC: 0.77 MG/DL (ref 0.76–1.27)
DEPRECATED RDW RBC AUTO: 49 FL (ref 37–54)
EGFRCR SERPLBLD CKD-EPI 2021: 107.7 ML/MIN/1.73
ERYTHROCYTE [DISTWIDTH] IN BLOOD BY AUTOMATED COUNT: 14.9 % (ref 12.3–15.4)
GLOBULIN UR ELPH-MCNC: 2.8 GM/DL
GLUCOSE SERPL-MCNC: 86 MG/DL (ref 65–99)
HCT VFR BLD AUTO: 43.1 % (ref 37.5–51)
HGB BLD-MCNC: 14 G/DL (ref 13–17.7)
MCH RBC QN AUTO: 31.3 PG (ref 26.6–33)
MCHC RBC AUTO-ENTMCNC: 32.5 G/DL (ref 31.5–35.7)
MCV RBC AUTO: 96.1 FL (ref 79–97)
PLATELET # BLD AUTO: 185 10*3/MM3 (ref 140–450)
PMV BLD AUTO: 9.4 FL (ref 6–12)
POTASSIUM SERPL-SCNC: 3.7 MMOL/L (ref 3.5–5.2)
PROT SERPL-MCNC: 6.4 G/DL (ref 6–8.5)
RBC # BLD AUTO: 4.48 10*6/MM3 (ref 4.14–5.8)
SODIUM SERPL-SCNC: 142 MMOL/L (ref 136–145)
WBC NRBC COR # BLD: 4.6 10*3/MM3 (ref 3.4–10.8)

## 2022-05-01 PROCEDURE — 45307 PROCTOSIGMOIDOSCOPY FB: CPT | Performed by: STUDENT IN AN ORGANIZED HEALTH CARE EDUCATION/TRAINING PROGRAM

## 2022-05-01 PROCEDURE — 25010000002 PROPOFOL 10 MG/ML EMULSION: Performed by: ANESTHESIOLOGY

## 2022-05-01 PROCEDURE — 25010000002 FENTANYL CITRATE (PF) 100 MCG/2ML SOLUTION: Performed by: ANESTHESIOLOGY

## 2022-05-01 PROCEDURE — 99233 SBSQ HOSP IP/OBS HIGH 50: CPT | Performed by: INTERNAL MEDICINE

## 2022-05-01 PROCEDURE — 85027 COMPLETE CBC AUTOMATED: CPT | Performed by: INTERNAL MEDICINE

## 2022-05-01 PROCEDURE — 0DJD8ZZ INSPECTION OF LOWER INTESTINAL TRACT, VIA NATURAL OR ARTIFICIAL OPENING ENDOSCOPIC: ICD-10-PCS | Performed by: STUDENT IN AN ORGANIZED HEALTH CARE EDUCATION/TRAINING PROGRAM

## 2022-05-01 PROCEDURE — G0378 HOSPITAL OBSERVATION PER HR: HCPCS

## 2022-05-01 PROCEDURE — 0DCP7ZZ EXTIRPATION OF MATTER FROM RECTUM, VIA NATURAL OR ARTIFICIAL OPENING: ICD-10-PCS | Performed by: STUDENT IN AN ORGANIZED HEALTH CARE EDUCATION/TRAINING PROGRAM

## 2022-05-01 PROCEDURE — 99222 1ST HOSP IP/OBS MODERATE 55: CPT | Performed by: STUDENT IN AN ORGANIZED HEALTH CARE EDUCATION/TRAINING PROGRAM

## 2022-05-01 PROCEDURE — 80053 COMPREHEN METABOLIC PANEL: CPT | Performed by: INTERNAL MEDICINE

## 2022-05-01 PROCEDURE — 25010000002 ONDANSETRON PER 1 MG: Performed by: ANESTHESIOLOGY

## 2022-05-01 PROCEDURE — 25010000002 ENOXAPARIN PER 10 MG: Performed by: STUDENT IN AN ORGANIZED HEALTH CARE EDUCATION/TRAINING PROGRAM

## 2022-05-01 RX ORDER — ACETAMINOPHEN 325 MG/1
650 TABLET ORAL ONCE AS NEEDED
Status: DISCONTINUED | OUTPATIENT
Start: 2022-05-01 | End: 2022-05-01 | Stop reason: HOSPADM

## 2022-05-01 RX ORDER — FENTANYL CITRATE 50 UG/ML
INJECTION, SOLUTION INTRAMUSCULAR; INTRAVENOUS AS NEEDED
Status: DISCONTINUED | OUTPATIENT
Start: 2022-05-01 | End: 2022-05-01 | Stop reason: SURG

## 2022-05-01 RX ORDER — PROMETHAZINE HYDROCHLORIDE 25 MG/1
25 SUPPOSITORY RECTAL ONCE AS NEEDED
Status: DISCONTINUED | OUTPATIENT
Start: 2022-05-01 | End: 2022-05-01 | Stop reason: HOSPADM

## 2022-05-01 RX ORDER — HYDROCODONE BITARTRATE AND ACETAMINOPHEN 5; 325 MG/1; MG/1
1 TABLET ORAL ONCE AS NEEDED
Status: DISCONTINUED | OUTPATIENT
Start: 2022-05-01 | End: 2022-05-01 | Stop reason: HOSPADM

## 2022-05-01 RX ORDER — MORPHINE SULFATE 4 MG/ML
2 INJECTION, SOLUTION INTRAMUSCULAR; INTRAVENOUS
Status: DISCONTINUED | OUTPATIENT
Start: 2022-05-01 | End: 2022-05-01 | Stop reason: HOSPADM

## 2022-05-01 RX ORDER — SODIUM CHLORIDE 9 MG/ML
INJECTION, SOLUTION INTRAVENOUS CONTINUOUS PRN
Status: DISCONTINUED | OUTPATIENT
Start: 2022-05-01 | End: 2022-05-01 | Stop reason: SURG

## 2022-05-01 RX ORDER — ACETAMINOPHEN 650 MG/1
650 SUPPOSITORY RECTAL ONCE AS NEEDED
Status: DISCONTINUED | OUTPATIENT
Start: 2022-05-01 | End: 2022-05-01 | Stop reason: HOSPADM

## 2022-05-01 RX ORDER — LIDOCAINE HYDROCHLORIDE 10 MG/ML
INJECTION, SOLUTION EPIDURAL; INFILTRATION; INTRACAUDAL; PERINEURAL AS NEEDED
Status: DISCONTINUED | OUTPATIENT
Start: 2022-05-01 | End: 2022-05-01 | Stop reason: SURG

## 2022-05-01 RX ORDER — LORAZEPAM 2 MG/ML
0.5 INJECTION INTRAMUSCULAR
Status: DISCONTINUED | OUTPATIENT
Start: 2022-05-01 | End: 2022-05-01 | Stop reason: HOSPADM

## 2022-05-01 RX ORDER — ONDANSETRON 2 MG/ML
4 INJECTION INTRAMUSCULAR; INTRAVENOUS ONCE AS NEEDED
Status: DISCONTINUED | OUTPATIENT
Start: 2022-05-01 | End: 2022-05-01 | Stop reason: HOSPADM

## 2022-05-01 RX ORDER — NALOXONE HCL 0.4 MG/ML
0.4 VIAL (ML) INJECTION AS NEEDED
Status: DISCONTINUED | OUTPATIENT
Start: 2022-05-01 | End: 2022-05-01 | Stop reason: HOSPADM

## 2022-05-01 RX ORDER — PROPOFOL 10 MG/ML
VIAL (ML) INTRAVENOUS AS NEEDED
Status: DISCONTINUED | OUTPATIENT
Start: 2022-05-01 | End: 2022-05-01 | Stop reason: SURG

## 2022-05-01 RX ORDER — BISACODYL 10 MG
10 SUPPOSITORY, RECTAL RECTAL DAILY
Status: DISCONTINUED | OUTPATIENT
Start: 2022-05-01 | End: 2022-05-10 | Stop reason: HOSPADM

## 2022-05-01 RX ORDER — MEPERIDINE HYDROCHLORIDE 25 MG/ML
12.5 INJECTION INTRAMUSCULAR; INTRAVENOUS; SUBCUTANEOUS
Status: DISCONTINUED | OUTPATIENT
Start: 2022-05-01 | End: 2022-05-01 | Stop reason: HOSPADM

## 2022-05-01 RX ORDER — ONDANSETRON 2 MG/ML
INJECTION INTRAMUSCULAR; INTRAVENOUS AS NEEDED
Status: DISCONTINUED | OUTPATIENT
Start: 2022-05-01 | End: 2022-05-01 | Stop reason: SURG

## 2022-05-01 RX ORDER — PROMETHAZINE HYDROCHLORIDE 25 MG/1
25 TABLET ORAL ONCE AS NEEDED
Status: DISCONTINUED | OUTPATIENT
Start: 2022-05-01 | End: 2022-05-01 | Stop reason: HOSPADM

## 2022-05-01 RX ORDER — FLUMAZENIL 0.1 MG/ML
0.5 INJECTION INTRAVENOUS AS NEEDED
Status: DISCONTINUED | OUTPATIENT
Start: 2022-05-01 | End: 2022-05-01 | Stop reason: HOSPADM

## 2022-05-01 RX ORDER — KETOROLAC TROMETHAMINE 15 MG/ML
15 INJECTION, SOLUTION INTRAMUSCULAR; INTRAVENOUS EVERY 6 HOURS PRN
Status: DISCONTINUED | OUTPATIENT
Start: 2022-05-01 | End: 2022-05-01 | Stop reason: HOSPADM

## 2022-05-01 RX ADMIN — Medication 3 ML: at 23:32

## 2022-05-01 RX ADMIN — FLUVOXAMINE MALEATE 50 MG: 50 TABLET, FILM COATED ORAL at 23:33

## 2022-05-01 RX ADMIN — ONDANSETRON 4 MG: 2 INJECTION INTRAMUSCULAR; INTRAVENOUS at 12:46

## 2022-05-01 RX ADMIN — CLONAZEPAM 0.25 MG: 0.5 TABLET ORAL at 05:10

## 2022-05-01 RX ADMIN — TAMSULOSIN HYDROCHLORIDE 0.4 MG: 0.4 CAPSULE ORAL at 23:31

## 2022-05-01 RX ADMIN — FENTANYL CITRATE 50 MCG: 50 INJECTION, SOLUTION INTRAMUSCULAR; INTRAVENOUS at 12:48

## 2022-05-01 RX ADMIN — DIVALPROEX SODIUM 500 MG: 500 TABLET, EXTENDED RELEASE ORAL at 10:10

## 2022-05-01 RX ADMIN — SODIUM CHLORIDE: 0.9 INJECTION, SOLUTION INTRAVENOUS at 12:40

## 2022-05-01 RX ADMIN — CLONAZEPAM 0.5 MG: 0.5 TABLET ORAL at 23:30

## 2022-05-01 RX ADMIN — LIDOCAINE HYDROCHLORIDE 50 MG: 10 INJECTION, SOLUTION EPIDURAL; INFILTRATION; INTRACAUDAL; PERINEURAL at 12:46

## 2022-05-01 RX ADMIN — ESTRADIOL 1 MG: 1 TABLET ORAL at 10:09

## 2022-05-01 RX ADMIN — BUSPIRONE HYDROCHLORIDE 30 MG: 15 TABLET ORAL at 23:30

## 2022-05-01 RX ADMIN — DIVALPROEX SODIUM 500 MG: 500 TABLET, EXTENDED RELEASE ORAL at 23:30

## 2022-05-01 RX ADMIN — ENOXAPARIN SODIUM 40 MG: 40 INJECTION SUBCUTANEOUS at 17:24

## 2022-05-01 RX ADMIN — PROPOFOL 200 MG: 10 INJECTION, EMULSION INTRAVENOUS at 12:48

## 2022-05-01 RX ADMIN — LEVOTHYROXINE SODIUM 125 MCG: 0.12 TABLET ORAL at 05:10

## 2022-05-01 RX ADMIN — QUETIAPINE FUMARATE 400 MG: 100 TABLET ORAL at 10:09

## 2022-05-01 RX ADMIN — LIOTHYRONINE SODIUM 5 MCG: 5 TABLET ORAL at 10:09

## 2022-05-01 RX ADMIN — QUETIAPINE FUMARATE 400 MG: 100 TABLET ORAL at 23:31

## 2022-05-01 RX ADMIN — BUSPIRONE HYDROCHLORIDE 30 MG: 15 TABLET ORAL at 10:10

## 2022-05-01 RX ADMIN — FENTANYL CITRATE 50 MCG: 50 INJECTION, SOLUTION INTRAMUSCULAR; INTRAVENOUS at 12:46

## 2022-05-01 NOTE — PROGRESS NOTES
[facility] Daily Progress Note    Patient Name: Charlie Wells  : 1970  MRN: [id number]  Primary Care Physician:  Rody Moreno MD  Date of admission: 2022      Subjective      Chief Complaint: Abdominal  discomfort      Patient Reports   2022  Patient seen and examined  No caregiver at bedside  Unable to obtain further information due to patient's baseline mental retardation  General surgeon plans fecal disimpaction, rigid sigmoidoscopy and decompression tube placement today    Review of Systems   Unable to perform ROS: other   Baseline mental retardation      Objective      Vitals:   Temp:  [97.5 °F (36.4 °C)-98.6 °F (37 °C)] 98.1 °F (36.7 °C)  Heart Rate:  [66-87] 68  Resp:  [12-19] 18  BP: (113-147)/(72-86) 123/84    Physical Exam  Constitutional:       General: He is not in acute distress.  HENT:      Head: Normocephalic.      Nose: Nose normal.   Eyes:      Extraocular Movements: Extraocular movements intact.      Conjunctiva/sclera: Conjunctivae normal.      Pupils: Pupils are equal, round, and reactive to light.   Cardiovascular:      Rate and Rhythm: Normal rate and regular rhythm.   Pulmonary:      Effort: No respiratory distress.   Abdominal:      General: There is distension.      Tenderness: There is abdominal tenderness. There is no guarding or rebound.   Musculoskeletal:         General: Normal range of motion.      Cervical back: Neck supple.   Skin:     General: Skin is warm and dry.   Neurological:      Mental Status: He is alert. Mental status is at baseline.   Psychiatric:         Mood and Affect: Mood normal.             Result Review    Result Review:  I have personally reviewed the results from the time of this admission to 2022 10:40 EDT and agree with these findings:  [x]  Laboratory  [x]  Microbiology  [x]  Radiology  []  EKG/Telemetry   []  Cardiology/Vascular   []  Pathology  []  Old records  []  Other:  Most notable  findings include:   Wounds (last 24 hours)     LDA Wound     Row Name 05/01/22 0710 05/01/22 0408          Wound 04/30/22 2100 gluteal MASD (Moisture associated skin damage)    Wound - Properties Group Placement Date: 04/30/22 -JE Placement Time: 2100 -JE Location: gluteal  -JE Primary Wound Type: MASD  -JE     Base blanchable;moist;red  -XC blanchable;moist;red  -JE     Retired Wound - Properties Group Placement Date: 04/30/22 -JE Placement Time: 2100 -JE Location: gluteal  -JE Primary Wound Type: MASD  -JE     Retired Wound - Properties Group Date first assessed: 04/30/22 -JE Time first assessed: 2100 -JE Location: gluteal  -JE Primary Wound Type: MASD  -JE           User Key  (r) = Recorded By, (t) = Taken By, (c) = Cosigned By    Initials Name Provider Type    Adin Amaya, RN Registered Nurse    Tika Cox RN Registered Nurse                  Assessment/Plan      Brief Patient Summary:     Patient is a 51-year-old man with multiple comorbidities including mental retardation, Hirschsprung's disease, ataxia, hypothyroidism and scoliosis.  He is a resident of a local long-term facility.     Patient was brought to the emergency room on 4/30/2022 due to abdominal discomfort.  Much history could not be obtained from the patient  Per caregiver, for the last 2 weeks patient has been progressively declining- less awake and not interactive.     He has history of recurrent megacolon and recently saw GI on 4/28/2022 and was set him up for procedure on 5/9/2022- caregiver could not specified the procedure planned.     CT abdomen/pelvis done in the emergency room showed markedly distended transverse, descending, sigmoid and rectum with maximum diameter of 14 cm.  General surgeon consulted from the ED and patient admitted for further care.         bisacodyl, 10 mg, Rectal, Daily  busPIRone, 30 mg, Oral, BID  clonazePAM, 0.25 mg, Oral, QAM  clonazePAM, 0.5 mg, Oral, Nightly  divalproex, 500 mg, Oral,  Q12H  enoxaparin, 40 mg, Subcutaneous, Q24H  estradiol, 1 mg, Oral, Daily  fluvoxaMINE, 50 mg, Oral, Nightly  levothyroxine, 125 mcg, Oral, Q AM  liothyronine, 5 mcg, Oral, Daily  montelukast, 10 mg, Oral, Daily  pantoprazole, 40 mg, Oral, Daily  QUEtiapine, 400 mg, Oral, BID  sodium chloride, 3 mL, Intravenous, Q12H  tamsulosin, 0.4 mg, Oral, Nightly       sodium chloride, 125 mL/hr, Last Rate: 125 mL/hr (04/30/22 1810)         Active Hospital Problems:  Active Hospital Problems    Diagnosis    • Abdominal pain, unspecified abdominal location      Plan:   Abdominal distention/pain  Has a history of Hirschsprung  CT abdomen/pelvis showed markedly distended colon with maximum diameter 14 cm  Patient has had previous colonic resection with ostomy which was subsequently reversed.  Since then has had recurrent megacolon and constipation.  Colostomy was discussed couple of years ago however if patient gets an ostomy, he will be unable to stay in his present facility per caregiver  General surgeon following and plans for fecal disimpaction, rigid sigmoidoscopy and decompression tube placement  Continue supportive care    Hypothyroidism-on Synthroid and liothyronine     GERD-on PPI     Mental retardation  Patient stated to be less interactive and awake recently  CT head showed no acute intracranial abnormality  On BuSpar, Klonopin, Depakote  Seroquel  Resume home medication when patient is able to take oral          DVT prophylaxis:  Medical DVT prophylaxis orders are present.    CODE STATUS:    Code Status (Patient has no pulse and is not breathing): CPR (Attempt to Resuscitate)  Medical Interventions (Patient has pulse or is breathing): Full Support      Disposition: Pending clinical progress.    This patient has been examined with appropriate PPE  05/01/22      Electronically signed by Jamal Smith MD, 05/01/22, 10:40 EDT.  [facility] Hospitalist Team

## 2022-05-01 NOTE — CONSULTS
General Surgery Consult Note      Name: Charlie Wells ADMIT: 2022   : 1970  PCP: Rody Moreno MD    MRN: [id number] LOS: 0 days   AGE/SEX: 51 y.o. male  ROOM: [address] [place] [address]      Patient Care Team:  Rody Moreno MD as PCP - General (Family Medicine)  Seipel, Joseph F, MD as Consulting Physician (Sleep Medicine)  To Barber DPM as Consulting Physician (Podiatry)  Jeff Alexis MD as Consulting Physician (Urology)  Chief Complaint   Patient presents with   • Abdominal Pain       Subjective   51-year-old gentleman with history of moderate developmental delay, Hirschsprung's disease with previous colonic resection with ostomy which was subsequently reversed.  History of chronic megacolon and constipation likely secondary to his Hirschsprung's disease.  He has been evaluated by Dr. Randall a couple years ago and at that time patient and caregivers decision was to continue with medical management and hold off on colostomy.  However over the past few months and even more so over the last couple weeks he developed more distention as well as constipation as well as some intermittent vomiting.  CT of his abdomen and pelvis with very distended transverse and descending colon with what appears to be an impaction just proximal to his rectal stricture.    Past Medical History:   Diagnosis Date   • Acute pancreatitis    • Allergic     Reglan, Benztropine   • Allergic rhinitis    • Anxiety    • Aspiration pneumonia (HCC)    • Ataxia    • Chronic constipation    • Chronic edema    • Depression    • Esophageal stricture    • Fecal incontinence    • GERD (gastroesophageal reflux disease)    • GI bleed    • Hirschsprung's disease    • Hyperlipidemia    • Hypokalemia    • Hypothyroidism    • Iron deficiency anemia    • Leg length discrepancy    • Mediastinal lymphadenopathy    • Megacolon    • Mildly mentally retarded    • Obesity    • Positive PPD    • Pulmonary hypertension (HCC)     • Scoliosis    • Seizures (HCC)    • Sleep apnea     Not very compliant with CPAP   • Urinary incontinence    • Urinary tract infection      Past Surgical History:   Procedure Laterality Date   • COLON SURGERY      colostomy, the reversed   • COLONOSCOPY      2017   • ESOPHAGEAL DILATATION      Several   • HEMICOLECTOMY Left    • MYOTOMY      Rectal     Family History   Problem Relation Age of Onset   • Early death Mother         Apparently  of bowel complications   • Early death Father         Heart related   • Heart disease Father      Social History     Tobacco Use   • Smoking status: Never Smoker   • Smokeless tobacco: Never Used   Vaping Use   • Vaping Use: Never used   Substance Use Topics   • Alcohol use: No   • Drug use: No     Medications Prior to Admission   Medication Sig Dispense Refill Last Dose   • ammonium lactate (LAC-HYDRIN) 12 % lotion    2022 at Unknown time   • Austedo 9 MG tablet    2022 at Unknown time   • busPIRone (BUSPAR) 30 MG tablet Take 1 tablet by mouth 2 (Two) Times a Day. 60 tablet 11 2022 at Unknown time   • clonazePAM (KlonoPIN) 0.5 MG tablet 1/2 tablet each morning, 1 full tablet each evening   2022 at Unknown time   • clonazePAM (KlonoPIN) 0.5 MG tablet Take 1 tablet by mouth Daily.   2022 at Unknown time   • clotrimazole (LOTRIMIN) 1 % cream    2022 at Unknown time   • coal tar (Neutrogena T/Gel) 0.5 % shampoo Use 3 times per week 237 mL 11 2022 at Unknown time   • divalproex (DEPAKOTE ER) 500 MG 24 hr tablet Take 1 tablet by mouth Every 12 (Twelve) Hours.   2022 at Unknown time   • divalproex (DEPAKOTE) 500 MG DR tablet    2022 at Unknown time   • estradiol (ESTRACE) 1 MG tablet Take 1 mg by mouth Daily.   2022 at Unknown time   • fluvoxaMINE (LUVOX) 50 MG tablet Take 50 mg by mouth Every Night.   2022 at Unknown time   • furosemide (LASIX) 40 MG tablet TAKE 1 1/2 TABLETS BY MOUTH EVERY DAY ( INCREASE ) 45  tablet 9 4/29/2022 at Unknown time   • GaviLAX 17 GM/SCOOP powder 2 scoops q d 578 g 11 4/29/2022 at Unknown time   • levothyroxine (SYNTHROID, LEVOTHROID) 125 MCG tablet TAKE ONE TABLET BY MOUTH EVERY DAY * DECREASE * 90 tablet 3 4/29/2022 at Unknown time   • linaclotide (LINZESS) 290 MCG capsule capsule Take 290 mcg by mouth Every Morning Before Breakfast.   4/29/2022 at Unknown time   • liothyronine (CYTOMEL) 5 MCG tablet TAKE 1 TABLET BY MOUTH EVERY DAY 30 tablet 4 4/29/2022 at Unknown time   • montelukast (SINGULAIR) 10 MG tablet TAKE 1 TABLET BY MOUTH AT BEDTIME 30 tablet 4 4/29/2022 at Unknown time   • MOTEGRITY 2 MG tablet Take 2 mg by mouth Daily.   4/29/2022 at Unknown time   • pantoprazole (PROTONIX) 40 MG EC tablet Take 40 mg by mouth Daily.  3 4/29/2022 at Unknown time   • Plecanatide 3 MG tablet Take 1 tablet by mouth Daily.   Past Week at Unknown time   • QUEtiapine (SEROquel) 400 MG tablet Take 400 mg by mouth 2 (Two) Times a Day.  3 4/29/2022 at Unknown time   • tamsulosin (FLOMAX) 0.4 MG capsule 24 hr capsule Take 1 capsule by mouth Every Night.   4/29/2022 at Unknown time   • triamcinolone (KENALOG) 0.1 % cream APPLY TO ARMS AND LEGS TWICE DAILY FOR 4 WEEKS   4/29/2022 at Unknown time     bisacodyl, 10 mg, Rectal, Daily  busPIRone, 30 mg, Oral, BID  clonazePAM, 0.25 mg, Oral, QAM  clonazePAM, 0.5 mg, Oral, Nightly  divalproex, 500 mg, Oral, Q12H  enoxaparin, 40 mg, Subcutaneous, Q24H  estradiol, 1 mg, Oral, Daily  fluvoxaMINE, 50 mg, Oral, Nightly  levothyroxine, 125 mcg, Oral, Q AM  liothyronine, 5 mcg, Oral, Daily  montelukast, 10 mg, Oral, Daily  pantoprazole, 40 mg, Oral, Daily  QUEtiapine, 400 mg, Oral, BID  sodium chloride, 3 mL, Intravenous, Q12H  tamsulosin, 0.4 mg, Oral, Nightly      sodium chloride, 125 mL/hr, Last Rate: 125 mL/hr (04/30/22 1810)      •  acetaminophen **OR** acetaminophen **OR** acetaminophen  •  melatonin  •  ondansetron **OR** ondansetron  •  [COMPLETED] Insert  "peripheral IV **AND** sodium chloride  •  sodium chloride  Metoclopramide and Benztropine    Review of Systems   Unable to perform ROS: Psychiatric disorder        Objective     Vital Signs and Labs:  Vital Signs Patient Vitals for the past 24 hrs:   BP Temp Temp src Pulse Resp SpO2 Height Weight   05/01/22 0826 123/84 98.1 °F (36.7 °C) Oral 68 18 94 % -- --   05/01/22 0517 114/78 98.6 °F (37 °C) Oral 66 12 94 % -- 89.6 kg (197 lb 8.5 oz)   04/30/22 2300 -- -- -- 78 -- 91 % -- --   04/30/22 1908 -- -- -- -- -- -- -- 89.5 kg (197 lb 5 oz)   04/30/22 1906 123/72 98.2 °F (36.8 °C) Oral 68 19 93 % -- --   04/30/22 1801 124/77 -- -- 75 -- 92 % -- --   04/30/22 1731 120/75 -- -- 71 -- 92 % -- --   04/30/22 1646 113/73 -- -- 77 -- (!) 0 % -- --   04/30/22 1256 114/74 -- -- 79 -- 94 % -- --   04/30/22 1157 147/86 97.5 °F (36.4 °C) -- 87 16 95 % 160 cm (63\") 88.7 kg (195 lb 8.8 oz)     I/O:  No intake/output data recorded.    Physical Exam:  Physical Exam  Constitutional:       General: He is not in acute distress.     Appearance: Normal appearance. He is not ill-appearing.   HENT:      Head: Normocephalic and atraumatic.      Right Ear: External ear normal.      Left Ear: External ear normal.   Eyes:      Extraocular Movements: Extraocular movements intact.      Conjunctiva/sclera: Conjunctivae normal.   Cardiovascular:      Rate and Rhythm: Normal rate and regular rhythm.   Pulmonary:      Effort: Pulmonary effort is normal. No respiratory distress.   Abdominal:      General: There is no distension.      Palpations: Abdomen is soft.   Musculoskeletal:         General: No swelling or deformity.   Skin:     General: Skin is warm and dry.   Neurological:      Mental Status: He is alert and oriented to person, place, and time. Mental status is at baseline.         CBC    Results from last 7 days   Lab Units 05/01/22  0727 04/30/22  1256   WBC 10*3/mm3 4.60 4.40   HEMOGLOBIN g/dL 14.0 14.2   PLATELETS 10*3/mm3 185 179     BMP "   Results from last 7 days   Lab Units 05/01/22  0727 04/30/22  1256   SODIUM mmol/L 142 141   POTASSIUM mmol/L 3.7 3.9   CHLORIDE mmol/L 107 105   CO2 mmol/L 25.0 24.0   BUN mg/dL 16 14   CREATININE mg/dL 0.77 0.88   GLUCOSE mg/dL 86 83     Radiology(recent) CT Head Without Contrast    Result Date: 4/30/2022  1. No acute appearing process is identified. 2. Mild cerebral and cerebellar atrophy.  Electronically Signed By-Paulette Sherwood MD On:4/30/2022 4:23 PM This report was finalized on [id number] by  Paulette Sherwood MD.    CT Abdomen Pelvis With Contrast    Result Date: 4/30/2022  1. There is marked distention of the transverse, descending, and sigmoid colon and of the rectum. The maximum diameter is about 14 cm. This distention is increased from that reported on 6/18/2020. The patient reportedly has a history of Hirschsprung's disease. 2. Negative for evidence of pneumatosis, free fluid, or free air. 3. Additional findings as reported above.  Electronically Signed By-Paulette Sherwood MD On:4/30/2022 4:21 PM This report was finalized on [id number] by  Paulette Sherwood MD.    XR Chest 1 View    Result Date: 4/30/2022  1. Cardiomegaly with low lung volumes. Streaky left basilar airspace opacity adjacent to a chronically elevated left hemidiaphragm likely representing atelectasis.  Electronically Signed By-Rashawn Montgomery MD On:4/30/2022 1:36 PM This report was finalized on [id number] by  Rashawn Montgomery MD.      I reviewed the patient's new clinical results.    Assessment/Plan       Abdominal pain, unspecified abdominal location      51-year-old gentleman with history of moderate developmental delay, Hirschsprung's disease with previous colonic resection with ostomy which was subsequently reversed.  History of chronic megacolon and constipation likely secondary to his Hirschsprung's disease.  He has been evaluated by Dr. Randall a couple years ago and at that time patient and caregivers decision was to  continue with medical management and hold off on colostomy.  However over the past few months and even more so over the last couple weeks he developed more distention as well as constipation as well as some intermittent vomiting.  CT of his abdomen and pelvis with very distended transverse and descending colon with what appears to be an impaction just proximal to his rectal stricture.  Plan to proceed to the operating room for fecal disimpaction, rigid sigmoidoscopy and decompression tube placement.  Once his colon is decompressed we will discuss further with patient and his caregiver, will likely require loop colostomy in the future.  Discussed risk benefits and alternatives to fecal disimpaction with patient and his caregiver including infection, bleeding, rectal injury, incontinence and patient and caregiver elected to proceed with surgery.      This note was created using Dragon Voice Recognition software.    Ken Christiansen MD  05/01/22  11:25 EDT

## 2022-05-01 NOTE — ANESTHESIA PREPROCEDURE EVALUATION
Anesthesia Evaluation     Patient summary reviewed and Nursing notes reviewed   NPO Solid Status: > 6 hours  NPO Liquid Status: > 6 hours           Airway   Mallampati: II  TM distance: >3 FB  Neck ROM: full  No difficulty expected  Dental - normal exam   (+) poor dentition    Pulmonary - normal exam    breath sounds clear to auscultation  (+) pneumonia , shortness of breath, sleep apnea,   Cardiovascular - normal exam    ECG reviewed  Rhythm: regular  Rate: normal    (+) hyperlipidemia,       Neuro/Psych  (+) seizures, numbness, psychiatric history,    GI/Hepatic/Renal/Endo    (+) obesity,  GERD, GI bleeding ,     Musculoskeletal     (+) neck pain,   Abdominal  - normal exam    Abdomen: soft.  Bowel sounds: normal.   Substance History - negative use     OB/GYN negative ob/gyn ROS         Other - negative ROS                     Anesthesia Plan    ASA 3     general     intravenous induction     Anesthetic plan, all risks, benefits, and alternatives have been provided, discussed and informed consent has been obtained with: patient.  Use of blood products discussed with patient .       CODE STATUS:    Code Status (Patient has no pulse and is not breathing): CPR (Attempt to Resuscitate)  Medical Interventions (Patient has pulse or is breathing): Full Support

## 2022-05-01 NOTE — OP NOTE
Operative Report:    Patient Name:  Charlie Wells  YOB: 1970    Date of Surgery:  5/1/2022     Indications:   51-year-old gentleman with history of moderate developmental delay, Hirschsprung's disease with previous colonic resection with ostomy which was subsequently reversed.  History of chronic megacolon and constipation likely secondary to his Hirschsprung's disease.  He has been evaluated by Dr. Randall a couple years ago and at that time patient and caregivers decision was to continue with medical management and hold off on colostomy.  However over the past few months and even more so over the last couple weeks he developed more distention as well as constipation as well as some intermittent vomiting.  CT of his abdomen and pelvis with very distended transverse and descending colon with what appears to be an impaction just proximal to his rectal stricture.  Plan to proceed to the operating room for fecal disimpaction, rigid sigmoidoscopy and decompression tube placement.  Once his colon is decompressed we will discuss further with patient and his caregiver, will likely require loop colostomy in the future.  Discussed risk benefits and alternatives to fecal disimpaction with patient and his caregiver including infection, bleeding, rectal injury, incontinence and patient and caregiver elected to proceed with surgery.    Pre-op Diagnosis:   Fecal impaction       Post-Op Diagnosis Codes:  Same    Procedure/CPT® Codes:      Procedure(s):  RECTAL EXAM UNDER ANESTHESIA, RIGID SIGMOIDOSCOPY WITH FECAL DISIMPACTION    Staff:  Surgeon(s):  Ken Christiansen MD    Circulator: Yesica Cano RN  Scrub Person: Kath Mora LPN        Anesthesia: Monitored Anesthesia Care    Estimated Blood Loss: none    Implants:    Nothing was implanted during the procedure    Specimen:          None        Findings: Liquid stool and air, no hard stool    Complications: None apparent    Description of  Procedure:   After risk benefits and alternatives were discussed with patient and his caregiver informed consent was obtained.  He was transported to the operating room and underwent monitored anesthesia care after he was placed supine on the operating room table.  He was placed in lithotomy position.  SCDs he underwent monitored anesthesia care there was a large volume liquid stool that spontaneously decompressed.  I inserted the rigid sigmoidoscope up to 20 cm and placed pressure on the abdomen in order to evacuate more liquid stool as well as air.  After this his abdomen was much softer and less distended.  I inserted a 24 Azeri red rubber catheter to the end of the sigmoidoscope and removed the sigmoidoscope.  I used a red rubber catheter to irrigate the patient's colon with 1 L of sterile water.  The patient's abdomen was again compressed in order to evacuate the rest of the water as well as some more air.  The red rubber catheter was secured to the patient's leg using a StatLock and placed to gravity drainage into a Florentino bag.  The patient tolerated the procedure well.  He was awoken from monitored anesthesia care and transported to the recovery unit without incident.      Ken Christiansen MD     Date: 5/1/2022  Time: 14:54 EDT    This note was created using Dragon Voice Recognition software.

## 2022-05-01 NOTE — ANESTHESIA POSTPROCEDURE EVALUATION
Patient: Charlie Wells    Procedure Summary     Date: 05/01/22 Room / Location: [facility] OR 06 / [facility] MAIN OR    Anesthesia Start: 1243 Anesthesia Stop: 1324    Procedure: RECTAL EXAM UNDER ANESTHESIA, rigid sigmodoscopy with fecal disempaction (N/A Rectum) Diagnosis:     Surgeons: Ken Christiansen MD Provider: Jonathan Zamarripa MD    Anesthesia Type: general ASA Status: 3          Anesthesia Type: general    Vitals  Vitals Value Taken Time   /81 05/01/22 1341   Temp     Pulse 69 05/01/22 1342   Resp 12 05/01/22 1335   SpO2 92 % 05/01/22 1342   Vitals shown include unvalidated device data.        Post Anesthesia Care and Evaluation    Patient location during evaluation: PACU  Patient participation: complete - patient participated  Level of consciousness: awake  Pain scale: See nurse's notes for pain score.  Pain management: adequate  Airway patency: patent  Anesthetic complications: No anesthetic complications  PONV Status: none  Cardiovascular status: acceptable  Respiratory status: acceptable  Hydration status: acceptable    Comments: Patient seen and examined postoperatively; vital signs stable; SpO2 greater than or equal to 90%; cardiopulmonary status stable; nausea/vomiting adequately controlled; pain adequately controlled; no apparent anesthesia complications; patient discharged from anesthesia care when discharge criteria were met

## 2022-05-02 LAB
ANION GAP SERPL CALCULATED.3IONS-SCNC: 9 MMOL/L (ref 5–15)
BUN SERPL-MCNC: 7 MG/DL (ref 6–20)
BUN/CREAT SERPL: 10.4 (ref 7–25)
CALCIUM SPEC-SCNC: 8.3 MG/DL (ref 8.6–10.5)
CHLORIDE SERPL-SCNC: 102 MMOL/L (ref 98–107)
CO2 SERPL-SCNC: 25 MMOL/L (ref 22–29)
CREAT SERPL-MCNC: 0.67 MG/DL (ref 0.76–1.27)
DEPRECATED RDW RBC AUTO: 49.4 FL (ref 37–54)
EGFRCR SERPLBLD CKD-EPI 2021: 112.3 ML/MIN/1.73
EOSINOPHIL # BLD MANUAL: 0.08 10*3/MM3 (ref 0–0.4)
EOSINOPHIL NFR BLD MANUAL: 1 % (ref 0.3–6.2)
ERYTHROCYTE [DISTWIDTH] IN BLOOD BY AUTOMATED COUNT: 15 % (ref 12.3–15.4)
GLUCOSE SERPL-MCNC: 88 MG/DL (ref 65–99)
HCT VFR BLD AUTO: 41.1 % (ref 37.5–51)
HGB BLD-MCNC: 13.6 G/DL (ref 13–17.7)
LYMPHOCYTES # BLD MANUAL: 2.61 10*3/MM3 (ref 0.7–3.1)
LYMPHOCYTES NFR BLD MANUAL: 13 % (ref 5–12)
MCH RBC QN AUTO: 31.7 PG (ref 26.6–33)
MCHC RBC AUTO-ENTMCNC: 33.1 G/DL (ref 31.5–35.7)
MCV RBC AUTO: 95.8 FL (ref 79–97)
METAMYELOCYTES NFR BLD MANUAL: 3 % (ref 0–0)
MONOCYTES # BLD: 1.03 10*3/MM3 (ref 0.1–0.9)
NEUTROPHILS # BLD AUTO: 3.95 10*3/MM3 (ref 1.7–7)
NEUTROPHILS NFR BLD MANUAL: 38 % (ref 42.7–76)
NEUTS BAND NFR BLD MANUAL: 12 % (ref 0–5)
PLAT MORPH BLD: NORMAL
PLATELET # BLD AUTO: 209 10*3/MM3 (ref 140–450)
PMV BLD AUTO: 10.6 FL (ref 6–12)
POTASSIUM SERPL-SCNC: 3.9 MMOL/L (ref 3.5–5.2)
RBC # BLD AUTO: 4.29 10*6/MM3 (ref 4.14–5.8)
RBC MORPH BLD: NORMAL
SODIUM SERPL-SCNC: 136 MMOL/L (ref 136–145)
VARIANT LYMPHS NFR BLD MANUAL: 1 % (ref 0–5)
VARIANT LYMPHS NFR BLD MANUAL: 32 % (ref 19.6–45.3)
WBC MORPH BLD: NORMAL
WBC NRBC COR # BLD: 7.9 10*3/MM3 (ref 3.4–10.8)

## 2022-05-02 PROCEDURE — 99232 SBSQ HOSP IP/OBS MODERATE 35: CPT | Performed by: INTERNAL MEDICINE

## 2022-05-02 PROCEDURE — 85025 COMPLETE CBC W/AUTO DIFF WBC: CPT | Performed by: STUDENT IN AN ORGANIZED HEALTH CARE EDUCATION/TRAINING PROGRAM

## 2022-05-02 PROCEDURE — 80048 BASIC METABOLIC PNL TOTAL CA: CPT | Performed by: STUDENT IN AN ORGANIZED HEALTH CARE EDUCATION/TRAINING PROGRAM

## 2022-05-02 PROCEDURE — 85007 BL SMEAR W/DIFF WBC COUNT: CPT | Performed by: STUDENT IN AN ORGANIZED HEALTH CARE EDUCATION/TRAINING PROGRAM

## 2022-05-02 PROCEDURE — 99232 SBSQ HOSP IP/OBS MODERATE 35: CPT | Performed by: STUDENT IN AN ORGANIZED HEALTH CARE EDUCATION/TRAINING PROGRAM

## 2022-05-02 RX ORDER — SIMETHICONE 80 MG
80 TABLET,CHEWABLE ORAL EVERY 6 HOURS PRN
COMMUNITY

## 2022-05-02 RX ORDER — MAGNESIUM HYDROXIDE/ALUMINUM HYDROXICE/SIMETHICONE 120; 1200; 1200 MG/30ML; MG/30ML; MG/30ML
30 SUSPENSION ORAL EVERY 6 HOURS PRN
COMMUNITY

## 2022-05-02 RX ORDER — POLYETHYLENE GLYCOL 3350 17 G/17G
34 POWDER, FOR SOLUTION ORAL DAILY
COMMUNITY

## 2022-05-02 RX ORDER — LIOTHYRONINE SODIUM 5 UG/1
5 TABLET ORAL
COMMUNITY

## 2022-05-02 RX ORDER — LEVOTHYROXINE SODIUM 0.12 MG/1
125 TABLET ORAL
COMMUNITY

## 2022-05-02 RX ORDER — LOPERAMIDE HYDROCHLORIDE 2 MG/1
2 CAPSULE ORAL 4 TIMES DAILY PRN
COMMUNITY

## 2022-05-02 RX ORDER — GUAIFENESIN AND DEXTROMETHORPHAN HYDROBROMIDE 100; 10 MG/5ML; MG/5ML
10 SOLUTION ORAL EVERY 4 HOURS PRN
COMMUNITY

## 2022-05-02 RX ORDER — CLONAZEPAM 0.5 MG/1
0.25 TABLET ORAL EVERY MORNING
COMMUNITY

## 2022-05-02 RX ORDER — CLONAZEPAM 0.5 MG/1
0.5 TABLET ORAL NIGHTLY
COMMUNITY

## 2022-05-02 RX ORDER — LOPERAMIDE HYDROCHLORIDE 2 MG/1
4 CAPSULE ORAL ONCE AS NEEDED
COMMUNITY

## 2022-05-02 RX ORDER — ACETAMINOPHEN 325 MG/1
650 TABLET ORAL EVERY 6 HOURS PRN
COMMUNITY

## 2022-05-02 RX ORDER — FUROSEMIDE 40 MG/1
60 TABLET ORAL DAILY
COMMUNITY

## 2022-05-02 RX ORDER — CHLORPHENIRAMINE MALEATE 4 MG/1
4 TABLET ORAL EVERY 4 HOURS PRN
COMMUNITY

## 2022-05-02 RX ORDER — IBUPROFEN 200 MG
200 TABLET ORAL EVERY 4 HOURS PRN
COMMUNITY

## 2022-05-02 RX ADMIN — ESTRADIOL 1 MG: 1 TABLET ORAL at 09:39

## 2022-05-02 RX ADMIN — CLONAZEPAM 0.25 MG: 0.5 TABLET ORAL at 07:23

## 2022-05-02 RX ADMIN — PANTOPRAZOLE SODIUM 40 MG: 40 TABLET, DELAYED RELEASE ORAL at 09:39

## 2022-05-02 RX ADMIN — LIOTHYRONINE SODIUM 5 MCG: 5 TABLET ORAL at 09:39

## 2022-05-02 RX ADMIN — TAMSULOSIN HYDROCHLORIDE 0.4 MG: 0.4 CAPSULE ORAL at 21:58

## 2022-05-02 RX ADMIN — FLUVOXAMINE MALEATE 50 MG: 50 TABLET, FILM COATED ORAL at 21:58

## 2022-05-02 RX ADMIN — Medication 3 ML: at 09:39

## 2022-05-02 RX ADMIN — QUETIAPINE FUMARATE 400 MG: 100 TABLET ORAL at 21:58

## 2022-05-02 RX ADMIN — DIVALPROEX SODIUM 500 MG: 500 TABLET, EXTENDED RELEASE ORAL at 09:39

## 2022-05-02 RX ADMIN — BUSPIRONE HYDROCHLORIDE 30 MG: 15 TABLET ORAL at 21:58

## 2022-05-02 RX ADMIN — QUETIAPINE FUMARATE 400 MG: 100 TABLET ORAL at 09:39

## 2022-05-02 RX ADMIN — DIVALPROEX SODIUM 500 MG: 500 TABLET, EXTENDED RELEASE ORAL at 21:58

## 2022-05-02 RX ADMIN — BUSPIRONE HYDROCHLORIDE 30 MG: 15 TABLET ORAL at 09:39

## 2022-05-02 RX ADMIN — CLONAZEPAM 0.5 MG: 0.5 TABLET ORAL at 21:58

## 2022-05-02 RX ADMIN — LEVOTHYROXINE SODIUM 125 MCG: 0.12 TABLET ORAL at 07:23

## 2022-05-02 RX ADMIN — MONTELUKAST 10 MG: 10 TABLET, FILM COATED ORAL at 09:39

## 2022-05-02 NOTE — PROGRESS NOTES
General Surgery Progress Note    Name: Charlie Wells ADMIT: 2022   : 1970  PCP: Rody Moreno MD    MRN: [id number] LOS: 0 days   AGE/SEX: 51 y.o. male  ROOM: [address] [place] [address]    Chief Complaint   Patient presents with   • Abdominal Pain     Subjective     No new issues or complaints, tolerating clears, no vomiting    Objective     Scheduled Medications:   bisacodyl, 10 mg, Rectal, Daily  busPIRone, 30 mg, Oral, BID  clonazePAM, 0.25 mg, Oral, QAM  clonazePAM, 0.5 mg, Oral, Nightly  divalproex, 500 mg, Oral, Q12H  estradiol, 1 mg, Oral, Daily  fluvoxaMINE, 50 mg, Oral, Nightly  levothyroxine, 125 mcg, Oral, Q AM  liothyronine, 5 mcg, Oral, Daily  montelukast, 10 mg, Oral, Daily  pantoprazole, 40 mg, Oral, Daily  QUEtiapine, 400 mg, Oral, BID  sodium chloride, 3 mL, Intravenous, Q12H  tamsulosin, 0.4 mg, Oral, Nightly        Active Infusions:  sodium chloride, 125 mL/hr, Last Rate: 125 mL/hr (22 1810)        As Needed Medications:  •  acetaminophen **OR** acetaminophen **OR** acetaminophen  •  melatonin  •  ondansetron **OR** ondansetron  •  [COMPLETED] Insert peripheral IV **AND** sodium chloride  •  sodium chloride    Vital Signs  Vital Signs Patient Vitals for the past 24 hrs:   BP Temp Temp src Pulse Resp SpO2 Weight   22 1234 109/67 97.5 °F (36.4 °C) Oral 69 15 94 % --   22 0500 95/74 97.8 °F (36.6 °C) Oral 76 18 93 % 91.3 kg (201 lb 4.5 oz)   22 115/76 97.4 °F (36.3 °C) Oral 74 20 95 % --   22 1647 139/98 97.4 °F (36.3 °C) Oral 73 13 95 % --   22 1447 136/88 98 °F (36.7 °C) Oral 65 12 97 % --     I/O:  I/O last 3 completed shifts:  In: 1940 [P.O.:1440; I.V.:500]  Out: 3825 [Urine:3825]    Physical Exam:  Physical Exam  Constitutional:       General: He is not in acute distress.     Appearance: Normal appearance. He is not ill-appearing.   HENT:      Head: Normocephalic and atraumatic.      Right Ear: External ear normal.      Left Ear: External  ear normal.   Eyes:      Extraocular Movements: Extraocular movements intact.      Conjunctiva/sclera: Conjunctivae normal.   Cardiovascular:      Rate and Rhythm: Normal rate and regular rhythm.   Pulmonary:      Effort: Pulmonary effort is normal. No respiratory distress.   Abdominal:      Palpations: Abdomen is soft.      Tenderness: There is no abdominal tenderness.      Comments: Softly distended   Musculoskeletal:         General: No swelling or deformity.   Skin:     General: Skin is warm and dry.   Neurological:      Mental Status: He is alert and oriented to person, place, and time. Mental status is at baseline.         Results Review:     CBC    Results from last 7 days   Lab Units 05/02/22  0344 05/01/22 0727 04/30/22  1256   WBC 10*3/mm3 7.90 4.60 4.40   HEMOGLOBIN g/dL 13.6 14.0 14.2   PLATELETS 10*3/mm3 209 185 179     BMP   Results from last 7 days   Lab Units 05/02/22 0344 05/01/22 0727 04/30/22  1256   SODIUM mmol/L 136 142 141   POTASSIUM mmol/L 3.9 3.7 3.9   CHLORIDE mmol/L 102 107 105   CO2 mmol/L 25.0 25.0 24.0   BUN mg/dL 7 16 14   CREATININE mg/dL 0.67* 0.77 0.88   GLUCOSE mg/dL 88 86 83     Radiology(recent) CT Head Without Contrast    Result Date: 4/30/2022  1. No acute appearing process is identified. 2. Mild cerebral and cerebellar atrophy.  Electronically Signed By-Paulette Sherwood MD On:4/30/2022 4:23 PM This report was finalized on [id number] by  Paulette Sherwood MD.    CT Abdomen Pelvis With Contrast    Result Date: 4/30/2022  1. There is marked distention of the transverse, descending, and sigmoid colon and of the rectum. The maximum diameter is about 14 cm. This distention is increased from that reported on 6/18/2020. The patient reportedly has a history of Hirschsprung's disease. 2. Negative for evidence of pneumatosis, free fluid, or free air. 3. Additional findings as reported above.  Electronically Signed By-Paulette Sherwood MD On:4/30/2022 4:21 PM This report was finalized on  [id number] by  Paulette Sherwood MD.      I reviewed the patient's new clinical results.    Assessment/Plan       Abdominal pain, unspecified abdominal location      51 y.o. male postop day 1 from fecal disimpaction for chronic constipation, history of Hirschsprung's and subsequent megacolon.  Discussed with patient as well as his caregiver and plan for diverting colostomy tomorrow.  N.p.o. after midnight, hold anticoagulation tomorrow.      This note was created using Dragon Voice Recognition software.    Ken Christiansen MD  05/02/22  14:28 EDT

## 2022-05-02 NOTE — PLAN OF CARE
Goal Outcome Evaluation:  Plan of Care Reviewed With: patient           Outcome Evaluation: Pt doing well. Abdominal sounds are audible. Belly still seems distended, but it is not firm. Will continue to monitor.

## 2022-05-02 NOTE — CASE MANAGEMENT/SOCIAL WORK
Discharge Planning Assessment  [facility]     Patient Name: Charlie Wells  MRN: [id number]  Today's Date: 5/2/2022    Admit Date: 4/30/2022     Discharge Needs Assessment     Row Name 05/02/22 1735       Living Environment    People in Home other (see comments)  Group home    Current Living Arrangements other (see comments)  Group home    Primary Care Provided by self  Group home    Provides Primary Care For no one    Family Caregiver if Needed none    Able to Return to Prior Arrangements yes       Resource/Environmental Concerns    Resource/Environmental Concerns none    Transportation Concerns none       Transition Planning    Patient/Family Anticipates Transition to other (see comments)  Group home    Patient/Family Anticipated Services at Transition none    Transportation Anticipated family or friend will provide       Discharge Needs Assessment    Readmission Within the Last 30 Days no previous admission in last 30 days    Current Outpatient/Agency/Support Group group home    Equipment Currently Used at Home none    Concerns to be Addressed care coordination/care conferences;discharge planning    Anticipated Changes Related to Illness none    Equipment Needed After Discharge none    Outpatient/Agency/Support Group Needs group home    Provided Post Acute Provider List? N/A    N/A Provider List Comment Denies needs at this time.               Discharge Plan     Row Name 05/02/22 1738       Plan    Plan Group home vs snf pending clinical course.    Plan Comments Spoke with patient and also used information from chart.  Patient lives at group home.  Able to ambulate without DME.  Return to group home vs snf . Pending clinical course.              Continued Care and Services - Admitted Since 4/30/2022    Coordination has not been started for this encounter.          Demographic Summary     Row Name 05/02/22 1735       General Information    Admission Type inpatient    Arrived From emergency department    Referral  Source admission list    Reason for Consult discharge planning    Preferred Language English               Functional Status     Row Name 05/02/22 7126       Functional Status    Usual Activity Tolerance good    Current Activity Tolerance moderate       Functional Status, IADL    Medications completely dependent    Meal Preparation completely dependent    Housekeeping completely dependent    Laundry completely dependent    Shopping completely dependent    IADL Comments Lives in group home but is able to ambulate and shower himself       Mental Status    General Appearance WDL WDL       Mental Status Summary    Recent Changes in Mental Status/Cognitive Functioning no changes                         Lore Bello RN   Met with patient in room wearing PPE: mask, face shield/goggles, gloves, gown.      Maintained distance greater than six feet and spent less than 15 minutes in the room.

## 2022-05-02 NOTE — PLAN OF CARE
Goal Outcome Evaluation:      Patient resting in bed.  Decompression tube remains in rectal vault. Abd remains firm and distended. Awaiting return call from brother to obtain consent for surgery tomorrow

## 2022-05-02 NOTE — NURSING NOTE
WOCN note:    51 yr old male admitted 4/30/22 with abdominal pain. Patient has a hx of Hirschsprung's disease, mental retardation and recurrent megacolon. Patient was taken to surgery for a rectal exam under anesthesia with a rigid sigmoidoscopy with fecal disimpaction on 5/1/22.     WOCN consult received for a stoma marking for a LLQ permanent loop colostomy. The procedure was explained to the patient using simple terms although the level of comprehension is unknown.   The patient was assessed in supine, semi-Phelps's and high Phelps's positions. He has a large round abdomen with old midline and left lateral abdominal scars. A site was marked in the LLQ.   Teaching materials were left at the bedside. We will continue to follow.

## 2022-05-02 NOTE — PROGRESS NOTES
[facility] Medicine Services Daily Progress Note    Patient Name: Charlie Wells  : 1970  MRN: [id number]  Primary Care Physician:  Rody Moreon MD  Date of admission: 2022      Subjective      Chief Complaint: Abdominal pain      Patient Reports he wants real food.  States usually liquid diet.  No other complaints.  Denies any abdominal pain today.    ROS negative except as above      Objective      Vitals:   Temp:  [97.4 °F (36.3 °C)-98 °F (36.7 °C)] 97.8 °F (36.6 °C)  Heart Rate:  [64-76] 76  Resp:  [12-20] 18  BP: ()/(74-98) 95/74    Physical Exam  Vitals reviewed.   HENT:      Head: Normocephalic.      Nose: Nose normal.      Mouth/Throat:      Mouth: Mucous membranes are moist.   Eyes:      Pupils: Pupils are equal, round, and reactive to light.   Cardiovascular:      Rate and Rhythm: Normal rate.      Pulses: Normal pulses.   Pulmonary:      Effort: Pulmonary effort is normal.   Abdominal:      General: There is distension.      Palpations: Abdomen is soft.      Comments: Distended but soft abdomen without tenderness   Musculoskeletal:         General: Normal range of motion.      Cervical back: Normal range of motion.   Skin:     General: Skin is warm.   Neurological:      General: No focal deficit present.      Mental Status: He is alert.   Psychiatric:         Mood and Affect: Mood normal.         Behavior: Behavior normal.             Result Review    Result Review:  I have personally reviewed the results from the time of this admission to 2022 11:49 EDT and agree with these findings:  [x]  Laboratory  []  Microbiology  []  Radiology  []  EKG/Telemetry   []  Cardiology/Vascular   []  Pathology  []  Old records  []  Other:  Most notable findings include: Lab work unremarkable    Wounds (last 24 hours)     LDA Wound     Row Name 22 0720 22 2100          Wound 22 2100 gluteal MASD (Moisture associated skin damage)    Wound - Properties  Group Placement Date: 04/30/22 -JE Placement Time: 2100 -JE Location: gluteal  -JE Primary Wound Type: MASD  -JE     Base red;blanchable;moist  -CS red;blanchable;moist  -JL     Drainage Amount none  -CS --     Retired Wound - Properties Group Placement Date: 04/30/22 -JE Placement Time: 2100 -JE Location: gluteal  -JE Primary Wound Type: MASD  -JE     Retired Wound - Properties Group Date first assessed: 04/30/22 -JE Time first assessed: 2100 -JE Location: gluteal  -JE Primary Wound Type: MASD  -JE           User Key  (r) = Recorded By, (t) = Taken By, (c) = Cosigned By    Initials Name Provider Type    Adin Amaya, RN Registered Nurse    Glory Giles RN Registered Nurse    Lynette Lombardo RN Registered Nurse                  Assessment/Plan      Brief Patient Summary:  Charlie Wlels is a 51 y.o. male who presents with recurrent constipation      bisacodyl, 10 mg, Rectal, Daily  busPIRone, 30 mg, Oral, BID  clonazePAM, 0.25 mg, Oral, QAM  clonazePAM, 0.5 mg, Oral, Nightly  divalproex, 500 mg, Oral, Q12H  enoxaparin, 40 mg, Subcutaneous, Q24H  estradiol, 1 mg, Oral, Daily  fluvoxaMINE, 50 mg, Oral, Nightly  levothyroxine, 125 mcg, Oral, Q AM  liothyronine, 5 mcg, Oral, Daily  montelukast, 10 mg, Oral, Daily  pantoprazole, 40 mg, Oral, Daily  QUEtiapine, 400 mg, Oral, BID  sodium chloride, 3 mL, Intravenous, Q12H  tamsulosin, 0.4 mg, Oral, Nightly       sodium chloride, 125 mL/hr, Last Rate: 125 mL/hr (04/30/22 1810)         Active Hospital Problems:  Active Hospital Problems    Diagnosis    • Abdominal pain, unspecified abdominal location      Plan:   51-year-old gentleman with recurrent constipation due to Hirschsprung's disease  Status post OR fecal disimpaction with tube placement  Plans for colostomy placement diet per surgery  Denies any pain    GERD  On PPI    Mental retardation  Baseline  CT head negative  Resume BuSpar Klonopin Depakote Seroquel    Hypothyroidism  Synthroid and  liothyronine resumed      DVT prophylaxis:  Medical DVT prophylaxis orders are present.    CODE STATUS:    Code Status (Patient has no pulse and is not breathing): CPR (Attempt to Resuscitate)  Medical Interventions (Patient has pulse or is breathing): Full Support          This patient has been examined wearing appropriate Personal Protective Equipment and discussed with Staff. 05/02/22      Electronically signed by Ben Lockwood MD, 05/02/22, 11:49 EDT.  [facility] Hospitalist Team

## 2022-05-03 ENCOUNTER — ANESTHESIA EVENT (OUTPATIENT)
Dept: PERIOP | Facility: HOSPITAL | Age: 52
End: 2022-05-03

## 2022-05-03 ENCOUNTER — ANESTHESIA (OUTPATIENT)
Dept: PERIOP | Facility: HOSPITAL | Age: 52
End: 2022-05-03

## 2022-05-03 LAB
ABO GROUP BLD: NORMAL
ANION GAP SERPL CALCULATED.3IONS-SCNC: 10 MMOL/L (ref 5–15)
BASOPHILS # BLD AUTO: 0.1 10*3/MM3 (ref 0–0.2)
BASOPHILS NFR BLD AUTO: 0.8 % (ref 0–1.5)
BLD GP AB SCN SERPL QL: NEGATIVE
BUN SERPL-MCNC: 5 MG/DL (ref 6–20)
BUN/CREAT SERPL: 6.8 (ref 7–25)
CALCIUM SPEC-SCNC: 8.5 MG/DL (ref 8.6–10.5)
CHLORIDE SERPL-SCNC: 106 MMOL/L (ref 98–107)
CO2 SERPL-SCNC: 24 MMOL/L (ref 22–29)
CREAT SERPL-MCNC: 0.73 MG/DL (ref 0.76–1.27)
DEPRECATED RDW RBC AUTO: 49 FL (ref 37–54)
EGFRCR SERPLBLD CKD-EPI 2021: 109.5 ML/MIN/1.73
EOSINOPHIL # BLD AUTO: 0.1 10*3/MM3 (ref 0–0.4)
EOSINOPHIL NFR BLD AUTO: 1.8 % (ref 0.3–6.2)
ERYTHROCYTE [DISTWIDTH] IN BLOOD BY AUTOMATED COUNT: 14.7 % (ref 12.3–15.4)
GLUCOSE SERPL-MCNC: 89 MG/DL (ref 65–99)
HCT VFR BLD AUTO: 42.4 % (ref 37.5–51)
HGB BLD-MCNC: 14.3 G/DL (ref 13–17.7)
LYMPHOCYTES # BLD AUTO: 2 10*3/MM3 (ref 0.7–3.1)
LYMPHOCYTES NFR BLD AUTO: 27.8 % (ref 19.6–45.3)
MAGNESIUM SERPL-MCNC: 1.8 MG/DL (ref 1.6–2.6)
MCH RBC QN AUTO: 32 PG (ref 26.6–33)
MCHC RBC AUTO-ENTMCNC: 33.6 G/DL (ref 31.5–35.7)
MCV RBC AUTO: 95.1 FL (ref 79–97)
MONOCYTES # BLD AUTO: 1 10*3/MM3 (ref 0.1–0.9)
MONOCYTES NFR BLD AUTO: 14.3 % (ref 5–12)
NEUTROPHILS NFR BLD AUTO: 4.1 10*3/MM3 (ref 1.7–7)
NEUTROPHILS NFR BLD AUTO: 55.3 % (ref 42.7–76)
NRBC BLD AUTO-RTO: 0.1 /100 WBC (ref 0–0.2)
PHOSPHATE SERPL-MCNC: 4 MG/DL (ref 2.5–4.5)
PLATELET # BLD AUTO: 163 10*3/MM3 (ref 140–450)
PMV BLD AUTO: 8.7 FL (ref 6–12)
POTASSIUM SERPL-SCNC: 4.4 MMOL/L (ref 3.5–5.2)
RBC # BLD AUTO: 4.46 10*6/MM3 (ref 4.14–5.8)
RH BLD: POSITIVE
SODIUM SERPL-SCNC: 140 MMOL/L (ref 136–145)
T&S EXPIRATION DATE: NORMAL
WBC NRBC COR # BLD: 7.3 10*3/MM3 (ref 3.4–10.8)

## 2022-05-03 PROCEDURE — 0D1N0Z4 BYPASS SIGMOID COLON TO CUTANEOUS, OPEN APPROACH: ICD-10-PCS | Performed by: STUDENT IN AN ORGANIZED HEALTH CARE EDUCATION/TRAINING PROGRAM

## 2022-05-03 PROCEDURE — 86901 BLOOD TYPING SEROLOGIC RH(D): CPT | Performed by: STUDENT IN AN ORGANIZED HEALTH CARE EDUCATION/TRAINING PROGRAM

## 2022-05-03 PROCEDURE — 86901 BLOOD TYPING SEROLOGIC RH(D): CPT

## 2022-05-03 PROCEDURE — 99232 SBSQ HOSP IP/OBS MODERATE 35: CPT | Performed by: INTERNAL MEDICINE

## 2022-05-03 PROCEDURE — 25010000002 DEXAMETHASONE PER 1 MG: Performed by: ANESTHESIOLOGY

## 2022-05-03 PROCEDURE — 83735 ASSAY OF MAGNESIUM: CPT | Performed by: INTERNAL MEDICINE

## 2022-05-03 PROCEDURE — 25010000002 ONDANSETRON PER 1 MG: Performed by: ANESTHESIOLOGY

## 2022-05-03 PROCEDURE — 86900 BLOOD TYPING SEROLOGIC ABO: CPT

## 2022-05-03 PROCEDURE — 25010000002 FENTANYL CITRATE (PF) 100 MCG/2ML SOLUTION: Performed by: ANESTHESIOLOGY

## 2022-05-03 PROCEDURE — 84100 ASSAY OF PHOSPHORUS: CPT | Performed by: INTERNAL MEDICINE

## 2022-05-03 PROCEDURE — 85025 COMPLETE CBC W/AUTO DIFF WBC: CPT | Performed by: INTERNAL MEDICINE

## 2022-05-03 PROCEDURE — 80048 BASIC METABOLIC PNL TOTAL CA: CPT | Performed by: INTERNAL MEDICINE

## 2022-05-03 PROCEDURE — 25010000002 PROPOFOL 200 MG/20ML EMULSION: Performed by: ANESTHESIOLOGY

## 2022-05-03 PROCEDURE — 44320 COLOSTOMY: CPT | Performed by: STUDENT IN AN ORGANIZED HEALTH CARE EDUCATION/TRAINING PROGRAM

## 2022-05-03 PROCEDURE — 86900 BLOOD TYPING SEROLOGIC ABO: CPT | Performed by: STUDENT IN AN ORGANIZED HEALTH CARE EDUCATION/TRAINING PROGRAM

## 2022-05-03 PROCEDURE — 25010000002 CEFOXITIN PER 1 G: Performed by: STUDENT IN AN ORGANIZED HEALTH CARE EDUCATION/TRAINING PROGRAM

## 2022-05-03 PROCEDURE — 25010000002 HYDROMORPHONE PER 4 MG: Performed by: ANESTHESIOLOGY

## 2022-05-03 PROCEDURE — 86850 RBC ANTIBODY SCREEN: CPT | Performed by: STUDENT IN AN ORGANIZED HEALTH CARE EDUCATION/TRAINING PROGRAM

## 2022-05-03 RX ORDER — PROPOFOL 10 MG/ML
INJECTION, EMULSION INTRAVENOUS AS NEEDED
Status: DISCONTINUED | OUTPATIENT
Start: 2022-05-03 | End: 2022-05-04 | Stop reason: SURG

## 2022-05-03 RX ORDER — LIDOCAINE HYDROCHLORIDE 10 MG/ML
INJECTION, SOLUTION EPIDURAL; INFILTRATION; INTRACAUDAL; PERINEURAL AS NEEDED
Status: DISCONTINUED | OUTPATIENT
Start: 2022-05-03 | End: 2022-05-04 | Stop reason: SURG

## 2022-05-03 RX ORDER — PHENYLEPHRINE HCL IN 0.9% NACL 1 MG/10 ML
SYRINGE (ML) INTRAVENOUS AS NEEDED
Status: DISCONTINUED | OUTPATIENT
Start: 2022-05-03 | End: 2022-05-04 | Stop reason: SURG

## 2022-05-03 RX ORDER — HYDROCODONE BITARTRATE AND ACETAMINOPHEN 5; 325 MG/1; MG/1
1 TABLET ORAL ONCE AS NEEDED
Status: DISCONTINUED | OUTPATIENT
Start: 2022-05-03 | End: 2022-05-03 | Stop reason: HOSPADM

## 2022-05-03 RX ORDER — SODIUM CHLORIDE, SODIUM LACTATE, POTASSIUM CHLORIDE, CALCIUM CHLORIDE 600; 310; 30; 20 MG/100ML; MG/100ML; MG/100ML; MG/100ML
1000 INJECTION, SOLUTION INTRAVENOUS CONTINUOUS
Status: DISPENSED | OUTPATIENT
Start: 2022-05-03 | End: 2022-05-05

## 2022-05-03 RX ORDER — HYDROCODONE BITARTRATE AND ACETAMINOPHEN 10; 325 MG/1; MG/1
1 TABLET ORAL EVERY 4 HOURS PRN
Status: DISCONTINUED | OUTPATIENT
Start: 2022-05-03 | End: 2022-05-03 | Stop reason: HOSPADM

## 2022-05-03 RX ORDER — HYDRALAZINE HYDROCHLORIDE 20 MG/ML
5 INJECTION INTRAMUSCULAR; INTRAVENOUS
Status: DISCONTINUED | OUTPATIENT
Start: 2022-05-03 | End: 2022-05-03 | Stop reason: HOSPADM

## 2022-05-03 RX ORDER — FENTANYL CITRATE 50 UG/ML
INJECTION, SOLUTION INTRAMUSCULAR; INTRAVENOUS AS NEEDED
Status: DISCONTINUED | OUTPATIENT
Start: 2022-05-03 | End: 2022-05-04 | Stop reason: SURG

## 2022-05-03 RX ORDER — EPHEDRINE SULFATE 5 MG/ML
5 INJECTION INTRAVENOUS ONCE AS NEEDED
Status: DISCONTINUED | OUTPATIENT
Start: 2022-05-03 | End: 2022-05-03 | Stop reason: HOSPADM

## 2022-05-03 RX ORDER — HYDROMORPHONE HCL 110MG/55ML
PATIENT CONTROLLED ANALGESIA SYRINGE INTRAVENOUS AS NEEDED
Status: DISCONTINUED | OUTPATIENT
Start: 2022-05-03 | End: 2022-05-04 | Stop reason: SURG

## 2022-05-03 RX ORDER — ONDANSETRON 2 MG/ML
INJECTION INTRAMUSCULAR; INTRAVENOUS AS NEEDED
Status: DISCONTINUED | OUTPATIENT
Start: 2022-05-03 | End: 2022-05-04 | Stop reason: SURG

## 2022-05-03 RX ORDER — POLYETHYLENE GLYCOL 3350 17 G/17G
17 POWDER, FOR SOLUTION ORAL DAILY
Status: DISCONTINUED | OUTPATIENT
Start: 2022-05-03 | End: 2022-05-10 | Stop reason: HOSPADM

## 2022-05-03 RX ORDER — HYDROCODONE BITARTRATE AND ACETAMINOPHEN 5; 325 MG/1; MG/1
1 TABLET ORAL EVERY 4 HOURS PRN
Status: DISPENSED | OUTPATIENT
Start: 2022-05-03 | End: 2022-05-10

## 2022-05-03 RX ORDER — MEPERIDINE HYDROCHLORIDE 25 MG/ML
12.5 INJECTION INTRAMUSCULAR; INTRAVENOUS; SUBCUTANEOUS
Status: DISCONTINUED | OUTPATIENT
Start: 2022-05-03 | End: 2022-05-03 | Stop reason: HOSPADM

## 2022-05-03 RX ORDER — DEXAMETHASONE SODIUM PHOSPHATE 4 MG/ML
INJECTION, SOLUTION INTRA-ARTICULAR; INTRALESIONAL; INTRAMUSCULAR; INTRAVENOUS; SOFT TISSUE AS NEEDED
Status: DISCONTINUED | OUTPATIENT
Start: 2022-05-03 | End: 2022-05-04 | Stop reason: SURG

## 2022-05-03 RX ORDER — ONDANSETRON 2 MG/ML
4 INJECTION INTRAMUSCULAR; INTRAVENOUS ONCE AS NEEDED
Status: DISCONTINUED | OUTPATIENT
Start: 2022-05-03 | End: 2022-05-03 | Stop reason: HOSPADM

## 2022-05-03 RX ORDER — LABETALOL HYDROCHLORIDE 5 MG/ML
5 INJECTION, SOLUTION INTRAVENOUS
Status: DISCONTINUED | OUTPATIENT
Start: 2022-05-03 | End: 2022-05-03 | Stop reason: HOSPADM

## 2022-05-03 RX ORDER — IPRATROPIUM BROMIDE AND ALBUTEROL SULFATE 2.5; .5 MG/3ML; MG/3ML
3 SOLUTION RESPIRATORY (INHALATION) ONCE AS NEEDED
Status: DISCONTINUED | OUTPATIENT
Start: 2022-05-03 | End: 2022-05-03 | Stop reason: HOSPADM

## 2022-05-03 RX ORDER — ROCURONIUM BROMIDE 10 MG/ML
INJECTION, SOLUTION INTRAVENOUS AS NEEDED
Status: DISCONTINUED | OUTPATIENT
Start: 2022-05-03 | End: 2022-05-04 | Stop reason: SURG

## 2022-05-03 RX ORDER — DIPHENHYDRAMINE HYDROCHLORIDE 50 MG/ML
12.5 INJECTION INTRAMUSCULAR; INTRAVENOUS
Status: DISCONTINUED | OUTPATIENT
Start: 2022-05-03 | End: 2022-05-03 | Stop reason: HOSPADM

## 2022-05-03 RX ORDER — EPHEDRINE SULFATE 5 MG/ML
INJECTION INTRAVENOUS AS NEEDED
Status: DISCONTINUED | OUTPATIENT
Start: 2022-05-03 | End: 2022-05-04 | Stop reason: SURG

## 2022-05-03 RX ADMIN — DIVALPROEX SODIUM 500 MG: 500 TABLET, EXTENDED RELEASE ORAL at 20:55

## 2022-05-03 RX ADMIN — Medication 200 MCG: at 16:34

## 2022-05-03 RX ADMIN — LIOTHYRONINE SODIUM 5 MCG: 5 TABLET ORAL at 06:39

## 2022-05-03 RX ADMIN — TAMSULOSIN HYDROCHLORIDE 0.4 MG: 0.4 CAPSULE ORAL at 20:56

## 2022-05-03 RX ADMIN — DEXAMETHASONE SODIUM PHOSPHATE 4 MG: 4 INJECTION, SOLUTION INTRAMUSCULAR; INTRAVENOUS at 15:37

## 2022-05-03 RX ADMIN — EPHEDRINE SULFATE 10 MG: 5 INJECTION INTRAVENOUS at 15:42

## 2022-05-03 RX ADMIN — ROCURONIUM BROMIDE 50 MG: 50 INJECTION, SOLUTION INTRAVENOUS at 15:30

## 2022-05-03 RX ADMIN — ONDANSETRON 4 MG: 2 INJECTION INTRAMUSCULAR; INTRAVENOUS at 16:39

## 2022-05-03 RX ADMIN — CLONAZEPAM 0.5 MG: 0.5 TABLET ORAL at 20:55

## 2022-05-03 RX ADMIN — EPHEDRINE SULFATE 20 MG: 5 INJECTION INTRAVENOUS at 15:46

## 2022-05-03 RX ADMIN — HYDROMORPHONE HYDROCHLORIDE 0.5 MG: 2 INJECTION, SOLUTION INTRAMUSCULAR; INTRAVENOUS; SUBCUTANEOUS at 16:17

## 2022-05-03 RX ADMIN — Medication 100 MCG: at 15:46

## 2022-05-03 RX ADMIN — LEVOTHYROXINE SODIUM 125 MCG: 0.12 TABLET ORAL at 06:40

## 2022-05-03 RX ADMIN — Medication 3 ML: at 20:57

## 2022-05-03 RX ADMIN — BUSPIRONE HYDROCHLORIDE 30 MG: 15 TABLET ORAL at 20:56

## 2022-05-03 RX ADMIN — EPHEDRINE SULFATE 5 MG: 5 INJECTION INTRAVENOUS at 15:39

## 2022-05-03 RX ADMIN — LIDOCAINE HYDROCHLORIDE 50 MG: 10 INJECTION, SOLUTION EPIDURAL; INFILTRATION; INTRACAUDAL; PERINEURAL at 15:29

## 2022-05-03 RX ADMIN — QUETIAPINE FUMARATE 400 MG: 100 TABLET ORAL at 20:56

## 2022-05-03 RX ADMIN — Medication 200 MCG: at 15:44

## 2022-05-03 RX ADMIN — SUGAMMADEX 200 MG: 100 INJECTION, SOLUTION INTRAVENOUS at 16:41

## 2022-05-03 RX ADMIN — CLONAZEPAM 0.25 MG: 0.5 TABLET ORAL at 06:39

## 2022-05-03 RX ADMIN — SODIUM CHLORIDE, POTASSIUM CHLORIDE, SODIUM LACTATE AND CALCIUM CHLORIDE 1000 ML: 600; 310; 30; 20 INJECTION, SOLUTION INTRAVENOUS at 14:48

## 2022-05-03 RX ADMIN — Medication 3 ML: at 09:00

## 2022-05-03 RX ADMIN — PROPOFOL 200 MG: 10 INJECTION, EMULSION INTRAVENOUS at 15:29

## 2022-05-03 RX ADMIN — CEFOXITIN 2 G: 2 INJECTION, POWDER, FOR SOLUTION INTRAVENOUS at 15:35

## 2022-05-03 RX ADMIN — POLYETHYLENE GLYCOL 3350 17 G: 17 POWDER, FOR SOLUTION ORAL at 20:56

## 2022-05-03 RX ADMIN — FENTANYL CITRATE 100 MCG: 50 INJECTION, SOLUTION INTRAMUSCULAR; INTRAVENOUS at 15:27

## 2022-05-03 NOTE — PROGRESS NOTES
[facility] Medicine Services Daily Progress Note    Patient Name: Charlie Wells  : 1970  MRN: [id number]  Primary Care Physician:  Rody Moreno MD  Date of admission: 2022      Subjective      Chief Complaint: Abdominal pain        Patient Reports he wants food. No other complaints.  Denies any abdominal pain today.     ROS negative except as above         Objective      Vitals:   Temp:  [97.8 °F (36.6 °C)-98 °F (36.7 °C)] 98 °F (36.7 °C)  Heart Rate:  [62-67] 62  Resp:  [12-18] 16  BP: (108-131)/(69-86) 127/83    Physical Exam  Vitals reviewed.   HENT:      Head: Normocephalic.      Nose: Nose normal.      Mouth/Throat:      Mouth: Mucous membranes are moist.   Eyes:      Pupils: Pupils are equal, round, and reactive to light.   Cardiovascular:      Rate and Rhythm: Normal rate.      Pulses: Normal pulses.   Pulmonary:      Effort: Pulmonary effort is normal.   Abdominal:      General: There is distension.      Palpations: Abdomen is soft.      Comments: Distended but soft abdomen without tenderness.  Hypoactive bowel sounds  Musculoskeletal:         General: Normal range of motion.      Cervical back: Normal range of motion.   Skin:     General: Skin is warm.   Neurological:      General: No focal deficit present.      Mental Status: He is alert.   Psychiatric:         Mood and Affect: Mood normal.         Behavior: Behavior normal.        Result Review    Result Review:  I have personally reviewed the results from the time of this admission to 5/3/2022 16:29 EDT and agree with these findings:  [x]  Laboratory  []  Microbiology  []  Radiology  []  EKG/Telemetry   []  Cardiology/Vascular   []  Pathology  []  Old records  []  Other:  Most notable findings include: Labs unremarkable    Wounds (last 24 hours)     LDA Wound     Row Name 22 1100 22 0710 22 1915       Wound 22 2100 gluteal MASD (Moisture associated skin damage)    Wound - Properties  Group Placement Date: 04/30/22  -JE Placement Time: 2100 -JE Location: gluteal  -JE Primary Wound Type: MASD  -JE    Base red;blanchable;moist  -AY red;blanchable;moist  -AY red;blanchable;moist  -JL    Drainage Amount none  -AY none  -AY --    Retired Wound - Properties Group Placement Date: 04/30/22  -JE Placement Time: 2100 -JE Location: gluteal  -JE Primary Wound Type: MASD  -JE    Retired Wound - Properties Group Date first assessed: 04/30/22  -JE Time first assessed: 2100 -JE Location: gluteal  -JE Primary Wound Type: MASD  -JE       Wound 05/03/22 1549 other (see comments) abdomen Incision    Wound - Properties Group Placement Date: 05/03/22  -SP Placement Time: 1549  -SP Present on Hospital Admission: N  -SP Orientation: other (see comments)  -SP, LLQ  Location: abdomen  -SP Primary Wound Type: Incision  -SP    Retired Wound - Properties Group Placement Date: 05/03/22  -SP Placement Time: 1549  -SP Present on Hospital Admission: N  -SP Orientation: other (see comments)  -SP, LLQ  Location: abdomen  -SP Primary Wound Type: Incision  -SP    Retired Wound - Properties Group Date first assessed: 05/03/22  -SP Time first assessed: 1549  -SP Present on Hospital Admission: N  -SP Location: abdomen  -SP Primary Wound Type: Incision  -SP          User Key  (r) = Recorded By, (t) = Taken By, (c) = Cosigned By    Initials Name Provider Type    Adin Amaya, RN Registered Nurse    Luciana Lundberg RN Registered Nurse    Lynette Lombardo RN Registered Nurse    Luis Carlos Mascorro RN Registered Nurse                   Assessment/Plan       Brief Patient Summary:  Charlie Wells is a 51 y.o. male who presents with recurrent constipation     bisacodyl, 10 mg, Rectal, Daily  busPIRone, 30 mg, Oral, BID  clonazePAM, 0.25 mg, Oral, QAM  clonazePAM, 0.5 mg, Oral, Nightly  divalproex, 500 mg, Oral, Q12H  enoxaparin, 40 mg, Subcutaneous, Q24H  estradiol, 1 mg, Oral, Daily  fluvoxaMINE, 50 mg, Oral,  Nightly  levothyroxine, 125 mcg, Oral, Q AM  liothyronine, 5 mcg, Oral, Daily  montelukast, 10 mg, Oral, Daily  pantoprazole, 40 mg, Oral, Daily  QUEtiapine, 400 mg, Oral, BID  sodium chloride, 3 mL, Intravenous, Q12H  tamsulosin, 0.4 mg, Oral, Nightly        sodium chloride, 125 mL/hr, Last Rate: 125 mL/hr (04/30/22 1810)           Active Hospital Problems:       Active Hospital Problems     Diagnosis     • Abdominal pain, unspecified abdominal location        Plan:   51-year-old gentleman with recurrent constipation due to Hirschsprung's disease  Status post OR fecal disimpaction with tube placement  Plans for colostomy placement n.p.o. today  Denies any pain     GERD  On PPI     Mental retardation  Baseline  CT head negative  Resume BuSpar Klonopin Depakote Seroquel but currently n.p.o.     Hypothyroidism  Synthroid and liothyronine resumed but currently n.p.o.        DVT prophylaxis:  Medical DVT prophylaxis orders are present.     CODE STATUS:    Code Status (Patient has no pulse and is not breathing): CPR (Attempt to Resuscitate)  Medical Interventions (Patient has pulse or is breathing): Full Support              This patient has been examined wearing appropriate Personal Protective Equipment and discussed with Staff.   05/03/22      Electronically signed by Ben Lockwood MD, 05/03/22, 16:29 EDT.  [facility] Hospitalist Team

## 2022-05-03 NOTE — ANESTHESIA PREPROCEDURE EVALUATION
Anesthesia Evaluation     Patient summary reviewed and Nursing notes reviewed   NPO Solid Status: > 6 hours  NPO Liquid Status: > 6 hours           Airway   Mallampati: III  TM distance: >3 FB  Neck ROM: full  Dental - normal exam   (+) poor dentition    Pulmonary - normal exam    breath sounds clear to auscultation  (+) pneumonia improving , shortness of breath, sleep apnea,   Cardiovascular - normal exam    ECG reviewed  Rhythm: regular  Rate: normal    (+) hyperlipidemia,       Neuro/Psych  (+) seizures, numbness, psychiatric history,    GI/Hepatic/Renal/Endo    (+) obesity,  GERD, GI bleeding , thyroid problem hypothyroidism    Musculoskeletal     (+) neck pain,   Abdominal  - normal exam    Abdomen: soft.  Bowel sounds: normal.   Substance History - negative use     OB/GYN negative ob/gyn ROS         Other - negative ROS                         Anesthesia Plan    ASA 3     general     intravenous induction     Anesthetic plan, all risks, benefits, and alternatives have been provided, discussed and informed consent has been obtained with: patient.  Use of blood products discussed with patient .   Plan discussed with CAA.        CODE STATUS:

## 2022-05-03 NOTE — OP NOTE
Operative Report:    Patient Name:  Charlie Wells  YOB: 1970    Date of Surgery:  5/3/2022     Indications:    51 y.o. male postop day 1 from fecal disimpaction for chronic constipation, history of Hirschsprung's and subsequent megacolon.  Discussed with patient as well as his caregiver and plan for diverting colostomy tomorrow.  N.p.o. after midnight, hold anticoagulation tomorrow.    Pre-op Diagnosis:   Chronic constipation       Post-Op Diagnosis Codes:  Same    Procedure/CPT® Codes:      Procedure(s):  COLOSTOMY DIVERTING    Staff:  Surgeon(s):  Ken Christiansen MD    Circulator: Venecia Aquino RN; Luis Carlos Alfaro RN  Scrub Person: Nellie Moscoso; Smitha Verduzco  Assistant: Rody Rizvi  was responsible for performing the following activities: Retraction, Suction, Irrigation and Placing Dressing and their skilled assistance was necessary for the success of this case.        Anesthesia: General    Estimated Blood Loss: minimal    Implants:    Nothing was implanted during the procedure    Specimen:          None        Findings: Extremely large sigmoid colon containing air and liquid stool    Complications: None apparent    Description of Procedure:   After risk benefits and alternatives were discussed with patient as well as his caregiver informed consent was obtained.  He was transported to the operating room placed supine the operating room table and underwent general anesthesia.  His abdomen was prepped and draped in sterile fashion and a surgical timeout was completed.    I made an elliptical incision around the area of his left lower quadrant colostomy site that had been marked preoperatively.  I use electrocautery to dissect down to the level of fascia and made a cruciate incision in the anterior fascia overlying the rectus muscle.  I split the rectus muscle with Paulette clamps and opened the posterior layer with Metzenbaum scissors between 2 hemostats.  I used my finger to  ensure there were no adhesions within the abdomen to the area of the colostomy and once this was confirmed I opened the posterior fascia vertically with the Bovie.  I was able to identify the sigmoid colon and pulled this through the anterior abdominal wall.  I placed 2 interrupted 0 PDS sutures on the anterior fascia in order to close down the fascial opening.  I used interrupted 3-0 Vicryl sutures to secure the colon to the anterior fascia.  I then placed a pursestring suture of 2-0 Monocryl in a running deep dermal fashion in order to close down the incision.  I used the Bovie to make a colotomy and then used interrupted 3-0 Vicryl sutures to take full-thickness bites through the colon and secured this to the dermis to complete the maturation of my colostomy.  I used the pull tip sucker to decompress some liquid stool as well as air out of the colon.  The sponge needle and instrument counts reported to me as correct x2.  An ostomy appliance was placed over the colostomy.  The patient was awoken from general anesthesia and transported to the recovery unit without incident.      Ken Christiansen MD     Date: 5/3/2022  Time: 16:58 EDT    This note was created using Dragon Voice Recognition software.

## 2022-05-03 NOTE — ANESTHESIA PROCEDURE NOTES
Airway  Urgency: elective    Date/Time: 5/3/2022 3:32 PM  Airway not difficult    General Information and Staff    Patient location during procedure: OR  Anesthesiologist: Thomas Yoder MD    Indications and Patient Condition  Indications for airway management: airway protection    Preoxygenated: yes  MILS maintained throughout  Mask difficulty assessment: 1 - vent by mask    Final Airway Details  Final airway type: endotracheal airway      Successful airway: ETT  Cuffed: yes   Successful intubation technique: direct laryngoscopy  Endotracheal tube insertion site: oral  Blade: Jovan  Blade size: 3  ETT size (mm): 7.0  Cormack-Lehane Classification: grade IIa - partial view of glottis  Placement verified by: chest auscultation and capnometry   Measured from: gums  ETT/EBT to gums (cm): 20  Number of attempts at approach: 1  Assessment: lips, teeth, and gum same as pre-op and atraumatic intubation

## 2022-05-03 NOTE — PLAN OF CARE
Goal Outcome Evaluation:   Pt resting in bed watching tv. Pt repeatedly hitting call light requesting food, pt is NPO for surgery today and this was reinforced. MD notified regarding giving 0900 PO medications, told to continue to hold.  at bedside this afternoon awaiting surgery. VSS

## 2022-05-04 LAB
ANION GAP SERPL CALCULATED.3IONS-SCNC: 13 MMOL/L (ref 5–15)
BASOPHILS # BLD AUTO: 0 10*3/MM3 (ref 0–0.2)
BASOPHILS NFR BLD AUTO: 0.5 % (ref 0–1.5)
BUN SERPL-MCNC: 5 MG/DL (ref 6–20)
BUN/CREAT SERPL: 8.6 (ref 7–25)
CALCIUM SPEC-SCNC: 8.7 MG/DL (ref 8.6–10.5)
CHLORIDE SERPL-SCNC: 104 MMOL/L (ref 98–107)
CO2 SERPL-SCNC: 22 MMOL/L (ref 22–29)
CREAT SERPL-MCNC: 0.58 MG/DL (ref 0.76–1.27)
CRP SERPL-MCNC: 3.4 MG/DL (ref 0–0.5)
DEPRECATED RDW RBC AUTO: 47.3 FL (ref 37–54)
EGFRCR SERPLBLD CKD-EPI 2021: 117.3 ML/MIN/1.73
EOSINOPHIL # BLD AUTO: 0 10*3/MM3 (ref 0–0.4)
EOSINOPHIL NFR BLD AUTO: 0 % (ref 0.3–6.2)
ERYTHROCYTE [DISTWIDTH] IN BLOOD BY AUTOMATED COUNT: 14.3 % (ref 12.3–15.4)
GLUCOSE SERPL-MCNC: 134 MG/DL (ref 65–99)
HCT VFR BLD AUTO: 42.6 % (ref 37.5–51)
HGB BLD-MCNC: 14.5 G/DL (ref 13–17.7)
LYMPHOCYTES # BLD AUTO: 0.9 10*3/MM3 (ref 0.7–3.1)
LYMPHOCYTES NFR BLD AUTO: 10.3 % (ref 19.6–45.3)
MAGNESIUM SERPL-MCNC: 2 MG/DL (ref 1.6–2.6)
MCH RBC QN AUTO: 32.3 PG (ref 26.6–33)
MCHC RBC AUTO-ENTMCNC: 33.9 G/DL (ref 31.5–35.7)
MCV RBC AUTO: 95.2 FL (ref 79–97)
MONOCYTES # BLD AUTO: 0.7 10*3/MM3 (ref 0.1–0.9)
MONOCYTES NFR BLD AUTO: 7.9 % (ref 5–12)
NEUTROPHILS NFR BLD AUTO: 6.9 10*3/MM3 (ref 1.7–7)
NEUTROPHILS NFR BLD AUTO: 81.3 % (ref 42.7–76)
NRBC BLD AUTO-RTO: 0 /100 WBC (ref 0–0.2)
PHOSPHATE SERPL-MCNC: 3.2 MG/DL (ref 2.5–4.5)
PLATELET # BLD AUTO: 160 10*3/MM3 (ref 140–450)
PMV BLD AUTO: 9.1 FL (ref 6–12)
POTASSIUM SERPL-SCNC: 4.5 MMOL/L (ref 3.5–5.2)
RBC # BLD AUTO: 4.48 10*6/MM3 (ref 4.14–5.8)
SODIUM SERPL-SCNC: 139 MMOL/L (ref 136–145)
WBC NRBC COR # BLD: 8.5 10*3/MM3 (ref 3.4–10.8)

## 2022-05-04 PROCEDURE — 80048 BASIC METABOLIC PNL TOTAL CA: CPT | Performed by: STUDENT IN AN ORGANIZED HEALTH CARE EDUCATION/TRAINING PROGRAM

## 2022-05-04 PROCEDURE — 84100 ASSAY OF PHOSPHORUS: CPT | Performed by: STUDENT IN AN ORGANIZED HEALTH CARE EDUCATION/TRAINING PROGRAM

## 2022-05-04 PROCEDURE — 99232 SBSQ HOSP IP/OBS MODERATE 35: CPT | Performed by: INTERNAL MEDICINE

## 2022-05-04 PROCEDURE — 85025 COMPLETE CBC W/AUTO DIFF WBC: CPT | Performed by: STUDENT IN AN ORGANIZED HEALTH CARE EDUCATION/TRAINING PROGRAM

## 2022-05-04 PROCEDURE — 83735 ASSAY OF MAGNESIUM: CPT | Performed by: STUDENT IN AN ORGANIZED HEALTH CARE EDUCATION/TRAINING PROGRAM

## 2022-05-04 PROCEDURE — 25010000002 ENOXAPARIN PER 10 MG: Performed by: STUDENT IN AN ORGANIZED HEALTH CARE EDUCATION/TRAINING PROGRAM

## 2022-05-04 PROCEDURE — 99024 POSTOP FOLLOW-UP VISIT: CPT | Performed by: STUDENT IN AN ORGANIZED HEALTH CARE EDUCATION/TRAINING PROGRAM

## 2022-05-04 PROCEDURE — 86140 C-REACTIVE PROTEIN: CPT | Performed by: STUDENT IN AN ORGANIZED HEALTH CARE EDUCATION/TRAINING PROGRAM

## 2022-05-04 RX ORDER — ENOXAPARIN SODIUM 100 MG/ML
40 INJECTION SUBCUTANEOUS EVERY 24 HOURS
Status: DISCONTINUED | OUTPATIENT
Start: 2022-05-04 | End: 2022-05-10 | Stop reason: HOSPADM

## 2022-05-04 RX ADMIN — QUETIAPINE FUMARATE 400 MG: 100 TABLET ORAL at 10:17

## 2022-05-04 RX ADMIN — Medication 3 ML: at 10:17

## 2022-05-04 RX ADMIN — DIVALPROEX SODIUM 500 MG: 500 TABLET, EXTENDED RELEASE ORAL at 10:16

## 2022-05-04 RX ADMIN — QUETIAPINE FUMARATE 400 MG: 100 TABLET ORAL at 21:35

## 2022-05-04 RX ADMIN — ESTRADIOL 1 MG: 1 TABLET ORAL at 10:17

## 2022-05-04 RX ADMIN — ACETAMINOPHEN 650 MG: 325 TABLET ORAL at 17:59

## 2022-05-04 RX ADMIN — Medication 3 ML: at 21:35

## 2022-05-04 RX ADMIN — BUSPIRONE HYDROCHLORIDE 30 MG: 15 TABLET ORAL at 21:34

## 2022-05-04 RX ADMIN — LIOTHYRONINE SODIUM 5 MCG: 5 TABLET ORAL at 06:30

## 2022-05-04 RX ADMIN — MONTELUKAST 10 MG: 10 TABLET, FILM COATED ORAL at 10:17

## 2022-05-04 RX ADMIN — ENOXAPARIN SODIUM 40 MG: 100 INJECTION SUBCUTANEOUS at 17:58

## 2022-05-04 RX ADMIN — CLONAZEPAM 0.5 MG: 0.5 TABLET ORAL at 21:34

## 2022-05-04 RX ADMIN — DIVALPROEX SODIUM 500 MG: 500 TABLET, EXTENDED RELEASE ORAL at 21:34

## 2022-05-04 RX ADMIN — SODIUM CHLORIDE 125 ML/HR: 9 INJECTION, SOLUTION INTRAVENOUS at 09:30

## 2022-05-04 RX ADMIN — PANTOPRAZOLE SODIUM 40 MG: 40 TABLET, DELAYED RELEASE ORAL at 10:17

## 2022-05-04 RX ADMIN — FLUVOXAMINE MALEATE 50 MG: 50 TABLET, FILM COATED ORAL at 21:34

## 2022-05-04 RX ADMIN — BISACODYL 10 MG: 10 SUPPOSITORY RECTAL at 10:17

## 2022-05-04 RX ADMIN — LEVOTHYROXINE SODIUM 125 MCG: 0.12 TABLET ORAL at 06:30

## 2022-05-04 RX ADMIN — TAMSULOSIN HYDROCHLORIDE 0.4 MG: 0.4 CAPSULE ORAL at 21:35

## 2022-05-04 RX ADMIN — CLONAZEPAM 0.25 MG: 0.5 TABLET ORAL at 06:30

## 2022-05-04 RX ADMIN — BUSPIRONE HYDROCHLORIDE 30 MG: 15 TABLET ORAL at 10:16

## 2022-05-04 RX ADMIN — POLYETHYLENE GLYCOL 3350 17 G: 17 POWDER, FOR SOLUTION ORAL at 10:17

## 2022-05-04 NOTE — PLAN OF CARE
Goal Outcome Evaluation:  Plan of Care Reviewed With: patient        Progress: no change  Outcome Evaluation: Pt is on 2L nc, VSS, Pt stated no pain this shift, Pt had a colostomy placed. Pt has been resting with call light in reach, Will continue to monitor.

## 2022-05-04 NOTE — NURSING NOTE
WOCN note:    WOCN follow up for new colostomy. Patient is POD#1 of diverting colostomy d/t chronic constipation. Patient has a hx of Hirschsprung's disease with recurrent megacolon and mental retardation. He currently lives in a group home.     Met with a staff member from the group home and discussed patient's needs. He is unable to care for the colostomy independently due to his mental status. Have arranged to meet with a caregiver from his facility tomorrow to review the pouch change.     The pouch is intact to the LLQ. Unable to visualize the stoma due to pasty light brown stool in the pouch.   Will continue to follow and plan to meet caregiver tomorrow at 10 AM.

## 2022-05-04 NOTE — PROGRESS NOTES
General Surgery Progress Note    Name: Charlie Wells ADMIT: 2022   : 1970  PCP: Rody Moreno MD    MRN: [id number] LOS: 2 days   AGE/SEX: 51 y.o. male  ROOM: [address] [place] [address]    Chief Complaint   Patient presents with   • Abdominal Pain     Subjective     No significant pain, wants to eat, having ostomy function    Objective     Scheduled Medications:   bisacodyl, 10 mg, Rectal, Daily  busPIRone, 30 mg, Oral, BID  clonazePAM, 0.25 mg, Oral, QAM  clonazePAM, 0.5 mg, Oral, Nightly  divalproex, 500 mg, Oral, Q12H  enoxaparin, 40 mg, Subcutaneous, Q24H  estradiol, 1 mg, Oral, Daily  fluvoxaMINE, 50 mg, Oral, Nightly  levothyroxine, 125 mcg, Oral, Q AM  liothyronine, 5 mcg, Oral, Daily  montelukast, 10 mg, Oral, Daily  pantoprazole, 40 mg, Oral, Daily  polyethylene glycol, 17 g, Oral, Daily  QUEtiapine, 400 mg, Oral, BID  sodium chloride, 3 mL, Intravenous, Q12H  tamsulosin, 0.4 mg, Oral, Nightly        Active Infusions:  lactated ringers, 1,000 mL, Last Rate: 25 mL/hr at 22 1525  sodium chloride, 125 mL/hr, Last Rate: 125 mL/hr (22 0930)        As Needed Medications:  •  acetaminophen **OR** acetaminophen **OR** acetaminophen  •  HYDROcodone-acetaminophen  •  HYDROmorphone  •  melatonin  •  ondansetron **OR** ondansetron  •  [COMPLETED] Insert peripheral IV **AND** sodium chloride  •  sodium chloride    Vital Signs  Vital Signs Patient Vitals for the past 24 hrs:   BP Temp Temp src Pulse Resp SpO2 Weight   22 1114 104/62 97.2 °F (36.2 °C) Oral 81 18 97 % --   22 0720 116/63 97.4 °F (36.3 °C) Oral 65 13 91 % --   22 0448 113/65 98 °F (36.7 °C) Oral 64 11 96 % 90 kg (198 lb 6.6 oz)   22 0030 105/65 98.2 °F (36.8 °C) Oral 80 14 92 % --   229 125/74 98.2 °F (36.8 °C) Oral 80 15 94 % --   22 1820 109/75 98.8 °F (37.1 °C) Oral 73 12 91 % --   22 1754 108/69 97.8 °F (36.6 °C) Temporal 74 11 92 % --   22 1748 119/63 -- -- 75 13 92 % --    05/03/22 1733 107/72 -- -- 75 14 92 % --   05/03/22 1718 113/71 -- -- 77 20 93 % --   05/03/22 1703 118/53 -- -- 76 17 91 % --   05/03/22 1658 -- -- -- -- 22 -- --   05/03/22 1653 123/60 -- -- 74 20 94 % --   05/03/22 1648 130/67 98.3 °F (36.8 °C) Temporal 71 19 96 % --   05/03/22 1422 127/83 98 °F (36.7 °C) Oral 62 16 95 % --   05/03/22 1247 126/78 97.9 °F (36.6 °C) Oral 64 12 94 % --     I/O:  I/O last 3 completed shifts:  In: 200 [I.V.:100; IV Piggyback:100]  Out: 4225 [Urine:4150; Stool:75]    Physical Exam:  Physical Exam  Constitutional:       General: He is not in acute distress.     Appearance: Normal appearance. He is not ill-appearing.   HENT:      Head: Normocephalic and atraumatic.      Right Ear: External ear normal.      Left Ear: External ear normal.   Eyes:      Extraocular Movements: Extraocular movements intact.      Conjunctiva/sclera: Conjunctivae normal.   Cardiovascular:      Rate and Rhythm: Normal rate and regular rhythm.   Pulmonary:      Effort: Pulmonary effort is normal. No respiratory distress.   Abdominal:      General: There is no distension.      Palpations: Abdomen is soft.      Comments: Ostomy pink and viable with stool in the bag   Musculoskeletal:         General: No swelling or deformity.   Skin:     General: Skin is warm and dry.   Neurological:      Mental Status: He is alert and oriented to person, place, and time. Mental status is at baseline.         Results Review:     CBC    Results from last 7 days   Lab Units 05/04/22  0345 05/03/22  0402 05/02/22  0344 05/01/22  0727 04/30/22  1256   WBC 10*3/mm3 8.50 7.30 7.90 4.60 4.40   HEMOGLOBIN g/dL 14.5 14.3 13.6 14.0 14.2   PLATELETS 10*3/mm3 160 163 209 185 179     BMP   Results from last 7 days   Lab Units 05/04/22  0345 05/03/22  0402 05/02/22  0344 05/01/22  0727 04/30/22  1256   SODIUM mmol/L 139 140 136 142 141   POTASSIUM mmol/L 4.5 4.4 3.9 3.7 3.9   CHLORIDE mmol/L 104 106 102 107 105   CO2 mmol/L 22.0 24.0 25.0 25.0  24.0   BUN mg/dL 5* 5* 7 16 14   CREATININE mg/dL 0.58* 0.73* 0.67* 0.77 0.88   GLUCOSE mg/dL 134* 89 88 86 83   MAGNESIUM mg/dL 2.0 1.8  --   --   --    PHOSPHORUS mg/dL 3.2 4.0  --   --   --      Radiology(recent) No radiology results for the last day    I reviewed the patient's new clinical results.    Assessment/Plan       Abdominal pain, unspecified abdominal location      51 y.o. male postop day 1 from diverting sigmoid colostomy    Plan:  -P.o. pain meds  -Regular diet  -Ostomy teaching, his long-term care facility is concerned about caring for his ostomy will need dedicated teaching with staff, they do not think that they will be able to have him come back home until Monday at the earliest  -Daily labs  -[facility]        This note was created using Dragon Voice Recognition software.    Ken Christiansen MD  05/04/22  12:13 EDT

## 2022-05-04 NOTE — ANESTHESIA POSTPROCEDURE EVALUATION
Patient: Charlie Wells    Procedure Summary     Date: 05/03/22 Room / Location: [facility] OR 09 / [facility] MAIN OR    Anesthesia Start: 1525 Anesthesia Stop: 1651    Procedure: COLOSTOMY DIVERTING (N/A Abdomen) Diagnosis:     Surgeons: Ken Christiansen MD Provider: Thomas Yoder MD    Anesthesia Type: general ASA Status: 3          Anesthesia Type: general    Vitals  Vitals Value Taken Time   /69 05/03/22 1756   Temp 97.8 °F (36.6 °C) 05/03/22 1754   Pulse 75 05/03/22 1758   Resp 11 05/03/22 1754   SpO2 93 % 05/03/22 1758   Vitals shown include unvalidated device data.        Post Anesthesia Care and Evaluation    Patient location during evaluation: PACU  Patient participation: complete - patient participated  Level of consciousness: awake  Pain scale: See nurse's notes for pain score.  Pain management: adequate  Airway patency: patent  Anesthetic complications: No anesthetic complications  PONV Status: none  Cardiovascular status: acceptable  Respiratory status: acceptable  Hydration status: acceptable    Comments: Patient seen and examined postoperatively; vital signs stable; SpO2 greater than or equal to 90%; cardiopulmonary status stable; nausea/vomiting adequately controlled; pain adequately controlled; no apparent anesthesia complications; patient discharged from anesthesia care when discharge criteria were met

## 2022-05-04 NOTE — PLAN OF CARE
Patient observed with eyes open in sitting position in chair. Rise and fall of chest observed. Dressings clean dry and intact. Hourly rounding completed this shift, no complaints or needs voiced.

## 2022-05-04 NOTE — PROGRESS NOTES
[facility] Medicine Services Daily Progress Note    Patient Name: Charlie Wells  : 1970  MRN: [id number]  Primary Care Physician:  Rody Moreno MD  Date of admission: 2022      Subjective      Chief Complaint: Abdominal pain     Patient Reports he is doing well.  Diet advanced to solid.  Tolerating well.     ROS negative except as above       Objective      Vitals:   Temp:  [97.2 °F (36.2 °C)-98.8 °F (37.1 °C)] 97.2 °F (36.2 °C)  Heart Rate:  [64-81] 81  Resp:  [11-22] 18  BP: (104-130)/(53-75) 104/62  Flow (L/min):  [2-6] 2    Physical Exam  Vitals reviewed.   from group home at bedside  HENT:      Head: Normocephalic.      Nose: Nose normal.      Mouth/Throat:      Mouth: Mucous membranes are moist.   Eyes:      Pupils: Pupils are equal, round, and reactive to light.   Cardiovascular:      Rate and Rhythm: Normal rate.      Pulses: Normal pulses.   Pulmonary:      Effort: Pulmonary effort is normal.   Abdominal:         Palpations: Abdomen is soft.      Comments:  Ostomy in place no longer distended and soft abdomen without tenderness.    Musculoskeletal:         General: Normal range of motion.      Cervical back: Normal range of motion.   Skin:     General: Skin is warm.   Neurological:      General: No focal deficit present.      Mental Status: He is alert.   Psychiatric:         Mood and Affect: Mood normal.         Behavior: Behavior normal.     Result Review    Result Review:  I have personally reviewed the results from the time of this admission to 2022 16:47 EDT and agree with these findings:  [x]  Laboratory  []  Microbiology  []  Radiology  []  EKG/Telemetry   []  Cardiology/Vascular   []  Pathology  []  Old records  []  Other:  Most notable findings include: Lab work stable    Wounds (last 24 hours)     LDA Wound     Row Name 22 175       Wound 22 2100 gluteal MASD (Moisture associated skin damage)     Wound - Properties Group Placement Date: 04/30/22 -JE Placement Time: 2100 -JE Location: gluteal  -JE Primary Wound Type: MASD  -JE    Base red;blanchable;moist  -CH red;blanchable;moist  -AY --    Drainage Amount none  -CH none  -AY --    Retired Wound - Properties Group Placement Date: 04/30/22  -JE Placement Time: 2100 -JE Location: gluteal  -JE Primary Wound Type: MASD  -JE    Retired Wound - Properties Group Date first assessed: 04/30/22 -JE Time first assessed: 2100 -JE Location: gluteal  -JE Primary Wound Type: MASD  -JE       Wound 05/03/22 1549 other (see comments) abdomen Incision    Wound - Properties Group Placement Date: 05/03/22  -SP Placement Time: 1549  -SP Present on Hospital Admission: N  -SP Orientation: other (see comments)  -SP, LLQ  Location: abdomen  -SP Primary Wound Type: Incision  -SP    Dressing Appearance dry;intact;no drainage  -CH dry;intact;no drainage  -AY dry;intact;no drainage  -DM    Closure RHONDA  -CH RHONDA  -AY RHONDA  -DM    Retired Wound - Properties Group Placement Date: 05/03/22  -SP Placement Time: 1549  -SP Present on Hospital Admission: N  -SP Orientation: other (see comments)  -SP, LLQ  Location: abdomen  -SP Primary Wound Type: Incision  -SP    Retired Wound - Properties Group Date first assessed: 05/03/22  -SP Time first assessed: 1549  -SP Present on Hospital Admission: N  -SP Location: abdomen  -SP Primary Wound Type: Incision  -SP    Row Name 05/03/22 1748 05/03/22 1733 05/03/22 1718       Wound 04/30/22 2100 gluteal MASD (Moisture associated skin damage)    Wound - Properties Group Placement Date: 04/30/22 -JE Placement Time: 2100 -JE Location: gluteal  -JE Primary Wound Type: MASD  -JE    Retired Wound - Properties Group Placement Date: 04/30/22  -JE Placement Time: 2100 -JE Location: gluteal  -JE Primary Wound Type: MASD  -JE    Retired Wound - Properties Group Date first assessed: 04/30/22 -JE Time first assessed: 2100 -JE Location: gluteal  -JE Primary  Wound Type: MASD  -JE       Wound 05/03/22 1549 other (see comments) abdomen Incision    Wound - Properties Group Placement Date: 05/03/22  -SP Placement Time: 1549  -SP Present on Hospital Admission: N  -SP Orientation: other (see comments)  -SP, LLQ  Location: abdomen  -SP Primary Wound Type: Incision  -SP    Dressing Appearance dry;intact;no drainage  -DM dry;intact;no drainage  -DM dry;intact;no drainage  -DM    Closure RHONDA  -DM RHONDA  -DM RHONDA  -DM    Retired Wound - Properties Group Placement Date: 05/03/22  -SP Placement Time: 1549  -SP Present on Hospital Admission: N  -SP Orientation: other (see comments)  -SP, LLQ  Location: abdomen  -SP Primary Wound Type: Incision  -SP    Retired Wound - Properties Group Date first assessed: 05/03/22  -SP Time first assessed: 1549  -SP Present on Hospital Admission: N  -SP Location: abdomen  -SP Primary Wound Type: Incision  -SP    Row Name 05/03/22 1703 05/03/22 1651 05/03/22 1648       Wound 04/30/22 2100 gluteal MASD (Moisture associated skin damage)    Wound - Properties Group Placement Date: 04/30/22  -JE Placement Time: 2100 -JE Location: gluteal  -JE Primary Wound Type: MASD  -JE    Retired Wound - Properties Group Placement Date: 04/30/22  -JE Placement Time: 2100 -JE Location: gluteal  -JE Primary Wound Type: MASD  -JE    Retired Wound - Properties Group Date first assessed: 04/30/22  -JE Time first assessed: 2100 -JE Location: gluteal  -JE Primary Wound Type: MASD  -JE       Wound 05/03/22 1549 other (see comments) abdomen Incision    Wound - Properties Group Placement Date: 05/03/22  -SP Placement Time: 1549  -SP Present on Hospital Admission: N  -SP Orientation: other (see comments)  -SP, LLQ  Location: abdomen  -SP Primary Wound Type: Incision  -SP    Dressing Appearance dry;intact;no drainage  -DM -- dry;intact;no drainage  -DM    Closure RHONDA  -DM Sutures;Other (Comment)  ostomy appliance  -SP RHONDA  -DM    Retired Wound - Properties Group Placement Date:  05/03/22  -SP Placement Time: 1549  -SP Present on Hospital Admission: N  -SP Orientation: other (see comments)  -SP, LLQ  Location: abdomen  -SP Primary Wound Type: Incision  -SP    Retired Wound - Properties Group Date first assessed: 05/03/22  -SP Time first assessed: 1549  -SP Present on Hospital Admission: N  -SP Location: abdomen  -SP Primary Wound Type: Incision  -SP          User Key  (r) = Recorded By, (t) = Taken By, (c) = Cosigned By    Initials Name Provider Type    DM Alona Emery, RN Registered Nurse    Adin Amaya, RN Registered Nurse    Ping Ellis, RN Registered Nurse    Luciana Lundberg, RN Registered Nurse    Luis Carlos Mascorro, RN Registered Nurse                     Assessment/Plan       Brief Patient Summary:  Charlie Wells is a 51 y.o. male who presents with recurrent constipation     bisacodyl, 10 mg, Rectal, Daily  busPIRone, 30 mg, Oral, BID  clonazePAM, 0.25 mg, Oral, QAM  clonazePAM, 0.5 mg, Oral, Nightly  divalproex, 500 mg, Oral, Q12H  enoxaparin, 40 mg, Subcutaneous, Q24H  estradiol, 1 mg, Oral, Daily  fluvoxaMINE, 50 mg, Oral, Nightly  levothyroxine, 125 mcg, Oral, Q AM  liothyronine, 5 mcg, Oral, Daily  montelukast, 10 mg, Oral, Daily  pantoprazole, 40 mg, Oral, Daily  QUEtiapine, 400 mg, Oral, BID  sodium chloride, 3 mL, Intravenous, Q12H  tamsulosin, 0.4 mg, Oral, Nightly        sodium chloride, 125 mL/hr, Last Rate: 125 mL/hr (04/30/22 1810)           Active Hospital Problems:          Active Hospital Problems     Diagnosis     • Abdominal pain, unspecified abdominal location        Plan:   51-year-old gentleman with recurrent constipation due to Hirschsprung's disease  Status post OR fecal disimpaction with tube placement  Status post or again on May 3.  Abdomen no longer distended     GERD  On PPI     Mental retardation  Baseline  CT head negative  Resume BuSpar Klonopin Depakote Seroquel but currently n.p.o.     Hypothyroidism  Synthroid and liothyronine  resumed but currently n.p.o.        DVT prophylaxis:  Medical DVT prophylaxis orders are present.     CODE STATUS:    Code Status (Patient has no pulse and is not breathing): CPR (Attempt to Resuscitate)  Medical Interventions (Patient has pulse or is breathing): Full Support              This patient has been examined wearing appropriate Personal Protective Equipment and discussed with Staff.  05/04/22      Electronically signed by Ben Lockwood MD, 05/04/22, 16:47 EDT.  [facility] Hospitalist Team

## 2022-05-05 LAB
ANION GAP SERPL CALCULATED.3IONS-SCNC: 10 MMOL/L (ref 5–15)
BACTERIA SPEC AEROBE CULT: NORMAL
BACTERIA SPEC AEROBE CULT: NORMAL
BASOPHILS # BLD AUTO: 0.1 10*3/MM3 (ref 0–0.2)
BASOPHILS NFR BLD AUTO: 1.3 % (ref 0–1.5)
BUN SERPL-MCNC: 13 MG/DL (ref 6–20)
BUN/CREAT SERPL: 19.1 (ref 7–25)
CALCIUM SPEC-SCNC: 9.1 MG/DL (ref 8.6–10.5)
CHLORIDE SERPL-SCNC: 105 MMOL/L (ref 98–107)
CO2 SERPL-SCNC: 26 MMOL/L (ref 22–29)
CREAT SERPL-MCNC: 0.68 MG/DL (ref 0.76–1.27)
DEPRECATED RDW RBC AUTO: 49 FL (ref 37–54)
EGFRCR SERPLBLD CKD-EPI 2021: 111.8 ML/MIN/1.73
EOSINOPHIL # BLD AUTO: 0.1 10*3/MM3 (ref 0–0.4)
EOSINOPHIL NFR BLD AUTO: 1 % (ref 0.3–6.2)
ERYTHROCYTE [DISTWIDTH] IN BLOOD BY AUTOMATED COUNT: 14.7 % (ref 12.3–15.4)
GLUCOSE SERPL-MCNC: 106 MG/DL (ref 65–99)
HCT VFR BLD AUTO: 42.4 % (ref 37.5–51)
HGB BLD-MCNC: 14.3 G/DL (ref 13–17.7)
LYMPHOCYTES # BLD AUTO: 2.5 10*3/MM3 (ref 0.7–3.1)
LYMPHOCYTES NFR BLD AUTO: 31.4 % (ref 19.6–45.3)
MAGNESIUM SERPL-MCNC: 1.9 MG/DL (ref 1.6–2.6)
MCH RBC QN AUTO: 32.2 PG (ref 26.6–33)
MCHC RBC AUTO-ENTMCNC: 33.8 G/DL (ref 31.5–35.7)
MCV RBC AUTO: 95.3 FL (ref 79–97)
MONOCYTES # BLD AUTO: 1.1 10*3/MM3 (ref 0.1–0.9)
MONOCYTES NFR BLD AUTO: 14 % (ref 5–12)
NEUTROPHILS NFR BLD AUTO: 4.2 10*3/MM3 (ref 1.7–7)
NEUTROPHILS NFR BLD AUTO: 52.3 % (ref 42.7–76)
NRBC BLD AUTO-RTO: 0.1 /100 WBC (ref 0–0.2)
PHOSPHATE SERPL-MCNC: 3.6 MG/DL (ref 2.5–4.5)
PLATELET # BLD AUTO: 158 10*3/MM3 (ref 140–450)
PMV BLD AUTO: 9.2 FL (ref 6–12)
POTASSIUM SERPL-SCNC: 4.2 MMOL/L (ref 3.5–5.2)
RBC # BLD AUTO: 4.45 10*6/MM3 (ref 4.14–5.8)
SODIUM SERPL-SCNC: 141 MMOL/L (ref 136–145)
WBC NRBC COR # BLD: 8.1 10*3/MM3 (ref 3.4–10.8)

## 2022-05-05 PROCEDURE — 25010000002 ENOXAPARIN PER 10 MG: Performed by: STUDENT IN AN ORGANIZED HEALTH CARE EDUCATION/TRAINING PROGRAM

## 2022-05-05 PROCEDURE — 97166 OT EVAL MOD COMPLEX 45 MIN: CPT

## 2022-05-05 PROCEDURE — 84100 ASSAY OF PHOSPHORUS: CPT | Performed by: INTERNAL MEDICINE

## 2022-05-05 PROCEDURE — 99024 POSTOP FOLLOW-UP VISIT: CPT | Performed by: STUDENT IN AN ORGANIZED HEALTH CARE EDUCATION/TRAINING PROGRAM

## 2022-05-05 PROCEDURE — 97162 PT EVAL MOD COMPLEX 30 MIN: CPT

## 2022-05-05 PROCEDURE — 85025 COMPLETE CBC W/AUTO DIFF WBC: CPT | Performed by: INTERNAL MEDICINE

## 2022-05-05 PROCEDURE — 83735 ASSAY OF MAGNESIUM: CPT | Performed by: INTERNAL MEDICINE

## 2022-05-05 PROCEDURE — 99232 SBSQ HOSP IP/OBS MODERATE 35: CPT | Performed by: INTERNAL MEDICINE

## 2022-05-05 PROCEDURE — 80048 BASIC METABOLIC PNL TOTAL CA: CPT | Performed by: INTERNAL MEDICINE

## 2022-05-05 RX ADMIN — ENOXAPARIN SODIUM 40 MG: 100 INJECTION SUBCUTANEOUS at 17:40

## 2022-05-05 RX ADMIN — PANTOPRAZOLE SODIUM 40 MG: 40 TABLET, DELAYED RELEASE ORAL at 08:39

## 2022-05-05 RX ADMIN — CLONAZEPAM 0.5 MG: 0.5 TABLET ORAL at 20:19

## 2022-05-05 RX ADMIN — LEVOTHYROXINE SODIUM 125 MCG: 0.12 TABLET ORAL at 05:53

## 2022-05-05 RX ADMIN — QUETIAPINE FUMARATE 400 MG: 100 TABLET ORAL at 20:18

## 2022-05-05 RX ADMIN — Medication 3 ML: at 20:19

## 2022-05-05 RX ADMIN — CLONAZEPAM 0.25 MG: 0.5 TABLET ORAL at 08:28

## 2022-05-05 RX ADMIN — DIVALPROEX SODIUM 500 MG: 500 TABLET, EXTENDED RELEASE ORAL at 08:38

## 2022-05-05 RX ADMIN — BUSPIRONE HYDROCHLORIDE 30 MG: 15 TABLET ORAL at 08:39

## 2022-05-05 RX ADMIN — FLUVOXAMINE MALEATE 50 MG: 50 TABLET, FILM COATED ORAL at 20:18

## 2022-05-05 RX ADMIN — LIOTHYRONINE SODIUM 5 MCG: 5 TABLET ORAL at 05:52

## 2022-05-05 RX ADMIN — QUETIAPINE FUMARATE 400 MG: 100 TABLET ORAL at 08:39

## 2022-05-05 RX ADMIN — MONTELUKAST 10 MG: 10 TABLET, FILM COATED ORAL at 08:39

## 2022-05-05 RX ADMIN — BUSPIRONE HYDROCHLORIDE 30 MG: 15 TABLET ORAL at 20:18

## 2022-05-05 RX ADMIN — TAMSULOSIN HYDROCHLORIDE 0.4 MG: 0.4 CAPSULE ORAL at 20:19

## 2022-05-05 RX ADMIN — DIVALPROEX SODIUM 500 MG: 500 TABLET, EXTENDED RELEASE ORAL at 20:18

## 2022-05-05 RX ADMIN — Medication 3 ML: at 08:40

## 2022-05-05 RX ADMIN — ESTRADIOL 1 MG: 1 TABLET ORAL at 08:39

## 2022-05-05 NOTE — PROGRESS NOTES
[facility] Medicine Services Daily Progress Note    Patient Name: Charlie Wells  : 1970  MRN: [id number]  Primary Care Physician:  Rody Moreno MD  Date of admission: 2022      Subjective      Chief Complaint: Abdominal pain     Patient Reports he is doing well.  Diet advanced to solid.  Tolerating well.  No new complaints.  PT consulted.     ROS negative except as above    Objective      Vitals:   Temp:  [97.6 °F (36.4 °C)-99.3 °F (37.4 °C)] 97.6 °F (36.4 °C)  Heart Rate:  [63-77] 63  Resp:  [15-21] 21  BP: (101-106)/(53-60) 105/60    Physical Exam  Vitals reviewed.   Patient sitting in the chair today.  HENT:      Head: Normocephalic.      Nose: Nose normal.      Mouth/Throat:      Mouth: Mucous membranes are moist.   Eyes:      Pupils: Pupils are equal, round, and reactive to light.   Cardiovascular:      Rate and Rhythm: Normal rate.      Pulses: Normal pulses.   Pulmonary:      Effort: Pulmonary effort is normal.   Abdominal:         Palpations: Abdomen is soft.      Comments:  Ostomy in place no longer distended and soft abdomen without tenderness.    Musculoskeletal:         General: Normal range of motion.      Cervical back: Normal range of motion.   Skin:     General: Skin is warm.   Neurological:      General: No focal deficit present.      Mental Status: He is alert.   Psychiatric:         Mood and Affect: Mood normal.         Behavior: Behavior normal.        Result Review    Result Review:  I have personally reviewed the results from the time of this admission to 2022 16:21 EDT and agree with these findings:  [x]  Laboratory  []  Microbiology  []  Radiology  []  EKG/Telemetry   []  Cardiology/Vascular   []  Pathology  []  Old records  []  Other:  Most notable findings include: Lab work unremarkable    Wounds (last 24 hours)     LDA Wound     Row Name 22 1545 22 1936          Wound 22 2100 gluteal MASD (Moisture associated skin damage)     Wound - Properties Group Placement Date: 04/30/22  -JE Placement Time: 2100 -JE Location: gluteal  -JE Primary Wound Type: MASD  -JE     Base -- red;blanchable;moist  -CH     Drainage Amount -- none  -CH     Retired Wound - Properties Group Placement Date: 04/30/22  -JE Placement Time: 2100 -JE Location: gluteal  -JE Primary Wound Type: MASD  -JE     Retired Wound - Properties Group Date first assessed: 04/30/22  -JE Time first assessed: 2100 -JE Location: gluteal  -JE Primary Wound Type: MASD  -JE            Wound 05/03/22 1549 other (see comments) abdomen Incision    Wound - Properties Group Placement Date: 05/03/22  -SP Placement Time: 1549  -SP Present on Hospital Admission: N  -SP Orientation: other (see comments)  -SP, LLQ  Location: abdomen  -SP Primary Wound Type: Incision  -SP     Dressing Appearance dry;intact;no drainage  -HT dry;intact;no drainage  -CH     Closure -- RHONDA  -CH     Retired Wound - Properties Group Placement Date: 05/03/22  -SP Placement Time: 1549  -SP Present on Hospital Admission: N  -SP Orientation: other (see comments)  -SP, LLQ  Location: abdomen  -SP Primary Wound Type: Incision  -SP     Retired Wound - Properties Group Date first assessed: 05/03/22  -SP Time first assessed: 1549  -SP Present on Hospital Admission: N  -SP Location: abdomen  -SP Primary Wound Type: Incision  -SP           User Key  (r) = Recorded By, (t) = Taken By, (c) = Cosigned By    Initials Name Provider Type    Siomara Donnelly RN Registered Nurse    Adin Amaya, RN Registered Nurse    Ping Ellis, RN Registered Nurse    SP Luis Carlos Alfaro, RN Registered Nurse                     Assessment/Plan       Brief Patient Summary:  Charlie Wells is a 51 y.o. male who presents with recurrent constipation     bisacodyl, 10 mg, Rectal, Daily  busPIRone, 30 mg, Oral, BID  clonazePAM, 0.25 mg, Oral, QAM  clonazePAM, 0.5 mg, Oral, Nightly  divalproex, 500 mg, Oral, Q12H  enoxaparin, 40 mg,  Subcutaneous, Q24H  estradiol, 1 mg, Oral, Daily  fluvoxaMINE, 50 mg, Oral, Nightly  levothyroxine, 125 mcg, Oral, Q AM  liothyronine, 5 mcg, Oral, Daily  montelukast, 10 mg, Oral, Daily  pantoprazole, 40 mg, Oral, Daily  QUEtiapine, 400 mg, Oral, BID  sodium chloride, 3 mL, Intravenous, Q12H  tamsulosin, 0.4 mg, Oral, Nightly        sodium chloride, 125 mL/hr, Last Rate: 125 mL/hr (04/30/22 1810)           Active Hospital Problems:          Active Hospital Problems     Diagnosis     • Abdominal pain, unspecified abdominal location        Plan:   51-year-old gentleman with recurrent constipation due to Hirschsprung's disease  Status post OR fecal disimpaction with tube placement  Status post OR again on May 3.  Abdomen no longer distended     GERD  On PPI     Intellectual disability  Baseline  CT head negative  Resume BuSpar Klonopin Depakote Seroquel      Hypothyroidism  Synthroid and liothyronine resumed      PT ordered on May 5.  Plans for SNF placement     DVT prophylaxis:  Medical DVT prophylaxis orders are present.     CODE STATUS:    Code Status (Patient has no pulse and is not breathing): CPR (Attempt to Resuscitate)  Medical Interventions (Patient has pulse or is breathing): Full Support              This patient has been examined wearing appropriate Personal Protective Equipment and discussed with Staff.   05/05/22      Electronically signed by Ben Lockwood MD, 05/05/22, 16:21 EDT.  [facility] [place] Hospitalist Team

## 2022-05-05 NOTE — THERAPY EVALUATION
Patient Name: Charlie Wells  : 1970    MRN: [id number]                              Today's Date: 2022       Admit Date: 2022    Visit Dx:     ICD-10-CM ICD-9-CM   1. Abdominal pain, unspecified abdominal location  R10.9 789.00   2. Abdominal distension  R14.0 787.3     Patient Active Problem List   Diagnosis   • Anemia in other chronic diseases classified elsewhere   • Ataxia   • Constipation, chronic   • Dependence on continuous positive airway pressure ventilation   • Disorder of foot   • Encounter for general adult medical examination without abnormal findings   • Gastroesophageal reflux disease   • Esophageal stricture   • Hay fever   • Hirschsprung's disease   • Hyperlipidemia   • Hypothyroidism   • Lung mass   • Mediastinal lymphadenopathy   • Moderate intellectual disability   • Obstructive sleep apnea   • Proteinuria   • Seizure disorder (formerly [facility])   • Unequal leg length   • Full incontinence of feces   • Urinary incontinence   • Leg edema   • Elevated PSA   • Rosacea   • Medicare annual wellness visit, subsequent   • Pneumonia due to infectious organism   • Abdominal pain   • Hypokalemia   • Pancreatitis   • Aspiration into respiratory tract   • Dyspnea on exertion   • Other idiopathic scoliosis, thoracolumbar region   • Development delay   • Musculoskeletal neck pain   • Adolescent idiopathic scoliosis of thoracolumbar region   • Scoliosis (and kyphoscoliosis), idiopathic   • Tardive dyskinesia   • Cognitive and behavioral changes   • Prostate cancer screening   • Abdominal pain, unspecified abdominal location     Past Medical History:   Diagnosis Date   • Acute pancreatitis    • Allergic     Reglan, Benztropine   • Allergic rhinitis    • Anxiety    • Aspiration pneumonia (formerly [facility])    • Ataxia    • Chronic constipation    • Chronic edema    • Depression    • Esophageal stricture    • Fecal incontinence    • GERD (gastroesophageal reflux disease)    • GI bleed    • Hirschsprung's disease    •  Hyperlipidemia    • Hypokalemia    • Hypothyroidism    • Iron deficiency anemia    • Leg length discrepancy    • Mediastinal lymphadenopathy    • Megacolon    • Mildly mentally retarded    • Obesity    • Positive PPD    • Pulmonary hypertension (HCC)    • Scoliosis    • Seizures (HCC)    • Sleep apnea     Not very compliant with CPAP   • Urinary incontinence    • Urinary tract infection      Past Surgical History:   Procedure Laterality Date   • COLON SURGERY  1989    colostomy, the reversed   • COLONOSCOPY      June 2017   • COLOSTOMY N/A 5/3/2022    Procedure: COLOSTOMY DIVERTING;  Surgeon: Ken Christiansen MD;  Location: [facility] MAIN OR;  Service: General;  Laterality: N/A;   • ESOPHAGEAL DILATATION      Several   • HEMICOLECTOMY Left    • MYOTOMY      Rectal   • RECTAL EXAMINATION UNDER ANESTHESIA N/A 5/1/2022    Procedure: RECTAL EXAM UNDER ANESTHESIA, RIGID SIGMOIDOSCOPY WITH FECAL DISIMPACTION;  Surgeon: Ken Christiansen MD;  Location: [facility];  Service: General;  Laterality: N/A;      General Information     Row Name 05/05/22 1702          OT Time and Intention    Document Type evaluation  -SR     Row Name 05/05/22 1702          Occupational Profile    Reason for Services/Referral (Occupational Profile) Pt underwent diverting colostomy.  He has history of Hirschsprung's and subsequent megacolon.  Pt has moderate developmental delay and lives in a group home.  Pt and family report he is typically able to mobilize without assist, though has some balance deficits.  Pt reports he is typically independent with dressing.  -SR     Row Name 05/05/22 1702          Cognition    Orientation Status (Cognition) oriented x 3  -SR     Row Name 05/05/22 1705 05/05/22 1702       Safety Issues, Functional Mobility    Impairments Affecting Function (Mobility) balance;endurance/activity tolerance;postural/trunk control;range of motion (ROM);sensation/sensory awareness;strength  -SR balance  -SR          User Key  (r) =  Recorded By, (t) = Taken By, (c) = Cosigned By    Initials Name Provider Type    SR Ayse Perez OT Occupational Therapist                 Mobility/ADL's     Row Name 05/05/22 1705          Transfers    Transfers sit-stand transfer;bed-chair transfer  -SR     Bed-Chair [place] (Transfers) maximum assist (25% patient effort);2 person assist  -SR     Row Name 05/05/22 1705          Sit-Stand Transfer    Comment, (Sit-Stand Transfer) Pt adamently refused to stand due to fear of falling.  Attempted many methods in attempt for pt to stand.  Near the end of session brother came in and assisted and pt reluctantly stood and transferred to bed.  -SR           User Key  (r) = Recorded By, (t) = Taken By, (c) = Cosigned By    Initials Name Provider Type    SR Ayse Perez OT Occupational Therapist               Obj/Interventions     Row Name 05/05/22 1710 05/05/22 1708       Range of Motion Comprehensive    General Range of Motion no range of motion deficits identified  -SR no range of motion deficits identified  -SR    Row Name 05/05/22 1710 05/05/22 1708       Strength Comprehensive (MMT)    General Manual Muscle Testing (MMT) Assessment -- no strength deficits identified  -SR    Comment, General Manual Muscle Testing (MMT) Assessment Grossly 3+/5  -SR --    Row Name 05/05/22 1708          Balance    Balance Interventions sitting;standing;sit to stand;supported;static;dynamic;minimal challenge  -SR           User Key  (r) = Recorded By, (t) = Taken By, (c) = Cosigned By    Initials Name Provider Type    SR Ayse Perez OT Occupational Therapist               Goals/Plan     Row Name 05/05/22 1710          Transfer Goal 1 (OT)    Activity/Assistive Device (Transfer Goal 1, OT) bed-to-chair/chair-to-bed  -SR     [place] Level/Cues Needed (Transfer Goal 1, OT) minimum assist (75% or more patient effort)  -SR     Time Frame (Transfer Goal 1, OT) 2 weeks  -SR     [facility] Name 05/05/22 1710           Bathing Goal 1 (OT)    Activity/Device (Bathing Goal 1, OT) bathing skills, all  -SR     [place] Level/Cues Needed (Bathing Goal 1, OT) moderate assist (50-74% patient effort)  -SR     Time Frame (Bathing Goal 1, OT) 2 weeks  -SR     Row Name 05/05/22 1710          Toileting Goal 1 (OT)    Activity/Device (Toileting Goal 1, OT) toileting skills, all  -SR     Jordan Level/Cues Needed (Toileting Goal 1, OT) moderate assist (50-74% patient effort)  -SR     Time Frame (Toileting Goal 1, OT) 2 weeks  -SR     Row Name 05/05/22 1710          Therapy Assessment/Plan (OT)    Planned Therapy Interventions (OT) activity tolerance training;BADL retraining;functional balance retraining;IADL retraining;neuromuscular control/coordination retraining;occupation/activity based interventions;passive ROM/stretching;prosthetic fitting/training;ROM/therapeutic exercise;strengthening exercise;transfer/mobility retraining  -SR           User Key  (r) = Recorded By, (t) = Taken By, (c) = Cosigned By    Initials Name Provider Type    SR Ayse Perez, OT Occupational Therapist               Clinical Impression     Row Name 05/05/22 9683          Plan of Care Review    Outcome Evaluation Pt underwent diverting colostomy.  He has history of Hirschsprung's and subsequent megacolon.  Pt has moderate developmental delay and lives in a group home.  Pt and family report he is typically able to mobilize without assist, though has some balance deficits.  Pt reports he is typically independent with dressing.  He is very fearful of falling and reqiured max assist to transfer to chair with assist from brother.  He has good UE ROM though demos general weakness. He is very timid with therapists this date. He is not safe to return to group home at this time and will reuqire subacute IP rehab at discharge.  -     Row Name 05/05/22 2741          Therapy Assessment/Plan (OT)    Criteria for Skilled Therapeutic Interventions Met  (OT) yes  -SR     Therapy Frequency (OT) 5 times/wk  -SR     Predicted Duration of Therapy Intervention (OT) Until discharge  -SR     Row Name 05/05/22 1708          Therapy Plan Review/Discharge Plan (OT)    Anticipated Discharge Disposition (OT) skilled nursing facility  -SR     Row Name 05/05/22 1708          Positioning and Restraints    Pre-Treatment Position sitting in chair/recliner  -SR     Post Treatment Position bed  -SR           User Key  (r) = Recorded By, (t) = Taken By, (c) = Cosigned By    Initials Name Provider Type    SR Ayse Perez OT Occupational Therapist               Outcome Measures     Row Name 05/05/22 1711          How much help from another is currently needed...    Putting on and taking off regular lower body clothing? 1  -SR     Bathing (including washing, rinsing, and drying) 1  -SR     Toileting (which includes using toilet bed pan or urinal) 1  -SR     Putting on and taking off regular upper body clothing 1  -SR     Taking care of personal grooming (such as brushing teeth) 1  -SR     Eating meals 1  -SR     AM-PAC 6 Clicks Score (OT) 6  -SR     Row Name 05/05/22 1711          Functional Assessment    Outcome Measure Options AM-PAC 6 Clicks Daily Activity (OT)  -SR           User Key  (r) = Recorded By, (t) = Taken By, (c) = Cosigned By    Initials Name Provider Type    Ayse Hill OT Occupational Therapist                Occupational Therapy Education                 Title: PT OT SLP Therapies (In Progress)     Topic: Occupational Therapy (In Progress)     Point: ADL training (In Progress)     Description:   Instruct learner(s) on proper safety adaptation and remediation techniques during self care or transfers.   Instruct in proper use of assistive devices.              Learning Progress Summary           Patient Acceptance, E,TB, NR by SR at 5/5/2022 1711                   Point: Home exercise program (Not Started)     Description:   Instruct learner(s)  on appropriate technique for monitoring, assisting and/or progressing therapeutic exercises/activities.              Learner Progress:  Not documented in this visit.          Point: Precautions (Not Started)     Description:   Instruct learner(s) on prescribed precautions during self-care and functional transfers.              Learner Progress:  Not documented in this visit.          Point: Body mechanics (In Progress)     Description:   Instruct learner(s) on proper positioning and spine alignment during self-care, functional mobility activities and/or exercises.              Learning Progress Summary           Patient Acceptance, E,TB, NR by SR at 5/5/2022 4131                               User Key     Initials Effective Dates Name Provider Type Discipline    SR 06/16/21 -  Ayse Perez, OT Occupational Therapist OT              OT Recommendation and Plan  Planned Therapy Interventions (OT): activity tolerance training, BADL retraining, functional balance retraining, IADL retraining, neuromuscular control/coordination retraining, occupation/activity based interventions, passive ROM/stretching, prosthetic fitting/training, ROM/therapeutic exercise, strengthening exercise, transfer/mobility retraining  Therapy Frequency (OT): 5 times/wk  Plan of Care Review  Outcome Evaluation: Pt underwent diverting colostomy.  He has history of Hirschsprung's and subsequent megacolon.  Pt has moderate developmental delay and lives in a group home.  Pt and family report he is typically able to mobilize without assist, though has some balance deficits.  Pt reports he is typically independent with dressing.  He is very fearful of falling and reqiured max assist to transfer to chair with assist from brother.  He has good UE ROM though demos general weakness. He is very timid with therapists this date. He is not safe to return to group home at this time and will reuqire subacute IP rehab at discharge.     Time Calculation:     Time Calculation- OT     Row Name 05/05/22 1711             Time Calculation- OT    OT Start Time 1515  -SR      OT Stop Time 1555  -SR      OT Time Calculation (min) 40 min  -SR      Total Timed Code Minutes- OT 0 minute(s)  -SR      OT Received On 05/05/22  -SR      OT - Next Appointment 05/06/22  -SR      OT Goal Re-Cert Due Date 05/19/22  -SR            User Key  (r) = Recorded By, (t) = Taken By, (c) = Cosigned By    Initials Name Provider Type    SR Ayse Perez, OT Occupational Therapist              Therapy Charges for Today     Code Description Service Date Service Provider Modifiers Qty    36392922115 HC OT EVAL MOD COMPLEXITY 4 5/5/2022 Ayse Perez OT GO 1               Ayse Perez OT  5/5/2022

## 2022-05-05 NOTE — CASE MANAGEMENT/SOCIAL WORK
Continued Stay Note  [facility]     Patient Name: Charlie Wells  MRN: [id number]  Today's Date: 5/5/2022    Admit Date: 4/30/2022     Discharge Plan     Row Name 05/05/22 1516       Plan    Plan Referral to [facility] pending acceptance.  PASRR per facility,  Pending PT eval.    Patient/Family in Agreement with Plan yes    Plan Comments Spoke wt caregiver and  with group home that is through  and they do not feel like the group home will be able to handle the patient's new colostomy.  Patient will need snf .  Choices,#1 [facility], #2 [facility].  Pending PT eval and clinical course.               Lore Bello RN   Met with patient in room wearing PPE: mask, face shield/goggles, gloves, gown.      Maintained distance greater than six feet and spent less than 15 minutes in the room.

## 2022-05-05 NOTE — DISCHARGE PLACEMENT REQUEST
"Charlie Wells (51 y.o. Male)             Date of Birth   1970    Social Security Number       Address   [address] [place] [address] [place] [address]    Home Phone   [phone]    MRN   [id number]       [employer]   None    Marital Status   Single                            Admission Date   4/30/22    Admission Type   Emergency    Admitting Provider   Jamal Smith MD    Attending Provider   Ben Lockwood MD    Department, Room/Bed   [facility] SURGICAL INPATIENT, 4114/1       Discharge Date       Discharge Disposition       Discharge Destination                               Attending Provider: Ben Lockwood MD    Allergies: Metoclopramide, Benztropine    Isolation: None   Infection: None   Code Status: CPR   Advance Care Planning Activity    Ht: 160 cm (63\")   Wt: 90 kg (198 lb 6.6 oz)    Admission Cmt: None   Principal Problem: None                Active Insurance as of 4/30/2022     Primary Coverage     Payor Plan Insurance Group Employer/Plan Group    MEDICARE MEDICARE A & B      Payor Plan Address Payor Plan Phone Number Payor Plan Fax Number Effective Dates    [address] [phone]  6/1/1996 - None Entered    [facility] [address]       Subscriber Name Subscriber Birth Date Member ID       CHARLIE WELLS 1970 [id number]           Secondary Coverage     Payor Plan Insurance Group Employer/Plan Group    [place]      Payor Plan Address Payor Plan Phone Number Payor Plan Fax Number Effective Dates    [address]   1970 - None Entered    [place] [address]       Subscriber Name Subscriber Birth Date Member ID       CHARLIE WELLS NAYELI 1970 [id number]                 Emergency Contacts      (Rel.) Home Phone Work Phone Mobile Phone    MALATHI RINCON (Care Giver) -- -- [phone]              "

## 2022-05-05 NOTE — THERAPY EVALUATION
Patient Name: Charlie Wells  : 1970    MRN: [id number]                              Today's Date: 2022       Admit Date: 2022    Visit Dx:     ICD-10-CM ICD-9-CM   1. Abdominal pain, unspecified abdominal location  R10.9 789.00   2. Abdominal distension  R14.0 787.3     Patient Active Problem List   Diagnosis   • Anemia in other chronic diseases classified elsewhere   • Ataxia   • Constipation, chronic   • Dependence on continuous positive airway pressure ventilation   • Disorder of foot   • Encounter for general adult medical examination without abnormal findings   • Gastroesophageal reflux disease   • Esophageal stricture   • Hay fever   • Hirschsprung's disease   • Hyperlipidemia   • Hypothyroidism   • Lung mass   • Mediastinal lymphadenopathy   • Moderate intellectual disability   • Obstructive sleep apnea   • Proteinuria   • Seizure disorder ([facility])   • Unequal leg length   • Full incontinence of feces   • Urinary incontinence   • Leg edema   • Elevated PSA   • Rosacea   • Medicare annual wellness visit, subsequent   • Pneumonia due to infectious organism   • Abdominal pain   • Hypokalemia   • Pancreatitis   • Aspiration into respiratory tract   • Dyspnea on exertion   • Other idiopathic scoliosis, thoracolumbar region   • Development delay   • Musculoskeletal neck pain   • Adolescent idiopathic scoliosis of thoracolumbar region   • Scoliosis (and kyphoscoliosis), idiopathic   • Tardive dyskinesia   • Cognitive and behavioral changes   • Prostate cancer screening   • Abdominal pain, unspecified abdominal location     Past Medical History:   Diagnosis Date   • Acute pancreatitis    • Allergic     Reglan, Benztropine   • Allergic rhinitis    • Anxiety    • Aspiration pneumonia ([facility])    • Ataxia    • Chronic constipation    • Chronic edema    • Depression    • Esophageal stricture    • Fecal incontinence    • GERD (gastroesophageal reflux disease)    • GI bleed    • Hirschsprung's disease    •  Hyperlipidemia    • Hypokalemia    • Hypothyroidism    • Iron deficiency anemia    • Leg length discrepancy    • Mediastinal lymphadenopathy    • Megacolon    • Mildly mentally retarded    • Obesity    • Positive PPD    • Pulmonary hypertension (HCC)    • Scoliosis    • Seizures (HCC)    • Sleep apnea     Not very compliant with CPAP   • Urinary incontinence    • Urinary tract infection      Past Surgical History:   Procedure Laterality Date   • COLON SURGERY  1989    colostomy, the reversed   • COLONOSCOPY      June 2017   • COLOSTOMY N/A 5/3/2022    Procedure: COLOSTOMY DIVERTING;  Surgeon: Ken Christiansen MD;  Location: [facility] MAIN OR;  Service: General;  Laterality: N/A;   • ESOPHAGEAL DILATATION      Several   • HEMICOLECTOMY Left    • MYOTOMY      Rectal   • RECTAL EXAMINATION UNDER ANESTHESIA N/A 5/1/2022    Procedure: RECTAL EXAM UNDER ANESTHESIA, RIGID SIGMOIDOSCOPY WITH FECAL DISIMPACTION;  Surgeon: Ken Christiansen MD;  Location: [facility];  Service: General;  Laterality: N/A;      General Information     Row Name 05/05/22 1706          Physical Therapy Time and Intention    Document Type evaluation  -SS     Mode of Treatment physical therapy  -     Row Name 05/05/22 1706          General Information    Patient Profile Reviewed yes  -SS     Prior Level of Function --  difficult to ascertain PLOF due to pt being unable to provide this information. Family states he walks at baseline. Lives in group home.  -     Existing Precautions/Restrictions fall  -     Row Name 05/05/22 1706          Living Environment    People in Home facility resident  group home  -     Row Name 05/05/22 1706          Cognition    Orientation Status (Cognition) oriented to;person;place  -     Row Name 05/05/22 1706          Safety Issues, Functional Mobility    Impairments Affecting Function (Mobility) balance;endurance/activity tolerance;postural/trunk control;range of motion (ROM);sensation/sensory  awareness;strength  -SS           User Key  (r) = Recorded By, (t) = Taken By, (c) = Cosigned By    Initials Name Provider Type    Юлия Jordan PT Physical Therapist               Mobility     Row Name 05/05/22 1708          Bed Mobility    Bed Mobility sit-supine  -SS     Sit-Supine Blair (Bed Mobility) maximum assist (25% patient effort)  -     Row Name 05/05/22 1708          Bed-Chair Transfer    Bed-Chair Blair (Transfers) maximum assist (25% patient effort);2 person assist  -SS     Comment, (Bed-Chair Transfer) pt very apprehensive to transfer to bed  -SS     Row Name 05/05/22 1708          Sit-Stand Transfer    Comment, (Sit-Stand Transfer) Pt adamently refused to stand due to fear of falling. Attempted many methods in attempt for pt to stand. Near the end of session brother came in and assisted and pt reluctantly stood and transferred to bed.  -SS           User Key  (r) = Recorded By, (t) = Taken By, (c) = Cosigned By    Initials Name Provider Type    Юлия Jordan PT Physical Therapist               Obj/Interventions    No documentation.                Goals/Plan     Row Name 05/05/22 1718          Bed Mobility Goal 1 (PT)    Activity/Assistive Device (Bed Mobility Goal 1, PT) bed mobility activities, all  -SS     Blair Level/Cues Needed (Bed Mobility Goal 1, PT) modified independence  -SS     Time Frame (Bed Mobility Goal 1, PT) long term goal (LTG);2 weeks  -     Row Name 05/05/22 1718          Transfer Goal 1 (PT)    Activity/Assistive Device (Transfer Goal 1, PT) transfers, all  -SS     Blair Level/Cues Needed (Transfer Goal 1, PT) modified independence  -SS     Time Frame (Transfer Goal 1, PT) long term goal (LTG);2 weeks  -     Row Name 05/05/22 1718          Gait Training Goal 1 (PT)    Activity/Assistive Device (Gait Training Goal 1, PT) decrease asymmetrical patterns;walker, rolling  -SS     Blair Level (Gait Training Goal 1, PT)  modified independence  -     Distance (Gait Training Goal 1, PT) 50'  -     Time Frame (Gait Training Goal 1, PT) long term goal (LTG);2 weeks  -     Row Name 05/05/22 1718          Therapy Assessment/Plan (PT)    Planned Therapy Interventions (PT) balance training;bed mobility training;gait training;transfer training;strengthening;patient/family education;home exercise program  -           User Key  (r) = Recorded By, (t) = Taken By, (c) = Cosigned By    Initials Name Provider Type     Юлия Blancas, PT Physical Therapist               Clinical Impression     Row Name 05/05/22 1716          Pain    Pretreatment Pain Rating 3/10  -     Posttreatment Pain Rating 3/10  -SS     Row Name 05/05/22 1716          Plan of Care Review    Plan of Care Reviewed With patient  -     Outcome Evaluation 52 y/o M POD 2 from diverting sigmoid colostomy. Patient with history of intellectual disability. Lives in group home. History is limited but per past admission notes pt bathes self and ambulates. Also with history of back pain, scoliosis and forward head posture per OPPT note. Pt in recliner chair upon arrival. Patient was very apprehensive to stand from chair this date and ultimately required dependent assist to stand and encouragement from his brother. Staff reports pt is much less fearful when his caregiver is present so coordinating with caregiver presence may be helpful for therapy. Max A required for bed mobility. Utilized many strategies and motivators for pt to forward weight shift and stand. Low fives worked for forward weight shifting but no strategies worked for standing. Pt with postural instability in standing and requires stand pivot. Pt will need subacute rehab/SNF at d/c to address decline from baseline function.  -     Row Name 05/05/22 1716          Therapy Assessment/Plan (PT)    Rehab Potential (PT) good, to achieve stated therapy goals  -     Criteria for Skilled Interventions Met (PT)  yes;meets criteria  -     Therapy Frequency (PT) 5 times/wk  -SS     Predicted Duration of Therapy Intervention (PT) until d/c  -SS     Row Name 05/05/22 1716          Positioning and Restraints    Pre-Treatment Position sitting in chair/recliner  -SS     Post Treatment Position bed  -SS     In Bed exit alarm on  -SS           User Key  (r) = Recorded By, (t) = Taken By, (c) = Cosigned By    Initials Name Provider Type     Юлия Blancas, PT Physical Therapist               Outcome Measures     Row Name 05/05/22 1719          How much help from another person do you currently need...    Turning from your back to your side while in flat bed without using bedrails? 2  -SS     Moving from lying on back to sitting on the side of a flat bed without bedrails? 2  -SS     Moving to and from a bed to a chair (including a wheelchair)? 2  -SS     Standing up from a chair using your arms (e.g., wheelchair, bedside chair)? 1  -SS     Climbing 3-5 steps with a railing? 1  -SS     To walk in hospital room? 1  -SS     AM-PAC 6 Clicks Score (PT) 9  -SS     Highest level of mobility 3 --> Sat at edge of bed  -SS     Row Name 05/05/22 1719 05/05/22 1711       Functional Assessment    Outcome Measure Options AM-PAC 6 Clicks Basic Mobility (PT)  -SS AM-PAC 6 Clicks Daily Activity (OT)  -SR          User Key  (r) = Recorded By, (t) = Taken By, (c) = Cosigned By    Initials Name Provider Type     Юлия Blancas, PT Physical Therapist    Ayse Hill, OT Occupational Therapist                             Physical Therapy Education                 Title: PT OT SLP Therapies (In Progress)     Topic: Physical Therapy (Done)     Point: Mobility training (Done)     Learning Progress Summary           Patient Acceptance, E, VU by  at 5/5/2022 1719                               User Key     Initials Effective Dates Name Provider Type [facility] 06/16/21 -  Юлия Blancas, PT Physical Therapist PT               PT Recommendation and Plan  Planned Therapy Interventions (PT): balance training, bed mobility training, gait training, transfer training, strengthening, patient/family education, home exercise program  Plan of Care Reviewed With: patient  Outcome Evaluation: 50 y/o M POD 2 from diverting sigmoid colostomy. Patient with history of intellectual disability. Lives in group home. History is limited but per past admission notes pt bathes self and ambulates. Also with history of back pain, scoliosis and forward head posture per OPPT note. Pt in recliner chair upon arrival. Patient was very apprehensive to stand from chair this date and ultimately required dependent assist to stand and encouragement from his brother. Staff reports pt is much less fearful when his caregiver is present so coordinating with caregiver presence may be helpful for therapy. Max A required for bed mobility. Utilized many strategies and motivators for pt to forward weight shift and stand. Low fives worked for forward weight shifting but no strategies worked for standing. Pt with postural instability in standing and requires stand pivot. Pt will need subacute rehab/SNF at d/c to address decline from baseline function.     Time Calculation:    PT Charges     Row Name 05/05/22 1720             Time Calculation    Start Time 1520  -      Stop Time 1555  -      Time Calculation (min) 35 min  -      PT Received On 05/05/22  -      PT - Next Appointment 05/06/22  -      PT Goal Re-Cert Due Date 05/19/22  -              Time Calculation- PT    Total Timed Code Minutes- PT 0 minute(s)  -            User Key  (r) = Recorded By, (t) = Taken By, (c) = Cosigned By    Initials Name Provider Type     Юлия Blancas, PT Physical Therapist              Therapy Charges for Today     Code Description Service Date Service Provider Modifiers Qty    [id number]  PT EVAL MOD COMPLEXITY 4 5/5/2022 Юлия Blancas, PT GP 1          PT  G-Codes  Outcome Measure Options: AM-PAC 6 Clicks Basic Mobility (PT)  AM-PAC 6 Clicks Score (PT): 9  AM-PAC 6 Clicks Score (OT): 6    Юлия Blancas, PT  5/5/2022

## 2022-05-05 NOTE — PLAN OF CARE
Goal Outcome Evaluation:              Outcome Evaluation: Pt underwent diverting colostomy.  He has history of Hirschsprung's and subsequent megacolon.  Pt has moderate developmental delay and lives in a group home.  Pt and family report he is typically able to mobilize without assist, though has some balance deficits.  Pt reports he is typically independent with dressing.  He is very fearful of falling and reqiured max assist to transfer to chair with assist from brother.  He has good UE ROM though demos general weakness. He is very timid with therapists this date. He is not safe to return to group home at this time and will reuqire subacute IP rehab at discharge.

## 2022-05-05 NOTE — PROGRESS NOTES
General Surgery Progress Note    Name: Charlie Wells ADMIT: 2022   : 1970  PCP: Rody Moreno MD    MRN: [id number] LOS: 3 days   AGE/SEX: 51 y.o. male  ROOM: [address] [place] [address]    Chief Complaint   Patient presents with   • Abdominal Pain     Subjective     Tolerating diet, having ostomy function    Objective     Scheduled Medications:   bisacodyl, 10 mg, Rectal, Daily  busPIRone, 30 mg, Oral, BID  clonazePAM, 0.25 mg, Oral, QAM  clonazePAM, 0.5 mg, Oral, Nightly  divalproex, 500 mg, Oral, Q12H  enoxaparin, 40 mg, Subcutaneous, Q24H  estradiol, 1 mg, Oral, Daily  fluvoxaMINE, 50 mg, Oral, Nightly  levothyroxine, 125 mcg, Oral, Q AM  liothyronine, 5 mcg, Oral, Daily  montelukast, 10 mg, Oral, Daily  pantoprazole, 40 mg, Oral, Daily  polyethylene glycol, 17 g, Oral, Daily  QUEtiapine, 400 mg, Oral, BID  sodium chloride, 3 mL, Intravenous, Q12H  tamsulosin, 0.4 mg, Oral, Nightly        Active Infusions:  sodium chloride, 125 mL/hr, Last Rate: 125 mL/hr (22 0930)        As Needed Medications:  •  acetaminophen **OR** acetaminophen **OR** acetaminophen  •  HYDROcodone-acetaminophen  •  HYDROmorphone  •  melatonin  •  ondansetron **OR** ondansetron  •  [COMPLETED] Insert peripheral IV **AND** sodium chloride  •  sodium chloride    Vital Signs  Vital Signs Patient Vitals for the past 24 hrs:   BP Temp Temp src Pulse Resp SpO2   22 1157 105/60 97.6 °F (36.4 °C) Axillary -- 21 --   22 0500 106/53 99.3 °F (37.4 °C) Oral 63 15 96 %   22 2100 101/59 98.7 °F (37.1 °C) Oral 77 18 95 %     I/O:  I/O last 3 completed shifts:  In: 2360 [P.O.:1360; I.V.:1000]  Out: 5175 [Urine:4150; Stool:1025]    Physical Exam:  Physical Exam  Constitutional:       General: He is not in acute distress.     Appearance: Normal appearance. He is not ill-appearing.   HENT:      Head: Normocephalic and atraumatic.      Right Ear: External ear normal.      Left Ear: External ear normal.   Eyes:       Extraocular Movements: Extraocular movements intact.      Conjunctiva/sclera: Conjunctivae normal.   Cardiovascular:      Rate and Rhythm: Normal rate and regular rhythm.   Pulmonary:      Effort: Pulmonary effort is normal. No respiratory distress.   Abdominal:      General: There is no distension.      Palpations: Abdomen is soft.      Comments: Ostomy pink and viable stool in the bag   Musculoskeletal:         General: No swelling or deformity.   Skin:     General: Skin is warm and dry.   Neurological:      Mental Status: He is alert and oriented to person, place, and time. Mental status is at baseline.         Results Review:     CBC    Results from last 7 days   Lab Units 05/05/22  0342 05/04/22  0345 05/03/22  0402 05/02/22  0344 05/01/22  0727 04/30/22  1256   WBC 10*3/mm3 8.10 8.50 7.30 7.90 4.60 4.40   HEMOGLOBIN g/dL 14.3 14.5 14.3 13.6 14.0 14.2   PLATELETS 10*3/mm3 158 160 163 209 185 179     BMP   Results from last 7 days   Lab Units 05/05/22  0342 05/04/22  0345 05/03/22  0402 05/02/22  0344 05/01/22  0727 04/30/22  1256   SODIUM mmol/L 141 139 140 136 142 141   POTASSIUM mmol/L 4.2 4.5 4.4 3.9 3.7 3.9   CHLORIDE mmol/L 105 104 106 102 107 105   CO2 mmol/L 26.0 22.0 24.0 25.0 25.0 24.0   BUN mg/dL 13 5* 5* 7 16 14   CREATININE mg/dL 0.68* 0.58* 0.73* 0.67* 0.77 0.88   GLUCOSE mg/dL 106* 134* 89 88 86 83   MAGNESIUM mg/dL 1.9 2.0 1.8  --   --   --    PHOSPHORUS mg/dL 3.6 3.2 4.0  --   --   --      Radiology(recent) No radiology results for the last day    I reviewed the patient's new clinical results.    Assessment/Plan       Abdominal pain, unspecified abdominal location    51 y.o. male postop day 2 from diverting sigmoid colostomy     Plan:  -P.o. pain meds  -Regular diet  -Okay for discharge from my standpoint once rehab placement is secured  -Daily labs  -Lovenox      This note was created using Dragon Voice Recognition software.    Ken Christiansen MD  05/05/22  18:29 EDT

## 2022-05-05 NOTE — PLAN OF CARE
Goal Outcome Evaluation:  Plan of Care Reviewed With: patient        Progress: no change  Outcome Evaluation: Pt is on room air, VSS, Pt states no pain this shift, Colostomy is clean dry and intact. Pt has been resting with call light in reach will continue to monitor.

## 2022-05-05 NOTE — PLAN OF CARE
Goal Outcome Evaluation:    Patient is able to use the call light and make needs known. He's had no complaints of pain. Currently working with therapy.

## 2022-05-05 NOTE — NURSING NOTE
Please watch patient when eating, he tries to overstuff his food, verbal redirection is effective.

## 2022-05-05 NOTE — PLAN OF CARE
Goal Outcome Evaluation:               50 y/o M POD 2 from diverting sigmoid colostomy. Patient with history of intellectual disability. Lives in group home. History is limited but per past admission notes pt bathes self and ambulates. Also with history of back pain, scoliosis and forward head posture per OPPT note. Pt in recliner chair upon arrival. Patient was very apprehensive to stand from chair this date and ultimately required dependent assist to stand and encouragement from his brother. Staff reports pt is much less fearful when his caregiver is present so coordinating with caregiver presence may be helpful for therapy. Max A required for bed mobility. Utilized many strategies and motivators for pt to forward weight shift and stand. Low fives worked for forward weight shifting but no strategies worked for standing. Pt with postural instability in standing and requires stand pivot. Pt will need subacute rehab/SNF at d/c to address decline from baseline function.

## 2022-05-06 LAB
ANION GAP SERPL CALCULATED.3IONS-SCNC: 13 MMOL/L (ref 5–15)
BASOPHILS # BLD AUTO: 0.1 10*3/MM3 (ref 0–0.2)
BASOPHILS NFR BLD AUTO: 0.8 % (ref 0–1.5)
BUN SERPL-MCNC: 13 MG/DL (ref 6–20)
BUN/CREAT SERPL: 20.3 (ref 7–25)
CALCIUM SPEC-SCNC: 8.7 MG/DL (ref 8.6–10.5)
CHLORIDE SERPL-SCNC: 102 MMOL/L (ref 98–107)
CO2 SERPL-SCNC: 24 MMOL/L (ref 22–29)
CREAT SERPL-MCNC: 0.64 MG/DL (ref 0.76–1.27)
CRP SERPL-MCNC: 3.26 MG/DL (ref 0–0.5)
DEPRECATED RDW RBC AUTO: 49 FL (ref 37–54)
EGFRCR SERPLBLD CKD-EPI 2021: 113.9 ML/MIN/1.73
EOSINOPHIL # BLD AUTO: 0.2 10*3/MM3 (ref 0–0.4)
EOSINOPHIL NFR BLD AUTO: 2 % (ref 0.3–6.2)
ERYTHROCYTE [DISTWIDTH] IN BLOOD BY AUTOMATED COUNT: 14.5 % (ref 12.3–15.4)
GLUCOSE SERPL-MCNC: 109 MG/DL (ref 65–99)
HCT VFR BLD AUTO: 42.9 % (ref 37.5–51)
HGB BLD-MCNC: 14.2 G/DL (ref 13–17.7)
LYMPHOCYTES # BLD AUTO: 3.3 10*3/MM3 (ref 0.7–3.1)
LYMPHOCYTES NFR BLD AUTO: 42.4 % (ref 19.6–45.3)
MAGNESIUM SERPL-MCNC: 1.8 MG/DL (ref 1.6–2.6)
MCH RBC QN AUTO: 31.8 PG (ref 26.6–33)
MCHC RBC AUTO-ENTMCNC: 33 G/DL (ref 31.5–35.7)
MCV RBC AUTO: 96.3 FL (ref 79–97)
MONOCYTES # BLD AUTO: 1.3 10*3/MM3 (ref 0.1–0.9)
MONOCYTES NFR BLD AUTO: 16.6 % (ref 5–12)
NEUTROPHILS NFR BLD AUTO: 2.9 10*3/MM3 (ref 1.7–7)
NEUTROPHILS NFR BLD AUTO: 38.2 % (ref 42.7–76)
NRBC BLD AUTO-RTO: 0.3 /100 WBC (ref 0–0.2)
PHOSPHATE SERPL-MCNC: 3.6 MG/DL (ref 2.5–4.5)
PLATELET # BLD AUTO: 171 10*3/MM3 (ref 140–450)
PMV BLD AUTO: 9.3 FL (ref 6–12)
POTASSIUM SERPL-SCNC: 4.1 MMOL/L (ref 3.5–5.2)
QT INTERVAL: 369 MS
RBC # BLD AUTO: 4.46 10*6/MM3 (ref 4.14–5.8)
SODIUM SERPL-SCNC: 139 MMOL/L (ref 136–145)
WBC NRBC COR # BLD: 7.7 10*3/MM3 (ref 3.4–10.8)

## 2022-05-06 PROCEDURE — 85025 COMPLETE CBC W/AUTO DIFF WBC: CPT | Performed by: INTERNAL MEDICINE

## 2022-05-06 PROCEDURE — 99232 SBSQ HOSP IP/OBS MODERATE 35: CPT | Performed by: INTERNAL MEDICINE

## 2022-05-06 PROCEDURE — 83735 ASSAY OF MAGNESIUM: CPT | Performed by: INTERNAL MEDICINE

## 2022-05-06 PROCEDURE — 99024 POSTOP FOLLOW-UP VISIT: CPT | Performed by: SURGERY

## 2022-05-06 PROCEDURE — 25010000002 ENOXAPARIN PER 10 MG: Performed by: STUDENT IN AN ORGANIZED HEALTH CARE EDUCATION/TRAINING PROGRAM

## 2022-05-06 PROCEDURE — 97530 THERAPEUTIC ACTIVITIES: CPT

## 2022-05-06 PROCEDURE — 80048 BASIC METABOLIC PNL TOTAL CA: CPT | Performed by: INTERNAL MEDICINE

## 2022-05-06 PROCEDURE — 84100 ASSAY OF PHOSPHORUS: CPT | Performed by: INTERNAL MEDICINE

## 2022-05-06 PROCEDURE — 86140 C-REACTIVE PROTEIN: CPT | Performed by: INTERNAL MEDICINE

## 2022-05-06 RX ADMIN — LIOTHYRONINE SODIUM 5 MCG: 5 TABLET ORAL at 05:35

## 2022-05-06 RX ADMIN — BUSPIRONE HYDROCHLORIDE 30 MG: 15 TABLET ORAL at 09:57

## 2022-05-06 RX ADMIN — MONTELUKAST 10 MG: 10 TABLET, FILM COATED ORAL at 09:58

## 2022-05-06 RX ADMIN — Medication 3 ML: at 20:25

## 2022-05-06 RX ADMIN — POLYETHYLENE GLYCOL 3350 17 G: 17 POWDER, FOR SOLUTION ORAL at 09:57

## 2022-05-06 RX ADMIN — DIVALPROEX SODIUM 500 MG: 500 TABLET, EXTENDED RELEASE ORAL at 20:24

## 2022-05-06 RX ADMIN — LEVOTHYROXINE SODIUM 125 MCG: 0.12 TABLET ORAL at 05:35

## 2022-05-06 RX ADMIN — FLUVOXAMINE MALEATE 50 MG: 50 TABLET, FILM COATED ORAL at 20:24

## 2022-05-06 RX ADMIN — DIVALPROEX SODIUM 500 MG: 500 TABLET, EXTENDED RELEASE ORAL at 09:57

## 2022-05-06 RX ADMIN — Medication 3 ML: at 09:57

## 2022-05-06 RX ADMIN — ESTRADIOL 1 MG: 1 TABLET ORAL at 09:57

## 2022-05-06 RX ADMIN — BUSPIRONE HYDROCHLORIDE 30 MG: 15 TABLET ORAL at 20:24

## 2022-05-06 RX ADMIN — ENOXAPARIN SODIUM 40 MG: 100 INJECTION SUBCUTANEOUS at 15:14

## 2022-05-06 RX ADMIN — TAMSULOSIN HYDROCHLORIDE 0.4 MG: 0.4 CAPSULE ORAL at 20:24

## 2022-05-06 RX ADMIN — QUETIAPINE FUMARATE 400 MG: 100 TABLET ORAL at 20:23

## 2022-05-06 RX ADMIN — CLONAZEPAM 0.5 MG: 0.5 TABLET ORAL at 20:24

## 2022-05-06 RX ADMIN — HYDROCODONE BITARTRATE AND ACETAMINOPHEN 1 TABLET: 5; 325 TABLET ORAL at 15:14

## 2022-05-06 RX ADMIN — PANTOPRAZOLE SODIUM 40 MG: 40 TABLET, DELAYED RELEASE ORAL at 09:57

## 2022-05-06 RX ADMIN — CLONAZEPAM 0.25 MG: 0.5 TABLET ORAL at 05:35

## 2022-05-06 RX ADMIN — QUETIAPINE FUMARATE 400 MG: 100 TABLET ORAL at 09:57

## 2022-05-06 RX ADMIN — BISACODYL 10 MG: 10 SUPPOSITORY RECTAL at 09:58

## 2022-05-06 NOTE — PROGRESS NOTES
[facility] Services Daily Progress Note    Patient Name: Charlie Wells  : 1970  MRN: [id number]  Primary Care Physician:  Rody Moreno MD  Date of admission: 2022      Subjective      Chief Complaint: Abdominal pain     Patient Reports he is doing well.  Eating all his food.  Pending placement at rehab/SNF    ROS negative except as above    Objective      Vitals:   Temp:  [97.8 °F (36.6 °C)-99.8 °F (37.7 °C)] 97.8 °F (36.6 °C)  Heart Rate:  [74-77] 77  Resp:  [19-25] 19  BP: ()/(63-67) 101/67    Physical Exam  Vitals reviewed.   Patient sitting in the chair today eating lunch.  HENT:      Head: Normocephalic.      Nose: Nose normal.      Mouth/Throat:      Mouth: Mucous membranes are moist.   Eyes:      Pupils: Pupils are equal, round, and reactive to light.   Cardiovascular:      Rate and Rhythm: Normal rate.      Pulses: Normal pulses.   Pulmonary:      Effort: Pulmonary effort is normal.   Abdominal:         Palpations: Abdomen is soft.      Comments:  Ostomy in place no longer distended and soft abdomen without tenderness.    Musculoskeletal:         General: Normal range of motion.      Cervical back: Normal range of motion.   Skin:     General: Skin is warm.   Neurological:      General: No focal deficit present.      Mental Status: He is alert.   Psychiatric:         Mood and Affect: Mood normal.         Behavior: Behavior normal.        Result Review    Result Review:  I have personally reviewed the results from the time of this admission to 2022 14:20 EDT and agree with these findings:  [x]  Laboratory  []  Microbiology  []  Radiology  []  EKG/Telemetry   []  Cardiology/Vascular   []  Pathology  []  Old records  []  Other:  Most notable findings include: Lab work is stable    Wounds (last 24 hours)     LDA Wound     Row Name 22 0720 22 0314 22 2340       Wound 22 2100 gluteal MASD (Moisture associated skin damage)     Wound - Properties Group Placement Date: 04/30/22  -JE Placement Time: 2100 -JE Location: gluteal  -JE Primary Wound Type: MASD  -JE    Base red;blanchable;moist  -BC red;blanchable;moist  -JW red;blanchable;moist  -JW    Drainage Amount -- none  -JW none  -JW    Retired Wound - Properties Group Placement Date: 04/30/22  -JE Placement Time: 2100 -JE Location: gluteal  -JE Primary Wound Type: MASD  -JE    Retired Wound - Properties Group Date first assessed: 04/30/22  -JE Time first assessed: 2100 -JE Location: gluteal  -JE Primary Wound Type: MASD  -JE       Wound 05/03/22 1549 other (see comments) abdomen Incision    Wound - Properties Group Placement Date: 05/03/22  -SP Placement Time: 1549  -SP Present on Hospital Admission: N  -SP Orientation: other (see comments)  -SP, LLQ  Location: abdomen  -SP Primary Wound Type: Incision  -SP    Dressing Appearance dry;intact;no drainage  -BC dry;intact;no drainage  -JW dry;intact;no drainage  -JW    Closure -- RHONDA  -JW RHONDA  -JW    Retired Wound - Properties Group Placement Date: 05/03/22  -SP Placement Time: 1549  -SP Present on Hospital Admission: N  -SP Orientation: other (see comments)  -SP, LLQ  Location: abdomen  -SP Primary Wound Type: Incision  -SP    Retired Wound - Properties Group Date first assessed: 05/03/22  -SP Time first assessed: 1549  -SP Present on Hospital Admission: N  -SP Location: abdomen  -SP Primary Wound Type: Incision  -SP    Row Name 05/05/22 1937 05/05/22 1545          Wound 04/30/22 2100 gluteal MASD (Moisture associated skin damage)    Wound - Properties Group Placement Date: 04/30/22  -JE Placement Time: 2100 -JE Location: gluteal  -JE Primary Wound Type: MASD  -JE     Base red;blanchable;moist  -JW --     Drainage Amount none  -JW --     Retired Wound - Properties Group Placement Date: 04/30/22  -JE Placement Time: 2100 -JE Location: gluteal  -JE Primary Wound Type: MASD  -JE     Retired Wound - Properties Group Date first assessed:  04/30/22  -JE Time first assessed: 2100 -JE Location: gluteal  -JE Primary Wound Type: MASD  -JE            Wound 05/03/22 1549 other (see comments) abdomen Incision    Wound - Properties Group Placement Date: 05/03/22  -SP Placement Time: 1549  -SP Present on Hospital Admission: N  -SP Orientation: other (see comments)  -SP, LLQ  Location: abdomen  -SP Primary Wound Type: Incision  -SP     Dressing Appearance dry;intact;no drainage  -JW dry;intact;no drainage  -HT     Closure RHONDA  -JW --     Retired Wound - Properties Group Placement Date: 05/03/22  -SP Placement Time: 1549  -SP Present on Hospital Admission: N  -SP Orientation: other (see comments)  -SP, LLQ  Location: abdomen  -SP Primary Wound Type: Incision  -SP     Retired Wound - Properties Group Date first assessed: 05/03/22  -SP Time first assessed: 1549  -SP Present on Hospital Admission: N  -SP Location: abdomen  -SP Primary Wound Type: Incision  -SP           User Key  (r) = Recorded By, (t) = Taken By, (c) = Cosigned By    Initials Name Provider Type     Siomara Gatica RN Registered Nurse    Adin Amaya RN Registered Nurse    Karen Andrews LPN Licensed Nurse    Felisa Paez LPN Licensed Nurse    Luis Carlos Mascorro, RN Registered Nurse                   Assessment/Plan       Brief Patient Summary:  Charlie Wells is a 51 y.o. male who presents with recurrent constipation     bisacodyl, 10 mg, Rectal, Daily  busPIRone, 30 mg, Oral, BID  clonazePAM, 0.25 mg, Oral, QAM  clonazePAM, 0.5 mg, Oral, Nightly  divalproex, 500 mg, Oral, Q12H  enoxaparin, 40 mg, Subcutaneous, Q24H  estradiol, 1 mg, Oral, Daily  fluvoxaMINE, 50 mg, Oral, Nightly  levothyroxine, 125 mcg, Oral, Q AM  liothyronine, 5 mcg, Oral, Daily  montelukast, 10 mg, Oral, Daily  pantoprazole, 40 mg, Oral, Daily  QUEtiapine, 400 mg, Oral, BID  sodium chloride, 3 mL, Intravenous, Q12H  tamsulosin, 0.4 mg, Oral, Nightly        sodium chloride, 125 mL/hr, Last Rate:  125 mL/hr (04/30/22 1810)           Active Hospital Problems:          Active Hospital Problems     Diagnosis     • Abdominal pain, unspecified abdominal location        Plan:   51-year-old gentleman with recurrent constipation due to Hirschsprung's disease  Status post OR fecal disimpaction with tube placement  Status post OR again on May 3.  Abdomen no longer distended     GERD  On PPI     Intellectual disability  Baseline  CT head negative  Resume BuSpar Klonopin Depakote Seroquel      Hypothyroidism  Synthroid and liothyronine resumed      PT ordered on May 5.  Plans for SNF placement     DVT prophylaxis:  Medical DVT prophylaxis orders are present.     CODE STATUS:    Code Status (Patient has no pulse and is not breathing): CPR (Attempt to Resuscitate)  Medical Interventions (Patient has pulse or is breathing): Full Support         This patient has been examined wearing appropriate Personal Protective Equipment and discussed with Staff.  05/06/22      Electronically signed by Ben Lockwood MD, 05/06/22, 14:20 EDT.  [facility] Hospitalist Team

## 2022-05-06 NOTE — PLAN OF CARE
"Charlie Wells presents with functional mobility impairments which indicate the need for skilled intervention. Tolerating session today without incident. Pt educated on importance of skilled PT to increase mobility, however pt resistant to movement this date.  PT spoke with RN and RN present to continue to motivate pt, however pt continues to exhibit resistance.  Brother nor caregiver present this afternoon.  Attempted two sit <-> stand transfers with maxAx2, however pt exhibits resistance and posterior lean requiring maxAx2-depAx2 to safely return to chair.  Intellectual disability appears to be causing a barrier with communication, understanding, and pt's comfort with staff on importance of mobility.  Will continue to follow and progress as tolerated.     Plan/Recommendations:   Moderate Intensity Therapy recommended post-acute care. This is recommended as therapy feels the patient would require 3-4 days per week and wouldn't tolerate \"3 hour daily\" rehab intensity. SNF would be the preferred choice. If the patient does not agree to SNF, arrange HH or OP depending on home bound status. If patient is medically complex, consider LTACH.. Pt requires no DME at discharge.     Pt desires Skilled Rehab placement at discharge. Pt cooperative; agreeable to therapeutic recommendations and plan of care.  "

## 2022-05-06 NOTE — PLAN OF CARE
Goal Outcome Evaluation:  Plan of Care Reviewed With: patient        Progress: no change  Outcome Evaluation: Pt is on room air, VSS, Pt has a new colostomy, with good stool output. Pt has been resting well at first in bed and now up in his recliner with call light in reach, DC monday to [facility].Will continue to monitor.

## 2022-05-06 NOTE — CASE MANAGEMENT/SOCIAL WORK
Continued Stay Note  [facility]     Patient Name: Charlie Wells  MRN: [id number]  Today's Date: 5/6/2022    Admit Date: 4/30/2022     Discharge Plan     Row Name 05/06/22 1414       Plan    Plan Referral to [facility] pending acceptance.  PASRR per facility.    Plan Comments Spoke to Cindy,  liaison for [facility]  about additional choices.  Referral to [facility] pending acceptance.  Declined at [facility] due to no bed available. #2 choice [facility], #3 [facility]. #4 [facility]    Row Name 05/06/22 1143                          Lore Bello RN   Phone communication or documentation only - no physical contact with patient or family.

## 2022-05-06 NOTE — CASE MANAGEMENT/SOCIAL WORK
Continued Stay Note  [facility]     Patient Name: Charlie Wells  MRN: [id number]  Today's Date: 5/6/2022    Admit Date: 4/30/2022     Discharge Plan     Row Name 05/06/22 1143       Plan    Plan Referral to [facility] pending acceptance.    Plan Comments [facility] unable to accept due to lack of bed availability.  New referral sent to [facility], pending acceptance.           Lore Bello RN   Phone communication or documentation only - no physical contact with patient or family.

## 2022-05-06 NOTE — PROGRESS NOTES
GENERAL SURGERY PROGRESS NOTE  Chief Complaint:  Surgery Follow up   LOS: 4 days       Subjective     Interval History:     Feels ok. Sitting up in chair. Has been eating    Objective     Vital Signs  Temp:  [97.8 °F (36.6 °C)-99.8 °F (37.7 °C)] 97.8 °F (36.6 °C)  Heart Rate:  [74-77] 77  Resp:  [19-25] 19  BP: ()/(63-67) 101/67    Physical Exam:   Ostomy with stool out  Labs:  Lab Results (last 24 hours)     Procedure Component Value Units Date/Time    Basic Metabolic Panel [[id number]]  (Abnormal) Collected: 05/06/22 0117    Specimen: Blood Updated: 05/06/22 0235     Glucose 109 mg/dL      BUN 13 mg/dL      Creatinine 0.64 mg/dL      Sodium 139 mmol/L      Potassium 4.1 mmol/L      Comment: Slight hemolysis detected by analyzer. Results may be affected.        Chloride 102 mmol/L      CO2 24.0 mmol/L      Calcium 8.7 mg/dL      BUN/Creatinine Ratio 20.3     Anion Gap 13.0 mmol/L      eGFR 113.9 mL/min/1.73      Comment: National Kidney Foundation and American Society of Nephrology (ASN) Task Force recommended calculation based on the Chronic Kidney Disease Epidemiology Collaboration (CKD-EPI) equation refit without adjustment for race.       Narrative:      GFR Normal >60  Chronic Kidney Disease <60  Kidney Failure <15      Phosphorus [[id number]]  (Normal) Collected: 05/06/22 0117    Specimen: Blood Updated: 05/06/22 0235     Phosphorus 3.6 mg/dL     Magnesium [[id number]]  (Normal) Collected: 05/06/22 0117    Specimen: Blood Updated: 05/06/22 0235     Magnesium 1.8 mg/dL     C-reactive Protein [[id number]]  (Abnormal) Collected: 05/06/22 0117    Specimen: Blood Updated: 05/06/22 0235     C-Reactive Protein 3.26 mg/dL     CBC & Differential [[id number]]  (Abnormal) Collected: 05/06/22 0117    Specimen: Blood Updated: 05/06/22 0159    Narrative:      The following orders were created for panel order CBC & Differential.  Procedure                               Abnormality         Status                      ---------                               -----------         ------                     CBC Auto Differential[[id number]]        Abnormal            Final result                 Please view results for these tests on the individual orders.    CBC Auto Differential [[id number]]  (Abnormal) Collected: 05/06/22 0117    Specimen: Blood Updated: 05/06/22 0159     WBC 7.70 10*3/mm3      RBC 4.46 10*6/mm3      Hemoglobin 14.2 g/dL      Hematocrit 42.9 %      MCV 96.3 fL      MCH 31.8 pg      MCHC 33.0 g/dL      RDW 14.5 %      RDW-SD 49.0 fl      MPV 9.3 fL      Platelets 171 10*3/mm3      Neutrophil % 38.2 %      Lymphocyte % 42.4 %      Monocyte % 16.6 %      Eosinophil % 2.0 %      Basophil % 0.8 %      Neutrophils, Absolute 2.90 10*3/mm3      Lymphocytes, Absolute 3.30 10*3/mm3      Monocytes, Absolute 1.30 10*3/mm3      Eosinophils, Absolute 0.20 10*3/mm3      Basophils, Absolute 0.10 10*3/mm3      nRBC 0.3 /100 WBC            Results Review:     Labs and imaging for today were reviewed.    Assessment/Plan     Charlie Wells is a 51 y.o. male who is s/p colostomy due to colonic distention and dysmotility      Awaiting placement. Follow up with Dr. Christiansen in 1-2 weeks post DC.          This document has been electronically signed by Josh Conde MD on May 6, 2022 17:13 EDT        Josh Conde MD  05/06/22  17:13 EDT

## 2022-05-06 NOTE — PLAN OF CARE
Goal Outcome Evaluation:              Outcome Evaluation: Pt remains on room air, VSS. Colostomy intact with presence of stool output. Pt in chair most of the day, refusing to stand with nursing/PT. Plan ongoing.

## 2022-05-06 NOTE — DISCHARGE PLACEMENT REQUEST
"Charlie Wells (51 y.o. Male)             Date of Birth   1970    Social Security Number       Address   [address] [place] [address] [place] [address]    Home Phone   [phone]    MRN   [id number]       Protestant   None    Marital Status   Single                            Admission Date   4/30/22    Admission Type   Emergency    Admitting Provider   Jamal Smith MD    Attending Provider   Ben Lockwood MD    Department, Room/Bed   [facility] SURGICAL INPATIENT, 4114/1       Discharge Date       Discharge Disposition       Discharge Destination                               Attending Provider: Ben Lockwood MD    Allergies: Metoclopramide, Benztropine    Isolation: None   Infection: None   Code Status: CPR   Advance Care Planning Activity    Ht: 160 cm (63\")   Wt: 90 kg (198 lb 6.6 oz)    Admission Cmt: None   Principal Problem: None                Active Insurance as of 4/30/2022     Primary Coverage     Payor Plan Insurance Group Employer/Plan Group    MEDICARE MEDICARE A & B      Payor Plan Address Payor Plan Phone Number Payor Plan Fax Number Effective Dates    [address] [phone]  6/1/1996 - None Entered    [facility] [address]       Subscriber Name Subscriber Birth Date Member ID       CHARLIE WELLS 1970 [id number]           Secondary Coverage     Payor Plan Insurance Group Employer/Plan Group    [place]      Payor Plan Address Payor Plan Phone Number Payor Plan Fax Number Effective Dates    [address]   1970 - None Entered    [place] [address]       Subscriber Name Subscriber Birth Date Member ID       CHARLIE WELLS 1970 [id number]                 Emergency Contacts      (Rel.) Home Phone Work Phone Mobile Phone    MALATHI RINCON (Care Giver) -- -- [phone]    Cindy Chadwick () -- [phone]                "

## 2022-05-06 NOTE — THERAPY TREATMENT NOTE
"Subjective: Pt agreeable to therapeutic plan of care.    Objective:     Bed mobility -up in chair  Transfers - Max-A, Assist x 2 and Dependent  Ambulation - unable due to pt resistance    Pain: 4 VAS abdominal region  Education: Provided education on importance of mobility and skilled verbal / tactile cueing throughout intervention.     Assessment: Charlie Wells presents with functional mobility impairments which indicate the need for skilled intervention. Tolerating session today without incident. Pt educated on importance of skilled PT to increase mobility, however pt resistant to movement this date.  PT spoke with RN and RN present to continue to motivate pt, however pt continues to exhibit resistance.  Brother nor caregiver present this afternoon.  Attempted two sit <-> stand transfers with maxAx2, however pt exhibits resistance and posterior lean requiring maxAx2-depAx2 to safely return to chair.  Intellectual disability appears to be causing a barrier with communication, understanding, and pt's comfort with staff on importance of mobility.  Will continue to follow and progress as tolerated.     Plan/Recommendations:   Moderate Intensity Therapy recommended post-acute care. This is recommended as therapy feels the patient would require 3-4 days per week and wouldn't tolerate \"3 hour daily\" rehab intensity. SNF would be the preferred choice. If the patient does not agree to SNF, arrange HH or OP depending on home bound status. If patient is medically complex, consider LTACH.. Pt requires no DME at discharge.     Pt desires Skilled Rehab placement at discharge. Pt cooperative; agreeable to therapeutic recommendations and plan of care.     Basic Mobility 6-click:  Rollin = Total, A lot = 2, A little = 3; 4 = None  Supine>Sit:   1 = Total, A lot = 2, A little = 3; 4 = None   Sit>Stand with arms:  1 = Total, A lot = 2, A little = 3; 4 = None  Bed>Chair:   1 = Total, A lot = 2, A little = 3; 4 = " None  Ambulate in room:  1 = Total, A lot = 2, A little = 3; 4 = None  3-5 Steps with railin = Total, A lot = 2, A little = 3; 4 = None  Score: 10    Post-Tx Position: Up in Chair, Alarms activated and Call light and personal items within reach  PPE: gloves, surgical mask, eyewear protection

## 2022-05-07 PROCEDURE — 99232 SBSQ HOSP IP/OBS MODERATE 35: CPT | Performed by: INTERNAL MEDICINE

## 2022-05-07 PROCEDURE — 99024 POSTOP FOLLOW-UP VISIT: CPT | Performed by: SURGERY

## 2022-05-07 PROCEDURE — 25010000002 ENOXAPARIN PER 10 MG: Performed by: STUDENT IN AN ORGANIZED HEALTH CARE EDUCATION/TRAINING PROGRAM

## 2022-05-07 RX ADMIN — Medication 3 ML: at 08:15

## 2022-05-07 RX ADMIN — PANTOPRAZOLE SODIUM 40 MG: 40 TABLET, DELAYED RELEASE ORAL at 08:15

## 2022-05-07 RX ADMIN — DIVALPROEX SODIUM 500 MG: 500 TABLET, EXTENDED RELEASE ORAL at 08:15

## 2022-05-07 RX ADMIN — POLYETHYLENE GLYCOL 3350 17 G: 17 POWDER, FOR SOLUTION ORAL at 08:15

## 2022-05-07 RX ADMIN — BUSPIRONE HYDROCHLORIDE 30 MG: 15 TABLET ORAL at 08:15

## 2022-05-07 RX ADMIN — LEVOTHYROXINE SODIUM 125 MCG: 0.12 TABLET ORAL at 05:32

## 2022-05-07 RX ADMIN — TAMSULOSIN HYDROCHLORIDE 0.4 MG: 0.4 CAPSULE ORAL at 20:46

## 2022-05-07 RX ADMIN — QUETIAPINE FUMARATE 400 MG: 100 TABLET ORAL at 08:15

## 2022-05-07 RX ADMIN — FLUVOXAMINE MALEATE 50 MG: 50 TABLET, FILM COATED ORAL at 20:46

## 2022-05-07 RX ADMIN — QUETIAPINE FUMARATE 400 MG: 100 TABLET ORAL at 20:45

## 2022-05-07 RX ADMIN — LIOTHYRONINE SODIUM 5 MCG: 5 TABLET ORAL at 05:32

## 2022-05-07 RX ADMIN — ENOXAPARIN SODIUM 40 MG: 100 INJECTION SUBCUTANEOUS at 16:00

## 2022-05-07 RX ADMIN — BUSPIRONE HYDROCHLORIDE 30 MG: 15 TABLET ORAL at 20:46

## 2022-05-07 RX ADMIN — ESTRADIOL 1 MG: 1 TABLET ORAL at 08:15

## 2022-05-07 RX ADMIN — DIVALPROEX SODIUM 500 MG: 500 TABLET, EXTENDED RELEASE ORAL at 20:46

## 2022-05-07 RX ADMIN — MONTELUKAST 10 MG: 10 TABLET, FILM COATED ORAL at 08:15

## 2022-05-07 RX ADMIN — CLONAZEPAM 0.25 MG: 0.5 TABLET ORAL at 05:32

## 2022-05-07 RX ADMIN — Medication 3 ML: at 20:46

## 2022-05-07 NOTE — PLAN OF CARE
Goal Outcome Evaluation:              Outcome Evaluation: VSS, colostomy intact has had minimal stool output, pt sat up in chair most of day visiting with  family, takes meds well and has rested well this evening. plan of care on-going

## 2022-05-07 NOTE — PROGRESS NOTES
GENERAL SURGERY PROGRESS NOTE  Chief Complaint:  Surgery Follow up   LOS: 5 days       Subjective     Interval History:     No issues.  Eating well having ostomy function.    Objective     Vital Signs  Temp:  [97.5 °F (36.4 °C)-97.7 °F (36.5 °C)] 97.5 °F (36.4 °C)  Heart Rate:  [70-75] 75  Resp:  [17-19] 19  BP: (104-115)/(64-69) 115/64    Physical Exam:   Sitting up in chair without complaint  Labs:  Lab Results (last 24 hours)     ** No results found for the last 24 hours. **           Results Review:     Labs and imaging for today were reviewed.    Assessment/Plan     Charlie Wells is a 51 y.o. male who is status post colostomy creation      Continue current care.  Awaiting placement.          This document has been electronically signed by Josh Conde MD on May 7, 2022 15:47 EDT        Josh Conde MD  05/07/22  15:47 EDT

## 2022-05-07 NOTE — PLAN OF CARE
Goal Outcome Evaluation:      Pt is resting in chair. Colostomy has good output. Tolerating regular diet. Frequently asks for more food. Awaiting acceptance to [facility].

## 2022-05-08 PROCEDURE — 97112 NEUROMUSCULAR REEDUCATION: CPT

## 2022-05-08 PROCEDURE — 25010000002 ENOXAPARIN PER 10 MG: Performed by: STUDENT IN AN ORGANIZED HEALTH CARE EDUCATION/TRAINING PROGRAM

## 2022-05-08 PROCEDURE — 99232 SBSQ HOSP IP/OBS MODERATE 35: CPT | Performed by: INTERNAL MEDICINE

## 2022-05-08 RX ADMIN — ENOXAPARIN SODIUM 40 MG: 100 INJECTION SUBCUTANEOUS at 15:19

## 2022-05-08 RX ADMIN — TAMSULOSIN HYDROCHLORIDE 0.4 MG: 0.4 CAPSULE ORAL at 21:48

## 2022-05-08 RX ADMIN — DIVALPROEX SODIUM 500 MG: 500 TABLET, EXTENDED RELEASE ORAL at 21:48

## 2022-05-08 RX ADMIN — Medication 3 ML: at 21:48

## 2022-05-08 RX ADMIN — Medication 3 ML: at 08:19

## 2022-05-08 RX ADMIN — LIOTHYRONINE SODIUM 5 MCG: 5 TABLET ORAL at 05:18

## 2022-05-08 RX ADMIN — ESTRADIOL 1 MG: 1 TABLET ORAL at 08:20

## 2022-05-08 RX ADMIN — BUSPIRONE HYDROCHLORIDE 30 MG: 15 TABLET ORAL at 08:20

## 2022-05-08 RX ADMIN — QUETIAPINE FUMARATE 400 MG: 100 TABLET ORAL at 21:47

## 2022-05-08 RX ADMIN — DIVALPROEX SODIUM 500 MG: 500 TABLET, EXTENDED RELEASE ORAL at 08:20

## 2022-05-08 RX ADMIN — LEVOTHYROXINE SODIUM 125 MCG: 0.12 TABLET ORAL at 05:18

## 2022-05-08 RX ADMIN — PANTOPRAZOLE SODIUM 40 MG: 40 TABLET, DELAYED RELEASE ORAL at 08:20

## 2022-05-08 RX ADMIN — QUETIAPINE FUMARATE 400 MG: 100 TABLET ORAL at 08:19

## 2022-05-08 RX ADMIN — BUSPIRONE HYDROCHLORIDE 30 MG: 15 TABLET ORAL at 21:47

## 2022-05-08 RX ADMIN — POLYETHYLENE GLYCOL 3350 17 G: 17 POWDER, FOR SOLUTION ORAL at 08:19

## 2022-05-08 RX ADMIN — MONTELUKAST 10 MG: 10 TABLET, FILM COATED ORAL at 08:20

## 2022-05-08 RX ADMIN — FLUVOXAMINE MALEATE 50 MG: 50 TABLET, FILM COATED ORAL at 21:48

## 2022-05-08 NOTE — PLAN OF CARE
Goal Outcome Evaluation:         Pt is up in chair. No complaints at this time. Brother at bedside. Ostomy still has good output. Denies nausea and no vomiting. Awaiting discharge to [facility].

## 2022-05-08 NOTE — PROGRESS NOTES
[facility] Medicine Services Daily Progress Note    Patient Name: Charlie Wells  : 1970  MRN: [id number]  Primary Care Physician:  Rody Moreno MD  Date of admission: 2022      Subjective      Chief Complaint: Abdominal pain     Patient Reports he is doing well.  Eating all his food.  Pending placement at rehab/SNF.  Abdomen somewhat bloated but denies any pain.    Patient has been drinking a lot of carbonated beverages.  He was counseled although comprehension unclear.     ROS negative except as above      Objective      Vitals:   Temp:  [98 °F (36.7 °C)-98.6 °F (37 °C)] 98.2 °F (36.8 °C)  Heart Rate:  [72-77] 77  Resp:  [15-20] 15  BP: ()/(58-83) 116/79    Physical Exam  Vitals reviewed.   Patient sitting in the chair today eating lunch.  HENT:      Head: Normocephalic.      Nose: Nose normal.      Mouth/Throat:      Mouth: Mucous membranes are moist.   Eyes:      Pupils: Pupils are equal, round, and reactive to light.   Cardiovascular:      Rate and Rhythm: Normal rate.      Pulses: Normal pulses.   Pulmonary:      Effort: Pulmonary effort is normal.   Abdominal:         Palpations: Abdomen is soft.      Comments:  Ostomy in place.  Abdomen somewhat distended but nontender      musculoskeletal:         General: Normal range of motion.      Cervical back: Normal range of motion.   Skin:     General: Skin is warm.   Neurological:      General: No focal deficit present.      Mental Status: He is alert.   Psychiatric:         Mood and Affect: Mood normal.         Behavior: Behavior normal.        Result Review    Result Review:  I have personally reviewed the results from the time of this admission to 2022 15:37 EDT and agree with these findings:  [x]  Laboratory  []  Microbiology  []  Radiology  []  EKG/Telemetry   []  Cardiology/Vascular   []  Pathology  []  Old records  []  Other:  Most notable findings include: No new labs    Wounds (last 24 hours)     LDA  Wound     Row Name 05/08/22 0715 05/08/22 0305 05/07/22 2320       Wound 04/30/22 2100 gluteal MASD (Moisture associated skin damage)    Wound - Properties Group Placement Date: 04/30/22 -JE Placement Time: 2100 -JE Location: gluteal  -JE Primary Wound Type: MASD  -JE    Base red;blanchable;moist  -KS red;blanchable;moist  -JW red;blanchable;moist  -JW    Drainage Amount none  -KS -- none  -JW    Retired Wound - Properties Group Placement Date: 04/30/22  -JE Placement Time: 2100 -JE Location: gluteal  -JE Primary Wound Type: MASD  -JE    Retired Wound - Properties Group Date first assessed: 04/30/22 -JE Time first assessed: 2100 -JE Location: gluteal  -JE Primary Wound Type: MASD  -JE       Wound 05/03/22 1549 other (see comments) abdomen Incision    Wound - Properties Group Placement Date: 05/03/22  -SP Placement Time: 1549  -SP Present on Hospital Admission: N  -SP Orientation: other (see comments)  -SP, LLQ  Location: abdomen  -SP Primary Wound Type: Incision  -SP    Dressing Appearance intact;no drainage  -KS intact;no drainage  -JW intact;no drainage  -JW    Closure RHONDA  -KS -- RHONDA  -JW    Drainage Amount none  -KS -- none  -JW    Retired Wound - Properties Group Placement Date: 05/03/22  -SP Placement Time: 1549  -SP Present on Hospital Admission: N  -SP Orientation: other (see comments)  -SP, LLQ  Location: abdomen  -SP Primary Wound Type: Incision  -SP    Retired Wound - Properties Group Date first assessed: 05/03/22  -SP Time first assessed: 1549  -SP Present on Hospital Admission: N  -SP Location: abdomen  -SP Primary Wound Type: Incision  -SP    Row Name 05/07/22 1933             Wound 04/30/22 2100 gluteal MASD (Moisture associated skin damage)    Wound - Properties Group Placement Date: 04/30/22  -JE Placement Time: 2100 -JE Location: gluteal  -JE Primary Wound Type: MASD  -JE      Base red;blanchable;moist  -JE (r) JW (t) JE (c)      Drainage Amount none  -JE (r) JW (t) JE (c)      Retired Wound  - Properties Group Placement Date: 04/30/22  -JE Placement Time: 2100 -JE Location: gluteal  -JE Primary Wound Type: MASD  -JE      Retired Wound - Properties Group Date first assessed: 04/30/22  -JE Time first assessed: 2100 -JE Location: gluteal  -JE Primary Wound Type: MASD  -JE              Wound 05/03/22 1549 other (see comments) abdomen Incision    Wound - Properties Group Placement Date: 05/03/22  -SP Placement Time: 1549  -SP Present on Hospital Admission: N  -SP Orientation: other (see comments)  -SP, LLQ  Location: abdomen  -SP Primary Wound Type: Incision  -SP      Dressing Appearance intact;no drainage  -JE (r) JW (t) JE (c)      Closure RHONDA  -JE (r) JW (t) JE (c)      Drainage Amount none  -JE (r) JW (t) JE (c)      Retired Wound - Properties Group Placement Date: 05/03/22  -SP Placement Time: 1549  -SP Present on Hospital Admission: N  -SP Orientation: other (see comments)  -SP, LLQ  Location: abdomen  -SP Primary Wound Type: Incision  -SP      Retired Wound - Properties Group Date first assessed: 05/03/22  -SP Time first assessed: 1549  -SP Present on Hospital Admission: N  -SP Location: abdomen  -SP Primary Wound Type: Incision  -SP            User Key  (r) = Recorded By, (t) = Taken By, (c) = Cosigned By    Initials Name Provider Type    Adin Amaya, RN Registered Nurse    Susys Salgado RN Registered Nurse    Felisa Paez LPN Licensed Nurse    Luis Carlos Mascorro RN Registered Nurse                   Assessment/Plan       Brief Patient Summary:  Charlie Wells is a 51 y.o. male who presents with recurrent constipation     bisacodyl, 10 mg, Rectal, Daily  busPIRone, 30 mg, Oral, BID  clonazePAM, 0.25 mg, Oral, QAM  clonazePAM, 0.5 mg, Oral, Nightly  divalproex, 500 mg, Oral, Q12H  enoxaparin, 40 mg, Subcutaneous, Q24H  estradiol, 1 mg, Oral, Daily  fluvoxaMINE, 50 mg, Oral, Nightly  levothyroxine, 125 mcg, Oral, Q AM  liothyronine, 5 mcg, Oral, Daily  montelukast, 10 mg,  Oral, Daily  pantoprazole, 40 mg, Oral, Daily  QUEtiapine, 400 mg, Oral, BID  sodium chloride, 3 mL, Intravenous, Q12H  tamsulosin, 0.4 mg, Oral, Nightly        sodium chloride, 125 mL/hr, Last Rate: 125 mL/hr (04/30/22 1810)           Active Hospital Problems:          Active Hospital Problems     Diagnosis     • Abdominal pain, unspecified abdominal location        Plan:   51-year-old gentleman with recurrent constipation due to Hirschsprung's disease  Status post OR fecal disimpaction with tube placement  Status post OR again on May 3.  Abdomen mildly distended but nontender on May 8     GERD  On PPI     Intellectual disability  Baseline  CT head negative  Resume BuSpar Klonopin Depakote Seroquel      Hypothyroidism  Synthroid and liothyronine resumed      PT ordered on May 5.  Plans for SNF placement     DVT prophylaxis:  Medical DVT prophylaxis orders are present.     CODE STATUS:    Code Status (Patient has no pulse and is not breathing): CPR (Attempt to Resuscitate)  Medical Interventions (Patient has pulse or is breathing): Full Support         This patient has been examined wearing appropriate Personal Protective Equipment and discussed with Staff.   05/08/22      Electronically signed by Ben Lockwood MD, 05/08/22, 15:37 EDT.  [facility] Hospitalist Team

## 2022-05-08 NOTE — PLAN OF CARE
Assessment: Charlie Wells presents with functional mobility impairments which indicate the need for skilled intervention. Tolerating session today without incident. Attempted 3x to get pt to stand and he kept pulling back. Unable to get him to sit at 90 degrees. Plans on rehab at [place]. Will continue to follow and progress as tolerated.

## 2022-05-08 NOTE — PLAN OF CARE
Goal Outcome Evaluation:  Plan of Care Reviewed With: patient           Outcome Evaluation: VSS, pt has been very calm and resting, taking meds well, plan of care on-going

## 2022-05-08 NOTE — THERAPY TREATMENT NOTE
"Subjective: Pt agreeable to therapeutic plan of care. Pt kept saying no he didn't want to get our of chair. Nsg stated he wanted oob at 4am and has been up since.    Objective:     Bed mobility - N/A or Not attempted.  Transfers - N/A or Not attempted.  Ambulation -  feet N/A or Not attempted.    Pain:kept holding on to his stomach  Education: Provided education on importance of mobility and skilled verbal / tactile cueing throughout intervention.     Assessment: Charlie Wells presents with functional mobility impairments which indicate the need for skilled intervention. Tolerating session today without incident. Attempted 3x to get pt to stand and he kept pulling back. Unable to get him to sit at 90 degrees. Plans on rehab at [place]. Will continue to follow and progress as tolerated.     Plan/Recommendations:   Moderate Intensity Therapy recommended post-acute care. This is recommended as therapy feels the patient would require 3-4 days per week and wouldn't tolerate \"3 hour daily\" rehab intensity. SNF would be the preferred choice. If the patient does not agree to SNF, arrange HH or OP depending on home bound status. If patient is medically complex, consider LTACH.. Pt requires no DME at discharge.     Pt desires Skilled Rehab placement at discharge. Pt cooperative; agreeable to therapeutic recommendations and plan of care.         Basic Mobility 6-click:  Rollin = Total, A lot = 2, A little = 3; 4 = None  Supine>Sit:   1 = Total, A lot = 2, A little = 3; 4 = None   Sit>Stand with arms:  1 = Total, A lot = 2, A little = 3; 4 = None  Bed>Chair:   1 = Total, A lot = 2, A little = 3; 4 = None  Ambulate in room:  1 = Total, A lot = 2, A little = 3; 4 = None  3-5 Steps with railin = Total, A lot = 2, A little = 3; 4 = None  Score: 10      Post-Tx Position: Up in Chair, Alarms activated and Call light and personal items within reach  PPE: gloves, surgical mask, eyewear protection  "

## 2022-05-08 NOTE — CASE MANAGEMENT/SOCIAL WORK
Continued Stay Note  [facility]     Patient Name: Charlie Wells  MRN: [id number]  Today's Date: 5/8/2022    Admit Date: 4/30/2022     Discharge Plan     Row Name 05/08/22 1811       Plan    Plan DC plan: [facility]- pending acceptance; pasrr per facility    Plan Comments CM reached out to [facility] inquiring if they accepted the patient; she responded that she thought they did but wasnt sure and would need to check when she got home.  No response; cm texted again at 1743. no further response at 1813.              Phone communication or documentation only - no physical contact with patient or family.        Damaris Duncan RN

## 2022-05-09 PROBLEM — Z43.3 ATTENTION TO COLOSTOMY (HCC): Status: ACTIVE | Noted: 2022-05-09

## 2022-05-09 LAB
ANION GAP SERPL CALCULATED.3IONS-SCNC: 13 MMOL/L (ref 5–15)
BUN SERPL-MCNC: 16 MG/DL (ref 6–20)
BUN/CREAT SERPL: 23.9 (ref 7–25)
CALCIUM SPEC-SCNC: 8.7 MG/DL (ref 8.6–10.5)
CHLORIDE SERPL-SCNC: 103 MMOL/L (ref 98–107)
CO2 SERPL-SCNC: 21 MMOL/L (ref 22–29)
CREAT SERPL-MCNC: 0.67 MG/DL (ref 0.76–1.27)
CRP SERPL-MCNC: 1.94 MG/DL (ref 0–0.5)
DEPRECATED RDW RBC AUTO: 47.3 FL (ref 37–54)
EGFRCR SERPLBLD CKD-EPI 2021: 112.3 ML/MIN/1.73
EOSINOPHIL # BLD MANUAL: 0.26 10*3/MM3 (ref 0–0.4)
EOSINOPHIL NFR BLD MANUAL: 4 % (ref 0.3–6.2)
ERYTHROCYTE [DISTWIDTH] IN BLOOD BY AUTOMATED COUNT: 14.4 % (ref 12.3–15.4)
GLUCOSE SERPL-MCNC: 170 MG/DL (ref 65–99)
HCT VFR BLD AUTO: 40.1 % (ref 37.5–51)
HGB BLD-MCNC: 13.2 G/DL (ref 13–17.7)
LYMPHOCYTES # BLD MANUAL: 2.44 10*3/MM3 (ref 0.7–3.1)
LYMPHOCYTES NFR BLD MANUAL: 15 % (ref 5–12)
MAGNESIUM SERPL-MCNC: 1.9 MG/DL (ref 1.6–2.6)
MCH RBC QN AUTO: 31.3 PG (ref 26.6–33)
MCHC RBC AUTO-ENTMCNC: 33 G/DL (ref 31.5–35.7)
MCV RBC AUTO: 94.7 FL (ref 79–97)
MONOCYTES # BLD: 0.99 10*3/MM3 (ref 0.1–0.9)
NEUTROPHILS # BLD AUTO: 2.9 10*3/MM3 (ref 1.7–7)
NEUTROPHILS NFR BLD MANUAL: 43 % (ref 42.7–76)
NEUTS BAND NFR BLD MANUAL: 1 % (ref 0–5)
PHOSPHATE SERPL-MCNC: 2.8 MG/DL (ref 2.5–4.5)
PLAT MORPH BLD: NORMAL
PLATELET # BLD AUTO: 188 10*3/MM3 (ref 140–450)
PMV BLD AUTO: 8.8 FL (ref 6–12)
POTASSIUM SERPL-SCNC: 4 MMOL/L (ref 3.5–5.2)
RBC # BLD AUTO: 4.23 10*6/MM3 (ref 4.14–5.8)
RBC MORPH BLD: NORMAL
SCAN SLIDE: NORMAL
SODIUM SERPL-SCNC: 137 MMOL/L (ref 136–145)
VARIANT LYMPHS NFR BLD MANUAL: 2 % (ref 0–5)
VARIANT LYMPHS NFR BLD MANUAL: 35 % (ref 19.6–45.3)
WBC MORPH BLD: NORMAL
WBC NRBC COR # BLD: 6.6 10*3/MM3 (ref 3.4–10.8)

## 2022-05-09 PROCEDURE — 99231 SBSQ HOSP IP/OBS SF/LOW 25: CPT | Performed by: NURSE PRACTITIONER

## 2022-05-09 PROCEDURE — 84100 ASSAY OF PHOSPHORUS: CPT | Performed by: INTERNAL MEDICINE

## 2022-05-09 PROCEDURE — 85007 BL SMEAR W/DIFF WBC COUNT: CPT | Performed by: INTERNAL MEDICINE

## 2022-05-09 PROCEDURE — 80048 BASIC METABOLIC PNL TOTAL CA: CPT | Performed by: INTERNAL MEDICINE

## 2022-05-09 PROCEDURE — 25010000002 ENOXAPARIN PER 10 MG: Performed by: STUDENT IN AN ORGANIZED HEALTH CARE EDUCATION/TRAINING PROGRAM

## 2022-05-09 PROCEDURE — 83735 ASSAY OF MAGNESIUM: CPT | Performed by: INTERNAL MEDICINE

## 2022-05-09 PROCEDURE — 86140 C-REACTIVE PROTEIN: CPT | Performed by: INTERNAL MEDICINE

## 2022-05-09 PROCEDURE — 99232 SBSQ HOSP IP/OBS MODERATE 35: CPT | Performed by: INTERNAL MEDICINE

## 2022-05-09 PROCEDURE — 85025 COMPLETE CBC W/AUTO DIFF WBC: CPT | Performed by: INTERNAL MEDICINE

## 2022-05-09 PROCEDURE — 99024 POSTOP FOLLOW-UP VISIT: CPT | Performed by: STUDENT IN AN ORGANIZED HEALTH CARE EDUCATION/TRAINING PROGRAM

## 2022-05-09 RX ADMIN — FLUVOXAMINE MALEATE 50 MG: 50 TABLET, FILM COATED ORAL at 20:14

## 2022-05-09 RX ADMIN — QUETIAPINE FUMARATE 400 MG: 100 TABLET ORAL at 08:34

## 2022-05-09 RX ADMIN — POLYETHYLENE GLYCOL 3350 17 G: 17 POWDER, FOR SOLUTION ORAL at 08:34

## 2022-05-09 RX ADMIN — BUSPIRONE HYDROCHLORIDE 30 MG: 15 TABLET ORAL at 20:14

## 2022-05-09 RX ADMIN — LEVOTHYROXINE SODIUM 125 MCG: 0.12 TABLET ORAL at 05:40

## 2022-05-09 RX ADMIN — PANTOPRAZOLE SODIUM 40 MG: 40 TABLET, DELAYED RELEASE ORAL at 08:34

## 2022-05-09 RX ADMIN — Medication 3 ML: at 20:15

## 2022-05-09 RX ADMIN — LIOTHYRONINE SODIUM 5 MCG: 5 TABLET ORAL at 05:40

## 2022-05-09 RX ADMIN — Medication 3 ML: at 08:35

## 2022-05-09 RX ADMIN — ENOXAPARIN SODIUM 40 MG: 100 INJECTION SUBCUTANEOUS at 16:16

## 2022-05-09 RX ADMIN — QUETIAPINE FUMARATE 400 MG: 100 TABLET ORAL at 20:14

## 2022-05-09 RX ADMIN — TAMSULOSIN HYDROCHLORIDE 0.4 MG: 0.4 CAPSULE ORAL at 20:14

## 2022-05-09 RX ADMIN — DIVALPROEX SODIUM 500 MG: 500 TABLET, EXTENDED RELEASE ORAL at 20:14

## 2022-05-09 RX ADMIN — MONTELUKAST 10 MG: 10 TABLET, FILM COATED ORAL at 08:34

## 2022-05-09 RX ADMIN — BUSPIRONE HYDROCHLORIDE 30 MG: 15 TABLET ORAL at 08:34

## 2022-05-09 RX ADMIN — DIVALPROEX SODIUM 500 MG: 500 TABLET, EXTENDED RELEASE ORAL at 08:34

## 2022-05-09 RX ADMIN — ESTRADIOL 1 MG: 1 TABLET ORAL at 08:34

## 2022-05-09 NOTE — PLAN OF CARE
Goal Outcome Evaluation:  Plan of Care Reviewed With: patient        Progress: no change  Outcome Evaluation: Pt is on room air, VSS, Pt has been resting and listening to his music and TV, Pt calls out saying he's hungry even after he has eaten. Pt is waiting for precert to [facility] at D/C. Pt has been resting well with call light in reach, Will continue to monitor.

## 2022-05-09 NOTE — CASE MANAGEMENT/SOCIAL WORK
Continued Stay Note  [facility]     Patient Name: Charlie Wells  MRN: [id number]  Today's Date: 5/9/2022    Admit Date: 4/30/2022     Discharge Plan     Row Name 05/09/22 1513       Plan    Plan [facility] pending acceptance. PASRR per facility.    Plan Comments Reached out to [facility] regarding acceptance at [facility].  Pending acceptance.           Lore Bello RN   Phone communication or documentation only - no physical contact with patient or family.

## 2022-05-09 NOTE — PROGRESS NOTES
General Surgery Progress Note    Name: Charlie Wells ADMIT: 2022   : 1970  PCP: Rody Moreno MD    MRN: [id number] LOS: 7 days   AGE/SEX: 51 y.o. male  ROOM: [address] [place] [address]    Chief Complaint   Patient presents with   • Abdominal Pain     Subjective     Tolerating diet, having ostomy function    Objective     Scheduled Medications:   bisacodyl, 10 mg, Rectal, Daily  busPIRone, 30 mg, Oral, BID  divalproex, 500 mg, Oral, Q12H  enoxaparin, 40 mg, Subcutaneous, Q24H  estradiol, 1 mg, Oral, Daily  fluvoxaMINE, 50 mg, Oral, Nightly  levothyroxine, 125 mcg, Oral, Q AM  liothyronine, 5 mcg, Oral, Daily  montelukast, 10 mg, Oral, Daily  pantoprazole, 40 mg, Oral, Daily  polyethylene glycol, 17 g, Oral, Daily  QUEtiapine, 400 mg, Oral, BID  sodium chloride, 3 mL, Intravenous, Q12H  tamsulosin, 0.4 mg, Oral, Nightly        Active Infusions:  sodium chloride, 125 mL/hr, Last Rate: 125 mL/hr (22 0930)        As Needed Medications:  •  acetaminophen **OR** acetaminophen **OR** acetaminophen  •  HYDROcodone-acetaminophen  •  HYDROmorphone  •  melatonin  •  ondansetron **OR** ondansetron  •  [COMPLETED] Insert peripheral IV **AND** sodium chloride  •  sodium chloride    Vital Signs  Vital Signs Patient Vitals for the past 24 hrs:   BP Temp Temp src Pulse Resp SpO2 Weight   22 1207 112/73 98.2 °F (36.8 °C) Oral 76 14 95 % --   22 0543 102/61 98.4 °F (36.9 °C) Oral 77 16 95 % 89.3 kg (196 lb 14.4 oz)   22 1900 102/66 98.2 °F (36.8 °C) Oral 72 13 98 % --     I/O:  I/O last 3 completed shifts:  In: 1020 [P.O.:1020]  Out: 3325 [Urine:3275; Stool:50]    Physical Exam:  Physical Exam  Constitutional:       General: He is not in acute distress.     Appearance: Normal appearance. He is not ill-appearing.   HENT:      Head: Normocephalic and atraumatic.      Right Ear: External ear normal.      Left Ear: External ear normal.   Eyes:      Extraocular Movements: Extraocular movements intact.       Conjunctiva/sclera: Conjunctivae normal.   Cardiovascular:      Rate and Rhythm: Normal rate and regular rhythm.   Pulmonary:      Effort: Pulmonary effort is normal. No respiratory distress.   Abdominal:      General: There is no distension.      Palpations: Abdomen is soft.      Comments: Ostomy pink and viable with stool in the bag   Musculoskeletal:         General: No swelling or deformity.   Skin:     General: Skin is warm and dry.   Neurological:      Mental Status: He is alert and oriented to person, place, and time. Mental status is at baseline.         Results Review:     CBC    Results from last 7 days   Lab Units 05/09/22 0222 05/06/22  0117 05/05/22  0342 05/04/22 0345 05/03/22  0402   WBC 10*3/mm3 6.60 7.70 8.10 8.50 7.30   HEMOGLOBIN g/dL 13.2 14.2 14.3 14.5 14.3   PLATELETS 10*3/mm3 188 171 158 160 163     BMP   Results from last 7 days   Lab Units 05/09/22 0222 05/06/22  0117 05/05/22 0342 05/04/22  0345 05/03/22  0402   SODIUM mmol/L 137 139 141 139 140   POTASSIUM mmol/L 4.0 4.1 4.2 4.5 4.4   CHLORIDE mmol/L 103 102 105 104 106   CO2 mmol/L 21.0* 24.0 26.0 22.0 24.0   BUN mg/dL 16 13 13 5* 5*   CREATININE mg/dL 0.67* 0.64* 0.68* 0.58* 0.73*   GLUCOSE mg/dL 170* 109* 106* 134* 89   MAGNESIUM mg/dL 1.9 1.8 1.9 2.0 1.8   PHOSPHORUS mg/dL 2.8 3.6 3.6 3.2 4.0     Radiology(recent) No radiology results for the last day    I reviewed the patient's new clinical results.    Assessment/Plan       Abdominal pain, unspecified abdominal location      51 y.o. male postop day 5 from diverting sigmoid colostomy     Plan:  -P.o. pain meds  -Regular diet  -Okay for discharge from my standpoint once rehab placement is secured  -Daily labs  -Lovenox           This note was created using Dragon Voice Recognition software.    Ken Christiansen MD  05/09/22  13:31 EDT

## 2022-05-09 NOTE — CASE MANAGEMENT/SOCIAL WORK
Continued Stay Note  [facility]     Patient Name: Charlie Wells  MRN: [id number]  Today's Date: 5/9/2022    Admit Date: 4/30/2022     Discharge Plan     Row Name 05/09/22 1605       Plan    Plan Accepted at [facility], pending available bed PASRR  per facility.    Plan Comments Per luz Valero, [facility] has accepted patient, Pending bed availability,  Pharmacy updated.    Row Name 05/09/22 2710                    Lore Bello RN   Phone communication or documentation only - no physical contact with patient or family.

## 2022-05-09 NOTE — PLAN OF CARE
Goal Outcome Evaluation:  Plan of Care Reviewed With: patient, caregiver        Progress: improving  Outcome Evaluation: Patient is stable and no complaints of pain. Patient is eating well. adequate output in ostomy. Patient is urinating well. awaiting availablity to IP rehab. Will continue to monitor.

## 2022-05-09 NOTE — PROGRESS NOTES
[facility] Medicine Services Daily Progress Note    Patient Name: Charlie Wells  : 1970  MRN: [id number]  Primary Care Physician:  Rody Moreno MD  Date of admission: 2022      Subjective      Chief Complaint: Abdominal pain     Patient Reports he is doing well.  Eating all his food.  Pending placement at rehab/SNF.       ROS negative except as above       Objective      Vitals:   Temp:  [98.2 °F (36.8 °C)-98.4 °F (36.9 °C)] 98.2 °F (36.8 °C)  Heart Rate:  [72-77] 76  Resp:  [13-16] 14  BP: (102-112)/(61-73) 112/73    Physical Exam  Vitals reviewed.   Patient sitting in the chair today eating lunch.  HENT:      Head: Normocephalic.      Nose: Nose normal.      Mouth/Throat:      Mouth: Mucous membranes are moist.   Eyes:      Pupils: Pupils are equal, round, and reactive to light.   Cardiovascular:      Rate and Rhythm: Normal rate.      Pulses: Normal pulses.   Pulmonary:      Effort: Pulmonary effort is normal.   Abdominal:         Palpations: Abdomen is soft.      Comments:  Ostomy in place.  Abdomen somewhat distended but nontender      musculoskeletal:         General: Normal range of motion.      Cervical back: Normal range of motion.   Skin:     General: Skin is warm.   Neurological:      General: No focal deficit present.      Mental Status: He is alert.   Psychiatric:         Mood and Affect: Mood normal.         Behavior: Behavior normal.        Result Review    Result Review:  I have personally reviewed the results from the time of this admission to 2022 17:35 EDT and agree with these findings:  [x]  Laboratory  []  Microbiology  []  Radiology  []  EKG/Telemetry   []  Cardiology/Vascular   []  Pathology  []  Old records  []  Other:  Most notable findings include: Lab work stable    Wounds (last 24 hours)     LDA Wound     Row Name 22 0741 22 2358 22       Wound 22 2100 gluteal MASD (Moisture associated skin damage)    Wound -  Properties Group Placement Date: 04/30/22  -JE Placement Time: 2100 -JE Location: gluteal  -JE Primary Wound Type: MASD  -JE    Dressing Appearance open to air  -AH -- --    Closure Open to air  -AH -- --    Base blanchable;red  -AH red;blanchable;moist  -CH red;blanchable;moist  -CH    Drainage Amount -- none  -CH none  -CH    Retired Wound - Properties Group Placement Date: 04/30/22  -JE Placement Time: 2100 -JE Location: gluteal  -JE Primary Wound Type: MASD  -JE    Retired Wound - Properties Group Date first assessed: 04/30/22  -JE Time first assessed: 2100 -JE Location: gluteal  -JE Primary Wound Type: MASD  -JE       Wound 05/03/22 1549 other (see comments) abdomen Incision    Wound - Properties Group Placement Date: 05/03/22  -SP Placement Time: 1549 -SP Present on Hospital Admission: N  -SP Orientation: other (see comments)  -SP, LLQ  Location: abdomen  -SP Primary Wound Type: Incision  -SP    Dressing Appearance dry;intact  -AH -- dry;intact  -CH    Closure RHONDA  -AH RHONDA  -CH RHONDA  -CH    Base dressing in place, unable to visualize  -AH -- --    Drainage Amount -- none  -CH none  -CH    Retired Wound - Properties Group Placement Date: 05/03/22  -SP Placement Time: 1549  -SP Present on Hospital Admission: N  -SP Orientation: other (see comments)  -SP, LLQ  Location: abdomen  -SP Primary Wound Type: Incision  -SP    Retired Wound - Properties Group Date first assessed: 05/03/22  -SP Time first assessed: 1549  -SP Present on Hospital Admission: N  -SP Location: abdomen  -SP Primary Wound Type: Incision  -SP          User Key  (r) = Recorded By, (t) = Taken By, (c) = Cosigned By    Initials Name Provider Type    Adin Amaya RN Registered Nurse    Ariana Leon LPN Licensed Nurse    Ping Ellis, RN Registered Nurse    Luis Carlos Mascorro, RN Registered Nurse                   Assessment/Plan       Brief Patient Summary:  Charlie Wells is a 51 y.o. male who presents with recurrent  constipation     bisacodyl, 10 mg, Rectal, Daily  busPIRone, 30 mg, Oral, BID  clonazePAM, 0.25 mg, Oral, QAM  clonazePAM, 0.5 mg, Oral, Nightly  divalproex, 500 mg, Oral, Q12H  enoxaparin, 40 mg, Subcutaneous, Q24H  estradiol, 1 mg, Oral, Daily  fluvoxaMINE, 50 mg, Oral, Nightly  levothyroxine, 125 mcg, Oral, Q AM  liothyronine, 5 mcg, Oral, Daily  montelukast, 10 mg, Oral, Daily  pantoprazole, 40 mg, Oral, Daily  QUEtiapine, 400 mg, Oral, BID  sodium chloride, 3 mL, Intravenous, Q12H  tamsulosin, 0.4 mg, Oral, Nightly        sodium chloride, 125 mL/hr, Last Rate: 125 mL/hr (04/30/22 1810)           Active Hospital Problems:          Active Hospital Problems     Diagnosis     • Abdominal pain, unspecified abdominal location        Plan:   51-year-old gentleman with recurrent constipation due to Hirschsprung's disease  Status post OR fecal disimpaction with tube placement  Status post OR again on May 3.  Abdomen mildly distended but nontender on May 9 but had a large bowel movement     GERD  On PPI     Intellectual disability  Baseline  CT head negative  Resume BuSpar Klonopin Depakote Seroquel      Hypothyroidism  Synthroid and liothyronine resumed      PT ordered on May 5.  Plans for placement in progress.  Awaiting placement.     DVT prophylaxis:  Medical DVT prophylaxis orders are present.     CODE STATUS:    Code Status (Patient has no pulse and is not breathing): CPR (Attempt to Resuscitate)  Medical Interventions (Patient has pulse or is breathing): Full Support         This patient has been examined wearing appropriate Personal Protective Equipment and discussed with Staff.   05/09/22      Electronically signed by Ben Lockwood MD, 05/09/22, 17:35 EDT.  [facility] Hospitalist Team

## 2022-05-09 NOTE — PROGRESS NOTES
Ostomy Note   DANAY     Patient Name: Charlie Wells  : 1970  MRN: [id number]  Today's Date: 2022 Room Number: 4114/1      Admit Date: 2022  Attending: Ben Lockwood MD    Consult Requested By: Dr Lockwood    Reason For Consult: new ostomy    Chief Complaint: 51-year-old male with multiple comorbidities history significant for Hirschsprung's.  Patient initially presented to the hospital with abdominal pain.  Patient underwent surgery on May 3 for diverting colostomy.  Patient sitting up in chair today voicing no complaints    Visit Dx:    ICD-10-CM ICD-9-CM   1. Abdominal pain, unspecified abdominal location  R10.9 789.00   2. Abdominal distension  R14.0 787.3     Patient Active Problem List   Diagnosis   • Anemia in other chronic diseases classified elsewhere   • Ataxia   • Constipation, chronic   • Dependence on continuous positive airway pressure ventilation   • Disorder of foot   • Encounter for general adult medical examination without abnormal findings   • Gastroesophageal reflux disease   • Esophageal stricture   • Hay fever   • Hirschsprung's disease   • Hyperlipidemia   • Hypothyroidism   • Lung mass   • Mediastinal lymphadenopathy   • Moderate intellectual disability   • Obstructive sleep apnea   • Proteinuria   • Seizure disorder (HCC)   • Unequal leg length   • Full incontinence of feces   • Urinary incontinence   • Leg edema   • Elevated PSA   • Rosacea   • Medicare annual wellness visit, subsequent   • Pneumonia due to infectious organism   • Abdominal pain   • Hypokalemia   • Pancreatitis   • Aspiration into respiratory tract   • Dyspnea on exertion   • Other idiopathic scoliosis, thoracolumbar region   • Development delay   • Musculoskeletal neck pain   • Adolescent idiopathic scoliosis of thoracolumbar region   • Scoliosis (and kyphoscoliosis), idiopathic   • Tardive dyskinesia   • Cognitive and behavioral changes   • Prostate cancer screening   • Abdominal pain, unspecified  abdominal location   • Attention to colostomy ([facility])       History:   Past Medical History:   Diagnosis Date   • Acute pancreatitis    • Allergic     Reglan, Benztropine   • Allergic rhinitis    • Anxiety    • Aspiration pneumonia ([facility])    • Ataxia    • Chronic constipation    • Chronic edema    • Depression    • Esophageal stricture    • Fecal incontinence    • GERD (gastroesophageal reflux disease)    • GI bleed    • Hirschsprung's disease    • Hyperlipidemia    • Hypokalemia    • Hypothyroidism    • Iron deficiency anemia    • Leg length discrepancy    • Mediastinal lymphadenopathy    • Megacolon    • Mildly mentally retarded    • Obesity    • Positive PPD    • Pulmonary hypertension ([facility])    • Scoliosis    • Seizures ([facility])    • Sleep apnea     Not very compliant with CPAP   • Urinary incontinence    • Urinary tract infection      Past Surgical History:   Procedure Laterality Date   • COLON SURGERY  1989    colostomy, the reversed   • COLONOSCOPY      June 2017   • COLOSTOMY N/A 5/3/2022    Procedure: COLOSTOMY DIVERTING;  Surgeon: Ken Christiansen MD;  Location: [facility];  Service: General;  Laterality: N/A;   • ESOPHAGEAL DILATATION      Several   • HEMICOLECTOMY Left    • MYOTOMY      Rectal   • RECTAL EXAMINATION UNDER ANESTHESIA N/A 5/1/2022    Procedure: RECTAL EXAM UNDER ANESTHESIA, RIGID SIGMOIDOSCOPY WITH FECAL DISIMPACTION;  Surgeon: Ken Christiansen MD;  Location: [facility];  Service: General;  Laterality: N/A;     Social History     Socioeconomic History   • Marital status: Single   Tobacco Use   • Smoking status: Never Smoker   • Smokeless tobacco: Never Used   Vaping Use   • Vaping Use: Never used   Substance and Sexual Activity   • Alcohol use: No   • Drug use: No   • Sexual activity: Never       Allergies:  Allergies   Allergen Reactions   • Metoclopramide Rash     Other reaction(s): not known    • Benztropine Delirium       Medications:    Current Facility-Administered Medications:   •   acetaminophen (TYLENOL) tablet 650 mg, 650 mg, Oral, Q4H PRN, 650 mg at 05/04/22 1759 **OR** acetaminophen (TYLENOL) 160 MG/5ML solution 650 mg, 650 mg, Oral, Q4H PRN **OR** acetaminophen (TYLENOL) suppository 650 mg, 650 mg, Rectal, Q4H PRN, Ken Christiansen MD  •  bisacodyl (DULCOLAX) suppository 10 mg, 10 mg, Rectal, Daily, Ken Christiansen MD, 10 mg at 05/06/22 0958  •  busPIRone (BUSPAR) tablet 30 mg, 30 mg, Oral, BID, Ken Christiansen MD, 30 mg at 05/09/22 0834  •  divalproex (DEPAKOTE ER) 24 hr tablet 500 mg, 500 mg, Oral, Q12H, Ken Christiansen MD, 500 mg at 05/09/22 0834  •  Enoxaparin Sodium (LOVENOX) syringe 40 mg, 40 mg, Subcutaneous, Q24H, Ken Christiansen MD, 40 mg at 05/08/22 1519  •  estradiol (ESTRACE) tablet 1 mg, 1 mg, Oral, Daily, eKn Christiansen MD, 1 mg at 05/09/22 0834  •  fluvoxaMINE (LUVOX) tablet 50 mg, 50 mg, Oral, Nightly, Ken Christiansen MD, 50 mg at 05/08/22 2148  •  HYDROcodone-acetaminophen (NORCO) 5-325 MG per tablet 1 tablet, 1 tablet, Oral, Q4H PRN, Ken Christiansen MD, 1 tablet at 05/06/22 1514  •  HYDROmorphone (DILAUDID) injection 0.5 mg, 0.5 mg, Intravenous, Q4H PRN, Ken Christiansen MD  •  levothyroxine (SYNTHROID, LEVOTHROID) tablet 125 mcg, 125 mcg, Oral, Q AM, Ken Christiansen MD, 125 mcg at 05/09/22 0540  •  liothyronine (CYTOMEL) tablet 5 mcg, 5 mcg, Oral, Daily, Ken Christiansen MD, 5 mcg at 05/09/22 0540  •  melatonin tablet 5 mg, 5 mg, Oral, Nightly PRN, Ken Christiansen MD  •  montelukast (SINGULAIR) tablet 10 mg, 10 mg, Oral, Daily, Ken Christiansen MD, 10 mg at 05/09/22 0834  •  ondansetron (ZOFRAN) tablet 4 mg, 4 mg, Oral, Q6H PRN **OR** ondansetron (ZOFRAN) injection 4 mg, 4 mg, Intravenous, Q6H PRN, Ken Christiansen MD  •  pantoprazole (PROTONIX) EC tablet 40 mg, 40 mg, Oral, Daily, Ken Christiansen MD, 40 mg at 05/09/22 0834  •  polyethylene glycol (MIRALAX) packet 17 g, 17 g, Oral, Daily, Ken Christiansen MD, 17 g at 05/09/22 0834  •  QUEtiapine (SEROquel)  tablet 400 mg, 400 mg, Oral, BID, Ken Christiansen MD, 400 mg at 05/09/22 0834  •  [COMPLETED] Insert peripheral IV, , , Once **AND** sodium chloride 0.9 % flush 10 mL, 10 mL, Intravenous, PRN, Ken Christiansen MD, 10 mL at 04/30/22 1258  •  sodium chloride 0.9 % flush 3 mL, 3 mL, Intravenous, Q12H, Ken Christiansen MD, 3 mL at 05/09/22 0835  •  sodium chloride 0.9 % flush 3-10 mL, 3-10 mL, Intravenous, PRN, Ken Christiansen MD  •  sodium chloride 0.9 % infusion, 125 mL/hr, Intravenous, Continuous, Ken Christiansen MD, Last Rate: 125 mL/hr at 05/04/22 0930, 125 mL/hr at 05/04/22 0930  •  tamsulosin (FLOMAX) 24 hr capsule 0.4 mg, 0.4 mg, Oral, Nightly, Ken Christiansen MD, 0.4 mg at 05/08/22 2148    Results Review:  Lab Results (last 48 hours)     Procedure Component Value Units Date/Time    Manual Differential [[id number]]  (Abnormal) Collected: 05/09/22 0222    Specimen: Blood Updated: 05/09/22 0420     Neutrophil % 43.0 %      Lymphocyte % 35.0 %      Monocyte % 15.0 %      Eosinophil % 4.0 %      Bands %  1.0 %      Atypical Lymphocyte % 2.0 %      Neutrophils Absolute 2.90 10*3/mm3      Lymphocytes Absolute 2.44 10*3/mm3      Monocytes Absolute 0.99 10*3/mm3      Eosinophils Absolute 0.26 10*3/mm3      RBC Morphology Normal     WBC Morphology Normal     Platelet Morphology Normal    CBC & Differential [[id number]] Collected: 05/09/22 0222    Specimen: Blood Updated: 05/09/22 0420    Narrative:      The following orders were created for panel order CBC & Differential.  Procedure                               Abnormality         Status                     ---------                               -----------         ------                     CBC Auto Differential[[id number]]        Normal              Final result               Scan Slide[[id number]]                                       Final result                 Please view results for these tests on the individual orders.    Scan Slide [[id number]] Collected:  05/09/22 0222    Specimen: Blood Updated: 05/09/22 0420     Scan Slide --     Comment: See Manual Differential Results       CBC Auto Differential [[id number]]  (Normal) Collected: 05/09/22 0222    Specimen: Blood Updated: 05/09/22 0420     WBC 6.60 10*3/mm3      RBC 4.23 10*6/mm3      Hemoglobin 13.2 g/dL      Hematocrit 40.1 %      MCV 94.7 fL      MCH 31.3 pg      MCHC 33.0 g/dL      RDW 14.4 %      RDW-SD 47.3 fl      MPV 8.8 fL      Platelets 188 10*3/mm3     Narrative:      The previously reported component NRBC is no longer being reported. Previous result was 0.2 /100 WBC (Reference Range: 0.0-0.2 /100 WBC) on 5/9/2022 at 0247 EDT.    Basic Metabolic Panel [[id number]]  (Abnormal) Collected: 05/09/22 0222    Specimen: Blood Updated: 05/09/22 0255     Glucose 170 mg/dL      BUN 16 mg/dL      Creatinine 0.67 mg/dL      Sodium 137 mmol/L      Potassium 4.0 mmol/L      Chloride 103 mmol/L      CO2 21.0 mmol/L      Calcium 8.7 mg/dL      BUN/Creatinine Ratio 23.9     Anion Gap 13.0 mmol/L      eGFR 112.3 mL/min/1.73      Comment: National Kidney Foundation and American Society of Nephrology (ASN) Task Force recommended calculation based on the Chronic Kidney Disease Epidemiology Collaboration (CKD-EPI) equation refit without adjustment for race.       Narrative:      GFR Normal >60  Chronic Kidney Disease <60  Kidney Failure <15      Phosphorus [[id number]]  (Normal) Collected: 05/09/22 0222    Specimen: Blood Updated: 05/09/22 0255     Phosphorus 2.8 mg/dL     Magnesium [[id number]]  (Normal) Collected: 05/09/22 0222    Specimen: Blood Updated: 05/09/22 0255     Magnesium 1.9 mg/dL     C-reactive Protein [[id number]]  (Abnormal) Collected: 05/09/22 0222    Specimen: Blood Updated: 05/09/22 0255     C-Reactive Protein 1.94 mg/dL         Imaging Results (Last 72 Hours)     ** No results found for the last 72 hours. **          Review of Systems:  Review of Systems   HENT: Negative.    Respiratory: Negative for cough and  choking.    Gastrointestinal:        New colostomy   Genitourinary: Negative.    Musculoskeletal: Positive for gait problem.   Skin: Negative.    Psychiatric/Behavioral: Negative.        Physical Assessment:  Left lower quadrant colostomy pouch is leaking at time of arrival.  The pouch was removed leaking at 3:00.  Stoma is retracting.  There is also cutaneous junction separation primarily at 3:00.  Stoma is viable and there is soft brown stool in the pouch.    Skin was cleansed Skin-Prep applied an Adaptic thin was used and a Belding cut to fit 1 piece system was applied    Final diagnosis  Attention to colostomy    Recommendation and Plan  The ostomy is viable and functioning however there is a cutaneous junction separation and the stoma is beginning to retract it is functioning well.  Patient tolerating procedures well voicing no complaints    NICOLE Polanco   5/9/2022   14:07 EDT

## 2022-05-10 VITALS
OXYGEN SATURATION: 92 % | BODY MASS INDEX: 34.5 KG/M2 | TEMPERATURE: 97.6 F | HEIGHT: 63 IN | SYSTOLIC BLOOD PRESSURE: 104 MMHG | HEART RATE: 75 BPM | RESPIRATION RATE: 16 BRPM | WEIGHT: 194.7 LBS | DIASTOLIC BLOOD PRESSURE: 64 MMHG

## 2022-05-10 PROCEDURE — 25010000002 ENOXAPARIN PER 10 MG: Performed by: STUDENT IN AN ORGANIZED HEALTH CARE EDUCATION/TRAINING PROGRAM

## 2022-05-10 PROCEDURE — 99239 HOSP IP/OBS DSCHRG MGMT >30: CPT | Performed by: INTERNAL MEDICINE

## 2022-05-10 PROCEDURE — 99024 POSTOP FOLLOW-UP VISIT: CPT | Performed by: STUDENT IN AN ORGANIZED HEALTH CARE EDUCATION/TRAINING PROGRAM

## 2022-05-10 PROCEDURE — 97112 NEUROMUSCULAR REEDUCATION: CPT

## 2022-05-10 PROCEDURE — 97535 SELF CARE MNGMENT TRAINING: CPT

## 2022-05-10 RX ADMIN — POLYETHYLENE GLYCOL 3350 17 G: 17 POWDER, FOR SOLUTION ORAL at 08:47

## 2022-05-10 RX ADMIN — LIOTHYRONINE SODIUM 5 MCG: 5 TABLET ORAL at 06:20

## 2022-05-10 RX ADMIN — Medication 3 ML: at 08:47

## 2022-05-10 RX ADMIN — QUETIAPINE FUMARATE 400 MG: 100 TABLET ORAL at 08:47

## 2022-05-10 RX ADMIN — PANTOPRAZOLE SODIUM 40 MG: 40 TABLET, DELAYED RELEASE ORAL at 08:47

## 2022-05-10 RX ADMIN — ESTRADIOL 1 MG: 1 TABLET ORAL at 08:47

## 2022-05-10 RX ADMIN — LEVOTHYROXINE SODIUM 125 MCG: 0.12 TABLET ORAL at 06:20

## 2022-05-10 RX ADMIN — DIVALPROEX SODIUM 500 MG: 500 TABLET, EXTENDED RELEASE ORAL at 08:47

## 2022-05-10 RX ADMIN — MONTELUKAST 10 MG: 10 TABLET, FILM COATED ORAL at 08:47

## 2022-05-10 RX ADMIN — ENOXAPARIN SODIUM 40 MG: 100 INJECTION SUBCUTANEOUS at 16:10

## 2022-05-10 RX ADMIN — BUSPIRONE HYDROCHLORIDE 30 MG: 15 TABLET ORAL at 08:47

## 2022-05-10 NOTE — NURSING NOTE
Pt just discharged by wheelchair with transport to [facility] with all of Pts belongings to be transported by [facility] to go to [facility].

## 2022-05-10 NOTE — CASE MANAGEMENT/SOCIAL WORK
Continued Stay Note  [facility]     Patient Name: Charlie Wells  MRN: [id number]  Today's Date: 5/10/2022    Admit Date: 4/30/2022     Discharge Plan     Row Name 05/10/22 1327       Plan    Plan Accepted at [facility], Bed ready today 05/10/2022 after 3pm.  PASRR per facility.    Plan Comments Patient to go to [place] today.  Cindy at group home aware.     [facility] ([phone]) will transport patient to [place].. Trip ID number is [id number]. Transportation was arranged from [facility] to [place] ([address] [place] [address]). ETA is 1600. Upon arrival,  will call nurse's station ([phone]).  Rn notified.    Row Name 05/10/22 1326       Plan    Plan Comments CMA called [facility] ([phone]) and spoke with Melania. Trip ID number is [id number]. Transportation was arranged from [facility] to [facility] ([address] [place] [address]). ETA is 1600. Upon arrival,  will call nurse's station ([phone]).  CM and floor nurse made aware.                Expected Discharge Date and Time     Expected Discharge Date Expected Discharge Time    May 10, 2022             Lore Bello RN   Phone communication or documentation only - no physical contact with patient or family.

## 2022-05-10 NOTE — PROGRESS NOTES
General Surgery Progress Note    Name: Charlie Wells ADMIT: 2022   : 1970  PCP: Rody Moreno MD    MRN: [id number] LOS: 8 days   AGE/SEX: 51 y.o. male  ROOM: [address] [place] [address]    Chief Complaint   Patient presents with   • Abdominal Pain     Subjective     No new issues or complaints, having ostomy function tolerating diet    Objective     Scheduled Medications:   bisacodyl, 10 mg, Rectal, Daily  busPIRone, 30 mg, Oral, BID  divalproex, 500 mg, Oral, Q12H  enoxaparin, 40 mg, Subcutaneous, Q24H  estradiol, 1 mg, Oral, Daily  fluvoxaMINE, 50 mg, Oral, Nightly  levothyroxine, 125 mcg, Oral, Q AM  liothyronine, 5 mcg, Oral, Daily  montelukast, 10 mg, Oral, Daily  pantoprazole, 40 mg, Oral, Daily  polyethylene glycol, 17 g, Oral, Daily  QUEtiapine, 400 mg, Oral, BID  sodium chloride, 3 mL, Intravenous, Q12H  tamsulosin, 0.4 mg, Oral, Nightly        Active Infusions:  sodium chloride, 125 mL/hr, Last Rate: 125 mL/hr (22 0930)        As Needed Medications:  •  acetaminophen **OR** acetaminophen **OR** acetaminophen  •  HYDROcodone-acetaminophen  •  HYDROmorphone  •  melatonin  •  ondansetron **OR** ondansetron  •  [COMPLETED] Insert peripheral IV **AND** sodium chloride  •  sodium chloride    Vital Signs  Vital Signs Patient Vitals for the past 24 hrs:   BP Temp Temp src Pulse Resp SpO2 Weight   05/10/22 1239 104/64 97.6 °F (36.4 °C) Oral 75 16 92 % --   05/10/22 0500 100/61 98.5 °F (36.9 °C) Oral 68 17 94 % 88.3 kg (194 lb 11.2 oz)   22 1954 109/69 97.3 °F (36.3 °C) Oral 72 19 93 % --     I/O:  I/O last 3 completed shifts:  In: 1680 [P.O.:1680]  Out: 3375 [Urine:3275; Stool:100]    Physical Exam:  Physical Exam  Constitutional:       General: He is not in acute distress.     Appearance: Normal appearance. He is not ill-appearing.   HENT:      Head: Normocephalic and atraumatic.      Right Ear: External ear normal.      Left Ear: External ear normal.   Eyes:      Extraocular Movements:  Extraocular movements intact.      Conjunctiva/sclera: Conjunctivae normal.   Cardiovascular:      Rate and Rhythm: Normal rate and regular rhythm.   Pulmonary:      Effort: Pulmonary effort is normal. No respiratory distress.   Abdominal:      General: There is no distension.      Palpations: Abdomen is soft.      Comments: Ostomy pink and viable with stool in the bag   Musculoskeletal:         General: No swelling or deformity.   Skin:     General: Skin is warm and dry.   Neurological:      Mental Status: He is alert and oriented to person, place, and time. Mental status is at baseline.         Results Review:     CBC    Results from last 7 days   Lab Units 05/09/22 0222 05/06/22  0117 05/05/22 0342 05/04/22  0345   WBC 10*3/mm3 6.60 7.70 8.10 8.50   HEMOGLOBIN g/dL 13.2 14.2 14.3 14.5   PLATELETS 10*3/mm3 188 171 158 160     BMP   Results from last 7 days   Lab Units 05/09/22 0222 05/06/22 0117 05/05/22 0342 05/04/22  0345   SODIUM mmol/L 137 139 141 139   POTASSIUM mmol/L 4.0 4.1 4.2 4.5   CHLORIDE mmol/L 103 102 105 104   CO2 mmol/L 21.0* 24.0 26.0 22.0   BUN mg/dL 16 13 13 5*   CREATININE mg/dL 0.67* 0.64* 0.68* 0.58*   GLUCOSE mg/dL 170* 109* 106* 134*   MAGNESIUM mg/dL 1.9 1.8 1.9 2.0   PHOSPHORUS mg/dL 2.8 3.6 3.6 3.2     Radiology(recent) No radiology results for the last day    I reviewed the patient's new clinical results.    [unfilled]      Abdominal pain, unspecified abdominal location    Attention to colostomy (HCC)    51 y.o. male postop day 6 from diverting sigmoid colostomy     Plan:  -P.o. pain meds  -Regular diet  -Okay for discharge from my standpoint once rehab placement is secured  -Daily labs  -Lovenox        This note was created using Dragon Voice Recognition software.    Ken Christiansen MD  05/10/22  13:20 EDT

## 2022-05-10 NOTE — PLAN OF CARE
Charlie Wells presents with ADL impairments below baseline abilities which indicate the need for continued skilled intervention while inpatient. Pt requesting to use urinal and require min verbal cuing to complete EOB / OOB.  Pt requires Mod A x2 for bed mobility, demonstrating posterior lean. Pt sits EOB with CGA - Supervision for 3-4 minutes. Pt completes sit<.stand with Min A x2 and ambulates in room with Min A x1 with RW. Pt stands for approx 6 minutes in room before requesting to sit. Completes urination in chair with urinal requiring max verbal cues to engage in task. Tolerating session today without incident. Will continue to follow and progress as tolerated.

## 2022-05-10 NOTE — PLAN OF CARE
Goal Outcome Evaluation:  Plan of Care Reviewed With: patient        Progress: no change  Outcome Evaluation: Pt is on room air, VSS, Pt has been eating well, Good output in colostomy. Waiting IP rehab. Pt has been resting with call light in reach. Will continue to monitor.

## 2022-05-10 NOTE — DISCHARGE SUMMARY
[facility] Medicine Services  DISCHARGE SUMMARY    Patient Name: Charlie Wells  : 1970  MRN: [id number]    Discharge condition: Stable  Date of Admission: 2022  Discharge Diagnosis: Recurrent constipation due to Hirschsprung's disease  Date of Discharge: 05/10/2022  Primary Care Physician: Rody Moreno MD      Presenting Problem:   Abdominal distension [R14.0]  Abdominal pain, unspecified abdominal location [R10.9]    Active and Resolved Hospital Problems:  Active Hospital Problems    Diagnosis POA   • Attention to colostomy (HCC) [Z43.3] Not Applicable   • Abdominal pain, unspecified abdominal location [R10.9] Yes      Resolved Hospital Problems   No resolved problems to display.         Hospital Course     Hospital Course:  Charlie Wells is a 51 y.o. male who presented to the hospital with recurrent constipation and distention.  The patient had to be disimpacted in the OR and a tube was placed.  Patient continued to have difficulties hence a diverting sigmoid colostomy was placed in the OR.  Patient tolerated the procedure well.  He was monitored for several days afterwards and was having bowel movements into his colostomy.  The patient's distention improved and he was then arranged to go to rehab.  He could not return to the group home due to concerns about being able to care for his new colostomy.            Reasons For Change In Medications and Indications for New Medications:      Day of Discharge     Vital Signs:  Temp:  [97.3 °F (36.3 °C)-98.5 °F (36.9 °C)] 98.5 °F (36.9 °C)  Heart Rate:  [68-72] 68  Resp:  [17-19] 17  BP: (100-109)/(61-69) 100/61    Physical Exam:  Physical Exam  Vitals reviewed.   HENT:      Head: Normocephalic.   Cardiovascular:      Rate and Rhythm: Normal rate.   Pulmonary:      Effort: Pulmonary effort is normal.   Abdominal:      General: Abdomen is flat.      Palpations: Abdomen is soft.      Comments: Chronically mildly  distended due to Hirschsprung's disease   Musculoskeletal:         General: Normal range of motion.      Cervical back: Normal range of motion.   Skin:     General: Skin is warm.   Neurological:      General: No focal deficit present.      Mental Status: He is alert and oriented to person, place, and time.   Psychiatric:         Mood and Affect: Mood normal.         Behavior: Behavior normal.            Pertinent  and/or Most Recent Results     LAB RESULTS:      Lab 05/09/22 0222 05/06/22  0117 05/05/22 0342 05/04/22  0345   WBC 6.60 7.70 8.10 8.50   HEMOGLOBIN 13.2 14.2 14.3 14.5   HEMATOCRIT 40.1 42.9 42.4 42.6   PLATELETS 188 171 158 160   NEUTROS ABS 2.90 2.90 4.20 6.90   LYMPHS ABS  --  3.30* 2.50 0.90   MONOS ABS  --  1.30* 1.10* 0.70   EOS ABS 0.26 0.20 0.10 0.00   MCV 94.7 96.3 95.3 95.2   CRP 1.94* 3.26*  --  3.40*         Lab 05/09/22 0222 05/06/22  0117 05/05/22 0342 05/04/22  0345   SODIUM 137 139 141 139   POTASSIUM 4.0 4.1 4.2 4.5   CHLORIDE 103 102 105 104   CO2 21.0* 24.0 26.0 22.0   ANION GAP 13.0 13.0 10.0 13.0   BUN 16 13 13 5*   CREATININE 0.67* 0.64* 0.68* 0.58*   EGFR 112.3 113.9 111.8 117.3   GLUCOSE 170* 109* 106* 134*   CALCIUM 8.7 8.7 9.1 8.7   MAGNESIUM 1.9 1.8 1.9 2.0   PHOSPHORUS 2.8 3.6 3.6 3.2                         Brief Urine Lab Results  (Last result in the past 365 days)      Color   Clarity   Blood   Leuk Est   Nitrite   Protein   CREAT   Urine HCG        04/30/22 1348 Yellow   Clear   Trace   Trace   Negative   Negative               Microbiology Results (last 10 days)     Procedure Component Value - Date/Time    Respiratory Panel PCR w/COVID-19(SARS-CoV-2) [facility] In-House, NP Swab in UTM/VTM, 3-4 HR TAT - Swab, Nasopharynx [[id number]]  (Normal) Collected: 04/30/22 1257    Lab Status: Final result Specimen: Swab from Nasopharynx Updated: 04/30/22 1356     ADENOVIRUS, PCR Not Detected     Coronavirus 229E Not Detected     Coronavirus HKU1 Not Detected      Coronavirus NL63 Not Detected     Coronavirus OC43 Not Detected     COVID19 Not Detected     Human Metapneumovirus Not Detected     Human Rhinovirus/Enterovirus Not Detected     Influenza A PCR Not Detected     Influenza B PCR Not Detected     Parainfluenza Virus 1 Not Detected     Parainfluenza Virus 2 Not Detected     Parainfluenza Virus 3 Not Detected     Parainfluenza Virus 4 Not Detected     RSV, PCR Not Detected     Bordetella pertussis pcr Not Detected     Bordetella parapertussis PCR Not Detected     Chlamydophila pneumoniae PCR Not Detected     Mycoplasma pneumo by PCR Not Detected    Narrative:      In the setting of a positive respiratory panel with a viral infection PLUS a negative procalcitonin without other underlying concern for bacterial infection, consider observing off antibiotics or discontinuation of antibiotics and continue supportive care. If the respiratory panel is positive for atypical bacterial infection (Bordetella pertussis, Chlamydophila pneumoniae, or Mycoplasma pneumoniae), consider antibiotic de-escalation to target atypical bacterial infection.    Blood Culture - Blood, Arm, Left [[id number]]  (Normal) Collected: 04/30/22 1256    Lab Status: Final result Specimen: Blood from Arm, Left Updated: 05/05/22 1317     Blood Culture No growth at 5 days    Blood Culture - Blood, Arm, Right [[id number]]  (Normal) Collected: 04/30/22 1256    Lab Status: Final result Specimen: Blood from Arm, Right Updated: 05/05/22 1317     Blood Culture No growth at 5 days          CT Head Without Contrast    Result Date: 4/30/2022  Impression: 1. No acute appearing process is identified. 2. Mild cerebral and cerebellar atrophy.  Electronically Signed By-Paulette Sherwood MD On:4/30/2022 4:23 PM This report was finalized on [id number] by  Paulette Sherwood MD.    CT Abdomen Pelvis With Contrast    Result Date: 4/30/2022  Impression: 1. There is marked distention of the transverse, descending, and sigmoid colon and  of the rectum. The maximum diameter is about 14 cm. This distention is increased from that reported on 6/18/2020. The patient reportedly has a history of Hirschsprung's disease. 2. Negative for evidence of pneumatosis, free fluid, or free air. 3. Additional findings as reported above.  Electronically Signed By-Paulette Sherwood MD On:4/30/2022 4:21 PM This report was finalized on [id number] by  Paulette Sherwood MD.    XR Chest 1 View    Result Date: 4/30/2022  Impression: 1. Cardiomegaly with low lung volumes. Streaky left basilar airspace opacity adjacent to a chronically elevated left hemidiaphragm likely representing atelectasis.  Electronically Signed By-Rashawn Montgomery MD On:4/30/2022 1:36 PM This report was finalized on [id number] by  Rashawn Montgomery MD.              Results for orders placed during the hospital encounter of 02/13/20    Adult Transthoracic Echo Complete W/ Cont if Necessary Per Protocol    Interpretation Summary  · Estimated EF appears to be in the range of 66 - 70%  · Left ventricular systolic function is normal.  · The study is technically difficult for diagnosis      Labs Pending at Discharge:      Procedures Performed  Procedure(s):  COLOSTOMY DIVERTING         Consults:   Consults     Date and Time Order Name Status Description    4/30/2022  7:08 PM Inpatient Gastroenterology Consult      4/30/2022  4:47 PM Hospitalist (on-call MD unless specified)      4/30/2022  4:35 PM Surgery (on-call MD unless specified) Completed             Discharge Details        Discharge Medications      Continue These Medications      Instructions Start Date   acetaminophen 325 MG tablet  Commonly known as: TYLENOL   650 mg, Oral, Every 6 Hours PRN      aluminum-magnesium hydroxide-simethicone 200-200-20 MG/5ML suspension  Commonly known as: MAALOX/MYLANTA   30 mL, Oral, Every 6 Hours PRN      ammonium lactate 12 % lotion  Commonly known as: LAC-HYDRIN   Topical, Daily, Before getting dressed daily       Austedo 9 MG tablet  Generic drug: Deutetrabenazine   9 mg, Oral, Daily      busPIRone 30 MG tablet  Commonly known as: BUSPAR   30 mg, Oral, 2 Times Daily      chlorpheniramine 4 MG tablet  Commonly known as: CHLOR-TRIMETON   4 mg, Oral, Every 4 Hours PRN      clonazePAM 0.5 MG tablet  Commonly known as: KlonoPIN   0.25 mg, Oral, Every Morning      clonazePAM 0.5 MG tablet  Commonly known as: KlonoPIN   0.5 mg, Oral, Nightly      clotrimazole 1 % cream  Commonly known as: LOTRIMIN   No dose, route, or frequency recorded.      dextromethorphan-guaifenesin  MG/5ML syrup  Commonly known as: ROBITUSSIN-DM   10 mL, Oral, Every 4 Hours PRN      divalproex 500 MG 24 hr tablet  Commonly known as: DEPAKOTE ER   1 tablet, Oral, Every 12 Hours      divalproex 500 MG DR tablet  Commonly known as: DEPAKOTE   10,000 mg, Oral, 2 Times Daily      estradiol 1 MG tablet  Commonly known as: ESTRACE   1 mg, Oral, Daily      fluvoxaMINE 50 MG tablet  Commonly known as: LUVOX   50 mg, Oral, Nightly      furosemide 40 MG tablet  Commonly known as: LASIX   60 mg, Oral, Daily      hydrocortisone 2.5 % cream   1 application, Topical, 4 Times Daily PRN      hydrocortisone 2.5 % cream   1 application, Topical, 2 Times Daily PRN      ibuprofen 200 MG tablet  Commonly known as: ADVIL,MOTRIN   200 mg, Oral, Every 4 Hours PRN      levothyroxine 125 MCG tablet  Commonly known as: SYNTHROID, LEVOTHROID   125 mcg, Oral, Every Early Morning      linaclotide 290 MCG capsule capsule  Commonly known as: LINZESS   290 mcg, Oral, Daily With Breakfast, 30 minutes after meal      liothyronine 5 MCG tablet  Commonly known as: CYTOMEL   5 mcg, Oral, Every Early Morning      loperamide 2 MG capsule  Commonly known as: IMODIUM   4 mg, Oral, Once As Needed      loperamide 2 MG capsule  Commonly known as: IMODIUM   2 mg, Oral, 4 Times Daily PRN      magnesium hydroxide 400 MG/5ML suspension  Commonly known as: MILK OF MAGNESIA   30 mL, Oral, Nightly       MiraLax 17 g packet  Generic drug: polyethylene glycol   34 g, Oral, Daily      montelukast 10 MG tablet  Commonly known as: SINGULAIR   TAKE 1 TABLET BY MOUTH AT BEDTIME      Motegrity 2 MG tablet  Generic drug: Prucalopride Succinate   2 mg, Oral, Daily      Neutrogena T/Gel 0.5 % shampoo  Generic drug: coal tar   Use 3 times per week      pantoprazole 40 MG EC tablet  Commonly known as: PROTONIX   40 mg, Oral, Daily      QUEtiapine 400 MG tablet  Commonly known as: SEROquel   400 mg, Oral, 2 Times Daily      simethicone 80 MG chewable tablet  Commonly known as: MYLICON   80 mg, Oral, Every 6 Hours PRN      SOLARCAINE EX   Apply externally, 4 Times Daily PRN      tamsulosin 0.4 MG capsule 24 hr capsule  Commonly known as: FLOMAX   1 capsule, Oral, Nightly      triamcinolone 0.1 % cream  Commonly known as: KENALOG   1 application, Topical, 2 Times Daily PRN             Allergies   Allergen Reactions   • Metoclopramide Rash     Other reaction(s): not known    • Benztropine Delirium         Discharge Disposition:   Skilled Nursing Facility (OR - External)    Diet:  Hospital:  Diet Order   Procedures   • Diet Regular         Discharge Activity:         CODE STATUS:  Code Status and Medical Interventions:   Ordered at: 04/30/22 1828     Code Status (Patient has no pulse and is not breathing):    CPR (Attempt to Resuscitate)     Medical Interventions (Patient has pulse or is breathing):    Full Support         Future Appointments   Date Time Provider Department Center   6/9/2022 11:15 AM Teresa Au MD [facility]   6/22/2022 11:00 AM Rody Moreno MD [facility]           Time spent on Discharge including face to face service:  65 minutes    This patient has been examined wearing appropriate Personal Protective Equipment and discussed with Surgery. 05/10/22      Signature: Ben Lockwood MD

## 2022-05-10 NOTE — PLAN OF CARE
"Charlie Wells presents with functional mobility impairments which indicate the need for skilled intervention. Tolerating session today without incident. With verbal cues this date, pt willing to ambulate to window using RW.  Pt declines further ambulation.  Pt continues to require Ax2 for safety with bed mobility with continuous verbal cues for motivation.  Pt remains far from mobility baseline of ambulating around group home.  Will continue to follow and progress as tolerated.     Plan/Recommendations:   Moderate Intensity Therapy recommended post-acute care. This is recommended as therapy feels the patient would require 3-4 days per week and wouldn't tolerate \"3 hour daily\" rehab intensity. SNF would be the preferred choice. If the patient does not agree to SNF, arrange HH or OP depending on home bound status. If patient is medically complex, consider LTACH.. Pt requires no DME at discharge.     Pt desires Skilled Rehab placement at discharge. Pt cooperative; agreeable to therapeutic recommendations and plan of care.   "

## 2022-05-10 NOTE — CONSULTS
Nutrition Services    Patient Name: Charlie Wells  YOB: 1970  MRN: [id number]  Admission date: 4/30/2022    PPE Documentation        PPE Worn By Provider Did not enter room on this encounter   PPE Worn By Patient  -      NUTRITION SCREENING      Encounter Information: Review of chart r/t LOS        PO Diet: Diet Regular   PO Supplements:    PO Intake:  % of meals        Labs (reviewed below): Reviewed         GI Function:  + BM 5/10        Skin: No pressure injuries        Weight Hx Review: Body mass index is 34.49 kg/m².  Wt Readings from Last 30 Encounters:   05/10/22 88.3 kg (194 lb 11.2 oz)   03/21/22 85.7 kg (189 lb)   12/13/21 86.2 kg (190 lb)   08/02/21 81.6 kg (180 lb)   06/09/21 84.3 kg (185 lb 12.8 oz)   11/25/20 86.5 kg (190 lb 9.6 oz)   11/17/20 86.2 kg (190 lb)   10/21/20 92.5 kg (204 lb)   07/21/20 89.2 kg (196 lb 9.6 oz)   06/24/20 83.2 kg (183 lb 6.8 oz)   06/04/20 92.1 kg (203 lb)   03/09/20 92 kg (202 lb 12.8 oz)   03/05/20 90.7 kg (200 lb)   02/13/20 91.6 kg (202 lb)   02/04/20 91.9 kg (202 lb 9.6 oz)   10/29/19 89.1 kg (196 lb 6.4 oz)   07/29/19 85.5 kg (188 lb 6.4 oz)   04/24/19 82.6 kg (182 lb 3.2 oz)   04/18/19 84.2 kg (185 lb 9.6 oz)   03/06/19 85.4 kg (188 lb 4 oz)   01/24/19 87.1 kg (192 lb)   10/23/18 82.5 kg (181 lb 12.8 oz)   08/24/18 78.5 kg (173 lb)   07/25/18 76 kg (167 lb 9.6 oz)   06/25/18 76.7 kg (169 lb 3.2 oz)   03/26/18 74.9 kg (165 lb 3.2 oz)   03/06/18 75.4 kg (166 lb 2.1 oz)   12/18/17 75.1 kg (165 lb 8 oz)   11/01/17 74.5 kg (164 lb 3.2 oz)   10/11/17 75 kg (165 lb 6.4 oz)          Nutrition Intervention: Will see prn or review at next LOS        Results from last 7 days   Lab Units 05/09/22  0222 05/06/22  0117 05/05/22  0342   SODIUM mmol/L 137 139 141   POTASSIUM mmol/L 4.0 4.1 4.2   CHLORIDE mmol/L 103 102 105   CO2 mmol/L 21.0* 24.0 26.0   BUN mg/dL 16 13 13   CREATININE mg/dL 0.67* 0.64* 0.68*   CALCIUM mg/dL 8.7 8.7 9.1   GLUCOSE mg/dL 170*  109* 106*     Results from last 7 days   Lab Units 05/09/22  0222 05/06/22  0117 05/05/22  0342   MAGNESIUM mg/dL 1.9 1.8 1.9   PHOSPHORUS mg/dL 2.8 3.6 3.6   HEMOGLOBIN g/dL 13.2 14.2 14.3   HEMATOCRIT % 40.1 42.9 42.4     COVID19   Date Value Ref Range Status   04/30/2022 Not Detected Not Detected - Ref. Range Final     No results found for: HGBA1C    RD to follow up per protocol.    Electronically signed by:  Laura Zhou RD  05/10/22 15:19 EDT

## 2022-05-10 NOTE — CASE MANAGEMENT/SOCIAL WORK
Case Management/Social Work    Patient Name:  Charlie Wells  YOB: 1970  MRN: [id number]  Admit Date:  4/30/2022        TORI called [facility] [employer] ([phone]) and spoke with Melania. Trip ID number is [id number]. Transportation was arranged from [facility] to [place] ([address] [place] [address]). ETA is 1600. Upon arrival,  will call nurse's station ([phone]).  CM and floor nurse made aware.        Electronically signed by:  Maurilio Haynes CMA  05/10/22 13:23 EDT

## 2022-05-10 NOTE — PLAN OF CARE
Goal Outcome Evaluation:  Plan of Care Reviewed With: patient, caregiver        Progress: improving  Patient is discharging to [facility] rehab. CM notified caregiver at group home. [facility] transport will transfer patient to rehab. Report called.

## 2022-05-10 NOTE — THERAPY TREATMENT NOTE
"Subjective: Pt agreeable to therapeutic plan of care.  Cognition: oriented to Person and Place    Objective:     Bed Mobility: Mod-A and Assist x 2  Functional Transfers: Min-A and Assist x 2 with RW  Functional Ambulation: Min-A with RW    Toileting: Max-A  ADL Position: supported sitting  ADL Comments: Requests to use urinal. Does not attempt to hold urinal until cued by OT. Dependent for ostomy bag care.     Lower Body Dressing: Max-A  ADL Position: supported sitting  ADL Comments: socks    Pain: 2 VAS mild abdominal pain while completing bed mobility  Education: Provided education on importance of mobility and skilled verbal / tactile cueing throughout intervention.     Assessment: Charlie Wells presents with ADL impairments below baseline abilities which indicate the need for continued skilled intervention while inpatient. Pt requesting to use urinal and require min verbal cuing to complete EOB / OOB.  Pt requires Mod A x2 for bed mobility, demonstrating posterior lean. Pt sits EOB with CGA - Supervision for 3-4 minutes. Pt completes sit<.stand with Min A x2 and ambulates in room with Min A x1 with RW. Pt stands for approx 6 minutes in room before requesting to sit. Completes urination in chair with urinal requiring max verbal cues to engage in task. Tolerating session today without incident. Will continue to follow and progress as tolerated.     Plan/Recommendations:   Pt would benefit from Skilled Rehab placement at discharge from facility. Moderate Intensity Therapy recommended post-acute care. This is recommended as therapy feels the patient would require 3-4 days per week and wouldn't tolerate \"3 hour daily\" rehab intensity. SNF would be the preferred choice. If the patient does not agree to SNF, arrange HH or OP depending on home bound status. If patient is medically complex, consider LTACH.   Pt desires Skilled Rehab placement at discharge. Pt cooperative; agreeable to therapeutic recommendations and " plan of care.     Post-Tx Position: Up in Chair, Alarms activated and Call light and personal items within reach  PPE: gloves, surgical mask, eyewear protection

## 2022-05-11 NOTE — CASE MANAGEMENT/SOCIAL WORK
Case Management Discharge Note           Provided Post Acute Provider List?: N/A  N/A Provider List Comment: Denies needs at this time.    Selected Continued Care - Discharged on 5/10/2022 Admission date: 4/30/2022 - Discharge disposition: Skilled Nursing Facility (DC - External)    Destination Coordination complete.    Service Provider Selected Services Address Phone Fax Patient Preferred    [facility] [address] [facility] [place] [address] [phone] --                           Transportation Services  W/C Van: Other ([facility])    Final Discharge Disposition Code: 03 - skilled nursing facility (SNF) ([place])

## 2022-05-12 ENCOUNTER — TELEPHONE (OUTPATIENT)
Dept: SURGERY | Facility: CLINIC | Age: 52
End: 2022-05-12

## 2022-05-12 NOTE — TELEPHONE ENCOUNTER
Called and spoke with patient caregiver.  She stated that patient is in [place], but is doing okay.  PO scheduled 5/18/22.

## 2023-08-26 ENCOUNTER — HOSPITAL ENCOUNTER (EMERGENCY)
Facility: HOSPITAL | Age: 53
Discharge: HOME | End: 2023-08-26
Attending: EMERGENCY MEDICINE
Payer: MEDICARE

## 2023-08-26 VITALS
BODY MASS INDEX: 48.83 KG/M2 | DIASTOLIC BLOOD PRESSURE: 79 MMHG | RESPIRATION RATE: 18 BRPM | SYSTOLIC BLOOD PRESSURE: 151 MMHG | HEIGHT: 63 IN | TEMPERATURE: 98 F | HEART RATE: 85 BPM | WEIGHT: 275.57 LBS | OXYGEN SATURATION: 91 %

## 2023-08-26 DIAGNOSIS — I77.6 VASCULITIS: Primary | ICD-10-CM

## 2023-08-26 LAB
ALBUMIN SERPL-MCNC: 3.5 G/DL (ref 3.5–5.2)
ALBUMIN/GLOB SERPL: 0.8 G/DL
ALP SERPL-CCNC: 138 U/L (ref 39–117)
ALT SERPL W P-5'-P-CCNC: 30 U/L (ref 1–41)
ANION GAP SERPL CALCULATED.3IONS-SCNC: 10 MMOL/L (ref 5–15)
APTT PPP: 28.2 SECONDS (ref 61–76.5)
AST SERPL-CCNC: 35 U/L (ref 1–40)
BASOPHILS # BLD AUTO: 0 10*3/MM3 (ref 0–0.2)
BASOPHILS NFR BLD AUTO: 0.7 % (ref 0–1.5)
BILIRUB SERPL-MCNC: 0.2 MG/DL (ref 0–1.2)
BUN SERPL-MCNC: 11 MG/DL (ref 6–20)
BUN/CREAT SERPL: 14.7 (ref 7–25)
CALCIUM SPEC-SCNC: 9.1 MG/DL (ref 8.6–10.5)
CHLORIDE SERPL-SCNC: 100 MMOL/L (ref 98–107)
CO2 SERPL-SCNC: 29 MMOL/L (ref 22–29)
CREAT SERPL-MCNC: 0.75 MG/DL (ref 0.76–1.27)
CRP SERPL-MCNC: 3.15 MG/DL (ref 0–0.5)
DEPRECATED RDW RBC AUTO: 51.6 FL (ref 37–54)
EGFRCR SERPLBLD CKD-EPI 2021: 107.9 ML/MIN/1.73
EOSINOPHIL # BLD AUTO: 0.1 10*3/MM3 (ref 0–0.4)
EOSINOPHIL NFR BLD AUTO: 1.5 % (ref 0.3–6.2)
ERYTHROCYTE [DISTWIDTH] IN BLOOD BY AUTOMATED COUNT: 17.8 % (ref 12.3–15.4)
ERYTHROCYTE [SEDIMENTATION RATE] IN BLOOD: 38 MM/HR (ref 0–20)
GLOBULIN UR ELPH-MCNC: 4.2 GM/DL
GLUCOSE SERPL-MCNC: 117 MG/DL (ref 65–99)
HCT VFR BLD AUTO: 39.6 % (ref 37.5–51)
HGB BLD-MCNC: 12.9 G/DL (ref 13–17.7)
HOLD SPECIMEN: NORMAL
INR PPP: 0.98 (ref 0.93–1.1)
LYMPHOCYTES # BLD AUTO: 1.6 10*3/MM3 (ref 0.7–3.1)
LYMPHOCYTES NFR BLD AUTO: 26.6 % (ref 19.6–45.3)
MCH RBC QN AUTO: 27.4 PG (ref 26.6–33)
MCHC RBC AUTO-ENTMCNC: 32.6 G/DL (ref 31.5–35.7)
MCV RBC AUTO: 84.1 FL (ref 79–97)
MONOCYTES # BLD AUTO: 0.8 10*3/MM3 (ref 0.1–0.9)
MONOCYTES NFR BLD AUTO: 12.6 % (ref 5–12)
NEUTROPHILS NFR BLD AUTO: 3.5 10*3/MM3 (ref 1.7–7)
NEUTROPHILS NFR BLD AUTO: 58.6 % (ref 42.7–76)
NRBC BLD AUTO-RTO: 0.1 /100 WBC (ref 0–0.2)
PLATELET # BLD AUTO: 219 10*3/MM3 (ref 140–450)
PMV BLD AUTO: 8.8 FL (ref 6–12)
POTASSIUM SERPL-SCNC: 3.8 MMOL/L (ref 3.5–5.2)
PROT SERPL-MCNC: 7.7 G/DL (ref 6–8.5)
PROTHROMBIN TIME: 10.5 SECONDS (ref 9.6–11.7)
RBC # BLD AUTO: 4.7 10*6/MM3 (ref 4.14–5.8)
SODIUM SERPL-SCNC: 139 MMOL/L (ref 136–145)
WBC NRBC COR # BLD: 6 10*3/MM3 (ref 3.4–10.8)

## 2023-08-26 PROCEDURE — 85652 RBC SED RATE AUTOMATED: CPT | Performed by: EMERGENCY MEDICINE

## 2023-08-26 PROCEDURE — 25010000002 METHYLPREDNISOLONE PER 125 MG: Performed by: EMERGENCY MEDICINE

## 2023-08-26 PROCEDURE — 86140 C-REACTIVE PROTEIN: CPT | Performed by: EMERGENCY MEDICINE

## 2023-08-26 PROCEDURE — 85730 THROMBOPLASTIN TIME PARTIAL: CPT | Performed by: EMERGENCY MEDICINE

## 2023-08-26 PROCEDURE — 96374 THER/PROPH/DIAG INJ IV PUSH: CPT

## 2023-08-26 PROCEDURE — 80053 COMPREHEN METABOLIC PANEL: CPT | Performed by: EMERGENCY MEDICINE

## 2023-08-26 PROCEDURE — 99283 EMERGENCY DEPT VISIT LOW MDM: CPT

## 2023-08-26 PROCEDURE — 85610 PROTHROMBIN TIME: CPT | Performed by: EMERGENCY MEDICINE

## 2023-08-26 PROCEDURE — 85025 COMPLETE CBC W/AUTO DIFF WBC: CPT | Performed by: EMERGENCY MEDICINE

## 2023-08-26 RX ORDER — METHYLPREDNISOLONE SODIUM SUCCINATE 125 MG/2ML
125 INJECTION, POWDER, LYOPHILIZED, FOR SOLUTION INTRAMUSCULAR; INTRAVENOUS ONCE
Status: COMPLETED | OUTPATIENT
Start: 2023-08-26 | End: 2023-08-26

## 2023-08-26 RX ORDER — PREDNISONE 10 MG/1
TABLET ORAL
Qty: 45 TABLET | Refills: 0 | Status: SHIPPED | OUTPATIENT
Start: 2023-08-26 | End: 2023-09-10

## 2023-08-26 RX ORDER — SODIUM CHLORIDE 0.9 % (FLUSH) 0.9 %
10 SYRINGE (ML) INJECTION AS NEEDED
Status: DISCONTINUED | OUTPATIENT
Start: 2023-08-26 | End: 2023-08-26 | Stop reason: HOSPADM

## 2023-08-26 RX ADMIN — METHYLPREDNISOLONE SODIUM SUCCINATE 125 MG: 125 INJECTION, POWDER, FOR SOLUTION INTRAMUSCULAR; INTRAVENOUS at 14:01

## 2023-08-26 RX ADMIN — Medication 10 ML: at 12:43

## 2023-08-26 NOTE — CASE MANAGEMENT/SOCIAL WORK
Continued Stay Note  RENO Flores     Patient Name: Charlie Wells  MRN: [id number]  Today's Date: 8/26/2023    Admit Date: 8/26/2023        Discharge Plan       Row Name 08/26/23 1848       Plan    Plan Comments Call placed to [facility] again, lift in route, nurse notified.                          Tiffanie Avilez RN

## 2023-08-26 NOTE — ED NOTES
Unable to reach brother Nehemias at [phone].  Rosetta Avilez set up ride with [facility]. ETA 3 hours.

## 2023-08-26 NOTE — ED PROVIDER NOTES
Subjective   History of Present Illness  52-year-old male presents from the [facility] care facility with reports of rash this morning.  Patient denies itching or pain.  He reports no fevers or chills or shortness of breath.  There is no reported bleeding or trauma.  And no medication changes have been reported to the ED.  Patient is a poor historian and history is limited  Review of Systems    Past Medical History:   Diagnosis Date    Acute pancreatitis     Allergic     Reglan, Benztropine    Allergic rhinitis     Anxiety     Aspiration pneumonia     Ataxia     Chronic constipation     Chronic edema     Depression     Esophageal stricture     Fecal incontinence     GERD (gastroesophageal reflux disease)     GI bleed     Hirschsprung's disease     Hyperlipidemia     Hypokalemia     Hypothyroidism     Iron deficiency anemia     Leg length discrepancy     Mediastinal lymphadenopathy     [place]     Mildly mentally retarded     Obesity     Positive PPD     Pulmonary hypertension     Scoliosis     Seizures     Sleep apnea     Not very compliant with CPAP    Urinary incontinence     Urinary tract infection        Allergies   Allergen Reactions    Metoclopramide Rash     Other reaction(s): not known     Benztropine Delirium       Past Surgical History:   Procedure Laterality Date    COLON SURGERY      colostomy, the reversed    COLONOSCOPY      2017    COLOSTOMY N/A 5/3/2022    Procedure: COLOSTOMY DIVERTING;  Surgeon: Ken Christiansen MD;  Location: [facility] MAIN OR;  Service: General;  Laterality: N/A;    ESOPHAGEAL DILATATION      Several    HEMICOLECTOMY Left     MYOTOMY      Rectal    RECTAL EXAMINATION UNDER ANESTHESIA N/A 2022    Procedure: RECTAL EXAM UNDER ANESTHESIA, RIGID SIGMOIDOSCOPY WITH FECAL DISIMPACTION;  Surgeon: Ken Christiansen MD;  Location: [facility] OR;  Service: General;  Laterality: N/A;       Family History   Problem Relation Age of Onset    Early death Mother         Apparently   of bowel complications    Early death Father         Heart related    Heart disease Father        Social History     Socioeconomic History    Marital status: Single   Tobacco Use    Smoking status: Never    Smokeless tobacco: Never   Vaping Use    Vaping Use: Never used   Substance and Sexual Activity    Alcohol use: No    Drug use: No    Sexual activity: Never       Prior to Admission medications    Medication Sig Start Date End Date Taking? Authorizing Provider   acetaminophen (TYLENOL) 325 MG tablet Take 650 mg by mouth Every 6 (Six) Hours As Needed for Mild Pain .    Eileen Martines MD   aluminum-magnesium hydroxide-simethicone (MAALOX/MYLANTA) 200-200-20 MG/5ML suspension Take 30 mL by mouth Every 6 (Six) Hours As Needed for Indigestion or Heartburn.    Eileen Martines MD   ammonium lactate (LAC-HYDRIN) 12 % lotion Apply  topically to the appropriate area as directed Daily. Before getting dressed daily 1/26/22   Eileen Martines MD   Austedo 9 MG tablet Take 9 mg by mouth Daily. 12/10/21   Eileen Martines MD   Benzocaine (SOLARCAINE EX) Apply  topically 4 (Four) Times a Day As Needed.    Eileen Martines MD   busPIRone (BUSPAR) 30 MG tablet Take 1 tablet by mouth 2 (Two) Times a Day. 7/29/19   Charlie Wing MD   chlorpheniramine (CHLOR-TRIMETON) 4 MG tablet Take 4 mg by mouth Every 4 (Four) Hours As Needed for Allergies.    Eileen Martines MD   clonazePAM (KlonoPIN) 0.5 MG tablet Take 0.25 mg by mouth Every Morning.    Eileen Martines MD   clonazePAM (KlonoPIN) 0.5 MG tablet Take 0.5 mg by mouth Every Night.    Eileen Martines MD   clotrimazole (LOTRIMIN) 1 % cream  11/11/20   Eileen Martines MD   coal tar (Neutrogena T/Gel) 0.5 % shampoo Use 3 times per week 12/30/20   Charlie Wing MD   dextromethorphan-guaifenesin (ROBITUSSIN-DM)  MG/5ML syrup Take 10 mL by mouth Every 4 (Four) Hours As Needed.    Eileen Martines MD   divalproex  (DEPAKOTE ER) 500 MG 24 hr tablet Take 1 tablet by mouth Every 12 (Twelve) Hours.    Eileen Martines MD   divalproex (DEPAKOTE) 500 MG DR tablet Take 10,000 mg by mouth 2 (Two) Times a Day. 10/2/19   Eileen Martines MD   estradiol (ESTRACE) 1 MG tablet Take 1 mg by mouth Daily. 2/10/20   Eileen Martines MD   fluvoxaMINE (LUVOX) 50 MG tablet Take 50 mg by mouth Every Night.    Eileen Martines MD   furosemide (LASIX) 40 MG tablet Take 60 mg by mouth Daily.    Eileen Martines MD   hydrocortisone 2.5 % cream Apply 1 application topically to the appropriate area as directed 4 (Four) Times a Day As Needed (itching).    Eileen Martines MD   hydrocortisone 2.5 % cream Apply 1 application topically to the appropriate area as directed 2 (Two) Times a Day As Needed (Face and ears for rosacea).    Eileen Martines MD   ibuprofen (ADVIL,MOTRIN) 200 MG tablet Take 200 mg by mouth Every 4 (Four) Hours As Needed for Mild Pain .    Eileen Martines MD   levothyroxine (SYNTHROID, LEVOTHROID) 125 MCG tablet Take 125 mcg by mouth Every Morning.    Eileen Martines MD   linaclotide (LINZESS) 290 MCG capsule capsule Take 290 mcg by mouth Daily With Breakfast. 30 minutes after meal    Eileen Martines MD   liothyronine (CYTOMEL) 5 MCG tablet Take 5 mcg by mouth Every Morning.    Eileen Martines MD   loperamide (IMODIUM) 2 MG capsule Take 4 mg by mouth 1 (One) Time As Needed for Diarrhea (first loose stool).    Eileen Martines MD   loperamide (IMODIUM) 2 MG capsule Take 2 mg by mouth 4 (Four) Times a Day As Needed for Diarrhea.    Eileen Martines MD   magnesium hydroxide (MILK OF MAGNESIA) 400 MG/5ML suspension Take 30 mL by mouth Every Night.    Eileen Martines MD   montelukast (SINGULAIR) 10 MG tablet TAKE 1 TABLET BY MOUTH AT BEDTIME 3/6/22   Rody Moreno MD   MOTEGRITY 2 MG tablet Take 2 mg by mouth Daily. 2/12/20   Eileen Martines MD  "  pantoprazole (PROTONIX) 40 MG EC tablet Take 40 mg by mouth Daily. 10/17/19   Eileen Martines MD   polyethylene glycol (MiraLax) 17 g packet Take 34 g by mouth Daily.    Eileen Martines MD   QUEtiapine (SEROquel) 400 MG tablet Take 400 mg by mouth 2 (Two) Times a Day. 6/10/19   Eileen Martines MD   simethicone (MYLICON) 80 MG chewable tablet Chew 80 mg Every 6 (Six) Hours As Needed for Flatulence.    Eileen Martines MD   tamsulosin (FLOMAX) 0.4 MG capsule 24 hr capsule Take 1 capsule by mouth Every Night.    Eileen Martines MD   triamcinolone (KENALOG) 0.1 % cream Apply 1 application topically to the appropriate area as directed 2 (Two) Times a Day As Needed (Legs). 10/14/21   Eileen Martines MD     /90   Pulse 91   Temp 97.6 øF (36.4 øC) (Oral)   Resp 16   Ht 160 cm (63\")   Wt 88.5 kg (195 lb 1.7 oz)   SpO2 94%   BMI 34.56 kg/mý       Objective   Physical Exam  General: Chronically ill-appearing male in no acute distress, awake and alert  Eyes: Pupils round and equal, sclera nonicteric  HEENT: Mucous membranes moist, no mucosal swelling  Neck: Supple, no nuchal rigidity,   Respirations: Respirations nonlabored, equal breath sounds bilaterally, clear lungs  Heart regular rate and rhythm, no murmurs rubs or gallops,   Abdomen soft nontender nondistended, no hepatosplenomegaly, no hernia, no mass, normal bowel sounds, no CVA tenderness  Extremities no clubbing cyanosis or edema, calves are symmetric and nontender  Neuro cranial nerves grossly intact, no focal limb deficits  Psych oriented, pleasant affect  Skin nonblanching, palpable purpura on the trunk and extremities, no vesicles or pustules, no skin breakdown, nontender  Procedures           ED Course      Results for orders placed or performed during the hospital encounter of 08/26/23   Comprehensive Metabolic Panel    Specimen: Blood   Result Value Ref Range    Glucose 117 (H) 65 - 99 mg/dL    BUN 11 6 - 20 " mg/dL    Creatinine 0.75 (L) 0.76 - 1.27 mg/dL    Sodium 139 136 - 145 mmol/L    Potassium 3.8 3.5 - 5.2 mmol/L    Chloride 100 98 - 107 mmol/L    CO2 29.0 22.0 - 29.0 mmol/L    Calcium 9.1 8.6 - 10.5 mg/dL    Total Protein 7.7 6.0 - 8.5 g/dL    Albumin 3.5 3.5 - 5.2 g/dL    ALT (SGPT) 30 1 - 41 U/L    AST (SGOT) 35 1 - 40 U/L    Alkaline Phosphatase 138 (H) 39 - 117 U/L    Total Bilirubin 0.2 0.0 - 1.2 mg/dL    Globulin 4.2 gm/dL    A/G Ratio 0.8 g/dL    BUN/Creatinine Ratio 14.7 7.0 - 25.0    Anion Gap 10.0 5.0 - 15.0 mmol/L    eGFR 107.9 >60.0 mL/min/1.73   Protime-INR    Specimen: Blood   Result Value Ref Range    Protime 10.5 9.6 - 11.7 Seconds    INR 0.98 0.93 - 1.10   aPTT    Specimen: Blood   Result Value Ref Range    PTT 28.2 (L) 61.0 - 76.5 seconds   CBC Auto Differential    Specimen: Blood   Result Value Ref Range    WBC 6.00 3.40 - 10.80 10*3/mm3    RBC 4.70 4.14 - 5.80 10*6/mm3    Hemoglobin 12.9 (L) 13.0 - 17.7 g/dL    Hematocrit 39.6 37.5 - 51.0 %    MCV 84.1 79.0 - 97.0 fL    MCH 27.4 26.6 - 33.0 pg    MCHC 32.6 31.5 - 35.7 g/dL    RDW 17.8 (H) 12.3 - 15.4 %    RDW-SD 51.6 37.0 - 54.0 fl    MPV 8.8 6.0 - 12.0 fL    Platelets 219 140 - 450 10*3/mm3    Neutrophil % 58.6 42.7 - 76.0 %    Lymphocyte % 26.6 19.6 - 45.3 %    Monocyte % 12.6 (H) 5.0 - 12.0 %    Eosinophil % 1.5 0.3 - 6.2 %    Basophil % 0.7 0.0 - 1.5 %    Neutrophils, Absolute 3.50 1.70 - 7.00 10*3/mm3    Lymphocytes, Absolute 1.60 0.70 - 3.10 10*3/mm3    Monocytes, Absolute 0.80 0.10 - 0.90 10*3/mm3    Eosinophils, Absolute 0.10 0.00 - 0.40 10*3/mm3    Basophils, Absolute 0.00 0.00 - 0.20 10*3/mm3    nRBC 0.1 0.0 - 0.2 /100 WBC   C-reactive Protein    Specimen: Blood   Result Value Ref Range    C-Reactive Protein 3.15 (H) 0.00 - 0.50 mg/dL   Sedimentation Rate    Specimen: Blood   Result Value Ref Range    Sed Rate 38 (H) 0 - 20 mm/hr   Gold Top - SST   Result Value Ref Range    Extra Tube Hold for add-ons.                                            Medical Decision Making  Patient presents with a nonblanchable rash on his extremities.  He is not describing any pain or itching.  Differential diagnosis included drug reaction, nonspecific vasculitis, sepsis, thrombocytopenia    CBC normal, no leukocytosis, normal platelet count; comprehensive metabolic panel essentially normal, CRP elevated, sed rate marginally elevated.  Findings suggestive of a nonspecific vasculitis.  Drug-induced vasculitis is a possibility.  Patient is on numerous medications.  He is ordered IV Solu-Medrol in the emergency room and prescribed prednisone taper.  Notably patient is essentially asymptomatic he is afebrile and reports no pain or bleeding or dyspnea.  He has stable vital signs during the emergency room course and on reexamination the rash has not progressed and his symptoms are unchanged.  He will be discharged back to his extended care facility with recommendation for ongoing primary care evaluation of the rash and vasculitis.  They are given warning signs for return.    Problems Addressed:  Vasculitis: complicated acute illness or injury    Amount and/or Complexity of Data Reviewed  Labs: ordered. Decision-making details documented in ED Course.    Risk  Prescription drug management.        Final diagnoses:   Vasculitis       ED Disposition  ED Disposition       ED Disposition   Discharge    Condition   Stable    Comment   --               Rody Moreno MD  [facility] [address]  [phone]    Schedule an appointment as soon as possible for a visit in 3 days           Medication List        New Prescriptions      predniSONE 10 MG tablet  Commonly known as: DELTASONE  Take 5 tablets by mouth Daily for 3 days, THEN 4 tablets Daily for 3 days, THEN 3 tablets Daily for 3 days, THEN 2 tablets Daily for 3 days, THEN 1 tablet Daily for 3 days.  Start taking on: August 26, 2023               Where to Get Your Medications        These  medications were sent to [facility] - [place], [place] - [address] - [phone]  - [phone] FX  [address] [place] [address] [place] [address]      Phone: [phone]   predniSONE 10 MG tablet            Sim Mills MD  08/26/23 [id number]

## 2023-08-26 NOTE — ED NOTES
Patient was brought in through EMS patient presents with Multiple Red Rashes/Spots all over the body that started yesterday.

## 2023-08-26 NOTE — DISCHARGE INSTRUCTIONS
Start prednisone for vasculitis rash.  Follow-up with your doctor this week for recheck as well as consideration of your medication list as the rash may be caused by any one of your medications.  Return for increased swelling, increased pain, fever, shortness of breath, persistent vomiting, bleeding or any other concerns

## 2023-08-26 NOTE — DISCHARGE PLACEMENT REQUEST
"Charlie Wells (52 y.o. Male)       Date of Birth   1970    Social Security Number       Address   [address] [place] [address]    Home Phone   [phone]    MRN   [id number]       Zoroastrian   None    Marital Status   Single                            Admission Date   8/26/23    Admission Type   Emergency    Admitting Provider       Attending Provider       Department, Room/Bed   [facility] EMERGENCY DEPARTMENT, 28/28       Discharge Date       Discharge Disposition       Discharge Destination                                 Attending Provider: (none)   Allergies: Metoclopramide, Benztropine    Isolation: None   Infection: None   Code Status: Prior    Ht: 160 cm (63\")   Wt: 88.5 kg (195 lb 1.7 oz)    Admission Cmt: None   Principal Problem: None                  Active Insurance as of 8/26/2023       Primary Coverage       Payor Plan Insurance Group Employer/Plan Group    MISC MEDICARE REPLACEMENT MISC MCARE REPLACEMENT NGN       Coverage Address Coverage Phone Number Coverage Fax Number Effective Dates    [address] [address] [phone]  9/1/2022 - None Entered    [facility] [address]         Subscriber Name Subscriber Birth Date Member ID       CHARLIE WELLS 1970 [id number]               Secondary Coverage       Payor Plan Insurance Group Employer/Plan Group    [place]        Payor Plan Address Payor Plan Phone Number Payor Plan Fax Number Effective Dates    [address]   1970 - None Entered    [place] [address]         Subscriber Name Subscriber Birth Date Member ID       CHARLIE WELLS 1970 [id number]                     Emergency Contacts        (Rel.) Home Phone Work Phone Mobile Phone    MALATHI RINCON (Care Giver) -- -- [phone]    Cindy Chadwick () -- [phone]                 Emergency Department Notes        Elvira Mccarty, RN at 08/26/23 " 0917          Unable to reach brother Nehemias at [phone].  Rosetta Avilez set up ride with [facility]. ETA 3 hours.     Electronically signed by Elvira Mccarty RN at 08/26/23 5941       Sim Mills MD at 08/26/23 1242          Subjective   History of Present Illness  52-year-old male presents from the extended care facility with reports of rash this morning.  Patient denies itching or pain.  He reports no fevers or chills or shortness of breath.  There is no reported bleeding or trauma.  And no medication changes have been reported to the ED.  Patient is a poor historian and history is limited  Review of Systems    Past Medical History:   Diagnosis Date    Acute pancreatitis     Allergic     Reglan, Benztropine    Allergic rhinitis     Anxiety     Aspiration pneumonia     Ataxia     Chronic constipation     Chronic edema     Depression     Esophageal stricture     Fecal incontinence     GERD (gastroesophageal reflux disease)     GI bleed     Hirschsprung's disease     Hyperlipidemia     Hypokalemia     Hypothyroidism     Iron deficiency anemia     Leg length discrepancy     Mediastinal lymphadenopathy     Megacolon     Mildly mentally retarded     Obesity     Positive PPD     Pulmonary hypertension     Scoliosis     Seizures     Sleep apnea     Not very compliant with CPAP    Urinary incontinence     Urinary tract infection        Allergies   Allergen Reactions    Metoclopramide Rash     Other reaction(s): not known     Benztropine Delirium       Past Surgical History:   Procedure Laterality Date    COLON SURGERY  1989    colostomy, the reversed    COLONOSCOPY      June 2017    COLOSTOMY N/A 5/3/2022    Procedure: COLOSTOMY DIVERTING;  Surgeon: Ken Christiansen MD;  Location: [facility] MAIN OR;  Service: General;  Laterality: N/A;    ESOPHAGEAL DILATATION      Several    HEMICOLECTOMY Left     MYOTOMY      Rectal    RECTAL EXAMINATION UNDER ANESTHESIA N/A 5/1/2022    Procedure: RECTAL EXAM  UNDER ANESTHESIA, RIGID SIGMOIDOSCOPY WITH FECAL DISIMPACTION;  Surgeon: Ken Christiansen MD;  Location: [facility] MAIN OR;  Service: General;  Laterality: N/A;       Family History   Problem Relation Age of Onset    Early death Mother         Apparently  of bowel complications    Early death Father         Heart related    Heart disease Father        Social History     Socioeconomic History    Marital status: Single   Tobacco Use    Smoking status: Never    Smokeless tobacco: Never   Vaping Use    Vaping Use: Never used   Substance and Sexual Activity    Alcohol use: No    Drug use: No    Sexual activity: Never       Prior to Admission medications    Medication Sig Start Date End Date Taking? Authorizing Provider   acetaminophen (TYLENOL) 325 MG tablet Take 650 mg by mouth Every 6 (Six) Hours As Needed for Mild Pain .    Eileen Martines MD   aluminum-magnesium hydroxide-simethicone (MAALOX/MYLANTA) 200-200-20 MG/5ML suspension Take 30 mL by mouth Every 6 (Six) Hours As Needed for Indigestion or Heartburn.    Eileen Martines MD   ammonium lactate (LAC-HYDRIN) 12 % lotion Apply  topically to the appropriate area as directed Daily. Before getting dressed daily 22   Eileen Martines MD   Austedo 9 MG tablet Take 9 mg by mouth Daily. 12/10/21   Eileen Martines MD   Benzocaine (SOLARCAINE EX) Apply  topically 4 (Four) Times a Day As Needed.    Eileen Martines MD   busPIRone (BUSPAR) 30 MG tablet Take 1 tablet by mouth 2 (Two) Times a Day. 19   Charlie Wing MD   chlorpheniramine (CHLOR-TRIMETON) 4 MG tablet Take 4 mg by mouth Every 4 (Four) Hours As Needed for Allergies.    Eileen Martines MD   clonazePAM (KlonoPIN) 0.5 MG tablet Take 0.25 mg by mouth Every Morning.    Eileen Martines MD   clonazePAM (KlonoPIN) 0.5 MG tablet Take 0.5 mg by mouth Every Night.    Eileen Martines MD   clotrimazole (LOTRIMIN) 1 % cream  20   Provider, Historical, MD   coal  tar (Neutrogena T/Gel) 0.5 % shampoo Use 3 times per week 12/30/20   Charlie Wing MD   dextromethorphan-guaifenesin (ROBITUSSIN-DM)  MG/5ML syrup Take 10 mL by mouth Every 4 (Four) Hours As Needed.    Eileen Martines MD   divalproex (DEPAKOTE ER) 500 MG 24 hr tablet Take 1 tablet by mouth Every 12 (Twelve) Hours.    Eileen Martines MD   divalproex (DEPAKOTE) 500 MG DR tablet Take 10,000 mg by mouth 2 (Two) Times a Day. 10/2/19   Eileen Martines MD   estradiol (ESTRACE) 1 MG tablet Take 1 mg by mouth Daily. 2/10/20   Eileen Martines MD   fluvoxaMINE (LUVOX) 50 MG tablet Take 50 mg by mouth Every Night.    Eileen Martines MD   furosemide (LASIX) 40 MG tablet Take 60 mg by mouth Daily.    Eileen Martines MD   hydrocortisone 2.5 % cream Apply 1 application topically to the appropriate area as directed 4 (Four) Times a Day As Needed (itching).    Eileen Martines MD   hydrocortisone 2.5 % cream Apply 1 application topically to the appropriate area as directed 2 (Two) Times a Day As Needed (Face and ears for rosacea).    Eileen Martines MD   ibuprofen (ADVIL,MOTRIN) 200 MG tablet Take 200 mg by mouth Every 4 (Four) Hours As Needed for Mild Pain .    Eileen Martines MD   levothyroxine (SYNTHROID, LEVOTHROID) 125 MCG tablet Take 125 mcg by mouth Every Morning.    Eileen Martines MD   linaclotide (LINZESS) 290 MCG capsule capsule Take 290 mcg by mouth Daily With Breakfast. 30 minutes after meal    Eileen Martines MD   liothyronine (CYTOMEL) 5 MCG tablet Take 5 mcg by mouth Every Morning.    Eileen Martines MD   loperamide (IMODIUM) 2 MG capsule Take 4 mg by mouth 1 (One) Time As Needed for Diarrhea (first loose stool).    Eileen Martines MD   loperamide (IMODIUM) 2 MG capsule Take 2 mg by mouth 4 (Four) Times a Day As Needed for Diarrhea.    Eileen Martines MD   magnesium hydroxide (MILK OF MAGNESIA) 400 MG/5ML suspension Take 30 mL  "by mouth Every Night.    Eileen Martines MD   montelukast (SINGULAIR) 10 MG tablet TAKE 1 TABLET BY MOUTH AT BEDTIME 3/6/22   Rody Moreno MD   MOTEGRITY 2 MG tablet Take 2 mg by mouth Daily. 2/12/20   Eileen Martines MD   pantoprazole (PROTONIX) 40 MG EC tablet Take 40 mg by mouth Daily. 10/17/19   Eileen Martines MD   polyethylene glycol (MiraLax) 17 g packet Take 34 g by mouth Daily.    Eileen Martines MD   QUEtiapine (SEROquel) 400 MG tablet Take 400 mg by mouth 2 (Two) Times a Day. 6/10/19   Eileen Martines MD   simethicone (MYLICON) 80 MG chewable tablet Chew 80 mg Every 6 (Six) Hours As Needed for Flatulence.    Eileen Martines MD   tamsulosin (FLOMAX) 0.4 MG capsule 24 hr capsule Take 1 capsule by mouth Every Night.    Eileen Martines MD   triamcinolone (KENALOG) 0.1 % cream Apply 1 application topically to the appropriate area as directed 2 (Two) Times a Day As Needed (Legs). 10/14/21   Eileen Martines MD     /90   Pulse 91   Temp 97.6 øF (36.4 øC) (Oral)   Resp 16   Ht 160 cm (63\")   Wt 88.5 kg (195 lb 1.7 oz)   SpO2 94%   BMI 34.56 kg/mý       Objective   Physical Exam  General: Chronically ill-appearing male in no acute distress, awake and alert  Eyes: Pupils round and equal, sclera nonicteric  HEENT: Mucous membranes moist, no mucosal swelling  Neck: Supple, no nuchal rigidity,   Respirations: Respirations nonlabored, equal breath sounds bilaterally, clear lungs  Heart regular rate and rhythm, no murmurs rubs or gallops,   Abdomen soft nontender nondistended, no hepatosplenomegaly, no hernia, no mass, normal bowel sounds, no CVA tenderness  Extremities no clubbing cyanosis or edema, calves are symmetric and nontender  Neuro cranial nerves grossly intact, no focal limb deficits  Psych oriented, pleasant affect  Skin nonblanching, palpable purpura on the trunk and extremities, no vesicles or pustules, no skin breakdown, " nontender  Procedures          ED Course      Results for orders placed or performed during the hospital encounter of 08/26/23   Comprehensive Metabolic Panel    Specimen: Blood   Result Value Ref Range    Glucose 117 (H) 65 - 99 mg/dL    BUN 11 6 - 20 mg/dL    Creatinine 0.75 (L) 0.76 - 1.27 mg/dL    Sodium 139 136 - 145 mmol/L    Potassium 3.8 3.5 - 5.2 mmol/L    Chloride 100 98 - 107 mmol/L    CO2 29.0 22.0 - 29.0 mmol/L    Calcium 9.1 8.6 - 10.5 mg/dL    Total Protein 7.7 6.0 - 8.5 g/dL    Albumin 3.5 3.5 - 5.2 g/dL    ALT (SGPT) 30 1 - 41 U/L    AST (SGOT) 35 1 - 40 U/L    Alkaline Phosphatase 138 (H) 39 - 117 U/L    Total Bilirubin 0.2 0.0 - 1.2 mg/dL    Globulin 4.2 gm/dL    A/G Ratio 0.8 g/dL    BUN/Creatinine Ratio 14.7 7.0 - 25.0    Anion Gap 10.0 5.0 - 15.0 mmol/L    eGFR 107.9 >60.0 mL/min/1.73   Protime-INR    Specimen: Blood   Result Value Ref Range    Protime 10.5 9.6 - 11.7 Seconds    INR 0.98 0.93 - 1.10   aPTT    Specimen: Blood   Result Value Ref Range    PTT 28.2 (L) 61.0 - 76.5 seconds   CBC Auto Differential    Specimen: Blood   Result Value Ref Range    WBC 6.00 3.40 - 10.80 10*3/mm3    RBC 4.70 4.14 - 5.80 10*6/mm3    Hemoglobin 12.9 (L) 13.0 - 17.7 g/dL    Hematocrit 39.6 37.5 - 51.0 %    MCV 84.1 79.0 - 97.0 fL    MCH 27.4 26.6 - 33.0 pg    MCHC 32.6 31.5 - 35.7 g/dL    RDW 17.8 (H) 12.3 - 15.4 %    RDW-SD 51.6 37.0 - 54.0 fl    MPV 8.8 6.0 - 12.0 fL    Platelets 219 140 - 450 10*3/mm3    Neutrophil % 58.6 42.7 - 76.0 %    Lymphocyte % 26.6 19.6 - 45.3 %    Monocyte % 12.6 (H) 5.0 - 12.0 %    Eosinophil % 1.5 0.3 - 6.2 %    Basophil % 0.7 0.0 - 1.5 %    Neutrophils, Absolute 3.50 1.70 - 7.00 10*3/mm3    Lymphocytes, Absolute 1.60 0.70 - 3.10 10*3/mm3    Monocytes, Absolute 0.80 0.10 - 0.90 10*3/mm3    Eosinophils, Absolute 0.10 0.00 - 0.40 10*3/mm3    Basophils, Absolute 0.00 0.00 - 0.20 10*3/mm3    nRBC 0.1 0.0 - 0.2 /100 WBC   C-reactive Protein    Specimen: Blood   Result Value Ref Range     C-Reactive Protein 3.15 (H) 0.00 - 0.50 mg/dL   Sedimentation Rate    Specimen: Blood   Result Value Ref Range    Sed Rate 38 (H) 0 - 20 mm/hr   Gold Top - SST   Result Value Ref Range    Extra Tube Hold for add-ons.                                           Medical Decision Making  Patient presents with a nonblanchable rash on his extremities.  He is not describing any pain or itching.  Differential diagnosis included drug reaction, nonspecific vasculitis, sepsis, thrombocytopenia    CBC normal, no leukocytosis, normal platelet count; comprehensive metabolic panel essentially normal, CRP elevated, sed rate marginally elevated.  Findings suggestive of a nonspecific vasculitis.  Drug-induced vasculitis is a possibility.  Patient is on numerous medications.  He is ordered IV Solu-Medrol in the emergency room and prescribed prednisone taper.  Notably patient is essentially asymptomatic he is afebrile and reports no pain or bleeding or dyspnea.  He has stable vital signs during the emergency room course and on reexamination the rash has not progressed and his symptoms are unchanged.  He will be discharged back to his extended care facility with recommendation for ongoing primary care evaluation of the rash and vasculitis.  They are given warning signs for return.    Problems Addressed:  Vasculitis: complicated acute illness or injury    Amount and/or Complexity of Data Reviewed  Labs: ordered. Decision-making details documented in ED Course.    Risk  Prescription drug management.        Final diagnoses:   Vasculitis       ED Disposition  ED Disposition       ED Disposition   Discharge    Condition   Stable    Comment   --               Rody Moreno MD  [address] [id number] [phone]    Schedule an appointment as soon as possible for a visit in 3 days           Medication List        New Prescriptions      predniSONE 10 MG tablet  Commonly known as: DELTASONE  Take 5 tablets by  mouth Daily for 3 days, THEN 4 tablets Daily for 3 days, THEN 3 tablets Daily for 3 days, THEN 2 tablets Daily for 3 days, THEN 1 tablet Daily for 3 days.  Start taking on: August 26, 2023               Where to Get Your Medications        These medications were sent to [facility], [place] - [address] [place] [address] - [phone]  - [phone]  [address] [facility] [place] [address]      Phone: [phone]   predniSONE 10 MG tablet            Sim Mills MD  08/26/23 1431      Electronically signed by Sim Mills MD at 08/26/23 1431       Doni Ramos LPN at 08/26/23 1228          Patient was brought in through EMS patient presents with Multiple Red Rashes/Spots all over the body that started yesterday.    Electronically signed by Doni Ramos LPN at 08/26/23 1229       Physician Progress Notes (last 24 hours)  Notes from 08/25/23 1922 through 08/26/23 1922   No notes of this type exist for this encounter.

## 2023-08-26 NOTE — CASE MANAGEMENT/SOCIAL WORK
"Physicians Statement of Medical Necessity for  Ambulance Transportation    GENERAL INFORMATION     Name: Charlie Wells  YOB: 1970  Medicare #: [id number]   Transport Date: 8/26/23 (Valid for round trips this date, or for scheduled repetitive trips for 60 days from the date signed below.)  Origin: [facility]  Destination: [facility]  Is the Patient's stay covered under Medicare Part A (PPS/DRG?)No   Closest appropriate facility? Yes  If this a hosp-hosp transfer? No  Is this a hospice patient? No    MEDICAL NECESSITY QUESTIONAIRE    Ambulance Transportation is medically necessary only if other means of transportation are contraindicated or would be potentially harmful to the patient.  To meet this requirement, the patient must be either \"bed confined\" or suffer from a condition such that transport by means other than an ambulance is contraindicated by the patient's condition.  The following questions must be answered by the healthcare professional signing below for this form to be valid:     1) Describe the MEDICAL CONDITION (physical and/or mental) of this patient AT THE TIME OF AMBULANCE TRANSPORT that requires the patient to be transported in an ambulance, and why transport by other means is contraindicated by the patient's condition: Raymond's disease, loss of coordination, unable to ambulate self.  Past Medical History:   Diagnosis Date    Acute pancreatitis     Allergic     Reglan, Benztropine    Allergic rhinitis     Anxiety     Aspiration pneumonia     Ataxia     Chronic constipation     Chronic edema     Depression     Esophageal stricture     Fecal incontinence     GERD (gastroesophageal reflux disease)     GI bleed     Hirschsprung's disease     Hyperlipidemia     Hypokalemia     Hypothyroidism     Iron deficiency anemia     Leg length discrepancy     Mediastinal lymphadenopathy     Megacolon     Mildly mentally retarded     Obesity     Positive PPD     Pulmonary " "hypertension     Scoliosis     Seizures     Sleep apnea     Not very compliant with CPAP    Urinary incontinence     Urinary tract infection       Past Surgical History:   Procedure Laterality Date    COLON SURGERY  1989    colostomy, the reversed    COLONOSCOPY      June 2017    COLOSTOMY N/A 5/3/2022    Procedure: COLOSTOMY DIVERTING;  Surgeon: Ken Christiansen MD;  Location: [facility];  Service: General;  Laterality: N/A;    ESOPHAGEAL DILATATION      Several    HEMICOLECTOMY Left     MYOTOMY      Rectal    RECTAL EXAMINATION UNDER ANESTHESIA N/A 5/1/2022    Procedure: RECTAL EXAM UNDER ANESTHESIA, RIGID SIGMOIDOSCOPY WITH FECAL DISIMPACTION;  Surgeon: Ken Christiansen MD;  Location: [facility] OR;  Service: General;  Laterality: N/A;      2) Is this patient \"bed confined\" as defined below?No    To be \"bed confined\" the patient must satisfy all three of the following criteria:  (1) unable to get up from bed without assistance; AND (2) unable to ambulate;  AND (3) unable to sit in a chair or wheelchair.  3) Can this patient safely be transported by car or wheelchair van (I.e., may safely sit during transport, without an attendant or monitoring?)No   4. In addition to completing questions 1-3 above, please check any of the following conditions that apply*:          *Note: supporting documentation for any boxes checked must be maintained in the patient's medical records Medical attendant required and Morbid obesity requires additional personnel/equipment to safely handle patient      SIGNATURE OF PHYSICIAN OR OTHER AUTHORIZED HEALTHCARE PROFESSIONAL    I certify that the above information is true and correct based on my evaluation of this patient, and represent that the patient requires transport by ambulance and that other forms of transport are contraindicated.  I understand that this information will be used by the Centers for Medicare and Medicaid Services (CMS) to support the determiniation of medical " necessity for ambulance services, and I represent that I have personal knowledge of the patient's condition at the time of transport.    SM   If this box is checked, I also certify that the patient is physically or mentally incapable of signing the ambulance service's claim form and that the institution with which I am affiliated has furnished care, services or assistance to the patient.  My signature below is made on behalf of the patient pursuant to 42 .36(b)(4). In accordance with 42 .37, the specific reason(s) that the patient is physically or mentally incapable of signing the claim for is as follows:     Signature of Physician or Healthcare Professional   Tiffanie Avilez RN Date/Time:   8/26/23/1920     (For Scheduled repetitive transport, this form is not valid for transports performed more than 60 days after this date).                                                                                                                                            --------------------------------------------------------------------------------------------  Printed Name and Credentials of Physician or Authorized Healthcare Professional     *Form must be signed by patient's attending physician for scheduled, repetitive transports,.  For non-repetitive ambulance transports, if unable to obtain the signature of the attending physician, any of the following may sign (please select below):     Physician  Clinical Nurse Specialist  Registered Nurse     Physician Assistant  Discharge Planner  Licensed Practical Nurse     Nurse Practitioner

## 2023-08-27 NOTE — CASE MANAGEMENT/SOCIAL WORK
Continued Stay Note   [place]     Patient Name: Charlie Wells  MRN: [id number]  Today's Date: 8/26/2023    Admit Date: 8/26/2023        Discharge Plan       Row Name 08/26/23 1848       Plan    Plan Comments patient not completely oriented, and therefore can't go via Medicaid transport. Transportation will need arranged via  van, or EMS. Medical necessity form completed and on chart for EMS. Uploaded on [facility] for [facility] pick-up, pending. Tamica Helton notified if [facility] cannot pick-up to put on schedule for tomorrow.                            Tiffanie Avilez RN

## 2024-09-23 ENCOUNTER — APPOINTMENT (OUTPATIENT)
Dept: CT IMAGING | Facility: HOSPITAL | Age: 54
End: 2024-09-23
Payer: MEDICARE

## 2024-09-23 ENCOUNTER — HOSPITAL ENCOUNTER (OUTPATIENT)
Facility: HOSPITAL | Age: 54
Setting detail: OBSERVATION
Discharge: NURSING, REHAB OR LONG-TERM CARE | End: 2024-09-24
Attending: EMERGENCY MEDICINE | Admitting: HOSPITALIST
Payer: MEDICARE

## 2024-09-23 DIAGNOSIS — K56.41 FECAL IMPACTION: Primary | ICD-10-CM

## 2024-09-23 LAB
ALBUMIN SERPL-MCNC: 3.8 G/DL (ref 3.5–5.2)
ALBUMIN/GLOB SERPL: 1 G/DL
ALP SERPL-CCNC: 131 U/L (ref 39–117)
ALT SERPL W P-5'-P-CCNC: 27 U/L (ref 1–41)
ANION GAP SERPL CALCULATED.3IONS-SCNC: 9 MMOL/L (ref 5–15)
AST SERPL-CCNC: 40 U/L (ref 1–40)
BASOPHILS # BLD AUTO: 0.03 10*3/MM3 (ref 0–0.2)
BASOPHILS NFR BLD AUTO: 0.5 % (ref 0–1.5)
BILIRUB SERPL-MCNC: 0.3 MG/DL (ref 0–1.2)
BUN SERPL-MCNC: 10 MG/DL (ref 6–20)
BUN/CREAT SERPL: 13.3 (ref 7–25)
CALCIUM SPEC-SCNC: 9.5 MG/DL (ref 8.6–10.5)
CHLORIDE SERPL-SCNC: 100 MMOL/L (ref 98–107)
CO2 SERPL-SCNC: 32 MMOL/L (ref 22–29)
CREAT SERPL-MCNC: 0.75 MG/DL (ref 0.76–1.27)
DEPRECATED RDW RBC AUTO: 56.5 FL (ref 37–54)
EGFRCR SERPLBLD CKD-EPI 2021: 107.2 ML/MIN/1.73
EOSINOPHIL # BLD AUTO: 0.13 10*3/MM3 (ref 0–0.4)
EOSINOPHIL NFR BLD AUTO: 2.1 % (ref 0.3–6.2)
ERYTHROCYTE [DISTWIDTH] IN BLOOD BY AUTOMATED COUNT: 17.6 % (ref 12.3–15.4)
GLOBULIN UR ELPH-MCNC: 4 GM/DL
GLUCOSE SERPL-MCNC: 150 MG/DL (ref 65–99)
HCT VFR BLD AUTO: 42.3 % (ref 37.5–51)
HGB BLD-MCNC: 13 G/DL (ref 13–17.7)
IMM GRANULOCYTES # BLD AUTO: 0.09 10*3/MM3 (ref 0–0.05)
IMM GRANULOCYTES NFR BLD AUTO: 1.5 % (ref 0–0.5)
LYMPHOCYTES # BLD AUTO: 1.49 10*3/MM3 (ref 0.7–3.1)
LYMPHOCYTES NFR BLD AUTO: 24.3 % (ref 19.6–45.3)
MCH RBC QN AUTO: 27.2 PG (ref 26.6–33)
MCHC RBC AUTO-ENTMCNC: 30.7 G/DL (ref 31.5–35.7)
MCV RBC AUTO: 88.5 FL (ref 79–97)
MONOCYTES # BLD AUTO: 0.73 10*3/MM3 (ref 0.1–0.9)
MONOCYTES NFR BLD AUTO: 11.9 % (ref 5–12)
NEUTROPHILS NFR BLD AUTO: 3.66 10*3/MM3 (ref 1.7–7)
NEUTROPHILS NFR BLD AUTO: 59.7 % (ref 42.7–76)
NRBC BLD AUTO-RTO: 0 /100 WBC (ref 0–0.2)
PLATELET # BLD AUTO: 215 10*3/MM3 (ref 140–450)
PMV BLD AUTO: 10.5 FL (ref 6–12)
POTASSIUM SERPL-SCNC: 4.1 MMOL/L (ref 3.5–5.2)
PROT SERPL-MCNC: 7.8 G/DL (ref 6–8.5)
RBC # BLD AUTO: 4.78 10*6/MM3 (ref 4.14–5.8)
SODIUM SERPL-SCNC: 141 MMOL/L (ref 136–145)
WBC NRBC COR # BLD AUTO: 6.13 10*3/MM3 (ref 3.4–10.8)

## 2024-09-23 PROCEDURE — G0378 HOSPITAL OBSERVATION PER HR: HCPCS

## 2024-09-23 PROCEDURE — 25810000003 SODIUM CHLORIDE 0.9 % SOLUTION: Performed by: NURSE PRACTITIONER

## 2024-09-23 PROCEDURE — 85025 COMPLETE CBC W/AUTO DIFF WBC: CPT | Performed by: EMERGENCY MEDICINE

## 2024-09-23 PROCEDURE — 25010000002 ONDANSETRON PER 1 MG: Performed by: EMERGENCY MEDICINE

## 2024-09-23 PROCEDURE — 80053 COMPREHEN METABOLIC PANEL: CPT | Performed by: EMERGENCY MEDICINE

## 2024-09-23 PROCEDURE — 96374 THER/PROPH/DIAG INJ IV PUSH: CPT

## 2024-09-23 PROCEDURE — 96361 HYDRATE IV INFUSION ADD-ON: CPT

## 2024-09-23 PROCEDURE — 99285 EMERGENCY DEPT VISIT HI MDM: CPT

## 2024-09-23 PROCEDURE — 74176 CT ABD & PELVIS W/O CONTRAST: CPT

## 2024-09-23 RX ORDER — SODIUM CHLORIDE 0.9 % (FLUSH) 0.9 %
10 SYRINGE (ML) INJECTION AS NEEDED
Status: DISCONTINUED | OUTPATIENT
Start: 2024-09-23 | End: 2024-09-24 | Stop reason: HOSPADM

## 2024-09-23 RX ORDER — ACETAMINOPHEN 650 MG/1
650 SUPPOSITORY RECTAL EVERY 4 HOURS PRN
Status: DISCONTINUED | OUTPATIENT
Start: 2024-09-23 | End: 2024-09-24 | Stop reason: HOSPADM

## 2024-09-23 RX ORDER — POLYETHYLENE GLYCOL 3350 17 G/17G
17 POWDER, FOR SOLUTION ORAL DAILY
Status: DISCONTINUED | OUTPATIENT
Start: 2024-09-23 | End: 2024-09-24 | Stop reason: HOSPADM

## 2024-09-23 RX ORDER — ACETAMINOPHEN 325 MG/1
650 TABLET ORAL EVERY 4 HOURS PRN
Status: DISCONTINUED | OUTPATIENT
Start: 2024-09-23 | End: 2024-09-24 | Stop reason: HOSPADM

## 2024-09-23 RX ORDER — ACETAMINOPHEN 160 MG/5ML
650 SOLUTION ORAL EVERY 4 HOURS PRN
Status: DISCONTINUED | OUTPATIENT
Start: 2024-09-23 | End: 2024-09-24 | Stop reason: HOSPADM

## 2024-09-23 RX ORDER — QUETIAPINE FUMARATE 300 MG/1
300 TABLET, FILM COATED ORAL EVERY MORNING
COMMUNITY

## 2024-09-23 RX ORDER — SPIRONOLACTONE 25 MG/1
12.5 TABLET ORAL EVERY MORNING
COMMUNITY
Start: 2024-08-31

## 2024-09-23 RX ORDER — ATORVASTATIN CALCIUM 10 MG/1
10 TABLET, FILM COATED ORAL
COMMUNITY
Start: 2024-09-10

## 2024-09-23 RX ORDER — METRONIDAZOLE 7.5 MG/G
1 GEL TOPICAL 2 TIMES DAILY
COMMUNITY

## 2024-09-23 RX ORDER — SODIUM CHLORIDE 9 MG/ML
100 INJECTION, SOLUTION INTRAVENOUS CONTINUOUS
Status: DISPENSED | OUTPATIENT
Start: 2024-09-23 | End: 2024-09-24

## 2024-09-23 RX ORDER — SODIUM CHLORIDE 9 MG/ML
40 INJECTION, SOLUTION INTRAVENOUS AS NEEDED
Status: DISCONTINUED | OUTPATIENT
Start: 2024-09-23 | End: 2024-09-24 | Stop reason: HOSPADM

## 2024-09-23 RX ORDER — SODIUM CHLORIDE 0.9 % (FLUSH) 0.9 %
10 SYRINGE (ML) INJECTION EVERY 12 HOURS SCHEDULED
Status: DISCONTINUED | OUTPATIENT
Start: 2024-09-23 | End: 2024-09-24 | Stop reason: HOSPADM

## 2024-09-23 RX ORDER — ACETAMINOPHEN 325 MG/1
650 TABLET ORAL EVERY 6 HOURS PRN
COMMUNITY

## 2024-09-23 RX ORDER — ONDANSETRON 2 MG/ML
4 INJECTION INTRAMUSCULAR; INTRAVENOUS ONCE
Status: COMPLETED | OUTPATIENT
Start: 2024-09-23 | End: 2024-09-23

## 2024-09-23 RX ORDER — ONDANSETRON 2 MG/ML
4 INJECTION INTRAMUSCULAR; INTRAVENOUS EVERY 6 HOURS PRN
Status: DISCONTINUED | OUTPATIENT
Start: 2024-09-23 | End: 2024-09-24 | Stop reason: HOSPADM

## 2024-09-23 RX ADMIN — SODIUM CHLORIDE 100 ML/HR: 9 INJECTION, SOLUTION INTRAVENOUS at 23:01

## 2024-09-23 RX ADMIN — AVOBENZONE, HOMOSALATE, OCTISALATE, OCTOCRYLENE, AND OXYBENZONE 1 PACKET: 29.4; 147; 49; 25.4; 58.8 LOTION TOPICAL at 21:54

## 2024-09-23 RX ADMIN — POLYETHYLENE GLYCOL 3350 17 G: 17 POWDER, FOR SOLUTION ORAL at 21:54

## 2024-09-23 RX ADMIN — ONDANSETRON 4 MG: 2 INJECTION INTRAMUSCULAR; INTRAVENOUS at 19:00

## 2024-09-23 NOTE — LETTER
EMS Transport Request  For use at [facility], [place], [place], [place], and [place] only   Patient Name: Charlie Wells : 1970   Weight:125 kg (276 lb 3.8 oz) Pick-up Location: [facility] BLS/ALS: BLS/ALS: BLS   Insurance: COMM[employer]ICARE MEDICARE ADVANTAGE Auth End Date: 24   Pre-Cert #: D/C Summary complete:    Destination: Other [address]   Contact Precautions: None   Equipment (O2, Fluids, etc.): None   Arrive By Date/Time: 24 Stretcher/WC: Wheelchair   CM Requesting: Rebeca Mccray Ext: [phone]   Notes/Medical Necessity: Bedbound, Min to mod assist for transfers. Lives at [facility].  WILL CALL     ______________________________________________________________________    *Only 2 patient bags OR 1 carry-on size bag are permitted.  Wheelchairs and walkers CANNOT transported with the patient. Acknowledge: Yes

## 2024-09-24 VITALS
RESPIRATION RATE: 18 BRPM | HEART RATE: 83 BPM | DIASTOLIC BLOOD PRESSURE: 79 MMHG | BODY MASS INDEX: 40.91 KG/M2 | WEIGHT: 276.24 LBS | HEIGHT: 69 IN | OXYGEN SATURATION: 91 % | SYSTOLIC BLOOD PRESSURE: 131 MMHG | TEMPERATURE: 98.5 F

## 2024-09-24 LAB
ANION GAP SERPL CALCULATED.3IONS-SCNC: 7.5 MMOL/L (ref 5–15)
BASOPHILS # BLD AUTO: 0.03 10*3/MM3 (ref 0–0.2)
BASOPHILS NFR BLD AUTO: 0.4 % (ref 0–1.5)
BUN SERPL-MCNC: 8 MG/DL (ref 6–20)
BUN/CREAT SERPL: 12.5 (ref 7–25)
CALCIUM SPEC-SCNC: 8.8 MG/DL (ref 8.6–10.5)
CHLORIDE SERPL-SCNC: 102 MMOL/L (ref 98–107)
CO2 SERPL-SCNC: 30.5 MMOL/L (ref 22–29)
CREAT SERPL-MCNC: 0.64 MG/DL (ref 0.76–1.27)
DEPRECATED RDW RBC AUTO: 55.8 FL (ref 37–54)
EGFRCR SERPLBLD CKD-EPI 2021: 112.5 ML/MIN/1.73
EOSINOPHIL # BLD AUTO: 0.12 10*3/MM3 (ref 0–0.4)
EOSINOPHIL NFR BLD AUTO: 1.8 % (ref 0.3–6.2)
ERYTHROCYTE [DISTWIDTH] IN BLOOD BY AUTOMATED COUNT: 17.6 % (ref 12.3–15.4)
GLUCOSE BLDC GLUCOMTR-MCNC: 99 MG/DL (ref 70–105)
GLUCOSE SERPL-MCNC: 92 MG/DL (ref 65–99)
HBA1C MFR BLD: 7.86 % (ref 4.8–5.6)
HCT VFR BLD AUTO: 42.8 % (ref 37.5–51)
HGB BLD-MCNC: 13.1 G/DL (ref 13–17.7)
IMM GRANULOCYTES # BLD AUTO: 0.09 10*3/MM3 (ref 0–0.05)
IMM GRANULOCYTES NFR BLD AUTO: 1.3 % (ref 0–0.5)
LYMPHOCYTES # BLD AUTO: 1.61 10*3/MM3 (ref 0.7–3.1)
LYMPHOCYTES NFR BLD AUTO: 24 % (ref 19.6–45.3)
MCH RBC QN AUTO: 26.7 PG (ref 26.6–33)
MCHC RBC AUTO-ENTMCNC: 30.6 G/DL (ref 31.5–35.7)
MCV RBC AUTO: 87.3 FL (ref 79–97)
MONOCYTES # BLD AUTO: 0.84 10*3/MM3 (ref 0.1–0.9)
MONOCYTES NFR BLD AUTO: 12.5 % (ref 5–12)
NEUTROPHILS NFR BLD AUTO: 4.02 10*3/MM3 (ref 1.7–7)
NEUTROPHILS NFR BLD AUTO: 60 % (ref 42.7–76)
NRBC BLD AUTO-RTO: 0 /100 WBC (ref 0–0.2)
PLATELET # BLD AUTO: 202 10*3/MM3 (ref 140–450)
PMV BLD AUTO: 10.2 FL (ref 6–12)
POTASSIUM SERPL-SCNC: 4.3 MMOL/L (ref 3.5–5.2)
RBC # BLD AUTO: 4.9 10*6/MM3 (ref 4.14–5.8)
SODIUM SERPL-SCNC: 140 MMOL/L (ref 136–145)
WBC NRBC COR # BLD AUTO: 6.71 10*3/MM3 (ref 3.4–10.8)

## 2024-09-24 PROCEDURE — 82948 REAGENT STRIP/BLOOD GLUCOSE: CPT

## 2024-09-24 PROCEDURE — G0378 HOSPITAL OBSERVATION PER HR: HCPCS

## 2024-09-24 PROCEDURE — 97162 PT EVAL MOD COMPLEX 30 MIN: CPT

## 2024-09-24 PROCEDURE — 96361 HYDRATE IV INFUSION ADD-ON: CPT

## 2024-09-24 PROCEDURE — 36415 COLL VENOUS BLD VENIPUNCTURE: CPT

## 2024-09-24 PROCEDURE — 99221 1ST HOSP IP/OBS SF/LOW 40: CPT | Performed by: STUDENT IN AN ORGANIZED HEALTH CARE EDUCATION/TRAINING PROGRAM

## 2024-09-24 PROCEDURE — 87481 CANDIDA DNA AMP PROBE: CPT

## 2024-09-24 PROCEDURE — 83036 HEMOGLOBIN GLYCOSYLATED A1C: CPT | Performed by: NURSE PRACTITIONER

## 2024-09-24 PROCEDURE — 80048 BASIC METABOLIC PNL TOTAL CA: CPT | Performed by: NURSE PRACTITIONER

## 2024-09-24 PROCEDURE — 85025 COMPLETE CBC W/AUTO DIFF WBC: CPT | Performed by: NURSE PRACTITIONER

## 2024-09-24 RX ORDER — TAMSULOSIN HYDROCHLORIDE 0.4 MG/1
0.4 CAPSULE ORAL NIGHTLY
Status: DISCONTINUED | OUTPATIENT
Start: 2024-09-24 | End: 2024-09-24 | Stop reason: HOSPADM

## 2024-09-24 RX ORDER — LIOTHYRONINE SODIUM 5 UG/1
10 TABLET ORAL
Status: DISCONTINUED | OUTPATIENT
Start: 2024-09-24 | End: 2024-09-24 | Stop reason: HOSPADM

## 2024-09-24 RX ORDER — LACTULOSE 10 G/15ML
20 SOLUTION ORAL 2 TIMES DAILY
Status: SHIPPED | OUTPATIENT
Start: 2024-09-24

## 2024-09-24 RX ORDER — MONTELUKAST SODIUM 10 MG/1
10 TABLET ORAL NIGHTLY
Status: DISCONTINUED | OUTPATIENT
Start: 2024-09-24 | End: 2024-09-24 | Stop reason: HOSPADM

## 2024-09-24 RX ORDER — DIVALPROEX SODIUM 500 MG/1
500 TABLET, FILM COATED, EXTENDED RELEASE ORAL EVERY 12 HOURS SCHEDULED
Status: DISCONTINUED | OUTPATIENT
Start: 2024-09-24 | End: 2024-09-24 | Stop reason: HOSPADM

## 2024-09-24 RX ORDER — QUETIAPINE FUMARATE 100 MG/1
400 TABLET, FILM COATED ORAL NIGHTLY
Status: DISCONTINUED | OUTPATIENT
Start: 2024-09-24 | End: 2024-09-24 | Stop reason: HOSPADM

## 2024-09-24 RX ORDER — POLYETHYLENE GLYCOL 3350 17 G/17G
17 POWDER, FOR SOLUTION ORAL DAILY
Start: 2024-09-24

## 2024-09-24 RX ORDER — CLONAZEPAM 0.5 MG/1
0.5 TABLET ORAL 2 TIMES DAILY
Status: DISCONTINUED | OUTPATIENT
Start: 2024-09-24 | End: 2024-09-24 | Stop reason: HOSPADM

## 2024-09-24 RX ORDER — BUSPIRONE HYDROCHLORIDE 15 MG/1
30 TABLET ORAL 2 TIMES DAILY
Status: DISCONTINUED | OUTPATIENT
Start: 2024-09-24 | End: 2024-09-24 | Stop reason: HOSPADM

## 2024-09-24 RX ORDER — PANTOPRAZOLE SODIUM 40 MG/1
40 TABLET, DELAYED RELEASE ORAL
Status: DISCONTINUED | OUTPATIENT
Start: 2024-09-24 | End: 2024-09-24 | Stop reason: HOSPADM

## 2024-09-24 RX ORDER — AMMONIUM LACTATE 12 G/100G
CREAM TOPICAL 2 TIMES DAILY PRN
Status: DISCONTINUED | OUTPATIENT
Start: 2024-09-24 | End: 2024-09-24 | Stop reason: HOSPADM

## 2024-09-24 RX ORDER — ATORVASTATIN CALCIUM 10 MG/1
10 TABLET, FILM COATED ORAL NIGHTLY
Status: DISCONTINUED | OUTPATIENT
Start: 2024-09-24 | End: 2024-09-24 | Stop reason: HOSPADM

## 2024-09-24 RX ORDER — QUETIAPINE FUMARATE 100 MG/1
300 TABLET, FILM COATED ORAL DAILY
Status: DISCONTINUED | OUTPATIENT
Start: 2024-09-24 | End: 2024-09-24 | Stop reason: HOSPADM

## 2024-09-24 RX ORDER — FUROSEMIDE 40 MG
40 TABLET ORAL
Status: DISCONTINUED | OUTPATIENT
Start: 2024-09-24 | End: 2024-09-24 | Stop reason: HOSPADM

## 2024-09-24 RX ORDER — SPIRONOLACTONE 25 MG/1
12.5 TABLET ORAL DAILY
Status: DISCONTINUED | OUTPATIENT
Start: 2024-09-24 | End: 2024-09-24 | Stop reason: HOSPADM

## 2024-09-24 RX ORDER — FLUVOXAMINE MALEATE 50 MG
50 TABLET ORAL NIGHTLY
Status: DISCONTINUED | OUTPATIENT
Start: 2024-09-24 | End: 2024-09-24 | Stop reason: HOSPADM

## 2024-09-24 RX ORDER — LEVOTHYROXINE SODIUM 75 UG/1
75 TABLET ORAL
Status: DISCONTINUED | OUTPATIENT
Start: 2024-09-24 | End: 2024-09-24 | Stop reason: HOSPADM

## 2024-09-24 RX ORDER — SODIUM PHOSPHATE,MONO-DIBASIC 19G-7G/197
1 ENEMA (ML) RECTAL ONCE
Status: DISCONTINUED | OUTPATIENT
Start: 2024-09-24 | End: 2024-09-24 | Stop reason: HOSPADM

## 2024-09-24 RX ADMIN — BUSPIRONE HYDROCHLORIDE 30 MG: 15 TABLET ORAL at 12:15

## 2024-09-24 RX ADMIN — FUROSEMIDE 40 MG: 40 TABLET ORAL at 12:15

## 2024-09-24 RX ADMIN — LEVOTHYROXINE SODIUM 75 MCG: 0.07 TABLET ORAL at 06:33

## 2024-09-24 RX ADMIN — DIVALPROEX SODIUM 500 MG: 500 TABLET, EXTENDED RELEASE ORAL at 12:15

## 2024-09-24 RX ADMIN — PANTOPRAZOLE SODIUM 40 MG: 40 TABLET, DELAYED RELEASE ORAL at 06:33

## 2024-09-24 RX ADMIN — CLONAZEPAM 0.5 MG: 0.5 TABLET ORAL at 12:15

## 2024-09-24 RX ADMIN — Medication 10 ML: at 08:00

## 2024-09-24 RX ADMIN — LIOTHYRONINE SODIUM 10 MCG: 5 TABLET ORAL at 06:33

## 2024-09-24 RX ADMIN — ACETAMINOPHEN 650 MG: 325 TABLET, FILM COATED ORAL at 06:33

## 2024-09-24 RX ADMIN — QUETIAPINE FUMARATE 300 MG: 100 TABLET ORAL at 12:15

## 2024-09-24 RX ADMIN — SPIRONOLACTONE 12.5 MG: 25 TABLET ORAL at 12:15

## 2024-09-24 NOTE — PLAN OF CARE
Goal Outcome Evaluation:    Patient kept NPO after MN for a gensx consult this AM. PT evaluation ordered. Awaiting WOCN and inpatient DM educator consult.

## 2024-09-24 NOTE — DISCHARGE PLACEMENT REQUEST
"Charlie Wells (53 y.o. Male)       Date of Birth   1970    Social Security Number       Address   [address] [place] [address]    Home Phone   [phone]    MRN   [id number]       [place]   None    Marital Status   Single                            Admission Date   9/23/24    Admission Type   Emergency    Admitting Provider   Ag Seals MD    Attending Provider   Brammell, Timothy Duane, MD    Department, Room/Bed   [facility] OBSERVATION, 227/1       Discharge Date       Discharge Disposition       Discharge Destination                                 Attending Provider: Brammell, Timothy Duane, MD    Allergies: Metoclopramide, Benztropine    Isolation: Contact   Infection: Candida Auris (rule out) (09/24/24)   Code Status: CPR    Ht: 175.3 cm (69.02\")   Wt: 125 kg (276 lb 3.8 oz)    Admission Cmt: None   Principal Problem: Fecal impaction [K56.41]                   Active Insurance as of 9/23/2024       Primary Coverage       Payor Plan Insurance Group Employer/Plan Group    MISC MEDICARE REPLACEMENT MISC MCARE REPLACEMENT NGN       Coverage Address Coverage Phone Number Coverage Fax Number Effective Dates    [address] [address] [phone]  9/1/2022 - None Entered    [facility] [address]         Subscriber Name Subscriber Birth Date Member ID       CHARLIE WELLS 1970 [id number]               Secondary Coverage       Payor Plan Insurance Group Employer/Plan Group    [place]        Payor Plan Address Payor Plan Phone Number Payor Plan Fax Number Effective Dates    [address] [address]   1970 - None Entered    [place] [address]         Subscriber Name Subscriber Birth Date Member ID       CHARLIE WELLS 1970 [id number]                     Emergency Contacts        (Rel.) Home Phone Work Phone Mobile Phone    Isidro Wells (Brother) -- -- [phone]    TALYA WELLS (Relative) -- " -- [phone]              Insurance Information                  MISC MEDICARE REPLACEMENT/[facility] [place] Phone: [phone]    Subscriber: Charlie Wells Subscriber#: [id number]    Group#: [id number] Precert#: --        [place] Phone: --    Subscriber: Charlie Wells Subscriber#: [id number]    Group#: -- Precert#: --

## 2024-09-24 NOTE — PLAN OF CARE
Problem: Adult Inpatient Plan of Care  Goal: Plan of Care Review  Outcome: Ongoing, Progressing  Flowsheets (Taken 9/24/2024 0353)  Plan of Care Reviewed With: patient  Outcome Evaluation: New admission. Patient alert to person only. Patient admitted for fecal impaction. NPO awaiting general surgery consult.  Goal: Patient-Specific Goal (Individualized)  Outcome: Ongoing, Progressing  Goal: Absence of Hospital-Acquired Illness or Injury  Outcome: Ongoing, Progressing  Intervention: Identify and Manage Fall Risk  Recent Flowsheet Documentation  Taken 9/24/2024 0300 by Hiwot Abdi RN  Safety Promotion/Fall Prevention: lighting adjusted  Intervention: Prevent Skin Injury  Recent Flowsheet Documentation  Taken 9/24/2024 0300 by Hiwot Abdi RN  Body Position: position changed independently  Intervention: Prevent and Manage VTE (Venous Thromboembolism) Risk  Recent Flowsheet Documentation  Taken 9/24/2024 0300 by Hiwot Abdi RN  Activity Management: back to bed  VTE Prevention/Management: (legs hurt)   bilateral   patient refused intervention   other (see comments)  Intervention: Prevent Infection  Recent Flowsheet Documentation  Taken 9/24/2024 0300 by Hiwot Abdi RN  Infection Prevention: hand hygiene promoted  Goal: Optimal Comfort and Wellbeing  Outcome: Ongoing, Progressing  Intervention: Provide Person-Centered Care  Recent Flowsheet Documentation  Taken 9/24/2024 0300 by Hiwot Abdi RN  Trust Relationship/Rapport:   care explained   choices provided   emotional support provided   empathic listening provided   questions answered   questions encouraged   reassurance provided   thoughts/feelings acknowledged  Goal: Readiness for Transition of Care  Outcome: Ongoing, Progressing  Intervention: Mutually Develop Transition Plan  Recent Flowsheet Documentation  Taken 9/24/2024 0344 by Hiwot Abdi RN  Equipment Currently Used at Home: (unsure) other (see comments)  Taken 9/24/2024 0341 by Jin  Hiwot, RN  Equipment Currently Used at Home: (Unsure) other (see comments)  Taken 9/24/2024 0259 by Hiwot Abdi, RN  Transportation Anticipated: health plan transportation  Patient/Family Anticipated Services at Transition: none  Patient/Family Anticipates Transition to: long-term care facility     Problem: Fall Injury Risk  Goal: Absence of Fall and Fall-Related Injury  Outcome: Ongoing, Progressing  Intervention: Promote Injury-Free Environment  Recent Flowsheet Documentation  Taken 9/24/2024 0300 by Hiwot Abdi, RN  Safety Promotion/Fall Prevention: lighting adjusted   Goal Outcome Evaluation:  Plan of Care Reviewed With: patient           Outcome Evaluation: New admission. Patient alert to person only. Patient admitted for fecal impaction. NPO awaiting general surgery consult.

## 2024-09-24 NOTE — CASE MANAGEMENT/SOCIAL WORK
Discharge Planning Assessment   Mark     Patient Name: Charlie Wells  MRN: [id number]  Today's Date: 9/24/2024    Admit Date: 9/23/2024    Plan: From [facility]. No precert or PASRR needed. Brother to transport   Discharge Needs Assessment       Row Name 09/24/24 1104       Living Environment    People in Home facility resident    Unique Family Situation Lives at [facility]    Current Living Arrangements extended care facility    Duration at Residence He reported years    Potentially Unsafe Housing Conditions none    Primary Care Provided by other (see comments)  SNF staff    Provides Primary Care For no one, unable/limited ability to care for self    Family Caregiver if Needed none    Quality of Family Relationships supportive    Able to Return to Prior Arrangements yes       Resource/Environmental Concerns    Transportation Concerns none       Transportation Needs    In the past 12 months, has lack of transportation kept you from medical appointments or from getting medications? no    In the past 12 months, has lack of transportation kept you from meetings, work, or from getting things needed for daily living? No       Food Insecurity    Within the past 12 months, you worried that your food would run out before you got the money to buy more. Never true    Within the past 12 months, the food you bought just didn't last and you didn't have money to get more. Never true       Transition Planning    Patient/Family Anticipates Transition to long-term care facility    Patient/Family Anticipated Services at Transition other (see comments)  LTC at [place]    Transportation Anticipated family or friend will provide       Discharge Needs Assessment    Equipment Currently Used at Home wheelchair;walker, rolling;hospital bed    Concerns to be Addressed discharge planning    Anticipated Changes Related to Illness none    Equipment Needed After Discharge none                   Discharge Plan        Row Name 09/24/24 1107       Plan    Plan From [facility]. No precert or PASRR needed. Brother to transport    Patient/Family in Agreement with Plan yes    Plan Comments Barriers: GI following for constipation. Enema ordered. ROYER met with Mr. Wells who is a/o and said he lives at [place] and wants to go back there at discharge. He reported he has lived there many years. His brother usually transports him in  his car and will take back when discharged. He ambulates with a rolling walker and has a wheelchair. ROYER contacted [facility] with [facility] and verified he lives there [facility] and can return without a PASRR or precert                  Continued Care and Services - Admitted Since 9/23/2024       Destination       Service Provider Request Status Selected Services Address Phone Fax Patient Preferred    [facility] Accepted N/A [address] [place] [address] [phone] --                       Demographic Summary       Row Name 09/24/24 1103       General Information    Admission Type observation    Arrived From emergency department    Required Notices Provided Observation Status Notice    Referral Source admission list    Reason for Consult discharge planning    Preferred Language English       Contact Information    Permission Granted to Share Info With     Contact Information Obtained for facility                    Functional Status       Row Name 09/24/24 1103       Functional Status    Usual Activity Tolerance moderate    Current Activity Tolerance moderate       Functional Status, IADL    Medications completely dependent    Meal Preparation completely dependent    Housekeeping completely dependent    Laundry completely dependent    Shopping completely dependent    IADL Comments dependent on SNF staff       Mental Status Summary    Recent Changes in Mental Status/Cognitive Functioning no changes                        Patient Forms       Row Name 09/24/24 0903       Patient Forms    Important Message from Medicare (IMM) Delivered  MOON by REG 9/24    Patient Observation Letter Delivered                      Rebeca BARBOUR,RN Case Manager  [facility]  Phone: Desk- [phone] cell- [phone]

## 2024-09-24 NOTE — NURSING NOTE
This RN called report to [facility]. Awaiting [facility] for transport

## 2024-09-24 NOTE — CONSULTS
Diabetes Education    Patient Name:  Charlie Wells  YOB: 1970  MRN: [id number]  Admit Date:  9/23/2024    Consult received due to A1c >7%. A1c this adm 7.86%. Reviewed H&P. Family had recommended pt be checked for diabetes because diabetes runs in the family. This is new dx for pt. Per nurse documentation, pt with confusion. Per CM note, pt from [facility]. Educator contacted brother, Isidro, to complete the assessment. Discussed A1c result of 7.86%. Discussed this is falling within diabetes range. Son concerned that nursing home allows pt to eat whatever he wants. Discussed usually nursing facilities do not have specific diabetes meal plan. Discussed talking with nursing home about pt's diagnosis of diabetes. Educator sent secure chat to MD to recommend ordering bs monitoring every 6 hours or ac and hs if eating. Discussed with son that pt would need bs checked at nursing home also. Pt does not have diabetes medication ordered for this adm. Son verbalized info covered over phone call. He states has no questions for educator at this time.       Electronically signed by:  Tuyet Ghotra RN  09/24/24 12:24 EDT

## 2024-09-24 NOTE — CASE MANAGEMENT/SOCIAL WORK
Continued Stay Note  [facility]     Patient Name: Charlie Wells  MRN: [id number]  Today's Date: 9/24/2024    Admit Date: 9/23/2024    Plan: From [facility]. No precert or PASRR needeed. [facility] wheelchair van transport requested   Discharge Plan       Row Name 09/24/24 1443       Plan    Plan From [facility]. No precert or PASRR needeed. [facility] wheelchair van transport requested    Plan Comments DC orders. Nursing unable to reach brother. [facility] does not have a van. CM placed request in Epic for [facility] Wheelchair van to transport him. Per Diabetic Educator's note his A1c was 7.86 with new diagnosis of diabetes. CM informed Patricia, [employer] liaison of A1c and new diabetes diagnosis. CM sent secure chat to Dr. Orosco inquiring if any changes to his treatment or medications at [place]                  Rebeca BARBOUR,RN Case Manager  [facility]  Phone: Desk- [phone] cell- [phone]

## 2024-09-24 NOTE — H&P
[facility]    Hospitalist History and Physical     Charlie Wells : 1970 MRN:[id number] LOS:0 ROOM: [facility]/1     Reason for admission: Fecal impaction     Assessment / Plan     Blood in ostomy  Fecal impaction  Nausea   - scant blood noted on ED. No further bleeding  - nurse noted skin breakdown around ostomy  - consult ostomy nurse for care   -CT impression: Findings consistent with severe constipation. Cannot exclude rectal fecal impaction. 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 Stool is visualized in the tract.   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  Huntingtons disease  Seizure disorder   - stable    SHARON  - O2 prn     Chronic dry skin, cellulitis, rosacea   Chronic lymphadema BLE  - apply creams as needed     GERD  - ppi     Hypothyroid  -synthroid     Scoliosis  - tylenol prn     Anxiety and depression  - buspar, depakote, seroquel     Combined systolic and diastolic CHF   - lasix, spironalactone    Elevated glucose  - family requested A1C due to family hx of diabetes   - monitor    HLD  - statin     Code Status (Patient has no pulse and is not breathing): CPR (Attempt to Resuscitate)  Medical Interventions (Patient has pulse or is breathing): Full Support       Nutrition:   NPO Diet NPO Type: Strict NPO     VTE Prophylaxis:  Mechanical VTE prophylaxis orders are present.         History of Present illness     Charlie Wells is a 53 y.o. male with a resident of [facility] with PMH of asthma, obstructive sleep apnea, chronic pancreatitis, Hirschberg's disease, history of chronic constipation with complication of megacolon requiring a diverting colostomy,  ataxia, anxiety, chronic cellulitis, Largo's disease, hypothyroidism, scoliosis, chronic constipation, reflux, developmental delay, unspecified systolic and diastolic congestive heart failure presented to the hospital and was admitted with a principal diagnosis of Fecal impaction.     It was reported that nursing facility reported that there was a large amount of blood in patient's ostomy and was sent to the ED for evaluation.  ED provider noted that there was only a scant amount present during his exam.  Patient denies any abdominal pain.  Does report nausea.  Stool is present in the ostomy that is brown in color.  Vital signs are stable.  Negative for leukocytosis.  Negative for any electrolyte abnormality.  CT was positive for possible fecal impaction and chronic constipation.  Patient is status post colostomy 5/3/2022.  Hemoglobin stable will consult general surgery to continue to follow.  Patient given MiraLAX and Benefiber and admitted for further observation.    Patient was seen and examined on 09/23/24 at 03:15 EDT .    Subjective / Review of systems     Review of Systems   Pt slow to respond and poor historian  Only complaint was nausea      Past Medical/Surgical/Social/Family History & Allergies     Past Medical History:   Diagnosis Date    Acute pancreatitis     Allergic     Reglan, Benztropine    Allergic rhinitis     Anxiety     Aspiration pneumonia     Ataxia     Chronic constipation     Chronic edema     Depression     Esophageal stricture     Fecal incontinence     GERD (gastroesophageal reflux disease)     GI bleed     Hirschsprung's disease     Hyperlipidemia     Hypokalemia     Hypothyroidism     Iron deficiency anemia     Leg length discrepancy     Mediastinal lymphadenopathy     Megacolon     Mildly mentally retarded     Obesity     Positive PPD     Pulmonary hypertension     Scoliosis     Seizures     Sleep apnea     Not very compliant with CPAP    Urinary incontinence     Urinary tract  infection       Past Surgical History:   Procedure Laterality Date    COLON SURGERY      colostomy, the reversed    COLONOSCOPY      2017    COLOSTOMY N/A 5/3/2022    Procedure: COLOSTOMY DIVERTING;  Surgeon: Ken Christiansen MD;  Location: [facility] MAIN OR;  Service: General;  Laterality: N/A;    ESOPHAGEAL DILATATION      Several    HEMICOLECTOMY Left     MYOTOMY      Rectal    RECTAL EXAMINATION UNDER ANESTHESIA N/A 2022    Procedure: RECTAL EXAM UNDER ANESTHESIA, RIGID SIGMOIDOSCOPY WITH FECAL DISIMPACTION;  Surgeon: Ken Christiansen MD;  Location: [facility] MAIN OR;  Service: General;  Laterality: N/A;      Social History     Socioeconomic History    Marital status: Single   Tobacco Use    Smoking status: Never    Smokeless tobacco: Never   Vaping Use    Vaping status: Never Used   Substance and Sexual Activity    Alcohol use: No    Drug use: No    Sexual activity: Never      Family History   Problem Relation Age of Onset    Early death Mother         Apparently  of bowel complications    Early death Father         Heart related    Heart disease Father       Allergies   Allergen Reactions    Metoclopramide Rash     Other reaction(s): not known     Benztropine Delirium      Social Determinants of Health     Tobacco Use: Low Risk  (2024)    Patient History     Smoking Tobacco Use: Never     Smokeless Tobacco Use: Never     Passive Exposure: Not on file   Alcohol Use: Not At Risk (2024)    AUDIT-C     Frequency of Alcohol Consumption: Never     Average Number of Drinks: Patient does not drink     Frequency of Binge Drinking: Never   Financial Resource Strain: Not on file   Food Insecurity: Not on file   Transportation Needs: Not on file   Physical Activity: Not on file   Stress: Not on file   Social Connections: Unknown (10/11/2023)    Family and Community Support     Help with Day-to-Day Activities: Not on file     Lonely or Isolated: Not on file   Interpersonal Safety: Not At Risk  (9/23/2024)    Abuse Screen     Unsafe at Home or Work/School: no     Feels Threatened by Someone?: no     Does Anyone Keep You from Contacting Others or Doint Things Outside the Home?: no     Physical Sign of Abuse Present: no   Depression: Not at risk (6/9/2021)    PHQ-2     PHQ-2 Score: 0   Housing Stability: Unknown (9/24/2024)    Housing Stability     Current Living Arrangements: residential facility     Potentially Unsafe Housing Conditions: Not on file   Utilities: Not on file   Health Literacy: Unknown (10/11/2023)    Education     Help with school or training?: Not on file     Preferred Language: Not on file   Employment: Unknown (10/11/2023)    Employment     Do you want help finding or keeping work or a job?: Not on file   Disabilities: Unknown (10/11/2023)    Disabilities     Concentrating, Remembering, or Making Decisions Difficulty: Not on file     Doing Errands Independently Difficulty: Not on file        Home Medications     Prior to Admission medications    Medication Sig Start Date End Date Taking? Authorizing Provider   acetaminophen (TYLENOL) 325 MG tablet Take 2 tablets by mouth Every 6 (Six) Hours As Needed for Mild Pain.   Yes Eileen Martines MD   atorvastatin (LIPITOR) 10 MG tablet Take 1 tablet by mouth every night at bedtime. 9/10/24  Yes Eileen Martines MD   spironolactone (ALDACTONE) 25 MG tablet Take 0.5 tablets by mouth Every Morning. 8/31/24  Yes Eileen Martines MD   ammonium lactate (LAC-HYDRIN) 12 % lotion Apply  topically to the appropriate area as directed Daily. Before getting dressed daily 1/26/22   Eileen Martines MD   Austedo 9 MG tablet Take 9 mg by mouth Daily. 12/10/21   Eileen Martines MD   Benzocaine (SOLARCAINE EX) Apply  topically 4 (Four) Times a Day As Needed.    Eileen Martines MD   busPIRone (BUSPAR) 30 MG tablet Take 1 tablet by mouth 2 (Two) Times a Day. 7/29/19   Charlie Wing MD   chlorpheniramine (CHLOR-TRIMETON) 4 MG  tablet Take 4 mg by mouth Every 4 (Four) Hours As Needed for Allergies.    Eileen Martines MD   clonazePAM (KlonoPIN) 0.5 MG tablet Take 0.25 mg by mouth Every Morning.    Eileen Martines MD   clonazePAM (KlonoPIN) 0.5 MG tablet Take 0.5 mg by mouth Every Night.    Eileen Martines MD   clotrimazole (LOTRIMIN) 1 % cream  11/11/20   Eileen Martines MD   coal tar (Neutrogena T/Gel) 0.5 % shampoo Use 3 times per week 12/30/20   Charlie Wing MD   dextromethorphan-guaifenesin (ROBITUSSIN-DM)  MG/5ML syrup Take 10 mL by mouth Every 4 (Four) Hours As Needed.    Eileen Martines MD   divalproex (DEPAKOTE ER) 500 MG 24 hr tablet Take 1 tablet by mouth Every 12 (Twelve) Hours.    Eileen Martines MD   divalproex (DEPAKOTE) 500 MG DR tablet Take 10,000 mg by mouth 2 (Two) Times a Day. 10/2/19   Eileen Martines MD   estradiol (ESTRACE) 1 MG tablet Take 1 mg by mouth Daily. 2/10/20   Eileen Martines MD   fluvoxaMINE (LUVOX) 50 MG tablet Take 50 mg by mouth Every Night.    Eileen Martines MD   furosemide (LASIX) 40 MG tablet Take 60 mg by mouth Daily.    Eileen Martines MD   hydrocortisone 2.5 % cream Apply 1 application topically to the appropriate area as directed 4 (Four) Times a Day As Needed (itching).    Eileen Martines MD   hydrocortisone 2.5 % cream Apply 1 application topically to the appropriate area as directed 2 (Two) Times a Day As Needed (Face and ears for rosacea).    Eileen Martines MD   ibuprofen (ADVIL,MOTRIN) 200 MG tablet Take 200 mg by mouth Every 4 (Four) Hours As Needed for Mild Pain .    Eileen Martines MD   levothyroxine (SYNTHROID, LEVOTHROID) 125 MCG tablet Take 125 mcg by mouth Every Morning.    Eileen Martines MD   linaclotide (LINZESS) 290 MCG capsule capsule Take 290 mcg by mouth Daily With Breakfast. 30 minutes after meal    Eileen Martines MD   liothyronine (CYTOMEL) 5 MCG tablet Take 5 mcg by mouth  Every Morning.    Eileen Martines MD   loperamide (IMODIUM) 2 MG capsule Take 4 mg by mouth 1 (One) Time As Needed for Diarrhea (first loose stool).    Eileen Martines MD   loperamide (IMODIUM) 2 MG capsule Take 2 mg by mouth 4 (Four) Times a Day As Needed for Diarrhea.    Eileen Martines MD   magnesium hydroxide (MILK OF MAGNESIA) 400 MG/5ML suspension Take 30 mL by mouth Every Night.    Eileen Martines MD   montelukast (SINGULAIR) 10 MG tablet TAKE 1 TABLET BY MOUTH AT BEDTIME 3/6/22   Rody Moreno MD   MOTEGRITY 2 MG tablet Take 2 mg by mouth Daily. 2/12/20   Eileen Martines MD   pantoprazole (PROTONIX) 40 MG EC tablet Take 40 mg by mouth Daily. 10/17/19   Eileen Martines MD   polyethylene glycol (MiraLax) 17 g packet Take 34 g by mouth Daily.    Eileen Martines MD   QUEtiapine (SEROquel) 400 MG tablet Take 400 mg by mouth 2 (Two) Times a Day. 6/10/19   Eileen Martines MD   simethicone (MYLICON) 80 MG chewable tablet Chew 80 mg Every 6 (Six) Hours As Needed for Flatulence.    Eileen Martines MD   tamsulosin (FLOMAX) 0.4 MG capsule 24 hr capsule Take 1 capsule by mouth Every Night.    Eileen Martines MD   triamcinolone (KENALOG) 0.1 % cream Apply 1 application topically to the appropriate area as directed 2 (Two) Times a Day As Needed (Legs). 10/14/21   Eileen Martines MD   aluminum-magnesium hydroxide-simethicone (MAALOX/MYLANTA) 200-200-20 MG/5ML suspension Take 30 mL by mouth Every 6 (Six) Hours As Needed for Indigestion or Heartburn.  9/23/24  Eileen Martines MD        Objective / Physical Exam     Vital signs:  Temp: 98.4 °F (36.9 °C)  BP: 126/84  Heart Rate: 87  Resp: 18  SpO2: 91 %  Weight: (!) 139 kg (307 lb 5.1 oz)    Admission Weight: Weight: 136 kg (300 lb)    Physical Exam  Constitutional:       Comments: Slow to respond and poor historian.    Cardiovascular:      Rate and Rhythm: Normal rate and regular rhythm.   Pulmonary:       Effort: Pulmonary effort is normal.      Breath sounds: Normal breath sounds.      Comments: On O2  Abdominal:      General: There is distension.      Tenderness: There is no abdominal tenderness.      Comments: Colostomy present with brown stool  No blood present     Skin:     Comments: Generalized dry and flaky   BLE with hyperkeratotic plaque and edema    Neurological:      Mental Status: He is alert.            Labs     Results from last 7 days   Lab Units 09/24/24  0030 09/23/24  1816   WBC 10*3/mm3 6.71 6.13   HEMOGLOBIN g/dL 13.1 13.0   HEMATOCRIT % 42.8 42.3   PLATELETS 10*3/mm3 202 215      Results from last 7 days   Lab Units 09/23/24  1816   ALK PHOS U/L 131*   AST (SGOT) U/L 40   ALT (SGPT) U/L 27           Results from last 7 days   Lab Units 09/24/24  0030 09/23/24  1816   SODIUM mmol/L 140 141   POTASSIUM mmol/L 4.3 4.1   CHLORIDE mmol/L 102 100   CO2 mmol/L 30.5* 32.0*   BUN mg/dL 8 10   CREATININE mg/dL 0.64* 0.75*   GLUCOSE mg/dL 92 150*        Imaging     CT Abdomen Pelvis Without Contrast    Result Date: 9/23/2024  CT ABDOMEN PELVIS WO CONTRAST Date of Exam: 9/23/2024 7:47 PM EDT Indication: pain. Comparison: Contrast-enhanced CT of the abdomen pelvis performed on April 30, 2022 Technique: Axial CT images were obtained of the abdomen and pelvis without the administration of contrast. Sagittal and coronal reconstructions were performed.  Automated exposure control and iterative reconstruction methods were used. Findings: Motion artifact limits evaluation. Lung Bases: The visualized lung bases and lower mediastinal structures are unremarkable. Peritoneum: No free intraperitoneal air or fluid. Abdominal wall: Double barrel colostomy versus colocutaneous fistula is visualized in the left lower quadrant. Stool is visualized to the tract. There is suggestion of an adjacent fat-containing parastomal hernia. Liver: The liver is within normal limits in size and contour. Liver demonstrates diffuse  hypoattenuation compatible with hepatic steatosis. Left hepatic lobe is atrophic. Biliary/Gallbladder: The gallbladder is normal without evidence of radiopaque gallstones. The biliary tree is nondilated. Pancreas: Pancreas is within normal limits. There is no evidence of pancreatic mass or peripancreatic inflammatory changes. Spleen: Spleen is normal in size and contour. Gastrointestinal/Mesentery: No evidence of bowel obstruction or gross inflammatory changes. The appendix is not visualized. There are no secondary signs of acute appendicitis. There is severe fecal retention which is most prominent in the rectum and sigmoid colon. Adrenals: Adrenal glands are unremarkable. Kidneys: The kidneys are in anatomic position. No evidence of nephrolithiasis. No evidence of hydronephrosis or significant perinephric fat stranding. Bladder: The urinary bladder is unremarkable. Reproductive organs:  Unremarkable. Lymph Nodes: No significant adenopathy is identified. Vasculature: Mild aortic atheromatous changes. The aorta is normal in caliber. Osseous Structures:  No acute fracture or aggressive lesions. There is levo and rotatory scoliosis of the lumbar spine.     Impression: Motion artifact limits evaluation. Findings consistent with severe constipation. Cannot exclude rectal fecal impaction. 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 Stool is visualized in the tract. Hepatic steatosis. Electronically Signed: Tay Manzano MD  9/23/2024 8:27 PM EDT  Workstation ID: [id number]      No orders to display        Current Medications     Scheduled Meds:  atorvastatin, 10 mg, Oral, Nightly  busPIRone, 30 mg, Oral, BID  clonazePAM, 0.5 mg, Oral, BID  divalproex, 500 mg, Oral, Q12H  fluvoxaMINE, 50 mg, Oral, Nightly  furosemide, 40 mg, Oral, BID  levothyroxine, 75 mcg, Oral, Q AM  liothyronine, 10 mcg, Oral, Q AM  montelukast, 10 mg, Oral, Nightly  pantoprazole, 40 mg, Oral, QAM  polyethylene glycol, 17  g, Oral, Daily  Psyllium, 1 packet, Oral, Daily  QUEtiapine, 300 mg, Oral, QAM  QUEtiapine, 400 mg, Oral, Nightly  sodium chloride, 10 mL, Intravenous, Q12H  spironolactone, 12.5 mg, Oral, QAM  tamsulosin, 0.4 mg, Oral, Nightly         Continuous Infusions:    Claudine Russell, [facility]  09/24/24   03:15 EDT

## 2024-09-24 NOTE — CONSULTS
General Surgery Consult Note      Name: Charlie Wells ADMIT: 2024   : 1970  PCP: Rody Moreno MD    MRN: [id number] LOS: 0 days   AGE/SEX: 53 y.o. male  ROOM: 227/1   [facility]      Patient Care Team:  Rody Moreno MD as PCP - General (Family Medicine)  Seipel, Joseph F, MD as Consulting Physician (Sleep Medicine)  To Barber DPM as Consulting Physician (Podiatry)  Jeff Alexis MD as Consulting Physician (Urology)  Chief Complaint   Patient presents with    GI Problem       Subjective   53-year-old gentleman with history of chronic constipation who underwent diverting colostomy during previous admission for severe chronic constipation.  Has done well since surgery came to the hospital from his long-term care facility for some blood in his ostomy bag.  No blood in his back today no bleeding since admission, hemoglobin stable.  CT with constipation.    Past Medical History:   Diagnosis Date    Acute pancreatitis     Allergic     Reglan, Benztropine    Allergic rhinitis     Anxiety     Aspiration pneumonia     Ataxia     Chronic constipation     Chronic edema     Depression     Esophageal stricture     Fecal incontinence     GERD (gastroesophageal reflux disease)     GI bleed     Hirschsprung's disease     Hyperlipidemia     Hypokalemia     Hypothyroidism     Iron deficiency anemia     Leg length discrepancy     Mediastinal lymphadenopathy     Megacolon     Mildly mentally retarded     Obesity     Positive PPD     Pulmonary hypertension     Scoliosis     Seizures     Sleep apnea     Not very compliant with CPAP    Urinary incontinence     Urinary tract infection      Past Surgical History:   Procedure Laterality Date    COLON SURGERY      colostomy, the reversed    COLONOSCOPY      2017    COLOSTOMY N/A 5/3/2022    Procedure: COLOSTOMY DIVERTING;  Surgeon: Ken Christiansen MD;  Location: [facility];  Service: General;  Laterality: N/A;    ESOPHAGEAL  DILATATION      Several    HEMICOLECTOMY Left     MYOTOMY      Rectal    RECTAL EXAMINATION UNDER ANESTHESIA N/A 2022    Procedure: RECTAL EXAM UNDER ANESTHESIA, RIGID SIGMOIDOSCOPY WITH FECAL DISIMPACTION;  Surgeon: Ken Christiansen MD;  Location: [facility];  Service: General;  Laterality: N/A;     Family History   Problem Relation Age of Onset    Early death Mother         Apparently  of bowel complications    Early death Father         Heart related    Heart disease Father        Social History     Tobacco Use    Smoking status: Never    Smokeless tobacco: Never   Vaping Use    Vaping status: Never Used   Substance Use Topics    Alcohol use: No    Drug use: No     Medications Prior to Admission   Medication Sig Dispense Refill Last Dose    acetaminophen (TYLENOL) 325 MG tablet Take 2 tablets by mouth Every 4 (Four) Hours As Needed for Fever.       acetaminophen (TYLENOL) 325 MG tablet Take 2 tablets by mouth Every 6 (Six) Hours As Needed for Mild Pain.       ammonium lactate (LAC-HYDRIN) 12 % lotion Apply  topically to the appropriate area as directed Daily. Before getting dressed daily.  Apply to BL legs and feet every shift for dry , flaky skin.       atorvastatin (LIPITOR) 10 MG tablet Take 1 tablet by mouth every night at bedtime.       Austedo 9 MG tablet Take 9 mg by mouth Every Morning.       busPIRone (BUSPAR) 30 MG tablet Take 1 tablet by mouth 2 (Two) Times a Day. 60 tablet 11     clonazePAM (KlonoPIN) 0.5 MG tablet Take 1 tablet by mouth 2 (Two) Times a Day.       divalproex (DEPAKOTE ER) 500 MG 24 hr tablet Take 1 tablet by mouth Every 12 (Twelve) Hours.       fluvoxaMINE (LUVOX) 50 MG tablet Take 1 tablet by mouth Every Night.       furosemide (LASIX) 40 MG tablet Take 1 tablet by mouth 2 (Two) Times a Day.       levothyroxine (SYNTHROID, LEVOTHROID) 75 MCG tablet Take 1 tablet by mouth Every Morning.       liothyronine (CYTOMEL) 5 MCG tablet Take 2 tablets by mouth Every Morning.        magnesium hydroxide (MILK OF MAGNESIA) 400 MG/5ML suspension Take 30 mL by mouth Every Night.       metroNIDAZOLE (METROGEL) 0.75 % gel Apply 1 Application topically to the appropriate area as directed 2 (Two) Times a Day.       montelukast (SINGULAIR) 10 MG tablet TAKE 1 TABLET BY MOUTH AT BEDTIME 30 tablet 4     pantoprazole (PROTONIX) 40 MG EC tablet Take 1 tablet by mouth Every Morning.  3     polyethylene glycol (MiraLax) 17 g packet Take 17 g by mouth Every Morning.       QUEtiapine (SEROquel) 300 MG tablet Take 1 tablet by mouth Every Morning.       QUEtiapine (SEROquel) 400 MG tablet Take 1 tablet by mouth every night at bedtime.  3     simethicone (MYLICON) 80 MG chewable tablet Chew 1 tablet Every 6 (Six) Hours As Needed for Flatulence.       spironolactone (ALDACTONE) 25 MG tablet Take 0.5 tablets by mouth Every Morning.       tamsulosin (FLOMAX) 0.4 MG capsule 24 hr capsule Take 1 capsule by mouth Every Night.        atorvastatin, 10 mg, Oral, Nightly  busPIRone, 30 mg, Oral, BID  clonazePAM, 0.5 mg, Oral, BID  divalproex, 500 mg, Oral, Q12H  Fleet Saline Enema, 1 enema, Rectal, Once  fluvoxaMINE, 50 mg, Oral, Nightly  furosemide, 40 mg, Oral, BID  levothyroxine, 75 mcg, Oral, Q AM  liothyronine, 10 mcg, Oral, Q AM  montelukast, 10 mg, Oral, Nightly  pantoprazole, 40 mg, Oral, QAM AC  polyethylene glycol, 17 g, Oral, Daily  Psyllium, 1 packet, Oral, Daily  QUEtiapine, 300 mg, Oral, Daily  QUEtiapine, 400 mg, Oral, Nightly  sodium chloride, 10 mL, Intravenous, Q12H  spironolactone, 12.5 mg, Oral, Daily  tamsulosin, 0.4 mg, Oral, Nightly           acetaminophen **OR** acetaminophen **OR** acetaminophen    ammonium lactate    ondansetron    [COMPLETED] Insert Peripheral IV **AND** sodium chloride    sodium chloride    sodium chloride  Metoclopramide and Benztropine    Review of Systems   Constitutional:  Negative for chills and fever.   HENT:  Negative for rhinorrhea and trouble swallowing.    Eyes:   "Negative for blurred vision and double vision.   Respiratory:  Negative for cough and shortness of breath.    Cardiovascular:  Negative for chest pain and palpitations.   Gastrointestinal:  Negative for abdominal distention and abdominal pain.   Musculoskeletal:  Negative for back pain and myalgias.   Skin:  Negative for color change and dry skin.   Neurological:  Negative for headache and confusion.   Psychiatric/Behavioral:  Negative for agitation and hallucinations.         Objective     Vital Signs and Labs:  Vital Signs Patient Vitals for the past 24 hrs:   BP Temp Temp src Pulse Resp SpO2 Height Weight   09/24/24 1015 -- -- -- 83 -- (!) 88 % -- --   09/24/24 1000 -- -- -- 87 -- (!) 86 % -- --   09/24/24 0947 131/79 98.5 °F (36.9 °C) Oral 83 18 91 % -- --   09/24/24 0830 -- -- -- 86 -- (!) 87 % -- --   09/24/24 0815 -- -- -- 85 -- (!) 88 % -- --   09/24/24 0800 -- -- -- 85 -- 90 % -- --   09/24/24 0745 -- -- -- 87 -- -- -- --   09/24/24 0730 -- -- -- 87 -- -- -- --   09/24/24 0715 -- -- -- 85 -- -- -- --   09/24/24 0700 -- -- -- 86 -- -- -- --   09/24/24 0645 -- -- -- 85 -- -- -- --   09/24/24 0615 -- -- -- 84 -- -- -- --   09/24/24 0600 -- -- -- 86 -- -- -- --   09/24/24 0545 -- -- -- 86 -- -- -- --   09/24/24 0542 134/80 98 °F (36.7 °C) Oral 86 18 (!) 89 % -- 125 kg (276 lb 3.8 oz)   09/24/24 0530 -- -- -- 87 -- -- -- --   09/24/24 0515 -- -- -- 89 -- -- -- --   09/24/24 0500 -- -- -- 88 -- -- -- --   09/24/24 0445 -- -- -- 85 -- -- -- --   09/24/24 0430 -- -- -- 88 -- -- -- --   09/24/24 0415 -- -- -- 88 -- -- -- --   09/24/24 0400 -- -- -- 87 -- -- -- --   09/24/24 0345 -- -- -- 88 -- -- -- --   09/24/24 0330 -- -- -- 88 -- -- -- --   09/24/24 0315 -- -- -- 90 -- -- -- --   09/24/24 0300 -- -- -- 87 -- -- -- --   09/24/24 0245 -- -- -- 85 -- -- -- --   09/24/24 0230 -- -- -- 87 -- -- -- --   09/24/24 0215 -- -- -- 85 -- -- -- --   09/24/24 0205 126/84 98.4 °F (36.9 °C) Oral 87 18 91 % 175.3 cm (69.02\") 125 " "kg (276 lb 3.8 oz)   09/23/24 2332 -- -- -- 90 -- 95 % -- --   09/23/24 2331 122/81 -- -- -- -- -- -- --   09/23/24 2301 122/79 -- -- 92 -- 94 % -- --   09/23/24 2246 139/92 -- -- 90 -- 94 % -- --   09/23/24 2201 132/96 -- -- 90 -- 95 % -- --   09/23/24 2146 134/80 -- -- 88 -- 96 % -- --   09/23/24 2131 133/83 -- -- 89 -- 97 % -- --   09/23/24 1916 147/74 -- -- 90 -- -- -- --   09/23/24 1915 -- -- -- -- -- 90 % -- --   09/23/24 1901 127/79 -- -- 89 -- 92 % -- --   09/23/24 1802 119/69 -- -- 93 -- 92 % -- --   09/23/24 1719 134/85 97.9 °F (36.6 °C) Oral 78 19 97 % 175.3 cm (69\") 136 kg (300 lb)     I/O:  I/O last 3 completed shifts:  In: -   Out: 500 [Urine:500]    Physical Exam:  Physical Exam  Constitutional:       General: He is not in acute distress.     Appearance: Normal appearance. He is not ill-appearing.   HENT:      Head: Normocephalic and atraumatic.      Right Ear: External ear normal.      Left Ear: External ear normal.   Eyes:      Extraocular Movements: Extraocular movements intact.      Conjunctiva/sclera: Conjunctivae normal.   Cardiovascular:      Rate and Rhythm: Normal rate and regular rhythm.   Pulmonary:      Effort: Pulmonary effort is normal. No respiratory distress.   Abdominal:      General: There is no distension.      Palpations: Abdomen is soft.      Tenderness: There is no abdominal tenderness.   Musculoskeletal:         General: No swelling or deformity.   Skin:     General: Skin is warm and dry.   Neurological:      Mental Status: He is alert and oriented to person, place, and time. Mental status is at baseline.         CBC    Results from last 7 days   Lab Units 09/24/24  0030 09/23/24  1816   WBC 10*3/mm3 6.71 6.13   HEMOGLOBIN g/dL 13.1 13.0   PLATELETS 10*3/mm3 202 215     BMP   Results from last 7 days   Lab Units 09/24/24  0030 09/23/24  1816   SODIUM mmol/L 140 141   POTASSIUM mmol/L 4.3 4.1   CHLORIDE mmol/L 102 100   CO2 mmol/L 30.5* 32.0*   BUN mg/dL 8 10   CREATININE mg/dL " 0.64* 0.75*   GLUCOSE mg/dL 92 150*     Radiology(recent) CT Abdomen Pelvis Without Contrast    Result Date: 9/23/2024  Impression: Motion artifact limits evaluation. Findings consistent with severe constipation. Cannot exclude rectal fecal impaction. 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 Stool is visualized in the tract. Hepatic steatosis. Electronically Signed: Tay Manzano MD  9/23/2024 8:27 PM EDT  Workstation ID: [id number]     I reviewed the patient's new clinical results.    Assessment & Plan       Fecal impaction      53-year-old gentleman with history of chronic constipation who underwent diverting colostomy during previous admission for severe chronic constipation.  Has done well since surgery came to the hospital from his long-term care facility for some blood in his ostomy bag.  No blood in his back today no bleeding since admission, hemoglobin stable.  CT with constipation.  This is a chronic issue.  Recommend enema and stoma irrigation.  If no blood in his ostomy okay for discharge from my standpoint.  Okay for regular diet.  I will be available inpatient as needed please call with change questions or concerns      This note was created using Dragon Voice Recognition software.    Ken Christiansen MD  09/24/24  11:05 EDT

## 2024-09-24 NOTE — PLAN OF CARE
Goal Outcome Evaluation:  Plan of Care Reviewed With: patient           Outcome Evaluation: Pt is a 52 y/o ECF resident of [facility] with PMH of asthma, obstructive sleep apnea, chronic pancreatitis, Hirschberg's disease, history of chronic constipation with complication of megacolon requiring a diverting colostomy, ataxia, anxiety, chronic cellulitis, Wilmore's disease, hypothyroidism, scoliosis, chronic constipation, reflux, developmental delay, unspecified systolic and diastolic congestive heart failure.  Concern for blood loss in colostomy bag.  Dx;  Fecal impaction.  CT abdomen/pelvis:  severe constipation, cannot exclude fecal impaction.  Pt on room air with IV this date.  Pt oriented to self and place but not time.  Min A for bed mobility, Min A for transfers with use of RW x 3 attempts.  Pt ambulated 15' with RW with CGA.  Recommend return back to ECF.      Anticipated Discharge Disposition (PT): extended care facility

## 2024-09-24 NOTE — NURSING NOTE
53-year-old male with a history of chronic constipation for severe constipation.  Patient presents from an ECF with concerns of blood loss in the ostomy appliance.  Surgery has been consulted with recommendations for an ostomy irrigation.    I instilled 500 cc of tap water into the left lower quadrant colostomy with a total of 750 cc attempted.  I did lose a couple 100 cc into the pouch.  The only return I received was a brown water.  Patient tolerated well without issue.    The stoma is quite retracted.  Peristomal skin is open denuded bleeds easily.  I did repouch him using a slim adapt ring with [place] 1 piece cut to fit system

## 2024-09-24 NOTE — THERAPY EVALUATION
Patient Name: Charlie Wells  : 1970    MRN: [id number]                              Today's Date: 2024       Admit Date: 2024    Visit Dx:     ICD-10-CM ICD-9-CM   1. Fecal impaction  K56.41 560.32     Patient Active Problem List   Diagnosis    Anemia in other chronic diseases classified elsewhere    Ataxia    Constipation, chronic    Dependence on continuous positive airway pressure ventilation    Disorder of foot    Encounter for general adult medical examination without abnormal findings    Gastroesophageal reflux disease    Esophageal stricture    Hay fever    Hirschsprung's disease    Hyperlipidemia    Hypothyroidism    Lung mass    Mediastinal lymphadenopathy    Moderate intellectual disability    Obstructive sleep apnea    Proteinuria    Seizure disorder    Unequal leg length    Full incontinence of feces    Urinary incontinence    Leg edema    Elevated PSA    Rosacea    Medicare annual wellness visit, subsequent    Pneumonia due to infectious organism    Abdominal pain    Hypokalemia    Pancreatitis    Aspiration into respiratory tract    Dyspnea on exertion    Other idiopathic scoliosis, thoracolumbar region    Development delay    Musculoskeletal neck pain    Adolescent idiopathic scoliosis of thoracolumbar region    Scoliosis (and kyphoscoliosis), idiopathic    Tardive dyskinesia    Cognitive and behavioral changes    Prostate cancer screening    Abdominal pain, unspecified abdominal location    Attention to colostomy    Fecal impaction     Past Medical History:   Diagnosis Date    Acute pancreatitis     Allergic     Reglan, Benztropine    Allergic rhinitis     Anxiety     Aspiration pneumonia     Ataxia     Chronic constipation     Chronic edema     Depression     Esophageal stricture     Fecal incontinence     GERD (gastroesophageal reflux disease)     GI bleed     Hirschsprung's disease     Hyperlipidemia     Hypokalemia     Hypothyroidism     Iron deficiency anemia     Leg  length discrepancy     Mediastinal lymphadenopathy     Megacolon     Mildly mentally retarded     Obesity     Positive PPD     Pulmonary hypertension     Scoliosis     Seizures     Sleep apnea     Not very compliant with CPAP    Urinary incontinence     Urinary tract infection      Past Surgical History:   Procedure Laterality Date    COLON SURGERY  1989    colostomy, the reversed    COLONOSCOPY      June 2017    COLOSTOMY N/A 5/3/2022    Procedure: COLOSTOMY DIVERTING;  Surgeon: Ken Christiansen MD;  Location: [facility] MAIN OR;  Service: General;  Laterality: N/A;    ESOPHAGEAL DILATATION      Several    HEMICOLECTOMY Left     MYOTOMY      Rectal    RECTAL EXAMINATION UNDER ANESTHESIA N/A 5/1/2022    Procedure: RECTAL EXAM UNDER ANESTHESIA, RIGID SIGMOIDOSCOPY WITH FECAL DISIMPACTION;  Surgeon: Ken Christiansen MD;  Location: [facility] MAIN OR;  Service: General;  Laterality: N/A;      General Information       Row Name 09/24/24 1419          Physical Therapy Time and Intention    Document Type evaluation  -AM     Mode of Treatment physical therapy  -AM       Row Name 09/24/24 1419          General Information    Patient Profile Reviewed yes  -AM     Prior Level of Function all household mobility;gait;transfer;bed mobility  supervision at [facility], ambulates with RW  -AM     Existing Precautions/Restrictions fall  -AM     Barriers to Rehab medically complex;cognitive status  -AM       Row Name 09/24/24 1419          Living Environment    People in Home facility resident  -AM       Row Name 09/24/24 1419          Home Main Entrance    Number of Stairs, Main Entrance none  -AM       Row Name 09/24/24 1419          Stairs Within Home, Primary    Number of Stairs, Within Home, Primary none  -AM       Row Name 09/24/24 1419          Cognition    Orientation Status (Cognition) oriented to;person;place;disoriented to;time  -AM       Row Name 09/24/24 1419          Safety Issues, Functional Mobility    Impairments Affecting Function  "(Mobility) balance;endurance/activity tolerance;pain;strength  -AM     Comment, Safety Issues/Impairments (Mobility) gait belt utilized  -AM               User Key  (r) = Recorded By, (t) = Taken By, (c) = Cosigned By      Initials Name Provider Type    AM Tian Norris, PT Physical Therapist                   Mobility       Row Name 09/24/24 1420          Bed Mobility    Bed Mobility supine-sit  -AM     Supine-Sit [place] (Bed Mobility) minimum assist (75% patient effort);1 person assist  -AM     Assistive Device (Bed Mobility) bed rails;head of bed elevated  -AM     Comment, (Bed Mobility) Pt repeatedly stating \"I too fat\".  Reassured pt and with max encouragement pt able to mobilize to EOB  -AM       Row Name 09/24/24 1420          Sit-Stand Transfer    Sit-Stand [place] (Transfers) minimum assist (75% patient effort);1 person to manage equipment  -AM     Assistive Device (Sit-Stand Transfers) walker, front-wheeled  -AM     Comment, (Sit-Stand Transfer) x 3 attempts; cues for hand placement  -AM       Row Name 09/24/24 1420          Gait/Stairs (Locomotion)    [place] Level (Gait) contact guard;1 person assist  -AM     Assistive Device (Gait) walker, front-wheeled  -AM     Distance in Feet (Gait) 15  -AM     Deviations/Abnormal Patterns (Gait) base of support, narrow  -AM     Bilateral Gait Deviations forward flexed posture  -AM     Comment, (Gait/Stairs) rapid pace, cues to keep BLEs inside RW  -AM               User Key  (r) = Recorded By, (t) = Taken By, (c) = Cosigned By      Initials Name Provider Type    AM Tian Norris, PT Physical Therapist                   Obj/Interventions       Row Name 09/24/24 1423          Range of Motion Comprehensive    General Range of Motion no range of motion deficits identified  -AM       Row Name 09/24/24 1423          Strength Comprehensive (MMT)    Comment, General Manual Muscle Testing (MMT) Assessment 4/5 BLEs  -AM       Row Name 09/24/24 1423          " Motor Skills    Motor Skills functional endurance  -AM     Functional Endurance fair  -AM       Row Name 09/24/24 1423          Balance    Balance Assessment sitting static balance;sitting dynamic balance;sit to stand dynamic balance;standing static balance;standing dynamic balance  -AM     Static Sitting Balance supervision  -AM     Dynamic Sitting Balance supervision  -AM     Position, Sitting Balance unsupported;sitting edge of bed  -AM     Sit to Stand Dynamic Balance minimal assist  -AM     Static Standing Balance contact guard  -AM     Dynamic Standing Balance contact guard  -AM     Position/Device Used, Standing Balance supported;walker, rolling  -AM       Row Name 09/24/24 1423          Sensory Assessment (Somatosensory)    Sensory Assessment (Somatosensory) sensation intact  -AM               User Key  (r) = Recorded By, (t) = Taken By, (c) = Cosigned By      Initials Name Provider Type    AM Tian Norris, PT Physical Therapist                   Goals/Plan       Row Name 09/24/24 1427          Bed Mobility Goal 1 (PT)    Activity/Assistive Device (Bed Mobility Goal 1, PT) bed mobility activities, all  -AM     [place] Level/Cues Needed (Bed Mobility Goal 1, PT) supervision required  -AM     Time Frame (Bed Mobility Goal 1, PT) long term goal (LTG)  -AM       Row Name 09/24/24 1427          Transfer Goal 1 (PT)    Activity/Assistive Device (Transfer Goal 1, PT) transfers, all;walker, rolling  -AM     [place] Level/Cues Needed (Transfer Goal 1, PT) supervision required  -AM     Time Frame (Transfer Goal 1, PT) long term goal (LTG)  -AM       Row Name 09/24/24 1427          Gait Training Goal 1 (PT)    Activity/Assistive Device (Gait Training Goal 1, PT) gait (walking locomotion);walker, rolling  -AM     [place] Level (Gait Training Goal 1, PT) supervision required  -AM     Distance (Gait Training Goal 1, PT) 150'  -AM     Time Frame (Gait Training Goal 1, PT) long term goal (LTG)  -AM        Row Name 09/24/24 1427          Therapy Assessment/Plan (PT)    Planned Therapy Interventions (PT) balance training;bed mobility training;gait training;strengthening;patient/family education;transfer training  -AM               User Key  (r) = Recorded By, (t) = Taken By, (c) = Cosigned By      Initials Name Provider Type    AM Tian Norris, PT Physical Therapist                   Clinical Impression       Row Name 09/24/24 1423          Pain    Pain Intervention(s) Repositioned;Ambulation/increased activity;Emotional support;Therapeutic presence  -AM     Additional Documentation Pain Scale: FACES Pre/Post-Treatment (Group)  -AM       Row Name 09/24/24 1423          Pain Scale: FACES Pre/Post-Treatment    Pain: FACES Scale, Pretreatment 4-->hurts little more  -AM     Posttreatment Pain Rating 4-->hurts little more  -AM     Pain Location - abdomen  -AM       Row Name 09/24/24 1423          Plan of Care Review    Plan of Care Reviewed With patient  -AM     Outcome Evaluation Pt is a 52 y/o [facility] resident of [facility] with PMH of asthma, obstructive sleep apnea, chronic pancreatitis, Hirschberg's disease, history of chronic constipation with complication of megacolon requiring a diverting colostomy, ataxia, anxiety, chronic cellulitis, Magalys's disease, hypothyroidism, scoliosis, chronic constipation, reflux, developmental delay, unspecified systolic and diastolic congestive heart failure.  Concern for blood loss in colostomy bag.  Dx;  Fecal impaction.  CT abdomen/pelvis:  severe constipation, cannot exclude fecal impaction.  Pt on room air with IV this date.  Pt oriented to self and place but not time.  Min A for bed mobility, Min A for transfers with use of RW x 3 attempts.  Pt ambulated 15' with RW with CGA.  Recommend return back to [facility]CF.  -AM       Row Name 09/24/24 1423          Therapy Assessment/Plan (PT)    Patient/Family Therapy Goals Statement (PT) To go home  -AM     Rehab Potential  (PT) good, to achieve stated therapy goals  -AM     Criteria for Skilled Interventions Met (PT) yes  -AM     Therapy Frequency (PT) 2 times/wk  -AM     Predicted Duration of Therapy Intervention (PT) until d/c  -AM       Row Name 09/24/24 1423          Vital Signs    O2 Delivery Pre Treatment room air  -AM     O2 Delivery Intra Treatment room air  -AM     O2 Delivery Post Treatment room air  -AM     Pre Patient Position Supine  -AM     Intra Patient Position Standing  -AM     Post Patient Position Sitting  -AM       Row Name 09/24/24 1423          Positioning and Restraints    Pre-Treatment Position in bed  -AM     Post Treatment Position chair  -AM     In Chair notified nsg;reclined;call light within reach;encouraged to call for assist;exit alarm on  -AM               User Key  (r) = Recorded By, (t) = Taken By, (c) = Cosigned By      Initials Name Provider Type    AM Tian Norris, PT Physical Therapist                   Outcome Measures       Row Name 09/24/24 1427 09/24/24 0800       How much help from another person do you currently need...    Turning from your back to your side while in flat bed without using bedrails? 3  -AM 3  -MR    Moving from lying on back to sitting on the side of a flat bed without bedrails? 3  -AM 3  -MR    Moving to and from a bed to a chair (including a wheelchair)? 3  -AM 3  -MR    Standing up from a chair using your arms (e.g., wheelchair, bedside chair)? 3  -AM 3  -MR    Climbing 3-5 steps with a railing? 1  -AM 1  -MR    To walk in hospital room? 3  -AM 2  -MR    AM-PAC 6 Clicks Score (PT) 16  -AM 15  -MR    Highest Level of Mobility Goal 5 --> Static standing  -AM 4 --> Transfer to chair/commode  -MR      Row Name 09/24/24 0300          How much help from another person do you currently need...    Turning from your back to your side while in flat bed without using bedrails? 3  -JH     Moving from lying on back to sitting on the side of a flat bed without bedrails? 3  -JH      Moving to and from a bed to a chair (including a wheelchair)? 3  -JH     Standing up from a chair using your arms (e.g., wheelchair, bedside chair)? 3  -JH     Climbing 3-5 steps with a railing? 1  -JH     To walk in hospital room? 3  -JH     AM-PAC 6 Clicks Score (PT) 16  -     Highest Level of Mobility Goal 5 --> Static standing  -       Row Name 09/24/24 1427          Functional Assessment    Outcome Measure Options AM-PAC 6 Clicks Basic Mobility (PT)  -AM               User Key  (r) = Recorded By, (t) = Taken By, (c) = Cosigned By      Initials Name Provider Type    AM Tian Norris, PT Physical Therapist    MR GardneraMuna, RN Registered Nurse    Hiwot Coleman, RN Registered Nurse                                 Physical Therapy Education       Title: PT OT SLP Therapies (Resolved)       Topic: Physical Therapy (Resolved)       Point: Mobility training (Resolved)       Learning Progress Summary             Patient Acceptance, E,TB, VU,NR by AM at 9/24/2024 1428                         Point: Body mechanics (Resolved)       Learning Progress Summary             Patient Acceptance, E,TB, VU,NR by AM at 9/24/2024 1428                         Point: Precautions (Resolved)       Learning Progress Summary             Patient Acceptance, E,TB, VU,NR by AM at 9/24/2024 1428                                         User Key       Initials Effective Dates Name Provider Type Discipline     05/10/21 -  Tian Norris, PT Physical Therapist PT                  PT Recommendation and Plan  Planned Therapy Interventions (PT): balance training, bed mobility training, gait training, strengthening, patient/family education, transfer training  Plan of Care Reviewed With: patient  Outcome Evaluation: Pt is a 52 y/o [facility] resident of [facility] with PMH of asthma, obstructive sleep apnea, chronic pancreatitis, Hirschberg's disease, history of chronic constipation with complication of megacolon  requiring a diverting colostomy, ataxia, anxiety, chronic cellulitis, [place]'s disease, hypothyroidism, scoliosis, chronic constipation, reflux, developmental delay, unspecified systolic and diastolic congestive heart failure.  Concern for blood loss in colostomy bag.  Dx;  Fecal impaction.  CT abdomen/pelvis:  severe constipation, cannot exclude fecal impaction.  Pt on room air with IV this date.  Pt oriented to self and place but not time.  Min A for bed mobility, Min A for transfers with use of RW x 3 attempts.  Pt ambulated 15' with RW with CGA.  [facility]     Time Calculation:         PT Charges       Row Name 09/24/24 1428             Time Calculation    Start Time 1016  -AM      Stop Time 1044  -AM      Time Calculation (min) 28 min  -AM      PT Received On 09/24/24  -AM      PT - Next Appointment 09/26/24  -AM      PT Goal Re-Cert Due Date 10/08/24  -AM                User Key  (r) = Recorded By, (t) = Taken By, (c) = Cosigned By      Initials Name Provider Type    Tian Mcknight, PT Physical Therapist                  Therapy Charges for Today       Code Description Service Date Service Provider Modifiers Qty    [id number]  PT EVAL MOD COMPLEXITY 4 9/24/2024 Tian Norris, PT GP 1            PT G-Codes  Outcome Measure Options: AM-PAC 6 Clicks Basic Mobility (PT)  AM-PAC 6 Clicks Score (PT): 16  PT Discharge Summary  Anticipated Discharge Disposition (PT): extended care facility    Tian Norris, BRENDA  9/24/2024

## 2024-09-24 NOTE — PAYOR COMM NOTE
"PA FORM WITH CLINICALS FOR INPATIENT PRECERT:                        AUTHORIZATION PENDING:   PLEASE CALL OR FAX DETERMINATION TO CONTACT BELOW. THANK YOU.        Lore Mariee RN MSN  /UR  [facility]  [phone] office  [phone] fax  [email or web address]    [facility]  NPI: [id number]  Tax: [phone]            Charlie Wells (53 y.o. Male)       Date of Birth   1970    Social Security Number       Address   [address] [place] [address]    Home Phone   [phone]    MRN   [id number]       Evangelical   None    Marital Status   Single                            Admission Date   9/23/24    Admission Type   Emergency    Admitting Provider   Brammell, Timothy Duane, MD    Attending Provider   Brammell, Timothy Duane, MD    Department, Room/Bed   [facility] OBSERVATION, 227/1       Discharge Date       Discharge Disposition       Discharge Destination                                 Attending Provider: Brammell, Timothy Duane, MD    Allergies: Metoclopramide, Benztropine    Isolation: Contact   Infection: Candida Auris (rule out) (09/24/24)   Code Status: CPR    Ht: 175.3 cm (69.02\")   Wt: 125 kg (276 lb 3.8 oz)    Admission Cmt: None   Principal Problem: Fecal impaction [K56.41]                   Active Insurance as of 9/23/2024       Primary Coverage       Payor Plan Insurance Group Employer/Plan Group    COMMUNICARE MEDICARE ADVANTAGE COMMUNICARE MEDICARE ADVANTAGE [id number]       Payor Plan Address Payor Plan Phone Number Payor Plan Fax Number Effective Dates    [address] [address]   9/1/2022 - None Entered    [place] [id number]         Subscriber Name Subscriber Birth Date Member ID       CHARLIE WELLS 1970 [id number]               Secondary Coverage       Payor Plan Insurance Group Employer/Plan Group    [place]        Payor Plan Address Payor Plan Phone Number Payor Plan Fax " Number Effective Dates    [address] [address]   1970 - None Entered    [place] [address]         Subscriber Name Subscriber Birth Date Member ID       CHARLIE WELLS 1970 [id number]                     Emergency Contacts        (Rel.) Home Phone Work Phone Mobile Phone    Isidro Wells (Brother) -- -- [phone]    TALYA WELLS (Relative) -- -- [phone]          24 1121  Inpatient Admission  Once     Completed     Level of Care: Med/Surg  Diagnosis: Fecal impaction [1956]  Admitting Physician: BRAMMELL, TIMOTHY DUANE [[id number]]  Attending Physician: BRAMMELL, TIMOTHY DUANE [[id number]]  Certification: I Certify That Inpatient Hospital Services Are Medically Necessary For Greater Than 2 Midnights    24 1126   24 0811  Transfer Patient  Once        Level of Care: Med/Surg    24 0810   24 2108  Initiate Observation Status  Once     Completed     Level of Care: Med/Surg  Diagnosis: Fecal impaction [1956]              History & Physical        Claudine Russell APRN at 24 2208       Attestation signed by Ag Seals MD at 24 0709    I have reviewed this documentation and agree.                      [facility] Medicine Services    Hospitalist History and Physical     Charlie Wells : 1970 MRN:[id number] LOS:0 ROOM: [facility]     Reason for admission: Fecal impaction     Assessment / Plan     Blood in ostomy  Fecal impaction  Nausea   - scant blood noted on ED. No further bleeding  - nurse noted skin breakdown around ostomy  - consult ostomy nurse for care   -CT impression: Findings consistent with severe constipation. Cannot exclude rectal fecal impaction. 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 Stool is visualized in the tract.   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  Huntingtons disease  Seizure disorder   - stable    SHARON  - O2 prn     Chronic dry skin, cellulitis, rosacea   Chronic lymphadema BLE  - apply creams as needed     GERD  - ppi     Hypothyroid  -synthroid     Scoliosis  - tylenol prn     Anxiety and depression  - buspar, depakote, seroquel     Combined systolic and diastolic CHF   - lasix, spironalactone    Elevated glucose  - family requested A1C due to family hx of diabetes   - monitor    HLD  - statin     Code Status (Patient has no pulse and is not breathing): CPR (Attempt to Resuscitate)  Medical Interventions (Patient has pulse or is breathing): Full Support       Nutrition:   NPO Diet NPO Type: Strict NPO     VTE Prophylaxis:  Mechanical VTE prophylaxis orders are present.         History of Present illness     Charlie Wells is a 53 y.o. male with a resident of [facility] with PMH of asthma, obstructive sleep apnea, chronic pancreatitis, Hirschberg's disease, history of chronic constipation with complication of megacolon requiring a diverting colostomy, ataxia, anxiety, chronic cellulitis, Gilchrist's disease, hypothyroidism, scoliosis, chronic constipation, reflux, developmental delay, unspecified systolic and diastolic congestive heart failure presented to the hospital and was admitted with a principal diagnosis of Fecal impaction.     It was reported that nursing facility reported that there was a large amount of blood in patient's ostomy and was sent to the ED for evaluation.  ED provider noted that there was only a scant amount present during his exam.  Patient denies any abdominal pain.  Does report nausea.  Stool is present in the ostomy that is brown in color.  Vital signs are stable.  Negative for leukocytosis.  Negative for any electrolyte abnormality.  CT was positive for possible  fecal impaction and chronic constipation.  Patient is status post colostomy 5/3/2022.  Hemoglobin stable will consult general surgery to continue to follow.  Patient given MiraLAX and Benefiber and admitted for further observation.    Patient was seen and examined on 09/23/24 at 03:15 EDT .    Subjective / Review of systems     Review of Systems   Pt slow to respond and poor historian  Only complaint was nausea      Past Medical/Surgical/Social/Family History & Allergies     Past Medical History:   Diagnosis Date    Acute pancreatitis     Allergic     Reglan, Benztropine    Allergic rhinitis     Anxiety     Aspiration pneumonia     Ataxia     Chronic constipation     Chronic edema     Depression     Esophageal stricture     Fecal incontinence     GERD (gastroesophageal reflux disease)     GI bleed     Hirschsprung's disease     Hyperlipidemia     Hypokalemia     Hypothyroidism     Iron deficiency anemia     Leg length discrepancy     Mediastinal lymphadenopathy     Megacolon     Mildly mentally retarded     Obesity     Positive PPD     Pulmonary hypertension     Scoliosis     Seizures     Sleep apnea     Not very compliant with CPAP    Urinary incontinence     Urinary tract infection       Past Surgical History:   Procedure Laterality Date    COLON SURGERY  1989    colostomy, the reversed    COLONOSCOPY      June 2017    COLOSTOMY N/A 5/3/2022    Procedure: COLOSTOMY DIVERTING;  Surgeon: Ken Christiansen MD;  Location: [facility];  Service: General;  Laterality: N/A;    ESOPHAGEAL DILATATION      Several    HEMICOLECTOMY Left     MYOTOMY      Rectal    RECTAL EXAMINATION UNDER ANESTHESIA N/A 5/1/2022    Procedure: RECTAL EXAM UNDER ANESTHESIA, RIGID SIGMOIDOSCOPY WITH FECAL DISIMPACTION;  Surgeon: Ken Christiansen MD;  Location: [facility];  Service: General;  Laterality: N/A;      Social History     Socioeconomic History    Marital status: Single   Tobacco Use    Smoking status: Never    Smokeless tobacco:  Never   Vaping Use    Vaping status: Never Used   Substance and Sexual Activity    Alcohol use: No    Drug use: No    Sexual activity: Never      Family History   Problem Relation Age of Onset    Early death Mother         Apparently  of bowel complications    Early death Father         Heart related    Heart disease Father       Allergies   Allergen Reactions    Metoclopramide Rash     Other reaction(s): not known     Benztropine Delirium      Social Determinants of Health     Tobacco Use: Low Risk  (2024)    Patient History     Smoking Tobacco Use: Never     Smokeless Tobacco Use: Never     Passive Exposure: Not on file   Alcohol Use: Not At Risk (2024)    AUDIT-C     Frequency of Alcohol Consumption: Never     Average Number of Drinks: Patient does not drink     Frequency of Binge Drinking: Never   Financial Resource Strain: Not on file   Food Insecurity: Not on file   Transportation Needs: Not on file   Physical Activity: Not on file   Stress: Not on file   Social Connections: Unknown (10/11/2023)    Family and Community Support     Help with Day-to-Day Activities: Not on file     Lonely or Isolated: Not on file   Interpersonal Safety: Not At Risk (2024)    Abuse Screen     Unsafe at Home or Work/School: no     Feels Threatened by Someone?: no     Does Anyone Keep You from Contacting Others or Doint Things Outside the Home?: no     Physical Sign of Abuse Present: no   Depression: Not at risk (2021)    PHQ-2     PHQ-2 Score: 0   Housing Stability: Unknown (2024)    Housing Stability     Current Living Arrangements: residential facility     Potentially Unsafe Housing Conditions: Not on file   Utilities: Not on file   Health Literacy: Unknown (10/11/2023)    Education     Help with school or training?: Not on file     Preferred Language: Not on file   Employment: Unknown (10/11/2023)    Employment     Do you want help finding or keeping work or a job?: Not on file   Disabilities: Unknown  (10/11/2023)    Disabilities     Concentrating, Remembering, or Making Decisions Difficulty: Not on file     Doing Errands Independently Difficulty: Not on file        Home Medications     Prior to Admission medications    Medication Sig Start Date End Date Taking? Authorizing Provider   acetaminophen (TYLENOL) 325 MG tablet Take 2 tablets by mouth Every 6 (Six) Hours As Needed for Mild Pain.   Yes Eileen Martines MD   atorvastatin (LIPITOR) 10 MG tablet Take 1 tablet by mouth every night at bedtime. 9/10/24  Yes Eileen Martines MD   spironolactone (ALDACTONE) 25 MG tablet Take 0.5 tablets by mouth Every Morning. 8/31/24  Yes Eileen Martines MD   ammonium lactate (LAC-HYDRIN) 12 % lotion Apply  topically to the appropriate area as directed Daily. Before getting dressed daily 1/26/22   Eileen Martines MD   Austedo 9 MG tablet Take 9 mg by mouth Daily. 12/10/21   Eileen Martines MD   Benzocaine (SOLARCAINE EX) Apply  topically 4 (Four) Times a Day As Needed.    Eileen Martines MD   busPIRone (BUSPAR) 30 MG tablet Take 1 tablet by mouth 2 (Two) Times a Day. 7/29/19   Charlie Wing MD   chlorpheniramine (CHLOR-TRIMETON) 4 MG tablet Take 4 mg by mouth Every 4 (Four) Hours As Needed for Allergies.    Eileen Martines MD   clonazePAM (KlonoPIN) 0.5 MG tablet Take 0.25 mg by mouth Every Morning.    Eileen Martines MD   clonazePAM (KlonoPIN) 0.5 MG tablet Take 0.5 mg by mouth Every Night.    Eileen Martines MD   clotrimazole (LOTRIMIN) 1 % cream  11/11/20   Eileen Martines MD   coal tar (Neutrogena T/Gel) 0.5 % shampoo Use 3 times per week 12/30/20   Charlie Wing MD   dextromethorphan-guaifenesin (ROBITUSSIN-DM)  MG/5ML syrup Take 10 mL by mouth Every 4 (Four) Hours As Needed.    Eileen Martines MD   divalproex (DEPAKOTE ER) 500 MG 24 hr tablet Take 1 tablet by mouth Every 12 (Twelve) Hours.    Eileen Martines MD   divalproex (DEPAKOTE) 500  MG DR tablet Take 10,000 mg by mouth 2 (Two) Times a Day. 10/2/19   Eileen Martines MD   estradiol (ESTRACE) 1 MG tablet Take 1 mg by mouth Daily. 2/10/20   Eileen Martines MD   fluvoxaMINE (LUVOX) 50 MG tablet Take 50 mg by mouth Every Night.    Eileen Martines MD   furosemide (LASIX) 40 MG tablet Take 60 mg by mouth Daily.    Eileen Martines MD   hydrocortisone 2.5 % cream Apply 1 application topically to the appropriate area as directed 4 (Four) Times a Day As Needed (itching).    Eileen Martines MD   hydrocortisone 2.5 % cream Apply 1 application topically to the appropriate area as directed 2 (Two) Times a Day As Needed (Face and ears for rosacea).    Eileen Martines MD   ibuprofen (ADVIL,MOTRIN) 200 MG tablet Take 200 mg by mouth Every 4 (Four) Hours As Needed for Mild Pain .    Eileen Martines MD   levothyroxine (SYNTHROID, LEVOTHROID) 125 MCG tablet Take 125 mcg by mouth Every Morning.    Eileen Martines MD   linaclotide (LINZESS) 290 MCG capsule capsule Take 290 mcg by mouth Daily With Breakfast. 30 minutes after meal    Eileen Martines MD   liothyronine (CYTOMEL) 5 MCG tablet Take 5 mcg by mouth Every Morning.    Eileen Martines MD   loperamide (IMODIUM) 2 MG capsule Take 4 mg by mouth 1 (One) Time As Needed for Diarrhea (first loose stool).    Eileen Martines MD   loperamide (IMODIUM) 2 MG capsule Take 2 mg by mouth 4 (Four) Times a Day As Needed for Diarrhea.    Eileen Martines MD   magnesium hydroxide (MILK OF MAGNESIA) 400 MG/5ML suspension Take 30 mL by mouth Every Night.    Eileen Martines MD   montelukast (SINGULAIR) 10 MG tablet TAKE 1 TABLET BY MOUTH AT BEDTIME 3/6/22   Rody Moreno MD   MOTEGRITY 2 MG tablet Take 2 mg by mouth Daily. 2/12/20   Eileen Martines MD   pantoprazole (PROTONIX) 40 MG EC tablet Take 40 mg by mouth Daily. 10/17/19   Eileen Martines MD   polyethylene glycol (MiraLax) 17 g packet  Take 34 g by mouth Daily.    Eileen Martines MD   QUEtiapine (SEROquel) 400 MG tablet Take 400 mg by mouth 2 (Two) Times a Day. 6/10/19   Eileen Martines MD   simethicone (MYLICON) 80 MG chewable tablet Chew 80 mg Every 6 (Six) Hours As Needed for Flatulence.    Eileen Martines MD   tamsulosin (FLOMAX) 0.4 MG capsule 24 hr capsule Take 1 capsule by mouth Every Night.    Eileen Martines MD   triamcinolone (KENALOG) 0.1 % cream Apply 1 application topically to the appropriate area as directed 2 (Two) Times a Day As Needed (Legs). 10/14/21   Eileen Martines MD   aluminum-magnesium hydroxide-simethicone (MAALOX/MYLANTA) 200-200-20 MG/5ML suspension Take 30 mL by mouth Every 6 (Six) Hours As Needed for Indigestion or Heartburn.  9/23/24  Eileen Martines MD        Objective / Physical Exam     Vital signs:  Temp: 98.4 °F (36.9 °C)  BP: 126/84  Heart Rate: 87  Resp: 18  SpO2: 91 %  Weight: (!) 139 kg (307 lb 5.1 oz)    Admission Weight: Weight: 136 kg (300 lb)    Physical Exam  Constitutional:       Comments: Slow to respond and poor historian.    Cardiovascular:      Rate and Rhythm: Normal rate and regular rhythm.   Pulmonary:      Effort: Pulmonary effort is normal.      Breath sounds: Normal breath sounds.      Comments: On O2  Abdominal:      General: There is distension.      Tenderness: There is no abdominal tenderness.      Comments: Colostomy present with brown stool  No blood present     Skin:     Comments: Generalized dry and flaky   BLE with hyperkeratotic plaque and edema    Neurological:      Mental Status: He is alert.            Labs     Results from last 7 days   Lab Units 09/24/24  0030 09/23/24  1816   WBC 10*3/mm3 6.71 6.13   HEMOGLOBIN g/dL 13.1 13.0   HEMATOCRIT % 42.8 42.3   PLATELETS 10*3/mm3 202 215      Results from last 7 days   Lab Units 09/23/24  1816   ALK PHOS U/L 131*   AST (SGOT) U/L 40   ALT (SGPT) U/L 27           Results from last 7 days   Lab Units  09/24/24  0030 09/23/24  1816   SODIUM mmol/L 140 141   POTASSIUM mmol/L 4.3 4.1   CHLORIDE mmol/L 102 100   CO2 mmol/L 30.5* 32.0*   BUN mg/dL 8 10   CREATININE mg/dL 0.64* 0.75*   GLUCOSE mg/dL 92 150*        Imaging     CT Abdomen Pelvis Without Contrast    Result Date: 9/23/2024  CT ABDOMEN PELVIS WO CONTRAST Date of Exam: 9/23/2024 7:47 PM EDT Indication: pain. Comparison: Contrast-enhanced CT of the abdomen pelvis performed on April 30, 2022 Technique: Axial CT images were obtained of the abdomen and pelvis without the administration of contrast. Sagittal and coronal reconstructions were performed.  Automated exposure control and iterative reconstruction methods were used. Findings: Motion artifact limits evaluation. Lung Bases: The visualized lung bases and lower mediastinal structures are unremarkable. Peritoneum: No free intraperitoneal air or fluid. Abdominal wall: Double barrel colostomy versus colocutaneous fistula is visualized in the left lower quadrant. Stool is visualized to the tract. There is suggestion of an adjacent fat-containing parastomal hernia. Liver: The liver is within normal limits in size and contour. Liver demonstrates diffuse hypoattenuation compatible with hepatic steatosis. Left hepatic lobe is atrophic. Biliary/Gallbladder: The gallbladder is normal without evidence of radiopaque gallstones. The biliary tree is nondilated. Pancreas: Pancreas is within normal limits. There is no evidence of pancreatic mass or peripancreatic inflammatory changes. Spleen: Spleen is normal in size and contour. Gastrointestinal/Mesentery: No evidence of bowel obstruction or gross inflammatory changes. The appendix is not visualized. There are no secondary signs of acute appendicitis. There is severe fecal retention which is most prominent in the rectum and sigmoid colon. Adrenals: Adrenal glands are unremarkable. Kidneys: The kidneys are in anatomic position. No evidence of nephrolithiasis. No evidence  of hydronephrosis or significant perinephric fat stranding. Bladder: The urinary bladder is unremarkable. Reproductive organs:  Unremarkable. Lymph Nodes: No significant adenopathy is identified. Vasculature: Mild aortic atheromatous changes. The aorta is normal in caliber. Osseous Structures:  No acute fracture or aggressive lesions. There is levo and rotatory scoliosis of the lumbar spine.     Impression: Motion artifact limits evaluation. Findings consistent with severe constipation. Cannot exclude rectal fecal impaction. 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 Stool is visualized in the tract. Hepatic steatosis. Electronically Signed: Tay Manzano MD  9/23/2024 8:27 PM EDT  Workstation ID: [id number]      No orders to display        Current Medications     Scheduled Meds:  atorvastatin, 10 mg, Oral, Nightly  busPIRone, 30 mg, Oral, BID  clonazePAM, 0.5 mg, Oral, BID  divalproex, 500 mg, Oral, Q12H  fluvoxaMINE, 50 mg, Oral, Nightly  furosemide, 40 mg, Oral, BID  levothyroxine, 75 mcg, Oral, Q AM  liothyronine, 10 mcg, Oral, Q AM  montelukast, 10 mg, Oral, Nightly  pantoprazole, 40 mg, Oral, QAM  polyethylene glycol, 17 g, Oral, Daily  Psyllium, 1 packet, Oral, Daily  QUEtiapine, 300 mg, Oral, QAM  QUEtiapine, 400 mg, Oral, Nightly  sodium chloride, 10 mL, Intravenous, Q12H  spironolactone, 12.5 mg, Oral, QAM  tamsulosin, 0.4 mg, Oral, Nightly         Continuous Infusions:    Claudine Russell [facility]  09/24/24   03:15 EDT      Electronically signed by Ag Seals MD at 09/24/24 0709          Emergency Department Notes        Lore Rob RN at 09/23/24 2333          Patient repositioned for comfort.    Electronically signed by Lore Rob RN at 09/23/24 0243       Phoenix Cat MD at 09/23/24 2034          Subjective   History of Present Illness  53-year-old male presents from [facility]care facility with report ostomy bag was full of  blood.  Patient is not complaining of pain vomiting or fever.  He has history of previous colostomy.  Review of Systems    Past Medical History:   Diagnosis Date    Acute pancreatitis     Allergic     Reglan, Benztropine    Allergic rhinitis     Anxiety     Aspiration pneumonia     Ataxia     Chronic constipation     Chronic edema     Depression     Esophageal stricture     Fecal incontinence     GERD (gastroesophageal reflux disease)     GI bleed     Hirschsprung's disease     Hyperlipidemia     Hypokalemia     Hypothyroidism     Iron deficiency anemia     Leg length discrepancy     Mediastinal lymphadenopathy     Megacolon     Mildly mentally retarded     Obesity     Positive PPD     Pulmonary hypertension     Scoliosis     Seizures     Sleep apnea     Not very compliant with CPAP    Urinary incontinence     Urinary tract infection        Allergies   Allergen Reactions    Metoclopramide Rash     Other reaction(s): not known     Benztropine Delirium       Past Surgical History:   Procedure Laterality Date    COLON SURGERY      colostomy, the reversed    COLONOSCOPY      2017    COLOSTOMY N/A 5/3/2022    Procedure: COLOSTOMY DIVERTING;  Surgeon: Ken Christiansen MD;  Location: [facility];  Service: General;  Laterality: N/A;    ESOPHAGEAL DILATATION      Several    HEMICOLECTOMY Left     MYOTOMY      Rectal    RECTAL EXAMINATION UNDER ANESTHESIA N/A 2022    Procedure: RECTAL EXAM UNDER ANESTHESIA, RIGID SIGMOIDOSCOPY WITH FECAL DISIMPACTION;  Surgeon: Ken Christiansen MD;  Location: [facility];  Service: General;  Laterality: N/A;       Family History   Problem Relation Age of Onset    Early death Mother         Apparently  of bowel complications    Early death Father         Heart related    Heart disease Father        Social History     Socioeconomic History    Marital status: Single   Tobacco Use    Smoking status: Never    Smokeless tobacco: Never   Vaping Use    Vaping status: Never  Used   Substance and Sexual Activity    Alcohol use: No    Drug use: No    Sexual activity: Never     Prior to Admission medications    Medication Sig Start Date End Date Taking? Authorizing Provider   acetaminophen (TYLENOL) 325 MG tablet Take 650 mg by mouth Every 6 (Six) Hours As Needed for Mild Pain .    Eileen Martines MD   aluminum-magnesium hydroxide-simethicone (MAALOX/MYLANTA) 200-200-20 MG/5ML suspension Take 30 mL by mouth Every 6 (Six) Hours As Needed for Indigestion or Heartburn.    Eileen Martines MD   ammonium lactate (LAC-HYDRIN) 12 % lotion Apply  topically to the appropriate area as directed Daily. Before getting dressed daily 1/26/22   Eileen Martines MD   Austedo 9 MG tablet Take 9 mg by mouth Daily. 12/10/21   Eileen Martines MD   Benzocaine (SOLARCAINE EX) Apply  topically 4 (Four) Times a Day As Needed.    Eileen Martines MD   busPIRone (BUSPAR) 30 MG tablet Take 1 tablet by mouth 2 (Two) Times a Day. 7/29/19   Charlie Wing MD   chlorpheniramine (CHLOR-TRIMETON) 4 MG tablet Take 4 mg by mouth Every 4 (Four) Hours As Needed for Allergies.    Eileen Martines MD   clonazePAM (KlonoPIN) 0.5 MG tablet Take 0.25 mg by mouth Every Morning.    Eileen Martines MD   clonazePAM (KlonoPIN) 0.5 MG tablet Take 0.5 mg by mouth Every Night.    Eileen Martines MD   clotrimazole (LOTRIMIN) 1 % cream  11/11/20   Eileen Martines MD   coal tar (Neutrogena T/Gel) 0.5 % shampoo Use 3 times per week 12/30/20   Charlie Wing MD   dextromethorphan-guaifenesin (ROBITUSSIN-DM)  MG/5ML syrup Take 10 mL by mouth Every 4 (Four) Hours As Needed.    Eileen Martines MD   divalproex (DEPAKOTE ER) 500 MG 24 hr tablet Take 1 tablet by mouth Every 12 (Twelve) Hours.    Eileen Martines MD   divalproex (DEPAKOTE) 500 MG DR tablet Take 10,000 mg by mouth 2 (Two) Times a Day. 10/2/19   Eileen Martines MD   estradiol (ESTRACE) 1 MG tablet Take 1 mg  by mouth Daily. 2/10/20   Eileen Martines MD   fluvoxaMINE (LUVOX) 50 MG tablet Take 50 mg by mouth Every Night.    Eileen Martines MD   furosemide (LASIX) 40 MG tablet Take 60 mg by mouth Daily.    Eileen Martines MD   hydrocortisone 2.5 % cream Apply 1 application topically to the appropriate area as directed 4 (Four) Times a Day As Needed (itching).    Eileen Martines MD   hydrocortisone 2.5 % cream Apply 1 application topically to the appropriate area as directed 2 (Two) Times a Day As Needed (Face and ears for rosacea).    Eileen Martines MD   ibuprofen (ADVIL,MOTRIN) 200 MG tablet Take 200 mg by mouth Every 4 (Four) Hours As Needed for Mild Pain .    Eileen Martines MD   levothyroxine (SYNTHROID, LEVOTHROID) 125 MCG tablet Take 125 mcg by mouth Every Morning.    Eileen Martines MD   linaclotide (LINZESS) 290 MCG capsule capsule Take 290 mcg by mouth Daily With Breakfast. 30 minutes after meal    Eileen Martines MD   liothyronine (CYTOMEL) 5 MCG tablet Take 5 mcg by mouth Every Morning.    Eileen Martines MD   loperamide (IMODIUM) 2 MG capsule Take 4 mg by mouth 1 (One) Time As Needed for Diarrhea (first loose stool).    Eileen Martines MD   loperamide (IMODIUM) 2 MG capsule Take 2 mg by mouth 4 (Four) Times a Day As Needed for Diarrhea.    Eileen Martines MD   magnesium hydroxide (MILK OF MAGNESIA) 400 MG/5ML suspension Take 30 mL by mouth Every Night.    Eileen Martines MD   montelukast (SINGULAIR) 10 MG tablet TAKE 1 TABLET BY MOUTH AT BEDTIME 3/6/22   Rody Moreno MD   MOTEGRITY 2 MG tablet Take 2 mg by mouth Daily. 2/12/20   Eileen Martines MD   pantoprazole (PROTONIX) 40 MG EC tablet Take 40 mg by mouth Daily. 10/17/19   Eileen Martines MD   polyethylene glycol (MiraLax) 17 g packet Take 34 g by mouth Daily.    Eileen Martines MD   QUEtiapine (SEROquel) 400 MG tablet Take 400 mg by mouth 2 (Two) Times a Day.  6/10/19   Eileen Martines MD   simethicone (MYLICON) 80 MG chewable tablet Chew 80 mg Every 6 (Six) Hours As Needed for Flatulence.    Eileen Martines MD   tamsulosin (FLOMAX) 0.4 MG capsule 24 hr capsule Take 1 capsule by mouth Every Night.    Eileen Martines MD   triamcinolone (KENALOG) 0.1 % cream Apply 1 application topically to the appropriate area as directed 2 (Two) Times a Day As Needed (Legs). 10/14/21   Eileen Martines MD           Objective   Physical Exam  53-year-old male awake alert.  Generally obese.  Chest clear equal breath sounds cardiovascular regular rhythm abdomen soft distended.  He has just a few streaks of blood in ostomy bag.  There is some stool at ostomy site noted.  This is nonbloody or melanotic.  Legs reveal chronic skin changes edema.  Procedures          ED Course      Insert ED course                                       Medical Decision Making  Amount and/or Complexity of Data Reviewed  Labs: ordered.  Radiology: ordered.    Risk  Prescription drug management.    Chart review: Patient had diverting colostomy May 2022 after having severe fecal impaction with Hirschsprung's disease.  Comorbidity: As per past history   Differential: Fecal impaction, colitis,  My EKG interpretation: Not indicated  Lab: White count 6.1 with hemoglobin 13 platelet count of 215 with normal differential comprehensive metabolic panel glucose 150 with BUN 10 creatinine 0.75 CO2 mild elevation at 32  My Radiology review and interpretation: CT scan abdomen pelvis shows findings consistent with severe constipation possible fecal impaction most prominent in the rectum and sigmoid colon.  This was discussed with radiologist.  It appears on my review that there is some air within the wall of bowel although the radiologist did not feel that pneumatosis was present.  Discussion/treatment: Patient was given Zofran for nausea.  Findings were discussed with patient and family.  Patient was  discussed with nurse practitioner the hospitalist.  Will be admitted to their service with consultation with general surgery who is done his colostomy.  With some bleeding and severe constipation he may have stercoral ulcer  Patient was evaluated using appropriate PPE      Final diagnoses:   Fecal impaction       ED Disposition  ED Disposition       ED Disposition   Decision to Admit    Condition   --    Comment   --               No follow-up provider specified.       Medication List      No changes were made to your prescriptions during this visit.            Phoenix Cat MD  24      Electronically signed by Phoenix Cat MD at 24       Operative/Procedure Notes (all)    No notes of this type exist for this encounter.       Physician Progress Notes (all)    No notes of this type exist for this encounter.          Consult Notes (all)        Ken Christiansen MD at 24 1105        Consult Orders    1. Inpatient General Surgery Consult [[id number]] ordered by Claudine Russell APRN at 24                   General Surgery Consult Note      Name: Charlie Wells ADMIT: 2024   : 1970  PCP: Rody Moreno MD    MRN: [id number] LOS: 0 days   AGE/SEX: 53 y.o. male  ROOM: [address] [place] [address]      Patient Care Team:  Rody Moreno MD as PCP - General (Family Medicine)  Seipel, Joseph F, MD as Consulting Physician (Sleep Medicine)  To Barber DPM as Consulting Physician (Podiatry)  Jeff Alexis MD as Consulting Physician (Urology)  Chief Complaint   Patient presents with    GI Problem       Subjective   53-year-old gentleman with history of chronic constipation who underwent diverting colostomy during previous admission for severe chronic constipation.  Has done well since surgery came to the hospital from his long-term care facility for some blood in his ostomy bag.  No blood in his back today no bleeding since admission, hemoglobin stable.   CT with constipation.    Past Medical History:   Diagnosis Date    Acute pancreatitis     Allergic     Reglan, Benztropine    Allergic rhinitis     Anxiety     Aspiration pneumonia     Ataxia     Chronic constipation     Chronic edema     Depression     Esophageal stricture     Fecal incontinence     GERD (gastroesophageal reflux disease)     GI bleed     Hirschsprung's disease     Hyperlipidemia     Hypokalemia     Hypothyroidism     Iron deficiency anemia     Leg length discrepancy     Mediastinal lymphadenopathy     Megacolon     Mildly mentally retarded     Obesity     Positive PPD     Pulmonary hypertension     Scoliosis     Seizures     Sleep apnea     Not very compliant with CPAP    Urinary incontinence     Urinary tract infection      Past Surgical History:   Procedure Laterality Date    COLON SURGERY      colostomy, the reversed    COLONOSCOPY      2017    COLOSTOMY N/A 5/3/2022    Procedure: COLOSTOMY DIVERTING;  Surgeon: Ken Christiansen MD;  Location: [facility] MAIN OR;  Service: General;  Laterality: N/A;    ESOPHAGEAL DILATATION      Several    HEMICOLECTOMY Left     MYOTOMY      Rectal    RECTAL EXAMINATION UNDER ANESTHESIA N/A 2022    Procedure: RECTAL EXAM UNDER ANESTHESIA, RIGID SIGMOIDOSCOPY WITH FECAL DISIMPACTION;  Surgeon: Ken Christiansen MD;  Location: [facility] MAIN OR;  Service: General;  Laterality: N/A;     Family History   Problem Relation Age of Onset    Early death Mother         Apparently  of bowel complications    Early death Father         Heart related    Heart disease Father        Social History     Tobacco Use    Smoking status: Never    Smokeless tobacco: Never   Vaping Use    Vaping status: Never Used   Substance Use Topics    Alcohol use: No    Drug use: No     Medications Prior to Admission   Medication Sig Dispense Refill Last Dose    acetaminophen (TYLENOL) 325 MG tablet Take 2 tablets by mouth Every 4 (Four) Hours As Needed for Fever.       acetaminophen  (TYLENOL) 325 MG tablet Take 2 tablets by mouth Every 6 (Six) Hours As Needed for Mild Pain.       ammonium lactate (LAC-HYDRIN) 12 % lotion Apply  topically to the appropriate area as directed Daily. Before getting dressed daily.  Apply to BL legs and feet every shift for dry , flaky skin.       atorvastatin (LIPITOR) 10 MG tablet Take 1 tablet by mouth every night at bedtime.       Austedo 9 MG tablet Take 9 mg by mouth Every Morning.       busPIRone (BUSPAR) 30 MG tablet Take 1 tablet by mouth 2 (Two) Times a Day. 60 tablet 11     clonazePAM (KlonoPIN) 0.5 MG tablet Take 1 tablet by mouth 2 (Two) Times a Day.       divalproex (DEPAKOTE ER) 500 MG 24 hr tablet Take 1 tablet by mouth Every 12 (Twelve) Hours.       fluvoxaMINE (LUVOX) 50 MG tablet Take 1 tablet by mouth Every Night.       furosemide (LASIX) 40 MG tablet Take 1 tablet by mouth 2 (Two) Times a Day.       levothyroxine (SYNTHROID, LEVOTHROID) 75 MCG tablet Take 1 tablet by mouth Every Morning.       liothyronine (CYTOMEL) 5 MCG tablet Take 2 tablets by mouth Every Morning.       magnesium hydroxide (MILK OF MAGNESIA) 400 MG/5ML suspension Take 30 mL by mouth Every Night.       metroNIDAZOLE (METROGEL) 0.75 % gel Apply 1 Application topically to the appropriate area as directed 2 (Two) Times a Day.       montelukast (SINGULAIR) 10 MG tablet TAKE 1 TABLET BY MOUTH AT BEDTIME 30 tablet 4     pantoprazole (PROTONIX) 40 MG EC tablet Take 1 tablet by mouth Every Morning.  3     polyethylene glycol (MiraLax) 17 g packet Take 17 g by mouth Every Morning.       QUEtiapine (SEROquel) 300 MG tablet Take 1 tablet by mouth Every Morning.       QUEtiapine (SEROquel) 400 MG tablet Take 1 tablet by mouth every night at bedtime.  3     simethicone (MYLICON) 80 MG chewable tablet Chew 1 tablet Every 6 (Six) Hours As Needed for Flatulence.       spironolactone (ALDACTONE) 25 MG tablet Take 0.5 tablets by mouth Every Morning.       tamsulosin (FLOMAX) 0.4 MG capsule 24 hr  capsule Take 1 capsule by mouth Every Night.        atorvastatin, 10 mg, Oral, Nightly  busPIRone, 30 mg, Oral, BID  clonazePAM, 0.5 mg, Oral, BID  divalproex, 500 mg, Oral, Q12H  Fleet Saline Enema, 1 enema, Rectal, Once  fluvoxaMINE, 50 mg, Oral, Nightly  furosemide, 40 mg, Oral, BID  levothyroxine, 75 mcg, Oral, Q AM  liothyronine, 10 mcg, Oral, Q AM  montelukast, 10 mg, Oral, Nightly  pantoprazole, 40 mg, Oral, QAM AC  polyethylene glycol, 17 g, Oral, Daily  Psyllium, 1 packet, Oral, Daily  QUEtiapine, 300 mg, Oral, Daily  QUEtiapine, 400 mg, Oral, Nightly  sodium chloride, 10 mL, Intravenous, Q12H  spironolactone, 12.5 mg, Oral, Daily  tamsulosin, 0.4 mg, Oral, Nightly           acetaminophen **OR** acetaminophen **OR** acetaminophen    ammonium lactate    ondansetron    [COMPLETED] Insert Peripheral IV **AND** sodium chloride    sodium chloride    sodium chloride  Metoclopramide and Benztropine    Review of Systems   Constitutional:  Negative for chills and fever.   HENT:  Negative for rhinorrhea and trouble swallowing.    Eyes:  Negative for blurred vision and double vision.   Respiratory:  Negative for cough and shortness of breath.    Cardiovascular:  Negative for chest pain and palpitations.   Gastrointestinal:  Negative for abdominal distention and abdominal pain.   Musculoskeletal:  Negative for back pain and myalgias.   Skin:  Negative for color change and dry skin.   Neurological:  Negative for headache and confusion.   Psychiatric/Behavioral:  Negative for agitation and hallucinations.         Objective     Vital Signs and Labs:  Vital Signs Patient Vitals for the past 24 hrs:   BP Temp Temp src Pulse Resp SpO2 Height Weight   09/24/24 1015 -- -- -- 83 -- (!) 88 % -- --   09/24/24 1000 -- -- -- 87 -- (!) 86 % -- --   09/24/24 0947 131/79 98.5 °F (36.9 °C) Oral 83 18 91 % -- --   09/24/24 0830 -- -- -- 86 -- (!) 87 % -- --   09/24/24 0815 -- -- -- 85 -- (!) 88 % -- --   09/24/24 0800 -- -- -- 85 -- 90  "% -- --   09/24/24 0745 -- -- -- 87 -- -- -- --   09/24/24 0730 -- -- -- 87 -- -- -- --   09/24/24 0715 -- -- -- 85 -- -- -- --   09/24/24 0700 -- -- -- 86 -- -- -- --   09/24/24 0645 -- -- -- 85 -- -- -- --   09/24/24 0615 -- -- -- 84 -- -- -- --   09/24/24 0600 -- -- -- 86 -- -- -- --   09/24/24 0545 -- -- -- 86 -- -- -- --   09/24/24 0542 134/80 98 °F (36.7 °C) Oral 86 18 (!) 89 % -- 125 kg (276 lb 3.8 oz)   09/24/24 0530 -- -- -- 87 -- -- -- --   09/24/24 0515 -- -- -- 89 -- -- -- --   09/24/24 0500 -- -- -- 88 -- -- -- --   09/24/24 0445 -- -- -- 85 -- -- -- --   09/24/24 0430 -- -- -- 88 -- -- -- --   09/24/24 0415 -- -- -- 88 -- -- -- --   09/24/24 0400 -- -- -- 87 -- -- -- --   09/24/24 0345 -- -- -- 88 -- -- -- --   09/24/24 0330 -- -- -- 88 -- -- -- --   09/24/24 0315 -- -- -- 90 -- -- -- --   09/24/24 0300 -- -- -- 87 -- -- -- --   09/24/24 0245 -- -- -- 85 -- -- -- --   09/24/24 0230 -- -- -- 87 -- -- -- --   09/24/24 0215 -- -- -- 85 -- -- -- --   09/24/24 0205 126/84 98.4 °F (36.9 °C) Oral 87 18 91 % 175.3 cm (69.02\") 125 kg (276 lb 3.8 oz)   09/23/24 2332 -- -- -- 90 -- 95 % -- --   09/23/24 2331 122/81 -- -- -- -- -- -- --   09/23/24 2301 122/79 -- -- 92 -- 94 % -- --   09/23/24 2246 139/92 -- -- 90 -- 94 % -- --   09/23/24 2201 132/96 -- -- 90 -- 95 % -- --   09/23/24 2146 134/80 -- -- 88 -- 96 % -- --   09/23/24 2131 133/83 -- -- 89 -- 97 % -- --   09/23/24 1916 147/74 -- -- 90 -- -- -- --   09/23/24 1915 -- -- -- -- -- 90 % -- --   09/23/24 1901 127/79 -- -- 89 -- 92 % -- --   09/23/24 1802 119/69 -- -- 93 -- 92 % -- --   09/23/24 1719 134/85 97.9 °F (36.6 °C) Oral 78 19 97 % 175.3 cm (69\") 136 kg (300 lb)     I/O:  I/O last 3 completed shifts:  In: -   Out: 500 [Urine:500]    Physical Exam:  Physical Exam  Constitutional:       General: He is not in acute distress.     Appearance: Normal appearance. He is not ill-appearing.   HENT:      Head: Normocephalic and atraumatic.      Right Ear: External " ear normal.      Left Ear: External ear normal.   Eyes:      Extraocular Movements: Extraocular movements intact.      Conjunctiva/sclera: Conjunctivae normal.   Cardiovascular:      Rate and Rhythm: Normal rate and regular rhythm.   Pulmonary:      Effort: Pulmonary effort is normal. No respiratory distress.   Abdominal:      General: There is no distension.      Palpations: Abdomen is soft.      Tenderness: There is no abdominal tenderness.   Musculoskeletal:         General: No swelling or deformity.   Skin:     General: Skin is warm and dry.   Neurological:      Mental Status: He is alert and oriented to person, place, and time. Mental status is at baseline.         CBC    Results from last 7 days   Lab Units 09/24/24  0030 09/23/24  1816   WBC 10*3/mm3 6.71 6.13   HEMOGLOBIN g/dL 13.1 13.0   PLATELETS 10*3/mm3 202 215     BMP   Results from last 7 days   Lab Units 09/24/24  0030 09/23/24  1816   SODIUM mmol/L 140 141   POTASSIUM mmol/L 4.3 4.1   CHLORIDE mmol/L 102 100   CO2 mmol/L 30.5* 32.0*   BUN mg/dL 8 10   CREATININE mg/dL 0.64* 0.75*   GLUCOSE mg/dL 92 150*     Radiology(recent) CT Abdomen Pelvis Without Contrast    Result Date: 9/23/2024  Impression: Motion artifact limits evaluation. Findings consistent with severe constipation. Cannot exclude rectal fecal impaction. 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 Stool is visualized in the tract. Hepatic steatosis. Electronically Signed: Tay Manzano MD  9/23/2024 8:27 PM EDT  Workstation ID: [id number]     I reviewed the patient's new clinical results.    Assessment & Plan       Fecal impaction      53-year-old gentleman with history of chronic constipation who underwent diverting colostomy during previous admission for severe chronic constipation.  Has done well since surgery came to the hospital from his long-term care facility for some blood in his ostomy bag.  No blood in his back today no bleeding since admission,  hemoglobin stable.  CT with constipation.  This is a chronic issue.  Recommend enema and stoma irrigation.  If no blood in his ostomy okay for discharge from my standpoint.  Okay for regular diet.  I will be available inpatient as needed please call with change questions or concerns      This note was created using Dragon Voice Recognition software.    Ken Christiansen MD  09/24/24  11:05 EDT     Electronically signed by Ken Christiansen MD at 09/24/24 [id number]

## 2024-09-24 NOTE — ED PROVIDER NOTES
Subjective   History of Present Illness  53-year-old male presents from extended-care facility with report ostomy bag was full of blood.  Patient is not complaining of pain vomiting or fever.  He has history of previous colostomy.  Review of Systems    Past Medical History:   Diagnosis Date    Acute pancreatitis     Allergic     Reglan, Benztropine    Allergic rhinitis     Anxiety     Aspiration pneumonia     Ataxia     Chronic constipation     Chronic edema     Depression     Esophageal stricture     Fecal incontinence     GERD (gastroesophageal reflux disease)     GI bleed     Hirschsprung's disease     Hyperlipidemia     Hypokalemia     Hypothyroidism     Iron deficiency anemia     Leg length discrepancy     Mediastinal lymphadenopathy     Megacolon     Mildly mentally retarded     Obesity     Positive PPD     Pulmonary hypertension     Scoliosis     Seizures     Sleep apnea     Not very compliant with CPAP    Urinary incontinence     Urinary tract infection        Allergies   Allergen Reactions    Metoclopramide Rash     Other reaction(s): not known     Benztropine Delirium       Past Surgical History:   Procedure Laterality Date    COLON SURGERY      colostomy, the reversed    COLONOSCOPY      2017    COLOSTOMY N/A 5/3/2022    Procedure: COLOSTOMY DIVERTING;  Surgeon: Ken Christiansen MD;  Location: [facility];  Service: General;  Laterality: N/A;    ESOPHAGEAL DILATATION      Several    HEMICOLECTOMY Left     MYOTOMY      Rectal    RECTAL EXAMINATION UNDER ANESTHESIA N/A 2022    Procedure: RECTAL EXAM UNDER ANESTHESIA, RIGID SIGMOIDOSCOPY WITH FECAL DISIMPACTION;  Surgeon: Ken Christiansen MD;  Location: [facility] MAIN OR;  Service: General;  Laterality: N/A;       Family History   Problem Relation Age of Onset    Early death Mother         Apparently  of bowel complications    Early death Father         Heart related    Heart disease Father        Social History     Socioeconomic History     Marital status: Single   Tobacco Use    Smoking status: Never    Smokeless tobacco: Never   Vaping Use    Vaping status: Never Used   Substance and Sexual Activity    Alcohol use: No    Drug use: No    Sexual activity: Never     Prior to Admission medications    Medication Sig Start Date End Date Taking? Authorizing Provider   acetaminophen (TYLENOL) 325 MG tablet Take 650 mg by mouth Every 6 (Six) Hours As Needed for Mild Pain .    Eileen Martines MD   aluminum-magnesium hydroxide-simethicone (MAALOX/MYLANTA) 200-200-20 MG/5ML suspension Take 30 mL by mouth Every 6 (Six) Hours As Needed for Indigestion or Heartburn.    Eileen Martines MD   ammonium lactate (LAC-HYDRIN) 12 % lotion Apply  topically to the appropriate area as directed Daily. Before getting dressed daily 1/26/22   Eileen Martines MD   Austedo 9 MG tablet Take 9 mg by mouth Daily. 12/10/21   Eileen Martines MD   Benzocaine (SOLARCAINE EX) Apply  topically 4 (Four) Times a Day As Needed.    Eileen Martines MD   busPIRone (BUSPAR) 30 MG tablet Take 1 tablet by mouth 2 (Two) Times a Day. 7/29/19   Charlie Wing MD   chlorpheniramine (CHLOR-TRIMETON) 4 MG tablet Take 4 mg by mouth Every 4 (Four) Hours As Needed for Allergies.    Eileen Martines MD   clonazePAM (KlonoPIN) 0.5 MG tablet Take 0.25 mg by mouth Every Morning.    Eileen Martines MD   clonazePAM (KlonoPIN) 0.5 MG tablet Take 0.5 mg by mouth Every Night.    Eileen Martines MD   clotrimazole (LOTRIMIN) 1 % cream  11/11/20   Eileen Martines MD   coal tar (Neutrogena T/Gel) 0.5 % shampoo Use 3 times per week 12/30/20   Charlie Wing MD   dextromethorphan-guaifenesin (ROBITUSSIN-DM)  MG/5ML syrup Take 10 mL by mouth Every 4 (Four) Hours As Needed.    Eileen Martines MD   divalproex (DEPAKOTE ER) 500 MG 24 hr tablet Take 1 tablet by mouth Every 12 (Twelve) Hours.    Eileen Martines MD   divalproex (DEPAKOTE) 500 MG   tablet Take 10,000 mg by mouth 2 (Two) Times a Day. 10/2/19   Eileen Martines MD   estradiol (ESTRACE) 1 MG tablet Take 1 mg by mouth Daily. 2/10/20   Eileen Martines MD   fluvoxaMINE (LUVOX) 50 MG tablet Take 50 mg by mouth Every Night.    Eileen Martines MD   furosemide (LASIX) 40 MG tablet Take 60 mg by mouth Daily.    Eileen Martines MD   hydrocortisone 2.5 % cream Apply 1 application topically to the appropriate area as directed 4 (Four) Times a Day As Needed (itching).    Eileen Martines MD   hydrocortisone 2.5 % cream Apply 1 application topically to the appropriate area as directed 2 (Two) Times a Day As Needed (Face and ears for rosacea).    Eileen Martines MD   ibuprofen (ADVIL,MOTRIN) 200 MG tablet Take 200 mg by mouth Every 4 (Four) Hours As Needed for Mild Pain .    Eileen Martines MD   levothyroxine (SYNTHROID, LEVOTHROID) 125 MCG tablet Take 125 mcg by mouth Every Morning.    Eileen Martines MD   linaclotide (LINZESS) 290 MCG capsule capsule Take 290 mcg by mouth Daily With Breakfast. 30 minutes after meal    Eileen Martines MD   liothyronine (CYTOMEL) 5 MCG tablet Take 5 mcg by mouth Every Morning.    Eileen Martines MD   loperamide (IMODIUM) 2 MG capsule Take 4 mg by mouth 1 (One) Time As Needed for Diarrhea (first loose stool).    Eileen Martines MD   loperamide (IMODIUM) 2 MG capsule Take 2 mg by mouth 4 (Four) Times a Day As Needed for Diarrhea.    Eileen Martines MD   magnesium hydroxide (MILK OF MAGNESIA) 400 MG/5ML suspension Take 30 mL by mouth Every Night.    Eileen Martines MD   montelukast (SINGULAIR) 10 MG tablet TAKE 1 TABLET BY MOUTH AT BEDTIME 3/6/22   Rody Moreno MD   MOTEGRITY 2 MG tablet Take 2 mg by mouth Daily. 2/12/20   Eileen Martines MD   pantoprazole (PROTONIX) 40 MG EC tablet Take 40 mg by mouth Daily. 10/17/19   Eileen Martines MD   polyethylene glycol (MiraLax) 17 g packet Take  34 g by mouth Daily.    Eileen Martines MD   QUEtiapine (SEROquel) 400 MG tablet Take 400 mg by mouth 2 (Two) Times a Day. 6/10/19   Eileen Martines MD   simethicone (MYLICON) 80 MG chewable tablet Chew 80 mg Every 6 (Six) Hours As Needed for Flatulence.    Eileen Martines MD   tamsulosin (FLOMAX) 0.4 MG capsule 24 hr capsule Take 1 capsule by mouth Every Night.    Eileen Martines MD   triamcinolone (KENALOG) 0.1 % cream Apply 1 application topically to the appropriate area as directed 2 (Two) Times a Day As Needed (Legs). 10/14/21   Eileen Martines MD           Objective   Physical Exam  53-year-old male awake alert.  Generally obese.  Chest clear equal breath sounds cardiovascular regular rhythm abdomen soft distended.  He has just a few streaks of blood in ostomy bag.  There is some stool at ostomy site noted.  This is nonbloody or melanotic.  Legs reveal chronic skin changes edema.  Procedures           ED Course      Insert ED course                                       Medical Decision Making  Amount and/or Complexity of Data Reviewed  Labs: ordered.  Radiology: ordered.    Risk  Prescription drug management.    Chart review: Patient had diverting colostomy May 2022 after having severe fecal impaction with Hirschsprung's disease.  Comorbidity: As per past history   Differential: Fecal impaction, colitis,  My EKG interpretation: Not indicated  Lab: White count 6.1 with hemoglobin 13 platelet count of 215 with normal differential comprehensive metabolic panel glucose 150 with BUN 10 creatinine 0.75 CO2 mild elevation at 32  My Radiology review and interpretation: CT scan abdomen pelvis shows findings consistent with severe constipation possible fecal impaction most prominent in the rectum and sigmoid colon.  This was discussed with radiologist.  It appears on my review that there is some air within the wall of bowel although the radiologist did not feel that pneumatosis was  present.  Discussion/treatment: Patient was given Zofran for nausea.  Findings were discussed with patient and family.  Patient was discussed with nurse practitioner the hospitalist.  Will be admitted to their service with consultation with general surgery who is done his colostomy.  With some bleeding and severe constipation he may have stercoral ulcer  Patient was evaluated using appropriate PPE      Final diagnoses:   Fecal impaction       ED Disposition  ED Disposition       ED Disposition   Decision to Admit    Condition   --    Comment   --               No follow-up provider specified.       Medication List      No changes were made to your prescriptions during this visit.            Phoenix Cat MD  09/23/24 8130

## 2024-09-24 NOTE — DISCHARGE SUMMARY
[facility] Medicine Services  Discharge Summary    Date of Service: 2024  Patient Name: Charlie Wells  : 1970  MRN: [id number]    Date of Admission: 2024  Discharge Diagnosis:     Hematochezia   chronic constipation  obstructive sleep apnea  Type 2 diabetes.  Chronic venous insufficiency  Morbid obesity  History of developmental delay   history of seizure disorder  History of Kilmichael's disease  Chronic combined congestive heart failure        Date of Discharge: 2024  Primary Care Physician: Rody Moreno MD      Presenting Problem:   Fecal impaction [K56.41]    Active and Resolved Hospital Problems:  Active Hospital Problems    Diagnosis POA    **Fecal impaction [K56.41] Yes      Resolved Hospital Problems   No resolved problems to display.         Hospital Course     HPI:    See history and physical of 2024    Hospital Course:   This is a 53-year-old white male chronically debilitated with chronic constipation issues.  He had reported blood with his ostomy output.  He was noted on CT scanning to have evidence of his chronic constipation.  He had no temperature elevation during his hospitalization.  Oxygenation was maintained on room air.  Blood pressures remained well-controlled.  Candida screening was negative.  CBC was nonremarkable and remained without anemia on repeat.  Electrolytes are nonremarkable.  Liver functions normal.  Glycohemoglobin returned being 7.86.  He had some ostomy output without any further evidence of bleeding.  He was seen by ostomy nurse during his hospitalization.  He was eating without issue.  It was thought that he could return to a nursing facility on bowel regiment and be followed up for any ongoing issues.  Attempt should be made to maintain patient on a no concentrated sweets diet with follow-up of his blood sugar control and consideration as to any need for oral medication with any ongoing blood  sugar elevation.                Day of Discharge     Vital Signs:  Temp:  [97.9 °F (36.6 °C)-98.5 °F (36.9 °C)] 98.5 °F (36.9 °C)  Heart Rate:  [78-93] 83  Resp:  [18-19] 18  BP: (119-147)/(69-96) 131/79  Flow (L/min):  [2] 2    Physical Exam:  Physical Exam  Vitals reviewed.   Constitutional:       Appearance: Normal appearance.   HENT:      Head: Normocephalic.   Cardiovascular:      Rate and Rhythm: Normal rate and regular rhythm.   Pulmonary:      Effort: Pulmonary effort is normal.      Breath sounds: Normal breath sounds.   Abdominal:      General: Bowel sounds are normal. There is distension.      Palpations: Abdomen is soft.      Tenderness: There is no abdominal tenderness.   Musculoskeletal:         General: Swelling present.   Skin:     Comments: Chronic venous stasis changes of bilateral lower extremities   Neurological:      Mental Status: He is alert. Mental status is at baseline.            Pertinent  and/or Most Recent Results     LAB RESULTS:      Lab 09/24/24  0030 09/23/24  1816   WBC 6.71 6.13   HEMOGLOBIN 13.1 13.0   HEMATOCRIT 42.8 42.3   PLATELETS 202 215   NEUTROS ABS 4.02 3.66   IMMATURE GRANS (ABS) 0.09* 0.09*   LYMPHS ABS 1.61 1.49   MONOS ABS 0.84 0.73   EOS ABS 0.12 0.13   MCV 87.3 88.5         Lab 09/24/24  0030 09/23/24  1816   SODIUM 140 141   POTASSIUM 4.3 4.1   CHLORIDE 102 100   CO2 30.5* 32.0*   ANION GAP 7.5 9.0   BUN 8 10   CREATININE 0.64* 0.75*   EGFR 112.5 107.2   GLUCOSE 92 150*   CALCIUM 8.8 9.5   HEMOGLOBIN A1C 7.86*  --          Lab 09/23/24  1816   TOTAL PROTEIN 7.8   ALBUMIN 3.8   GLOBULIN 4.0   ALT (SGPT) 27   AST (SGOT) 40   BILIRUBIN 0.3   ALK PHOS 131*                     Brief Urine Lab Results       None          Microbiology Results (last 10 days)       ** No results found for the last 240 hours. **            CT Abdomen Pelvis Without Contrast    Result Date: 9/23/2024  Impression: Impression: Motion artifact limits evaluation. Findings consistent with severe  constipation. Cannot exclude rectal fecal impaction. 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 Stool is visualized in the tract. Hepatic steatosis. Electronically Signed: Tay Manzano MD  9/23/2024 8:27 PM EDT  Workstation ID: [id number]             Results for orders placed during the hospital encounter of 02/13/20    Adult Transthoracic Echo Complete W/ Cont if Necessary Per Protocol    Interpretation Summary  · Estimated EF appears to be in the range of 66 - 70%  · Left ventricular systolic function is normal.  · The study is technically difficult for diagnosis      Labs Pending at Discharge:  Pending Labs       Order Current Status    CANDIDA AURIS PCR - Swab, Axilla Right, Axilla Left and Groin In process            Procedures Performed           Consults:   Consults       Date and Time Order Name Status Description    9/23/2024  9:07 PM Inpatient General Surgery Consult Completed     9/23/2024  8:47 PM Hospitalist (on-call MD unless specified)                Discharge Details        Discharge Medications        Changes to Medications        Instructions Start Date   MiraLax 17 g packet  Generic drug: polyethylene glycol  What changed: Another medication with the same name was added. Make sure you understand how and when to take each.   17 g, Oral, Every Morning      polyethylene glycol 17 g packet  Commonly known as: MIRALAX  What changed: You were already taking a medication with the same name, and this prescription was added. Make sure you understand how and when to take each.   17 g, Oral, Daily             Continue These Medications        Instructions Start Date   acetaminophen 325 MG tablet  Commonly known as: TYLENOL   650 mg, Oral, Every 4 Hours PRN      acetaminophen 325 MG tablet  Commonly known as: TYLENOL   650 mg, Oral, Every 6 Hours PRN      ammonium lactate 12 % lotion  Commonly known as: LAC-HYDRIN   Topical, Daily, Before getting dressed daily.  Apply to BL legs  and feet every shift for dry , flaky skin.      atorvastatin 10 MG tablet  Commonly known as: LIPITOR   10 mg, Oral, Every Night at Bedtime      Austedo 9 MG tablet  Generic drug: Deutetrabenazine   9 mg, Oral, Every Morning      busPIRone 30 MG tablet  Commonly known as: BUSPAR   30 mg, Oral, 2 Times Daily      clonazePAM 0.5 MG tablet  Commonly known as: KlonoPIN   0.5 mg, Oral, 2 Times Daily      divalproex 500 MG 24 hr tablet  Commonly known as: DEPAKOTE ER   1 tablet, Oral, Every 12 Hours      fluvoxaMINE 50 MG tablet  Commonly known as: LUVOX   50 mg, Oral, Nightly      furosemide 40 MG tablet  Commonly known as: LASIX   40 mg, Oral, 2 Times Daily      levothyroxine 75 MCG tablet  Commonly known as: SYNTHROID, LEVOTHROID   75 mcg, Oral, Every Early Morning      liothyronine 5 MCG tablet  Commonly known as: CYTOMEL   10 mcg, Oral, Every Early Morning      magnesium hydroxide 400 MG/5ML suspension  Commonly known as: MILK OF MAGNESIA   30 mL, Oral, Nightly      metroNIDAZOLE 0.75 % gel  Commonly known as: METROGEL   1 Application, Topical, 2 Times Daily      montelukast 10 MG tablet  Commonly known as: SINGULAIR   TAKE 1 TABLET BY MOUTH AT BEDTIME      pantoprazole 40 MG EC tablet  Commonly known as: PROTONIX   40 mg, Oral, Every Morning      QUEtiapine 400 MG tablet  Commonly known as: SEROquel   400 mg, Oral, Every Night at Bedtime      QUEtiapine 300 MG tablet  Commonly known as: SEROquel   300 mg, Oral, Every Morning      simethicone 80 MG chewable tablet  Commonly known as: MYLICON   80 mg, Oral, Every 6 Hours PRN      spironolactone 25 MG tablet  Commonly known as: ALDACTONE   12.5 mg, Oral, Every Morning      tamsulosin 0.4 MG capsule 24 hr capsule  Commonly known as: FLOMAX   1 capsule, Oral, Nightly               Allergies   Allergen Reactions    Metoclopramide Rash     Other reaction(s): not known     Benztropine Delirium         Discharge Disposition: snf  Skilled Nursing Facility (DC -  External)    Diet:  Hospital:  Diet Order   Procedures    Diet: Regular/House; Fluid Consistency: Thin (IDDSI 0)         Discharge Activity:         CODE STATUS:  Code Status and Medical Interventions: CPR (Attempt to Resuscitate); Full Support   Ordered at: 09/23/24 2111     Code Status (Patient has no pulse and is not breathing):    CPR (Attempt to Resuscitate)     Medical Interventions (Patient has pulse or is breathing):    Full Support         No future appointments.    Additional Instructions for the Follow-ups that You Need to Schedule       Discharge Follow-up with PCP   As directed       Currently Documented PCP:    Rody Moreno MD    PCP Phone Number:    [phone]     Follow Up Details: one week                Time spent on Discharge including face to face service:  30 minutes    Signature: Electronically signed by Timothy Duane Brammell, MD, 09/24/24, 14:26 EDT.  [facility] Hospitalist Team

## 2024-09-26 LAB — C AURIS DNA SPEC QL NAA+NON-PROBE: NOT DETECTED

## (undated) DEVICE — Device

## (undated) DEVICE — SYR LUERLOK 30CC

## (undated) DEVICE — CVR HNDL LT SURG ACCSSRY BLU STRL

## (undated) DEVICE — TOWEL,OR,DSP,ST,WHITE,DLX,4/PK,20PK/CS: Brand: MEDLINE

## (undated) DEVICE — GLV SURG BIOGEL LTX PF 7

## (undated) DEVICE — DRAPE, LAVH, STERILE: Brand: MEDLINE

## (undated) DEVICE — PAD,ABDOMINAL,5"X9",STERILE,LF,1/PK: Brand: MEDLINE INDUSTRIES, INC.

## (undated) DEVICE — DRAPE SHEET ULTRAGARD: Brand: MEDLINE

## (undated) DEVICE — PK MAJ LAPAROTOMY 50

## (undated) DEVICE — CVR HNDL LT CAM LB54

## (undated) DEVICE — IRRIGATOR BULB ASEPTO 60CC STRL

## (undated) DEVICE — ELECTRD BLD EZ CLN MOD 6.5IN

## (undated) DEVICE — KT SURG TURNOVER 050

## (undated) DEVICE — TOTAL TRAY, 16FR 10ML SIL FOLEY, URN: Brand: MEDLINE

## (undated) DEVICE — SUT PDS 0 CT2 27IN DYED Z334H

## (undated) DEVICE — UNDERGLV SURG BIOGEL INDICAT PF 71/2 GRN

## (undated) DEVICE — SLV SCD CALF HEMOFORCE DVT THERP REPROC MD

## (undated) DEVICE — HYDROGEL COATED LATEX URINE METER FOLEY TRAY,16 FR/CH (5.3 MM), 5 ML CATHETER PRE-CONNECTED TO 2000 ML DRAINAGE BAG WITH NEEDLE SAMPLING: Brand: DOVER

## (undated) DEVICE — COVER,MAYO STAND,STERILE: Brand: MEDLINE

## (undated) DEVICE — ADHS LIQ MASTISOL 2/3ML

## (undated) DEVICE — GOWN,REINFORCE,POLY,SIRUS,BREATH SLV,XLG: Brand: MEDLINE

## (undated) DEVICE — SUT MNCRYL 2/0 CT1 36IN

## (undated) DEVICE — TUBING, SUCTION, 1/4" X 12', STRAIGHT: Brand: MEDLINE

## (undated) DEVICE — TBG PENCL TELESCP MEGADYNE SMOKE EVAC 15FT

## (undated) DEVICE — ENSEAL 20 CM SHAFT, LARGE JAW: Brand: ENSEAL X1

## (undated) DEVICE — TP SXN YANKR BULB STRL

## (undated) DEVICE — WOUND RETRACTOR AND PROTECTOR: Brand: ALEXIS O WOUND PROTECTOR-RETRACTOR

## (undated) DEVICE — SUT VIC 3/0 SH CR8 18IN J864D

## (undated) DEVICE — SOLUTION,WATER,IRRIGATION,1000ML,STERILE: Brand: MEDLINE